# Patient Record
Sex: FEMALE | Race: WHITE | NOT HISPANIC OR LATINO | Employment: OTHER | URBAN - METROPOLITAN AREA
[De-identification: names, ages, dates, MRNs, and addresses within clinical notes are randomized per-mention and may not be internally consistent; named-entity substitution may affect disease eponyms.]

---

## 2019-02-28 DIAGNOSIS — K21.9 GASTROESOPHAGEAL REFLUX DISEASE, ESOPHAGITIS PRESENCE NOT SPECIFIED: Primary | ICD-10-CM

## 2019-02-28 RX ORDER — GABAPENTIN 400 MG/1
1 CAPSULE ORAL 4 TIMES DAILY
COMMUNITY
End: 2019-04-25 | Stop reason: SDUPTHER

## 2019-02-28 RX ORDER — TEMAZEPAM 7.5 MG/1
1 CAPSULE ORAL DAILY
COMMUNITY
End: 2019-03-07

## 2019-02-28 RX ORDER — PANTOPRAZOLE SODIUM 40 MG/1
1 TABLET, DELAYED RELEASE ORAL DAILY
COMMUNITY
Start: 2019-01-13 | End: 2019-02-28 | Stop reason: SDUPTHER

## 2019-02-28 NOTE — TELEPHONE ENCOUNTER
Script called in to pharmacy    ----- Message from Kiki Hoskins sent at 2/28/2019 11:47 AM CST -----  Contact: patient  Needs refill on PANTOTRAZOLE  40 mg ..[facility] [place]

## 2019-03-05 RX ORDER — PANTOPRAZOLE SODIUM 40 MG/1
40 TABLET, DELAYED RELEASE ORAL DAILY
Qty: 30 TABLET | Refills: 3 | Status: SHIPPED | OUTPATIENT
Start: 2019-03-05 | End: 2020-05-22

## 2019-03-06 DIAGNOSIS — C50.919 MALIGNANT NEOPLASM OF BREAST, STAGE 4, UNSPECIFIED LATERALITY: Primary | ICD-10-CM

## 2019-03-07 ENCOUNTER — OFFICE VISIT (OUTPATIENT)
Dept: HEMATOLOGY/ONCOLOGY | Facility: CLINIC | Age: 74
End: 2019-03-07
Payer: MEDICARE

## 2019-03-07 VITALS
DIASTOLIC BLOOD PRESSURE: 68 MMHG | RESPIRATION RATE: 18 BRPM | HEIGHT: 66 IN | TEMPERATURE: 98 F | BODY MASS INDEX: 21.69 KG/M2 | SYSTOLIC BLOOD PRESSURE: 131 MMHG | OXYGEN SATURATION: 95 % | HEART RATE: 70 BPM | WEIGHT: 135 LBS

## 2019-03-07 DIAGNOSIS — C50.919 CARCINOMA OF BREAST METASTATIC TO BONE, UNSPECIFIED LATERALITY: ICD-10-CM

## 2019-03-07 DIAGNOSIS — C79.51 CARCINOMA OF BREAST METASTATIC TO BONE, UNSPECIFIED LATERALITY: ICD-10-CM

## 2019-03-07 PROCEDURE — 99214 PR OFFICE/OUTPT VISIT, EST, LEVL IV, 30-39 MIN: ICD-10-PCS | Mod: S$GLB,,, | Performed by: NURSE PRACTITIONER

## 2019-03-07 PROCEDURE — 99214 OFFICE O/P EST MOD 30 MIN: CPT | Mod: S$GLB,,, | Performed by: NURSE PRACTITIONER

## 2019-03-07 RX ORDER — LAMOTRIGINE 25 MG/1
500 TABLET ORAL
Status: CANCELLED | OUTPATIENT
Start: 2019-03-07

## 2019-03-07 NOTE — PROGRESS NOTES
Subjective:      Patient ID: Benita Carballo is a 73 y.o. female.    Oncology History:  Oncology History    2002 Rt breast cancer s/p lump with chemo, [place] in [place] Tamoxifen x 1 yr, then Arimidex x 5 yrs. Completed tx 2008.     2015  c/o chronic cough with CT chest 1/28/16 showing extensive adenopathy  With base of lt neck mass 4.5 cm with yosi pulm nodules. RF to Dr ANDREWS Mcnally    2/6/16 bx of lt neck mass carcinoma with feature suggestive of breast primary. ER/%, Her2 borderline by IHC and deemed equivocal by FISH          Breast cancer metastasized to bone    2/4/2016 Initial Diagnosis     Breast cancer metastasized to bone, , lymphoid mass          5/25/2016 - 2/9/2017 Chemotherapy     Taxol/Herceptin x 8 cycles then perjeta added 7/16  Prescribed by Dr Mcnally          2/20/2017 - 6/17/2017 Chemotherapy     Kadcycla prescribed by Dr Mcnally          6/28/2017 Biopsy     Biopsy of rt abd met disease consistent with breast cancer. ER/RI + Her 2 neg per Dr Moreno's note.    Dr Moreno noted path review from bx 2/16 showed HER 2 + equivocal with FISH reading as Equivocal but ratio 1.2. Additional report form 2016 states additional testing was done with different probe and the equivocal results can be noted as negative.     Treatment therapy changed from Her2 based tx to endocrine base therapy by Dr Moreno          6/28/2017 -  Hormone Therapy     Arimidex 6/17-2/18  Aromasin 2/18  Ibrance + Aromasin 5/18 -1/19  Ibrance + faslodex 2/19                 Chief Complaint: Breast Cancer    Benita Carballo is here for continuation of Ibrance, faslodex   So far the patient appears to tolerate chemotherapy fairly well.  The patient does have mild fatigue.  Today pt states noticeable difference in left clavicle mass, pt states decreased in size since starting faslodex.           Past Medical History:   Diagnosis Date    Arthritis     Breast cancer     Depression     High cholesterol     Skin problem     Sleep disorder       Family History   Problem Relation Age of Onset    Lung cancer Father     Melanoma Father      Social History     Socioeconomic History    Marital status: Other     Spouse name: Not on file    Number of children: Not on file    Years of education: Not on file    Highest education level: Not on file   Social Needs    Financial resource strain: Not on file    Food insecurity - worry: Not on file    Food insecurity - inability: Not on file    Transportation needs - medical: Not on file    Transportation needs - non-medical: Not on file   Occupational History    Not on file   Tobacco Use    Smoking status: Current Every Day Smoker     Packs/day: 0.50     Years: 4.00     Pack years: 2.00     Types: Cigarettes    Smokeless tobacco: Never Used    Tobacco comment: Pt reports quitting for about ten years prior to 2015   Substance and Sexual Activity    Alcohol use: No     Frequency: Never    Drug use: No    Sexual activity: Not on file   Other Topics Concern    Not on file   Social History Narrative    Not on file     Past Surgical History:   Procedure Laterality Date    BREAST LUMPECTOMY Right 2002    CATARACT EXTRACTION, BILATERAL  2014    COLONOSCOPY  2008    HYSTERECTOMY      PORTACATH PLACEMENT  05/18/2016    SURGICAL REMOVAL OF NODULE OF VOCAL CORD             Review of systems:  Review of Systems   Constitutional: Negative for activity change, appetite change, chills, diaphoresis, fatigue, fever and unexpected weight change.   HENT: Negative for congestion, dental problem, mouth sores, nosebleeds, sore throat, tinnitus and voice change.    Eyes: Negative for photophobia, discharge and visual disturbance.   Respiratory: Negative for apnea, cough, choking, chest tightness, shortness of breath, wheezing and stridor.    Cardiovascular: Negative for chest pain, palpitations and leg swelling.   Gastrointestinal: Negative for abdominal distention, abdominal pain, anal bleeding, blood in stool,  constipation, diarrhea, nausea, rectal pain and vomiting.   Endocrine: Negative for cold intolerance and heat intolerance.   Genitourinary: Negative for difficulty urinating, dysuria, hematuria, menstrual problem, vaginal bleeding, vaginal discharge and vaginal pain.   Musculoskeletal: Negative for arthralgias, back pain, gait problem, joint swelling and myalgias.   Skin: Negative for color change, pallor, rash and wound.   Neurological: Negative for dizziness, syncope, light-headedness and numbness.   Hematological: Negative for adenopathy. Does not bruise/bleed easily.   Psychiatric/Behavioral: Negative for agitation, confusion, sleep disturbance and suicidal ideas. The patient is not nervous/anxious.        Objective:     Physical Exam   Constitutional: She is oriented to person, place, and time. She appears well-developed and well-nourished.   Neck: Normal range of motion.   Cardiovascular: Normal rate and regular rhythm.   Pulmonary/Chest: Effort normal and breath sounds normal.   Abdominal: Soft.   Neurological: She is alert and oriented to person, place, and time.   Psychiatric: She has a normal mood and affect.   Vitals reviewed.    Vitals:    03/07/19 1053   BP: 131/68   Pulse: 70   Resp: 18   Temp: 97.8 °F (36.6 °C)   Body surface area is 1.69 meters squared.    Labs:  Reviewed none completed.     Assessment:      1. 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, unspecified laterality           Plan:   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, unspecified laterality      Adv to cont Ibrance/Faslodex. Xgeva on hold for dental work. Will order PET at next visit  Pt undergoing chemotherapy, with risks, side effects and benefits discussed. Pt is instructed to RTC with labs for continued monitoring of treatment as instructed.     Rosalind Ricks, ANP

## 2019-04-05 ENCOUNTER — OFFICE VISIT (OUTPATIENT)
Dept: HEMATOLOGY/ONCOLOGY | Facility: CLINIC | Age: 74
End: 2019-04-05
Payer: MEDICARE

## 2019-04-05 VITALS
DIASTOLIC BLOOD PRESSURE: 64 MMHG | HEIGHT: 66 IN | WEIGHT: 139.19 LBS | OXYGEN SATURATION: 93 % | TEMPERATURE: 97 F | HEART RATE: 78 BPM | BODY MASS INDEX: 22.37 KG/M2 | RESPIRATION RATE: 18 BRPM | SYSTOLIC BLOOD PRESSURE: 135 MMHG

## 2019-04-05 DIAGNOSIS — C50.911 CARCINOMA OF RIGHT BREAST METASTATIC TO BONE: Primary | ICD-10-CM

## 2019-04-05 DIAGNOSIS — G62.9 NEUROPATHY: ICD-10-CM

## 2019-04-05 DIAGNOSIS — C79.51 CARCINOMA OF RIGHT BREAST METASTATIC TO BONE: Primary | ICD-10-CM

## 2019-04-05 PROCEDURE — 99215 PR OFFICE/OUTPT VISIT, EST, LEVL V, 40-54 MIN: ICD-10-PCS | Mod: S$GLB,,, | Performed by: NURSE PRACTITIONER

## 2019-04-05 PROCEDURE — 99215 OFFICE O/P EST HI 40 MIN: CPT | Mod: S$GLB,,, | Performed by: NURSE PRACTITIONER

## 2019-04-05 RX ORDER — LAMOTRIGINE 25 MG/1
500 TABLET ORAL
Status: CANCELLED | OUTPATIENT
Start: 2019-04-05

## 2019-04-05 NOTE — PROGRESS NOTES
Subjective:      Patient ID: Benita Carballo is a 73 y.o. female.    Oncology History:  Oncology History    2002 Rt breast cancer s/p lump with chemo, [place] in [place] Tamoxifen x 1 yr, then Arimidex x 5 yrs. Completed tx 2008.     2015  c/o chronic cough with CT chest 1/28/16 showing extensive adenopathy  With base of lt neck mass 4.5 cm with yosi pulm nodules. RF to Dr ANDREWS Mcnally    2/6/16 bx of lt neck mass carcinoma with feature suggestive of breast primary. ER/%, Her2 borderline by IHC and deemed equivocal by FISH          Breast cancer metastasized to bone    2/4/2016 Initial Diagnosis     Breast cancer metastasized to bone, , lymphoid mass          5/25/2016 - 2/9/2017 Chemotherapy     Taxol/Herceptin x 8 cycles then perjeta added 7/16  Prescribed by Dr Mcnally          2/20/2017 - 6/17/2017 Chemotherapy     Kadcycla prescribed by Dr Mcnally          6/28/2017 Biopsy     Biopsy of rt abd met disease consistent with breast cancer. ER/ME + Her 2 neg per Dr Moreno's note.    Dr Moreno noted path review from bx 2/16 showed HER 2 + equivocal with FISH reading as Equivocal but ratio 1.2. Additional report form 2016 states additional testing was done with different probe and the equivocal results can be noted as negative.     Treatment therapy changed from Her2 based tx to endocrine base therapy by Dr Moreno          6/28/2017 -  Hormone Therapy     Arimidex 6/17-2/18  Aromasin 2/18  Ibrance + Aromasin 5/18 -1/19  Ibrance + faslodex 2/19                 Chief Complaint: Breast Cancer    Benita Carballo is here for continuation of Ibrance, faslodex. Pt now on monthly faslodex injections. Patient appears to tolerate chemotherapy fairly well.  The patient does have mild fatigue and chronic neuropathy that is worse at night. Pt on gabapentin with modest relief. Discussed Anodyne therapy option but declines at this time.    Today pt states noticeable difference in left clavicle mass, pt states decreased in size since starting  faslodex.         Past Medical History:   Diagnosis Date    Arthritis     Bone cancer     Breast cancer     Cancer     Cataract     Depression     GERD (gastroesophageal reflux disease)     High cholesterol     Skin problem     Sleep disorder      Family History   Problem Relation Age of Onset    Lung cancer Father     Melanoma Father      Social History     Socioeconomic History    Marital status: Other     Spouse name: Not on file    Number of children: Not on file    Years of education: Not on file    Highest education level: Not on file   Occupational History    Not on file   Social Needs    Financial resource strain: Not on file    Food insecurity:     Worry: Not on file     Inability: Not on file    Transportation needs:     Medical: Not on file     Non-medical: Not on file   Tobacco Use    Smoking status: Current Every Day Smoker     Packs/day: 0.50     Years: 4.00     Pack years: 2.00     Types: Cigarettes    Smokeless tobacco: Never Used    Tobacco comment: Pt reports quitting for about ten years prior to 2015   Substance and Sexual Activity    Alcohol use: No     Frequency: Never    Drug use: No    Sexual activity: Not on file   Lifestyle    Physical activity:     Days per week: Not on file     Minutes per session: Not on file    Stress: Not on file   Relationships    Social connections:     Talks on phone: Not on file     Gets together: Not on file     Attends Worship service: Not on file     Active member of club or organization: Not on file     Attends meetings of clubs or organizations: Not on file     Relationship status: Not on file   Other Topics Concern    Not on file   Social History Narrative    Not on file     Past Surgical History:   Procedure Laterality Date    APPENDECTOMY      BREAST LUMPECTOMY Right 2002    CATARACT EXTRACTION, BILATERAL  2014    COLONOSCOPY  2008    HYSTERECTOMY      PORTACATH PLACEMENT  05/18/2016    SURGICAL REMOVAL OF NODULE OF  VOCAL CORD             Review of systems:  Review of Systems   Constitutional: Negative for activity change, appetite change, chills, diaphoresis, fatigue, fever and unexpected weight change.   HENT: Negative for congestion, dental problem, mouth sores, nosebleeds, sore throat, tinnitus and voice change.    Eyes: Negative for photophobia, discharge and visual disturbance.   Respiratory: Negative for apnea, cough, choking, chest tightness, shortness of breath, wheezing and stridor.    Cardiovascular: Negative for chest pain, palpitations and leg swelling.   Gastrointestinal: Negative for abdominal distention, abdominal pain, anal bleeding, blood in stool, constipation, diarrhea, nausea, rectal pain and vomiting.   Endocrine: Negative for cold intolerance and heat intolerance.   Genitourinary: Negative for difficulty urinating, dysuria, hematuria, menstrual problem, vaginal bleeding, vaginal discharge and vaginal pain.   Musculoskeletal: Negative for arthralgias, back pain, gait problem, joint swelling and myalgias.   Skin: Negative for color change, pallor, rash and wound.   Neurological: Negative for dizziness, syncope, light-headedness and numbness.   Hematological: Negative for adenopathy. Does not bruise/bleed easily.   Psychiatric/Behavioral: Negative for agitation, confusion, sleep disturbance and suicidal ideas. The patient is not nervous/anxious.        Objective:     Physical Exam   Constitutional: She is oriented to person, place, and time. She appears well-developed and well-nourished.   Neck: Normal range of motion.   Cardiovascular: Normal rate and regular rhythm.   Pulmonary/Chest: Effort normal and breath sounds normal.   Abdominal: Soft.   Musculoskeletal:        Arms:  Neurological: She is alert and oriented to person, place, and time.   Psychiatric: She has a normal mood and affect.   Vitals reviewed.    Vitals:    04/05/19 1046   BP: 135/64   Pulse: 78   Resp: 18   Temp: 97.2 °F (36.2 °C)   Body  surface area is 1.71 meters squared.    Labs:  WBC 3.5 HGB 13.1  Cr 0.86 AST 15 ALT 15    Assessment:      1. Carcinoma of right breast metastatic to bone    2. Neuropathy           Plan:   Carcinoma of right breast metastatic to bone  -     CBC w/ DIFF; Future; Expected date: 04/05/2019  -     CMP; Future; Expected date: 04/05/2019    Neuropathy      - Adv to cont Ibrance/Faslodex.   - Xgeva on hold for dental work. Pt states still working on finding dental evaluation.   - Repeat PET scan ordered for next visit.   - Cont gabapentin as prescribed.   - Pt undergoing chemotherapy, with risks, side effects and benefits discussed. Pt is instructed to RTC with labs for continued monitoring of treatment as instructed.     Rosalind Ricks, ANP

## 2019-04-22 DIAGNOSIS — C50.919 CARCINOMA OF BREAST METASTATIC TO BONE, UNSPECIFIED LATERALITY: ICD-10-CM

## 2019-04-22 DIAGNOSIS — C79.51 CARCINOMA OF BREAST METASTATIC TO BONE, UNSPECIFIED LATERALITY: ICD-10-CM

## 2019-04-22 DIAGNOSIS — C50.911 CARCINOMA OF RIGHT BREAST METASTATIC TO BONE: ICD-10-CM

## 2019-04-22 DIAGNOSIS — C79.51 CARCINOMA OF RIGHT BREAST METASTATIC TO BONE: ICD-10-CM

## 2019-04-22 DIAGNOSIS — C50.919 MALIGNANT NEOPLASM OF BREAST, STAGE 4, UNSPECIFIED LATERALITY: Primary | ICD-10-CM

## 2019-04-22 DIAGNOSIS — C50.411 MALIGNANT NEOPLASM OF UPPER-OUTER QUADRANT OF RIGHT BREAST IN FEMALE, ESTROGEN RECEPTOR POSITIVE: ICD-10-CM

## 2019-04-22 DIAGNOSIS — Z17.0 MALIGNANT NEOPLASM OF UPPER-OUTER QUADRANT OF RIGHT BREAST IN FEMALE, ESTROGEN RECEPTOR POSITIVE: ICD-10-CM

## 2019-04-23 DIAGNOSIS — C50.411 CARCINOMA OF BREAST UPPER OUTER QUADRANT, RIGHT: ICD-10-CM

## 2019-04-23 DIAGNOSIS — C79.51 CARCINOMA OF RIGHT BREAST METASTATIC TO BONE: Primary | ICD-10-CM

## 2019-04-23 DIAGNOSIS — C50.911 CARCINOMA OF RIGHT BREAST METASTATIC TO BONE: Primary | ICD-10-CM

## 2019-04-25 DIAGNOSIS — G62.9 NEUROPATHY: Primary | ICD-10-CM

## 2019-04-25 RX ORDER — GABAPENTIN 400 MG/1
400 CAPSULE ORAL 3 TIMES DAILY
Qty: 120 CAPSULE | Refills: 3 | Status: SHIPPED | OUTPATIENT
Start: 2019-04-25 | End: 2019-08-29 | Stop reason: ALTCHOICE

## 2019-04-25 RX ORDER — FUROSEMIDE 20 MG/1
TABLET ORAL
COMMUNITY
Start: 2019-04-11 | End: 2019-07-08

## 2019-04-25 RX ORDER — POTASSIUM CHLORIDE 1500 MG/1
TABLET, EXTENDED RELEASE ORAL
COMMUNITY
Start: 2019-04-11 | End: 2019-07-08

## 2019-05-02 ENCOUNTER — OFFICE VISIT (OUTPATIENT)
Dept: HEMATOLOGY/ONCOLOGY | Facility: CLINIC | Age: 74
End: 2019-05-02
Payer: MEDICARE

## 2019-05-02 VITALS
WEIGHT: 133.19 LBS | DIASTOLIC BLOOD PRESSURE: 69 MMHG | BODY MASS INDEX: 21.4 KG/M2 | OXYGEN SATURATION: 94 % | RESPIRATION RATE: 14 BRPM | HEIGHT: 66 IN | HEART RATE: 94 BPM | TEMPERATURE: 97 F | SYSTOLIC BLOOD PRESSURE: 111 MMHG

## 2019-05-02 DIAGNOSIS — C50.911 CARCINOMA OF RIGHT BREAST METASTATIC TO BONE: Primary | ICD-10-CM

## 2019-05-02 DIAGNOSIS — C79.51 CARCINOMA OF RIGHT BREAST METASTATIC TO BONE: Primary | ICD-10-CM

## 2019-05-02 DIAGNOSIS — G62.9 NEUROPATHY: ICD-10-CM

## 2019-05-02 PROCEDURE — 99215 OFFICE O/P EST HI 40 MIN: CPT | Mod: S$GLB,,, | Performed by: NURSE PRACTITIONER

## 2019-05-02 PROCEDURE — 99215 PR OFFICE/OUTPT VISIT, EST, LEVL V, 40-54 MIN: ICD-10-PCS | Mod: S$GLB,,, | Performed by: NURSE PRACTITIONER

## 2019-05-02 RX ORDER — LAMOTRIGINE 25 MG/1
500 TABLET ORAL
Status: CANCELLED | OUTPATIENT
Start: 2019-05-09

## 2019-05-02 NOTE — PROGRESS NOTES
Subjective:      Patient ID: Benita Carballo is a 73 y.o. female.    Oncology History:  Oncology History    2002 Rt breast cancer s/p lump with chemo, [place] in [place] Tamoxifen x 1 yr, then Arimidex x 5 yrs. Completed tx 2008.     2015  c/o chronic cough with CT chest 1/28/16 showing extensive adenopathy  With base of lt neck mass 4.5 cm with yosi pulm nodules. RF to Dr ANDREWS Mcnally    2/6/16 bx of lt neck mass carcinoma with feature suggestive of breast primary. ER/%, Her2 borderline by IHC and deemed equivocal by FISH          Breast cancer metastasized to bone    2/4/2016 Initial Diagnosis     Breast cancer metastasized to bone, , lymphoid mass          5/25/2016 - 2/9/2017 Chemotherapy     Taxol/Herceptin x 8 cycles then perjeta added 7/16  Prescribed by Dr Mcnally          2/20/2017 - 6/17/2017 Chemotherapy     Kadcycla prescribed by Dr Mcnally          6/28/2017 Biopsy     Biopsy of rt abd met disease consistent with breast cancer. ER/MN + Her 2 neg per Dr Moreno's note.    Dr Moreno noted path review from bx 2/16 showed HER 2 + equivocal with FISH reading as Equivocal but ratio 1.2. Additional report form 2016 states additional testing was done with different probe and the equivocal results can be noted as negative.     Treatment therapy changed from Her2 based tx to endocrine base therapy by Dr Moreno          6/28/2017 -  Hormone Therapy     Arimidex 6/17-2/18  Aromasin 2/18  Ibrance + Aromasin 5/18 -1/19  Ibrance + faslodex 2/19 4/30/2019 Imaging Significant Findings     PET/CT: Lesions involving the medial clavicle , subcarinal LN, pleural based soft tissue density adjacent to T11 and lesion in left lobe of liver diminished in size and SUV.   RECIST 34% decrease in disease              Chief Complaint: Breast Cancer    Benita Carballo is here for continuation of Ibrance, faslodex. Pt now on monthly faslodex injections. Patient appears to tolerate chemotherapy fairly well.  The patient does have mild  fatigue and chronic neuropathy that is worse at night. Pt on gabapentin with modest relief. Discussed Anodyne therapy option but declines at this time. Pt states noticeable difference in left clavicle mass, pt states decreased in size since starting faslodex.     Today, Ms Carballo is here to discuss recent CT/PET scan completed on 4/30/19 showing response to current treatment regimen.       Past Medical History:   Diagnosis Date    Arthritis     Bone cancer     Breast cancer     Cancer     Cataract     Depression     GERD (gastroesophageal reflux disease)     High cholesterol     Skin problem     Sleep disorder      Family History   Problem Relation Age of Onset    Lung cancer Father     Melanoma Father      Social History     Socioeconomic History    Marital status: Other     Spouse name: Not on file    Number of children: Not on file    Years of education: Not on file    Highest education level: Not on file   Occupational History    Not on file   Social Needs    Financial resource strain: Not on file    Food insecurity:     Worry: Not on file     Inability: Not on file    Transportation needs:     Medical: Not on file     Non-medical: Not on file   Tobacco Use    Smoking status: Current Every Day Smoker     Packs/day: 0.50     Years: 4.00     Pack years: 2.00     Types: Cigarettes    Smokeless tobacco: Never Used    Tobacco comment: Pt reports quitting for about ten years prior to 2015   Substance and Sexual Activity    Alcohol use: No     Frequency: Never    Drug use: No    Sexual activity: Not on file   Lifestyle    Physical activity:     Days per week: Not on file     Minutes per session: Not on file    Stress: Not on file   Relationships    Social connections:     Talks on phone: Not on file     Gets together: Not on file     Attends [employer] service: Not on file     Active member of club or organization: Not on file     Attends meetings of clubs or organizations: Not on file      Relationship status: Not on file   Other Topics Concern    Not on file   Social History Narrative    Not on file     Past Surgical History:   Procedure Laterality Date    APPENDECTOMY      BREAST LUMPECTOMY Right 2002    CATARACT EXTRACTION, BILATERAL  2014    COLONOSCOPY  2008    HYSTERECTOMY      PORTACATH PLACEMENT  05/18/2016    SURGICAL REMOVAL OF NODULE OF VOCAL CORD             Review of systems:  Review of Systems   Constitutional: Negative for activity change, appetite change, chills, diaphoresis, fatigue, fever and unexpected weight change.   HENT: Negative for congestion, dental problem, mouth sores, nosebleeds, sore throat, tinnitus and voice change.    Eyes: Negative for photophobia, discharge and visual disturbance.   Respiratory: Negative for apnea, cough, choking, chest tightness, shortness of breath, wheezing and stridor.    Cardiovascular: Negative for chest pain, palpitations and leg swelling.   Gastrointestinal: Negative for abdominal distention, abdominal pain, anal bleeding, blood in stool, constipation, diarrhea, nausea, rectal pain and vomiting.   Endocrine: Negative for cold intolerance and heat intolerance.   Genitourinary: Negative for difficulty urinating, dysuria, hematuria, menstrual problem, vaginal bleeding, vaginal discharge and vaginal pain.   Musculoskeletal: Negative for arthralgias, back pain, gait problem, joint swelling and myalgias.   Skin: Negative for color change, pallor, rash and wound.   Neurological: Negative for dizziness, syncope, light-headedness and numbness.   Hematological: Negative for adenopathy. Does not bruise/bleed easily.   Psychiatric/Behavioral: Negative for agitation, confusion, sleep disturbance and suicidal ideas. The patient is not nervous/anxious.        Objective:     Physical Exam   Constitutional: She is oriented to person, place, and time. She appears well-developed and well-nourished.   Neck: Normal range of motion.   Cardiovascular:  Normal rate and regular rhythm.   Pulmonary/Chest: Effort normal and breath sounds normal.   Abdominal: Soft.   Musculoskeletal:        Arms:  Neurological: She is alert and oriented to person, place, and time.   Psychiatric: She has a normal mood and affect.   Vitals reviewed.    Vitals:    05/02/19 1030   BP: 111/69   Pulse: 94   Resp: 14   Temp: 97.2 °F (36.2 °C)   Body surface area is 1.68 meters squared.    Labs:  WBC 3.5 HGB 13.1  Cr 0.86 AST 15 ALT 15    Assessment:      1. Carcinoma of right breast metastatic to bone    2. Neuropathy           Plan:   Carcinoma of right breast metastatic to bone    Neuropathy      - Pet scan discussed with patient and family member showing response to therapy.  - Adv to cont Ibrance/Faslodex which is scheduled for next Thusday.    - Xgeva on hold for dental work. Pt states still working on finding dental evaluation.   - Cont gabapentin as prescribed.   - Pt undergoing chemotherapy, with risks, side effects and benefits discussed. Pt is instructed to RTC with labs for continued monitoring of treatment as instructed.   - Today I spent over 25 - 30 minutes in patient care.  More than 50% of that time was spent in direct face-to-face patient counseling.        Rosalind Ricks, ANP

## 2019-06-06 ENCOUNTER — OFFICE VISIT (OUTPATIENT)
Dept: HEMATOLOGY/ONCOLOGY | Facility: CLINIC | Age: 74
End: 2019-06-06
Payer: MEDICARE

## 2019-06-06 VITALS
HEIGHT: 66 IN | SYSTOLIC BLOOD PRESSURE: 145 MMHG | BODY MASS INDEX: 21.69 KG/M2 | HEART RATE: 70 BPM | DIASTOLIC BLOOD PRESSURE: 73 MMHG | RESPIRATION RATE: 14 BRPM | OXYGEN SATURATION: 92 % | TEMPERATURE: 98 F | WEIGHT: 135 LBS

## 2019-06-06 DIAGNOSIS — C79.51 CARCINOMA OF RIGHT BREAST METASTATIC TO BONE: Primary | ICD-10-CM

## 2019-06-06 DIAGNOSIS — C50.911 CARCINOMA OF RIGHT BREAST METASTATIC TO BONE: Primary | ICD-10-CM

## 2019-06-06 DIAGNOSIS — D64.9 FATIGUE ASSOCIATED WITH ANEMIA: ICD-10-CM

## 2019-06-06 PROCEDURE — 99214 OFFICE O/P EST MOD 30 MIN: CPT | Mod: S$GLB,,, | Performed by: NURSE PRACTITIONER

## 2019-06-06 PROCEDURE — 99214 PR OFFICE/OUTPT VISIT, EST, LEVL IV, 30-39 MIN: ICD-10-PCS | Mod: S$GLB,,, | Performed by: NURSE PRACTITIONER

## 2019-06-06 RX ORDER — LAMOTRIGINE 25 MG/1
500 TABLET ORAL
Status: CANCELLED | OUTPATIENT
Start: 2019-06-06

## 2019-06-06 NOTE — PROGRESS NOTES
Subjective:      Patient ID: Benita Carballo is a 73 y.o. female.    Oncology History:  Oncology History    2002 Rt breast cancer s/p lump with chemo, [place] in [place]. Tamoxifen x 1 yr, then Arimidex x 5 yrs. Completed tx 2008.     2015  c/o chronic cough with CT chest 1/28/16 showing extensive adenopathy  With base of lt neck mass 4.5 cm with yosi pulm nodules. RF to Dr ANDREWS Mcnally    2/6/16 bx of lt neck mass carcinoma with feature suggestive of breast primary. ER/%, Her2 borderline by IHC and deemed equivocal by FISH          Breast cancer metastasized to bone    2/4/2016 Initial Diagnosis     Breast cancer metastasized to bone, , lymphoid mass          5/25/2016 - 2/9/2017 Chemotherapy     Taxol/Herceptin x 8 cycles then perjeta added 7/16  Prescribed by Dr Mcnally          2/20/2017 - 6/17/2017 Chemotherapy     Kadcycla prescribed by Dr Mcnally          6/28/2017 Biopsy     Biopsy of rt abd met disease consistent with breast cancer. ER/TX + Her 2 neg per Dr Moreno's note.    Dr Moreno noted path review from bx 2/16 showed HER 2 + equivocal with FISH reading as Equivocal but ratio 1.2. Additional report form 2016 states additional testing was done with different probe and the equivocal results can be noted as negative.     Treatment therapy changed from Her2 based tx to endocrine base therapy by Dr Moreno          6/28/2017 -  Hormone Therapy     Arimidex 6/17-2/18  Aromasin 2/18  Ibrance + Aromasin 5/18 -1/19  Ibrance + faslodex 2/19 4/30/2019 Imaging Significant Findings     PET/CT: Lesions involving the medial clavicle , subcarinal LN, pleural based soft tissue density adjacent to T11 and lesion in left lobe of liver diminished in size and SUV.   RECIST 34% decrease in disease              Chief Complaint: Breast Cancer    Benita Carballo is here for continuation of Ibrance, faslodex which she started in 2/19. Pt now on monthly faslodex injections. Patient appears to tolerate chemotherapy fairly well.  The  patient does have mild fatigue and chronic neuropathy that is worse at night. Pt on gabapentin with modest relief. Discussed Anodyne therapy option but declines at this time. Her xgeva has been on hold due to upcoming dental work that has been planned for several months but has not yet been completed.     Pt states noticeable difference in left clavicle mass, pt states decreased in size since starting faslodex. Ms Carballo most recent CT/PET scan on 4/30/19 showed response to current treatment regimen.     Today, she is her for month lab evaluation for her concurrent use of Ibrance + Faslodex. She states she is to undergo a vocal cord bx on Monday with Dr Singh due to chronic hoarseness per PCP requests. She c/o of fatigue and continuing hair thining while on faslodex.       Past Medical History:   Diagnosis Date    Arthritis     Bone cancer     Breast cancer     Cancer     Cataract     Depression     Fatigue associated with anemia 6/6/2019    GERD (gastroesophageal reflux disease)     High cholesterol     Skin problem     Sleep disorder      Family History   Problem Relation Age of Onset    Lung cancer Father     Melanoma Father      Social History     Socioeconomic History    Marital status: Other     Spouse name: Not on file    Number of children: Not on file    Years of education: Not on file    Highest education level: Not on file   Occupational History    Not on file   Social Needs    Financial resource strain: Not on file    Food insecurity:     Worry: Not on file     Inability: Not on file    Transportation needs:     Medical: Not on file     Non-medical: Not on file   Tobacco Use    Smoking status: Current Every Day Smoker     Packs/day: 0.50     Years: 4.00     Pack years: 2.00     Types: Cigarettes    Smokeless tobacco: Never Used    Tobacco comment: Pt reports quitting for about ten years prior to 2015   Substance and Sexual Activity    Alcohol use: No     Frequency: Never     Drug use: No    Sexual activity: Not on file   Lifestyle    Physical activity:     Days per week: Not on file     Minutes per session: Not on file    Stress: Not on file   Relationships    Social connections:     Talks on phone: Not on file     Gets together: Not on file     Attends Jainism service: Not on file     Active member of club or organization: Not on file     Attends meetings of clubs or organizations: Not on file     Relationship status: Not on file   Other Topics Concern    Not on file   Social History Narrative    Not on file     Past Surgical History:   Procedure Laterality Date    APPENDECTOMY      BREAST LUMPECTOMY Right 2002    CATARACT EXTRACTION, BILATERAL  2014    COLONOSCOPY  2008    HYSTERECTOMY      PORTACATH PLACEMENT  05/18/2016    SURGICAL REMOVAL OF NODULE OF VOCAL CORD             Review of systems:  Review of Systems   Constitutional: Negative for activity change, appetite change, chills, diaphoresis, fatigue, fever and unexpected weight change.   HENT: Positive for voice change. Negative for congestion, dental problem, mouth sores, nosebleeds, sore throat and tinnitus.    Eyes: Negative for photophobia, discharge and visual disturbance.   Respiratory: Negative for apnea, cough, choking, chest tightness, shortness of breath, wheezing and stridor.    Cardiovascular: Negative for chest pain, palpitations and leg swelling.   Gastrointestinal: Negative for abdominal distention, abdominal pain, anal bleeding, blood in stool, constipation, diarrhea, nausea, rectal pain and vomiting.   Endocrine: Negative for cold intolerance and heat intolerance.   Genitourinary: Negative for difficulty urinating, dysuria, hematuria, menstrual problem, vaginal bleeding, vaginal discharge and vaginal pain.   Musculoskeletal: Negative for arthralgias, back pain, gait problem, joint swelling and myalgias.   Skin: Negative for color change, pallor, rash and wound.   Neurological: Negative for  dizziness, syncope, light-headedness and numbness.   Hematological: Negative for adenopathy. Does not bruise/bleed easily.   Psychiatric/Behavioral: Negative for agitation, confusion, sleep disturbance and suicidal ideas. The patient is not nervous/anxious.        Objective:     Physical Exam   Constitutional: She is oriented to person, place, and time. She appears well-developed and well-nourished.   Neck: Normal range of motion.   Cardiovascular: Normal rate and regular rhythm.   Pulmonary/Chest: Effort normal and breath sounds normal.   Abdominal: Soft.   Musculoskeletal:        Arms:  Neurological: She is alert and oriented to person, place, and time.   Psychiatric: She has a normal mood and affect.   Vitals reviewed.    Vitals:    06/06/19 1022   BP: (!) 145/73   Pulse: 70   Resp: 14   Temp: 98.3 °F (36.8 °C)   Body surface area is 1.69 meters squared.    Labs:  WBC 5.0 HGB 11.8  Cr 0.86 AST 15 ALT 15    Assessment:      1. Carcinoma of right breast metastatic to bone    2. Fatigue associated with anemia           Plan:   Carcinoma of right breast metastatic to bone  -     CBC w/ DIFF; Standing  -     CMP; Standing    Fatigue associated with anemia      - Adv to cont Ibrance/Faslodex which is due today/   - Xgeva on hold for dental work. Pt states still working on finding dental evaluation.   - Cont gabapentin as prescribed.  - B12 today.  - RTC 1 month with labs.        Rosalind Ricks, ANP

## 2019-07-05 DIAGNOSIS — C50.911 CARCINOMA OF RIGHT BREAST METASTATIC TO BONE: Primary | ICD-10-CM

## 2019-07-05 DIAGNOSIS — C79.51 CARCINOMA OF RIGHT BREAST METASTATIC TO BONE: Primary | ICD-10-CM

## 2019-07-08 ENCOUNTER — OFFICE VISIT (OUTPATIENT)
Dept: HEMATOLOGY/ONCOLOGY | Facility: CLINIC | Age: 74
End: 2019-07-08
Payer: MEDICARE

## 2019-07-08 VITALS
RESPIRATION RATE: 12 BRPM | HEART RATE: 71 BPM | TEMPERATURE: 98 F | HEIGHT: 66 IN | DIASTOLIC BLOOD PRESSURE: 59 MMHG | BODY MASS INDEX: 21.89 KG/M2 | SYSTOLIC BLOOD PRESSURE: 108 MMHG | WEIGHT: 136.19 LBS | OXYGEN SATURATION: 94 %

## 2019-07-08 DIAGNOSIS — C50.911 CARCINOMA OF RIGHT BREAST METASTATIC TO BONE: ICD-10-CM

## 2019-07-08 DIAGNOSIS — C50.812 MALIGNANT NEOPLASM OF OVERLAPPING SITES OF LEFT BREAST IN FEMALE, ESTROGEN RECEPTOR POSITIVE: ICD-10-CM

## 2019-07-08 DIAGNOSIS — T50.905A DRUG-RELATED HAIR LOSS: Primary | ICD-10-CM

## 2019-07-08 DIAGNOSIS — C79.51 CARCINOMA OF RIGHT BREAST METASTATIC TO BONE: ICD-10-CM

## 2019-07-08 DIAGNOSIS — Z17.0 MALIGNANT NEOPLASM OF OVERLAPPING SITES OF LEFT BREAST IN FEMALE, ESTROGEN RECEPTOR POSITIVE: ICD-10-CM

## 2019-07-08 DIAGNOSIS — C79.51 CANCER, METASTATIC TO BONE: ICD-10-CM

## 2019-07-08 DIAGNOSIS — G62.9 NEUROPATHY: ICD-10-CM

## 2019-07-08 DIAGNOSIS — L65.8 DRUG-RELATED HAIR LOSS: Primary | ICD-10-CM

## 2019-07-08 DIAGNOSIS — D64.9 FATIGUE ASSOCIATED WITH ANEMIA: ICD-10-CM

## 2019-07-08 PROCEDURE — 99215 OFFICE O/P EST HI 40 MIN: CPT | Mod: S$GLB,,, | Performed by: NURSE PRACTITIONER

## 2019-07-08 PROCEDURE — 99215 PR OFFICE/OUTPT VISIT, EST, LEVL V, 40-54 MIN: ICD-10-PCS | Mod: S$GLB,,, | Performed by: NURSE PRACTITIONER

## 2019-07-08 RX ORDER — LAMOTRIGINE 25 MG/1
500 TABLET ORAL
Status: CANCELLED | OUTPATIENT
Start: 2019-07-08

## 2019-07-08 NOTE — PROGRESS NOTES
Subjective:      Patient ID: Benita Carballo is a 73 y.o. female.    Oncology History:  Oncology History    2002 Rt breast cancer s/p lump with chemo, [place] in [place] Tamoxifen x 1 yr, then Arimidex x 5 yrs. Completed tx 2008.     2015  c/o chronic cough with CT chest 1/28/16 showing extensive adenopathy  With base of lt neck mass 4.5 cm with yosi pulm nodules. RF to Dr ANDREWS Mcnally    2/6/16 bx of lt neck mass carcinoma with feature suggestive of breast primary. ER/%, Her2 borderline by IHC and deemed equivocal by FISH          Breast cancer metastasized to bone    2/4/2016 Initial Diagnosis     Breast cancer metastasized to bone, , lymphoid mass          5/25/2016 - 2/9/2017 Chemotherapy     Taxol/Herceptin x 8 cycles then perjeta added 7/16  Prescribed by Dr Mcnally          2/20/2017 - 6/17/2017 Chemotherapy     Kadcycla prescribed by Dr Mcnally          6/28/2017 Biopsy     Biopsy of rt abd met disease consistent with breast cancer. ER/NC + Her 2 neg per Dr Moreno's note.    Dr Moreno noted path review from bx 2/16 showed HER 2 + equivocal with FISH reading as Equivocal but ratio 1.2. Additional report form 2016 states additional testing was done with different probe and the equivocal results can be noted as negative.     Treatment therapy changed from Her2 based tx to endocrine base therapy by Dr Moreno          6/28/2017 -  Hormone Therapy     Arimidex 6/17-2/18  Aromasin 2/18  Ibrance + Aromasin 5/18 -1/19  Ibrance + faslodex 2/19 4/30/2019 Imaging Significant Findings     PET/CT: Lesions involving the medial clavicle , subcarinal LN, pleural based soft tissue density adjacent to T11 and lesion in left lobe of liver diminished in size and SUV.   RECIST 34% decrease in disease              Chief Complaint: Breast Cancer    Benita Carballo is here for continuation of Ibrance, faslodex which she started in 2/19. Pt now on monthly faslodex injections. Patient appears to tolerate chemotherapy fairly well.  The  patient does have mild fatigue and chronic neuropathy that is worse at night. Pt on gabapentin with modest relief. Discussed Anodyne therapy option but declines at this time. Her xgeva has been on hold due to upcoming dental work that has been planned for several months but has not yet been completed.     Pt states noticeable difference in left clavicle mass, pt states decreased in size since starting faslodex. Ms Carballo most recent CT/PET scan on 4/30/19 showed response to current treatment regimen.     Today, she is her for month lab evaluation for her concurrent use of Ibrance + Faslodex. She states she is to undergo a vocal cord bx on Monday with Dr Singh due to chronic hoarseness per PCP requests which shows squamous dysplasia and no evidence of malignancy. She c/o of fatigue and continuing hair thining while on Ibrance + faslodex.       Past Medical History:   Diagnosis Date    Arthritis     Bone cancer     Breast cancer     Cancer     Cataract     Depression     Fatigue associated with anemia 6/6/2019    GERD (gastroesophageal reflux disease)     High cholesterol     Skin problem     Sleep disorder      Family History   Problem Relation Age of Onset    Lung cancer Father     Melanoma Father      Social History     Socioeconomic History    Marital status: Other     Spouse name: Not on file    Number of children: Not on file    Years of education: Not on file    Highest education level: Not on file   Occupational History    Not on file   Social Needs    Financial resource strain: Not on file    Food insecurity:     Worry: Not on file     Inability: Not on file    Transportation needs:     Medical: Not on file     Non-medical: Not on file   Tobacco Use    Smoking status: Current Every Day Smoker     Packs/day: 0.50     Years: 4.00     Pack years: 2.00     Types: Cigarettes    Smokeless tobacco: Never Used    Tobacco comment: Pt reports quitting for about ten years prior to 2015    Substance and Sexual Activity    Alcohol use: No     Frequency: Never    Drug use: No    Sexual activity: Not on file   Lifestyle    Physical activity:     Days per week: Not on file     Minutes per session: Not on file    Stress: Not on file   Relationships    Social connections:     Talks on phone: Not on file     Gets together: Not on file     Attends [employer] service: Not on file     Active member of club or organization: Not on file     Attends meetings of clubs or organizations: Not on file     Relationship status: Not on file   Other Topics Concern    Not on file   Social History Narrative    Not on file     Past Surgical History:   Procedure Laterality Date    APPENDECTOMY      BREAST LUMPECTOMY Right 2002    CATARACT EXTRACTION, BILATERAL  2014    COLONOSCOPY  2008    HYSTERECTOMY      PORTACATH PLACEMENT  05/18/2016    SURGICAL REMOVAL OF NODULE OF VOCAL CORD             Review of systems:  Review of Systems   Constitutional: Negative for activity change, appetite change, chills, diaphoresis, fatigue, fever and unexpected weight change.   HENT: Positive for voice change. Negative for congestion, dental problem, mouth sores, nosebleeds, sore throat and tinnitus.    Eyes: Negative for photophobia, discharge and visual disturbance.   Respiratory: Negative for apnea, cough, choking, chest tightness, shortness of breath, wheezing and stridor.    Cardiovascular: Negative for chest pain, palpitations and leg swelling.   Gastrointestinal: Negative for abdominal distention, abdominal pain, anal bleeding, blood in stool, constipation, diarrhea, nausea, rectal pain and vomiting.   Endocrine: Negative for cold intolerance and heat intolerance.   Genitourinary: Negative for difficulty urinating, dysuria, hematuria, menstrual problem, vaginal bleeding, vaginal discharge and vaginal pain.   Musculoskeletal: Negative for arthralgias, back pain, gait problem, joint swelling and myalgias.   Skin: Negative  for color change, pallor, rash and wound.   Neurological: Negative for dizziness, syncope, light-headedness and numbness.   Hematological: Negative for adenopathy. Does not bruise/bleed easily.   Psychiatric/Behavioral: Negative for agitation, confusion, sleep disturbance and suicidal ideas. The patient is not nervous/anxious.        Objective:     Physical Exam   Constitutional: She is oriented to person, place, and time. She appears well-developed and well-nourished.   Neck: Normal range of motion.   Cardiovascular: Normal rate and regular rhythm.   Pulmonary/Chest: Effort normal and breath sounds normal.   Abdominal: Soft.   Musculoskeletal:        Arms:  Neurological: She is alert and oriented to person, place, and time.   Psychiatric: She has a normal mood and affect.   Vitals reviewed.    Vitals:    07/08/19 1030   BP: (!) 108/59   Pulse: 71   Resp: 12   Temp: 97.5 °F (36.4 °C)   Body surface area is 1.7 meters squared.    Labs:  7/5/19 WBC 4.9 HGB 12.5  ANC 3.69 CMP WNL   Assessment:      1. Drug-related hair loss    2. Carcinoma of right breast metastatic to bone    3. Neuropathy    4. Fatigue associated with anemia           Plan:   Drug-related hair loss    Carcinoma of right breast metastatic to bone    Neuropathy    Fatigue associated with anemia      - Adv to cont Ibrance/Faslodex which is due today  - Xgeva on hold for dental work. Pt states still working on finding dental evaluation.   - Cont gabapentin as prescribed.  - B12 today.  - RTC 1 month with labs and PET results.       Rosalind Ricks, ANP

## 2019-08-06 ENCOUNTER — OFFICE VISIT (OUTPATIENT)
Dept: HEMATOLOGY/ONCOLOGY | Facility: CLINIC | Age: 74
End: 2019-08-06
Payer: MEDICARE

## 2019-08-06 VITALS
OXYGEN SATURATION: 94 % | BODY MASS INDEX: 22.02 KG/M2 | WEIGHT: 137 LBS | DIASTOLIC BLOOD PRESSURE: 57 MMHG | HEIGHT: 66 IN | TEMPERATURE: 97 F | SYSTOLIC BLOOD PRESSURE: 112 MMHG | HEART RATE: 75 BPM

## 2019-08-06 DIAGNOSIS — C79.51 CARCINOMA OF RIGHT BREAST METASTATIC TO BONE: Primary | ICD-10-CM

## 2019-08-06 DIAGNOSIS — C50.911 CARCINOMA OF RIGHT BREAST METASTATIC TO BONE: Primary | ICD-10-CM

## 2019-08-06 DIAGNOSIS — T50.905A DRUG-RELATED HAIR LOSS: ICD-10-CM

## 2019-08-06 DIAGNOSIS — D64.9 FATIGUE ASSOCIATED WITH ANEMIA: ICD-10-CM

## 2019-08-06 DIAGNOSIS — L65.8 DRUG-RELATED HAIR LOSS: ICD-10-CM

## 2019-08-06 PROCEDURE — 99214 OFFICE O/P EST MOD 30 MIN: CPT | Mod: S$GLB,,, | Performed by: NURSE PRACTITIONER

## 2019-08-06 PROCEDURE — 99214 PR OFFICE/OUTPT VISIT, EST, LEVL IV, 30-39 MIN: ICD-10-PCS | Mod: S$GLB,,, | Performed by: NURSE PRACTITIONER

## 2019-08-06 RX ORDER — LAMOTRIGINE 25 MG/1
500 TABLET ORAL
Status: CANCELLED | OUTPATIENT
Start: 2019-08-06

## 2019-08-06 NOTE — PROGRESS NOTES
Subjective:      Patient ID: Benita Carballo is a 73 y.o. female.    Oncology History:  Oncology History    2002 Rt breast cancer s/p lump with chemo, [place] in [place] Tamoxifen x 1 yr, then Arimidex x 5 yrs. Completed tx 2008.     2015  c/o chronic cough with CT chest 1/28/16 showing extensive adenopathy  With base of lt neck mass 4.5 cm with yosi pulm nodules. RF to Dr ANDREWS Mcnally    2/6/16 bx of lt neck mass carcinoma with feature suggestive of breast primary. ER/%, Her2 borderline by IHC and deemed equivocal by FISH          Breast cancer metastasized to bone    2/4/2016 Initial Diagnosis     Breast cancer metastasized to bone, , lymphoid mass          5/25/2016 - 2/9/2017 Chemotherapy     Taxol/Herceptin x 8 cycles then perjeta added 7/16  Prescribed by Dr Mcnally          2/20/2017 - 6/17/2017 Chemotherapy     Kadcycla prescribed by Dr Mcnally          6/28/2017 Biopsy     Biopsy of rt abd met disease consistent with breast cancer. ER/ID + Her 2 neg per Dr Moreno's note.    Dr Moreno noted path review from bx 2/16 showed HER 2 + equivocal with FISH reading as Equivocal but ratio 1.2. Additional report form 2016 states additional testing was done with different probe and the equivocal results can be noted as negative.     Treatment therapy changed from Her2 based tx to endocrine base therapy by Dr Moreno          6/28/2017 -  Hormone Therapy     Arimidex 6/17-2/18  Aromasin 2/18  Ibrance + Aromasin 5/18 -1/19  Ibrance + faslodex 2/19 4/30/2019 Imaging Significant Findings     PET/CT: Lesions involving the medial clavicle , subcarinal LN, pleural based soft tissue density adjacent to T11 and lesion in left lobe of liver diminished in size and SUV.   RECIST 34% decrease in disease              Chief Complaint: Breast Cancer (follow up)    Benita Carballo is here for continuation of Ibrance, faslodex which she started in 2/19. Pt now on monthly faslodex injections. Patient appears to tolerate chemotherapy fairly  well.  The patient does have mild fatigue and chronic neuropathy that is worse at night. Pt on gabapentin with modest relief. Discussed Anodyne therapy option but declines at this time. Her xgeva has been on hold due to upcoming dental work that has been planned for several months but has not yet been completed.     Pt states noticeable difference in left clavicle mass, pt states completely resolved today. Ms Carballo most recent CT/PET scan on 4/30/19 showed response to current treatment regimen.     Today, she is her for month lab evaluation for her concurrent use of Ibrance + Faslodex. She underwent a vocal cord bx in the past with Dr Singh due to chronic hoarseness per PCP requests which shows squamous dysplasia and no evidence of malignancy. She c/o of fatigue and continuing hair thining while on Ibrance + faslodex.       Past Medical History:   Diagnosis Date    Arthritis     Bone cancer     Breast cancer     Cancer     Cataract     Depression     Fatigue associated with anemia 6/6/2019    GERD (gastroesophageal reflux disease)     High cholesterol     Skin problem     Sleep disorder      Family History   Problem Relation Age of Onset    Lung cancer Father     Melanoma Father      Social History     Socioeconomic History    Marital status: Other     Spouse name: Not on file    Number of children: Not on file    Years of education: Not on file    Highest education level: Not on file   Occupational History    Not on file   Social Needs    Financial resource strain: Not on file    Food insecurity:     Worry: Not on file     Inability: Not on file    Transportation needs:     Medical: Not on file     Non-medical: Not on file   Tobacco Use    Smoking status: Current Every Day Smoker     Packs/day: 0.50     Years: 4.00     Pack years: 2.00     Types: Cigarettes    Smokeless tobacco: Never Used    Tobacco comment: Pt reports quitting for about ten years prior to 2015   Substance and Sexual  Activity    Alcohol use: Yes     Alcohol/week: 2.4 oz     Types: 4 Glasses of wine per week     Frequency: Never     Comment: Grandson's wedding celebration    Drug use: No    Sexual activity: Not on file   Lifestyle    Physical activity:     Days per week: Not on file     Minutes per session: Not on file    Stress: Not on file   Relationships    Social connections:     Talks on phone: Not on file     Gets together: Not on file     Attends Latter-day service: Not on file     Active member of club or organization: Not on file     Attends meetings of clubs or organizations: Not on file     Relationship status: Not on file   Other Topics Concern    Not on file   Social History Narrative    Not on file     Past Surgical History:   Procedure Laterality Date    APPENDECTOMY      BREAST LUMPECTOMY Right 2002    CATARACT EXTRACTION, BILATERAL  2014    COLONOSCOPY  2008    HYSTERECTOMY      PORTACATH PLACEMENT  05/18/2016    SURGICAL REMOVAL OF NODULE OF VOCAL CORD             Review of systems:  Review of Systems   Constitutional: Negative for activity change, appetite change, chills, diaphoresis, fatigue, fever and unexpected weight change.   HENT: Positive for voice change. Negative for congestion, dental problem, mouth sores, nosebleeds, sore throat and tinnitus.    Eyes: Negative for photophobia, discharge and visual disturbance.   Respiratory: Negative for apnea, cough, choking, chest tightness, shortness of breath, wheezing and stridor.    Cardiovascular: Negative for chest pain, palpitations and leg swelling.   Gastrointestinal: Negative for abdominal distention, abdominal pain, anal bleeding, blood in stool, constipation, diarrhea, nausea, rectal pain and vomiting.   Endocrine: Negative for cold intolerance and heat intolerance.   Genitourinary: Negative for difficulty urinating, dysuria, hematuria, menstrual problem, vaginal bleeding, vaginal discharge and vaginal pain.   Musculoskeletal: Negative for  arthralgias, back pain, gait problem, joint swelling and myalgias.   Skin: Negative for color change, pallor, rash and wound.   Neurological: Negative for dizziness, syncope, light-headedness and numbness.   Hematological: Negative for adenopathy. Does not bruise/bleed easily.   Psychiatric/Behavioral: Negative for agitation, confusion, sleep disturbance and suicidal ideas. The patient is not nervous/anxious.        Objective:     Physical Exam   Constitutional: She is oriented to person, place, and time. She appears well-developed and well-nourished.   Neck: Normal range of motion.   Cardiovascular: Normal rate and regular rhythm.   Pulmonary/Chest: Effort normal and breath sounds normal.   Abdominal: Soft.   Musculoskeletal:        Arms:  Neurological: She is alert and oriented to person, place, and time.   Psychiatric: She has a normal mood and affect.   Vitals reviewed.    Vitals:    08/06/19 1106   BP: (!) 112/57   Pulse: 75   Temp: 96.9 °F (36.1 °C)   Body surface area is 1.7 meters squared.    Labs:  7/5/19 WBC 4.9 HGB 12.5  ANC 3.69 CMP WNL   Assessment:      No diagnosis found.       Plan:   There are no diagnoses linked to this encounter.  - Adv to cont Ibrance/Faslodex which is due today  - Xgeva on hold for dental work. Pt states still working on finding dental evaluation.   - Cont gabapentin as prescribed.  - B12 today.  - RTC 1 month with labs and PET results.       Rosalind Ricks, ANP

## 2019-08-15 NOTE — PROGRESS NOTES
Subjective:       Patient ID: Benita Carballo is a 73 y.o. female.    Chief Complaint: No chief complaint on file.    HPI     Mrs Carballo returns today for follow up.  Briefly, she is a 73 year old female with metastatic breast cancer who recently had a PET scan that is highly suggestive of disease progression.  She is here to discuss further treatment options.  Her most recent treatment was the combination of ibrance plus faslodex, and she has been on it since February 2019..  For complete oncologic history please refer to the NP's note dated 8/9/2109  which I had the opportunity to review.        Review of Systems    Overall she feels fair.  Her main complaint is her neuropathic pain on both the upper and the lower extremities.  She states that she has difficulty sleeping through the night and has to wake up and take neurontin.  She appears anxious but she denies any depression, easy bruising, fevers, chills, night  sweats, weight loss, nausea, vomiting, diarrhea, constipation, diplopia,blurred vision, headache, chest pain, palpitations, shortness of breath, breast pain, abdominal pain, or difficulty ambulating.  The remainder of the ten-point ROS, including general, skin, lymph, H/N, cardiorespiratory, GI, , Neuro, Endocrine, and psychiatric is negative.     Objective:      Physical Exam      She is alert, oriented to time, place, person, pleasant, well      nourished, in no acute physical distress.  She is here with her son.                                 VITAL SIGNS:  Reviewed                                      HEENT:  Normal.  There are no nasal, oral, lip, gingival, auricular, lid,    or conjunctival lesions.  Mucosae are moist and pink, and there is no        thrush.  Pupils are equal, reactive to light and accommodation.              Extraocular muscle movements are intact.  Dentition is good.  There is no frontal or maxillary tenderness.                                     NECK:  Supple without  JVD, adenopathy, or thyromegaly.                       LUNGS:  Clear to auscultation without wheezing, rales, or rhonchi.           CARDIOVASCULAR:  Reveals an S1, S2, no murmurs, no rubs, no gallops.         ABDOMEN:  Soft, nontender, without organomegaly.  Bowel sounds are    present.                                                                     EXTREMITIES:  No cyanosis, clubbing, or edema.                               BREASTS:  She is status post right lumpectomy with a well-healed incision.    There are no masses in either breast.                                        LYMPHATIC:  There is no cervical, axillary, or supraclavicular adenopathy.   SKIN:  Warm and moist, without petechiae, rashes, induration, or ecchymoses.           NEUROLOGIC:  DTRs are 0-1+ bilaterally, symmetrical, motor function is 5/5,  and cranial nerves are  within normal limits.    Assessment:       1. 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    Neuropathy secondary to chemotherapy  Plan:         I had a long discussion with her; the recent PET scan is highly suggestive of disease progression.  She had been exposed to examestane in the past, however, she has not tried the combination of examestane plus afinitor.  The pros and cons of the above combination were explained to her.  She was given a prescription for both, and she will return in 3 weeks for follow up with repeat labs.  We will also repeat her CBC and CMP today.    She was also given a prescription far oxycodone to take in addition to her gabapentin, and she will keep a log with her daily requirement.   Her multiple questions were answered to her satisfaction.  RTc 3 weeks to see our NP.

## 2019-08-19 ENCOUNTER — TELEPHONE (OUTPATIENT)
Dept: HEMATOLOGY/ONCOLOGY | Facility: CLINIC | Age: 74
End: 2019-08-19

## 2019-08-19 ENCOUNTER — OFFICE VISIT (OUTPATIENT)
Dept: HEMATOLOGY/ONCOLOGY | Facility: CLINIC | Age: 74
End: 2019-08-19
Payer: MEDICARE

## 2019-08-19 VITALS
TEMPERATURE: 98 F | WEIGHT: 133.19 LBS | SYSTOLIC BLOOD PRESSURE: 128 MMHG | HEART RATE: 96 BPM | OXYGEN SATURATION: 91 % | HEIGHT: 66 IN | BODY MASS INDEX: 21.4 KG/M2 | DIASTOLIC BLOOD PRESSURE: 83 MMHG | RESPIRATION RATE: 18 BRPM

## 2019-08-19 DIAGNOSIS — C50.919 CARCINOMA OF BREAST METASTATIC TO BONE, UNSPECIFIED LATERALITY: Primary | ICD-10-CM

## 2019-08-19 DIAGNOSIS — C79.51 CARCINOMA OF BREAST METASTATIC TO BONE, UNSPECIFIED LATERALITY: Primary | ICD-10-CM

## 2019-08-19 DIAGNOSIS — K13.79 MOUTH SORES: ICD-10-CM

## 2019-08-19 PROCEDURE — 99215 OFFICE O/P EST HI 40 MIN: CPT | Mod: S$GLB,,, | Performed by: INTERNAL MEDICINE

## 2019-08-19 PROCEDURE — 99215 PR OFFICE/OUTPT VISIT, EST, LEVL V, 40-54 MIN: ICD-10-PCS | Mod: S$GLB,,, | Performed by: INTERNAL MEDICINE

## 2019-08-19 RX ORDER — OXYCODONE HYDROCHLORIDE 5 MG/1
5 TABLET ORAL EVERY 8 HOURS PRN
Qty: 20 TABLET | Refills: 0 | Status: SHIPPED | OUTPATIENT
Start: 2019-08-19 | End: 2019-08-29 | Stop reason: SDUPTHER

## 2019-08-19 RX ORDER — EXEMESTANE 25 MG/1
25 TABLET ORAL DAILY
Qty: 30 TABLET | Refills: 3 | Status: SHIPPED | OUTPATIENT
Start: 2019-08-19 | End: 2019-08-21 | Stop reason: SDUPTHER

## 2019-08-19 RX ORDER — DEXAMETHASONE 0.5 MG/5ML
ELIXIR ORAL
Qty: 500 ML | Refills: 4 | Status: SHIPPED | OUTPATIENT
Start: 2019-08-19 | End: 2020-02-26 | Stop reason: SDUPTHER

## 2019-08-20 ENCOUNTER — TELEPHONE (OUTPATIENT)
Dept: HEMATOLOGY/ONCOLOGY | Facility: CLINIC | Age: 74
End: 2019-08-20

## 2019-08-21 RX ORDER — EVEROLIMUS 10 MG/1
1 TABLET ORAL DAILY
Qty: 30 TABLET | Refills: 5 | Status: SHIPPED | OUTPATIENT
Start: 2019-08-21 | End: 2019-08-26

## 2019-08-21 RX ORDER — EXEMESTANE 25 MG/1
25 TABLET ORAL DAILY
Qty: 30 TABLET | Refills: 3 | Status: SHIPPED | OUTPATIENT
Start: 2019-08-21 | End: 2019-09-18 | Stop reason: SDUPTHER

## 2019-08-26 ENCOUNTER — TELEPHONE (OUTPATIENT)
Dept: HEMATOLOGY/ONCOLOGY | Facility: CLINIC | Age: 74
End: 2019-08-26

## 2019-08-26 DIAGNOSIS — C50.919 CARCINOMA OF BREAST METASTATIC TO BONE, UNSPECIFIED LATERALITY: ICD-10-CM

## 2019-08-26 DIAGNOSIS — C79.51 CARCINOMA OF BREAST METASTATIC TO BONE, UNSPECIFIED LATERALITY: ICD-10-CM

## 2019-08-26 RX ORDER — EVEROLIMUS 10 MG/1
1 TABLET ORAL DAILY
Qty: 30 TABLET | Refills: 5 | Status: SHIPPED | OUTPATIENT
Start: 2019-08-26 | End: 2019-09-25

## 2019-08-26 NOTE — TELEPHONE ENCOUNTER
----- Message from Kiki Hoskins sent at 8/26/2019 11:09 AM CDT -----  Contact: patient  Called checking on the new medication AFINITOR.. Has been a week and hasnt seen it yet..

## 2019-08-26 NOTE — TELEPHONE ENCOUNTER
Afinitor sent to local pharmacy where there is a $2600 co-pay for med. Afinitor routed to [facility] in [facility] for co-pay assistance.

## 2019-08-26 NOTE — TELEPHONE ENCOUNTER
----- Message from Rosalind Ricks NP sent at 8/26/2019 11:38 AM CDT -----  Contact: patient  Darin,  Can you check on this medication. I know lavrene ordered it incorrectly and we corrected it at the end of last week.     Reuben,  Can you let Mrs fox know we are looking into it and will call her by tomorrow.     Thanks   Rosalind  ----- Message -----  From: Kiki Hoskins  Sent: 8/26/2019  11:09 AM  To: Rosalind Ricks NP, Katya Allred MA    Called checking on the new medication AFINITOR.. Has been a week and hasnt seen it yet..

## 2019-08-26 NOTE — TELEPHONE ENCOUNTER
I looked in her medication tabs I just see the medication that Barrett wrote; I dnt see the the new one that you wrote.

## 2019-08-29 ENCOUNTER — TELEPHONE (OUTPATIENT)
Dept: HEMATOLOGY/ONCOLOGY | Facility: CLINIC | Age: 74
End: 2019-08-29

## 2019-08-29 DIAGNOSIS — G62.9 NEUROPATHY: Primary | ICD-10-CM

## 2019-08-29 DIAGNOSIS — C50.919 CARCINOMA OF BREAST METASTATIC TO BONE, UNSPECIFIED LATERALITY: ICD-10-CM

## 2019-08-29 DIAGNOSIS — C79.51 CARCINOMA OF BREAST METASTATIC TO BONE, UNSPECIFIED LATERALITY: ICD-10-CM

## 2019-08-29 RX ORDER — PREGABALIN 50 MG/1
50 CAPSULE ORAL 3 TIMES DAILY
Qty: 90 CAPSULE | Refills: 2 | Status: SHIPPED | OUTPATIENT
Start: 2019-08-29 | End: 2019-10-16 | Stop reason: ALTCHOICE

## 2019-08-29 RX ORDER — OXYCODONE HYDROCHLORIDE 5 MG/1
5 TABLET ORAL
Qty: 20 TABLET | Refills: 0 | Status: SHIPPED | OUTPATIENT
Start: 2019-08-29 | End: 2019-09-09 | Stop reason: SDUPTHER

## 2019-08-29 NOTE — TELEPHONE ENCOUNTER
Pt requesting refill on pain med, she is taking oxycodone only at bedtime but if neuropathy is bothering her pain med doesn't help. She is on neurotin 400 mg that she takes at bedtime and throughout the night. Discussed stopping neurotin and trying lyrica. She is agreeable.     Will f/u with pharmacy r/g afinitor.     ----- Message from Kiki Hoskins sent at 8/29/2019  1:22 PM CDT -----  Contact: patient  Refill on pain medication OXYCODONE 5mg..she doesn't think its strong enough.. Would like to talk to you about this.. Not helping the neuropathy...and she has not received the new medication it has been 3 days...

## 2019-09-09 ENCOUNTER — TELEPHONE (OUTPATIENT)
Dept: HEMATOLOGY/ONCOLOGY | Facility: CLINIC | Age: 74
End: 2019-09-09

## 2019-09-09 DIAGNOSIS — C50.919 CARCINOMA OF BREAST METASTATIC TO BONE, UNSPECIFIED LATERALITY: ICD-10-CM

## 2019-09-09 DIAGNOSIS — C79.51 CARCINOMA OF BREAST METASTATIC TO BONE, UNSPECIFIED LATERALITY: ICD-10-CM

## 2019-09-09 RX ORDER — OXYCODONE HYDROCHLORIDE 5 MG/1
5 TABLET ORAL
Qty: 20 TABLET | Refills: 0 | Status: SHIPPED | OUTPATIENT
Start: 2019-09-09 | End: 2019-09-10 | Stop reason: SDUPTHER

## 2019-09-09 NOTE — TELEPHONE ENCOUNTER
----- Message from Kiki Hoskins sent at 9/9/2019  1:03 PM CDT -----  Contact: patient  Needs refill on OXYCODONE 5mg.. [facility] [address] in [place]..

## 2019-09-10 DIAGNOSIS — C50.919 CARCINOMA OF BREAST METASTATIC TO BONE, UNSPECIFIED LATERALITY: ICD-10-CM

## 2019-09-10 DIAGNOSIS — C79.51 CARCINOMA OF BREAST METASTATIC TO BONE, UNSPECIFIED LATERALITY: ICD-10-CM

## 2019-09-10 RX ORDER — OXYCODONE HYDROCHLORIDE 5 MG/1
5 TABLET ORAL
Qty: 20 TABLET | Refills: 0 | Status: SHIPPED | OUTPATIENT
Start: 2019-09-10 | End: 2019-10-15 | Stop reason: SDUPTHER

## 2019-09-10 NOTE — TELEPHONE ENCOUNTER
[facility] states they did not receive medication refill sent yesterday. Requesting to send it again.

## 2019-09-18 ENCOUNTER — OFFICE VISIT (OUTPATIENT)
Dept: HEMATOLOGY/ONCOLOGY | Facility: CLINIC | Age: 74
End: 2019-09-18
Payer: MEDICARE

## 2019-09-18 VITALS
RESPIRATION RATE: 12 BRPM | HEIGHT: 66 IN | TEMPERATURE: 98 F | SYSTOLIC BLOOD PRESSURE: 104 MMHG | BODY MASS INDEX: 20.49 KG/M2 | OXYGEN SATURATION: 93 % | DIASTOLIC BLOOD PRESSURE: 55 MMHG | HEART RATE: 101 BPM | WEIGHT: 127.5 LBS

## 2019-09-18 DIAGNOSIS — C79.51 CARCINOMA OF BREAST METASTATIC TO BONE, UNSPECIFIED LATERALITY: ICD-10-CM

## 2019-09-18 DIAGNOSIS — R11.0 NAUSEA: Primary | ICD-10-CM

## 2019-09-18 DIAGNOSIS — C50.919 CARCINOMA OF BREAST METASTATIC TO BONE, UNSPECIFIED LATERALITY: ICD-10-CM

## 2019-09-18 LAB
ALBUMIN SERPL BCP-MCNC: 3 G/DL (ref 3.4–5)
ALBUMIN/GLOBULIN RATIO: 0.79 RATIO (ref 1.1–1.8)
ALP SERPL-CCNC: 97 U/L (ref 46–116)
ALT SERPL W P-5'-P-CCNC: 21 U/L (ref 12–78)
AST SERPL-CCNC: 21 U/L (ref 15–37)
BILIRUB SERPL-MCNC: 0.3 MG/DL (ref 0–1)
BUN SERPL-MCNC: 8 MG/DL (ref 7–18)
BUN/CREAT SERPL: 9.09 RATIO (ref 7–18)
CALCIUM SERPL-MCNC: 8.8 MG/DL (ref 8.8–10.5)
CHLORIDE SERPL-SCNC: 104 MMOL/L (ref 100–108)
CO2 SERPL-SCNC: 27 MMOL/L (ref 21–32)
CREAT SERPL-MCNC: 0.88 MG/DL (ref 0.55–1.02)
GFR ESTIMATION: > 60
GLOBULIN: 3.8 G/DL (ref 2.3–3.5)
GLUCOSE SERPL-MCNC: 107 MG/DL (ref 70–110)
POTASSIUM SERPL-SCNC: 3.6 MMOL/L (ref 3.6–5.2)
PROT SERPL-MCNC: 6.8 G/DL (ref 6.4–8.2)
SODIUM BLD-SCNC: 140 MMOL/L (ref 135–145)

## 2019-09-18 PROCEDURE — 99214 OFFICE O/P EST MOD 30 MIN: CPT | Mod: S$GLB,,, | Performed by: NURSE PRACTITIONER

## 2019-09-18 PROCEDURE — 99214 PR OFFICE/OUTPT VISIT, EST, LEVL IV, 30-39 MIN: ICD-10-PCS | Mod: S$GLB,,, | Performed by: NURSE PRACTITIONER

## 2019-09-18 RX ORDER — GABAPENTIN 400 MG/1
CAPSULE ORAL
COMMUNITY
Start: 2019-08-31 | End: 2019-10-16 | Stop reason: SDUPTHER

## 2019-09-18 RX ORDER — ALBUTEROL SULFATE 90 UG/1
AEROSOL, METERED RESPIRATORY (INHALATION)
COMMUNITY
Start: 2019-09-11

## 2019-09-18 RX ORDER — PROMETHAZINE HYDROCHLORIDE 25 MG/1
25 TABLET ORAL EVERY 6 HOURS PRN
Qty: 30 TABLET | Refills: 3 | Status: SHIPPED | OUTPATIENT
Start: 2019-09-18 | End: 2019-11-13 | Stop reason: SDUPTHER

## 2019-09-18 RX ORDER — EXEMESTANE 25 MG/1
25 TABLET ORAL DAILY
Qty: 30 TABLET | Refills: 3 | Status: SHIPPED | OUTPATIENT
Start: 2019-09-18 | End: 2020-09-17

## 2019-09-18 NOTE — PROGRESS NOTES
Subjective:       Patient ID: Benita Carballo is a 73 y.o. female.    Chief Complaint: Breast Cancer (Right breast Metastatic to bone )    HPI     Mrs Carballo returns today for follow up.  Briefly, she is a 73 year old female with metastatic breast cancer who recently had a PET scan that is highly suggestive of disease progression.  She is here to discuss further treatment options.  Her most recent treatment was the combination of ibrance plus faslodex, and she has been on it since February 2019..        Review of Systems    Overall she feels fair.  Her main complaint is her neuropathic pain on both the upper and the lower extremities.  She states that she has difficulty sleeping through the night and has to wake up and take neurontin.  She appears anxious but she denies any depression, easy bruising, fevers, chills, night  sweats, weight loss, nausea, vomiting, diarrhea, constipation, diplopia,blurred vision, headache, chest pain, palpitations, shortness of breath, breast pain, abdominal pain, or difficulty ambulating.  The remainder of the ten-point ROS, including general, skin, lymph, H/N, cardiorespiratory, GI, , Neuro, Endocrine, and psychiatric is negative.     Objective:      Physical Exam      She is alert, oriented to time, place, person, pleasant, well      nourished, in no acute physical distress.  She is here with her son.                                 VITAL SIGNS:  Reviewed                                      HEENT:  Normal.  There are no nasal, oral, lip, gingival, auricular, lid,    or conjunctival lesions.  Mucosae are moist and pink, and there is no        thrush.  Pupils are equal, reactive to light and accommodation.              Extraocular muscle movements are intact.  Dentition is good.  There is no frontal or maxillary tenderness.                                     NECK:  Supple without JVD, adenopathy, or thyromegaly.                       LUNGS:  Clear to auscultation without  wheezing, rales, or rhonchi.           CARDIOVASCULAR:  Reveals an S1, S2, no murmurs, no rubs, no gallops.         ABDOMEN:  Soft, nontender, without organomegaly.  Bowel sounds are    present.                                                                     EXTREMITIES:  No cyanosis, clubbing, or edema.                               BREASTS:  She is status post right lumpectomy with a well-healed incision.    There are no masses in either breast.                                        LYMPHATIC:  There is no cervical, axillary, or supraclavicular adenopathy.   SKIN:  Warm and moist, without petechiae, rashes, induration, or ecchymoses.           NEUROLOGIC:  DTRs are 0-1+ bilaterally, symmetrical, motor function is 5/5,  and cranial nerves are  within normal limits.    Assessment:       1. 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    Neuropathy secondary to chemotherapy  Plan:       She is s/p 3 weeks of combined afinitor + aromasin, tolerating well.  Pt states lyrica is helping her neuropathy much better than gabapentin.  We will resume xgeva at next visit, once authorization obtained.  RTC in 1 month with labs.    Rosalind Ricks NP

## 2019-09-23 LAB
NAME OF TEST: NORMAL
PERFORMED BY:: NORMAL
RESULT OF TEST: NORMAL

## 2019-10-15 ENCOUNTER — TELEPHONE (OUTPATIENT)
Dept: HEMATOLOGY/ONCOLOGY | Facility: CLINIC | Age: 74
End: 2019-10-15

## 2019-10-15 DIAGNOSIS — C79.51 CARCINOMA OF BREAST METASTATIC TO BONE, UNSPECIFIED LATERALITY: ICD-10-CM

## 2019-10-15 DIAGNOSIS — C50.919 CARCINOMA OF BREAST METASTATIC TO BONE, UNSPECIFIED LATERALITY: ICD-10-CM

## 2019-10-15 RX ORDER — OXYCODONE HYDROCHLORIDE 5 MG/1
5 TABLET ORAL
Qty: 20 TABLET | Refills: 0 | Status: SHIPPED | OUTPATIENT
Start: 2019-10-15 | End: 2019-11-04 | Stop reason: SDUPTHER

## 2019-10-15 NOTE — TELEPHONE ENCOUNTER
Medication refill sent to pharmacy.    ----- Message from Kiki Hoskins sent at 10/15/2019 10:58 AM CDT -----  Contact: patient  Needs refill OXYCODONE..

## 2019-10-16 ENCOUNTER — OFFICE VISIT (OUTPATIENT)
Dept: HEMATOLOGY/ONCOLOGY | Facility: CLINIC | Age: 74
End: 2019-10-16
Payer: MEDICARE

## 2019-10-16 VITALS
HEIGHT: 66 IN | OXYGEN SATURATION: 93 % | TEMPERATURE: 98 F | DIASTOLIC BLOOD PRESSURE: 70 MMHG | HEART RATE: 84 BPM | BODY MASS INDEX: 20.73 KG/M2 | WEIGHT: 129 LBS | SYSTOLIC BLOOD PRESSURE: 126 MMHG | RESPIRATION RATE: 16 BRPM

## 2019-10-16 DIAGNOSIS — C50.919 CARCINOMA OF BREAST METASTATIC TO BONE, UNSPECIFIED LATERALITY: Primary | ICD-10-CM

## 2019-10-16 DIAGNOSIS — C79.51 CARCINOMA OF BREAST METASTATIC TO BONE, UNSPECIFIED LATERALITY: Primary | ICD-10-CM

## 2019-10-16 DIAGNOSIS — F51.01 PRIMARY INSOMNIA: ICD-10-CM

## 2019-10-16 LAB
ALBUMIN SERPL BCP-MCNC: 2.9 G/DL (ref 3.4–5)
ALBUMIN/GLOBULIN RATIO: 0.74 RATIO (ref 1.1–1.8)
ALP SERPL-CCNC: 114 U/L (ref 46–116)
ALT SERPL W P-5'-P-CCNC: 20 U/L (ref 12–78)
AST SERPL-CCNC: 18 U/L (ref 15–37)
BASOPHILS NFR SNV MANUAL: 1 % (ref 0–3)
BILIRUB SERPL-MCNC: 0.3 MG/DL (ref 0–1)
BUN SERPL-MCNC: 6 MG/DL (ref 7–18)
BUN/CREAT SERPL: 6 RATIO (ref 7–18)
CALCIUM SERPL-MCNC: 8.9 MG/DL (ref 8.8–10.5)
CHLORIDE SERPL-SCNC: 104 MMOL/L (ref 100–108)
CO2 SERPL-SCNC: 28 MMOL/L (ref 21–32)
CREAT SERPL-MCNC: 1 MG/DL (ref 0.55–1.02)
EOSINOPHIL NFR SNV MANUAL: 2.4 % (ref 1–3)
ERYTHROCYTE [DISTWIDTH] IN BLOOD BY AUTOMATED COUNT: 16.9 % (ref 12.5–18)
GFR ESTIMATION: 54
GLOBULIN: 3.9 G/DL (ref 2.3–3.5)
GLUCOSE SERPL-MCNC: 158 MG/DL (ref 70–110)
HCT VFR BLD AUTO: 42.2 % (ref 37–47)
HGB BLD-MCNC: 13.4 G/DL (ref 12–16)
LYMPHOCYTES NFR SNV MANUAL: 11.9 % (ref 25–40)
MANUAL NRBC PER 100 CELLS: 0 %
MCH RBC QN AUTO: 28.9 PG (ref 27–31.2)
MCHC RBC AUTO-ENTMCNC: 31.8 G/DL (ref 31.8–35.4)
MCV RBC AUTO: 91.1 FL (ref 80–97)
MONOCYTES/100 LEUKOCYTES: 7.8 % (ref 1–15)
NEUTROPHILS NFR BLD: 4.42 10*3/UL (ref 1.8–7.7)
NEUTROPHILS NFR SNV MANUAL: 76.4 % (ref 37–80)
PLATELETS: 106 10*3/UL (ref 142–424)
POTASSIUM SERPL-SCNC: 4.1 MMOL/L (ref 3.6–5.2)
PROT SERPL-MCNC: 6.8 G/DL (ref 6.4–8.2)
RBC # BLD AUTO: 4.63 10*6/UL (ref 4.2–5.4)
SMALL PLATELETS BLD QL SMEAR: ADEQUATE
SODIUM BLD-SCNC: 140 MMOL/L (ref 135–145)
WBC # BLD: 5.8 10*3/UL (ref 4.6–10.2)

## 2019-10-16 PROCEDURE — 99214 PR OFFICE/OUTPT VISIT, EST, LEVL IV, 30-39 MIN: ICD-10-PCS | Mod: S$GLB,,, | Performed by: NURSE PRACTITIONER

## 2019-10-16 PROCEDURE — 99214 OFFICE O/P EST MOD 30 MIN: CPT | Mod: S$GLB,,, | Performed by: NURSE PRACTITIONER

## 2019-10-16 RX ORDER — TEMAZEPAM 15 MG/1
15 CAPSULE ORAL NIGHTLY PRN
Qty: 20 CAPSULE | Refills: 0 | Status: SHIPPED | OUTPATIENT
Start: 2019-10-16 | End: 2019-11-13 | Stop reason: SDUPTHER

## 2019-10-16 RX ORDER — GABAPENTIN 400 MG/1
400 CAPSULE ORAL 3 TIMES DAILY
Qty: 120 CAPSULE | Refills: 4 | Status: SHIPPED | OUTPATIENT
Start: 2019-10-16 | End: 2019-11-04 | Stop reason: SDUPTHER

## 2019-10-16 NOTE — PROGRESS NOTES
Subjective:       Patient ID: Benita Carballo is a 73 y.o. female.    Chief Complaint: Breast Cancer (right breast metastic to bone )    HPI     Mrs Carballo returns today for follow up.  Briefly, she is a 73 year old female with metastatic breast cancer who recently had a PET scan that is highly suggestive of disease progression.  She is here to discuss further treatment options.  10/16/19 Visit:  Today she is her to resume her monthly xgeva after holiday for dental work. She has been on afinitor+aromasin for 6 weeks and states she is having some right sided abd fullness and occasional pain. Will order u/s of liver to evaluate. Otherwise she is tolerating new tx well except for mild fatigue and insomnia.       Review of Systems    Overall she feels fair.  Her main complaint is her neuropathic pain on both the upper and the lower extremities.  She states that she has difficulty sleeping through the night and has to wake up and take neurontin.  She appears anxious but she denies any depression, easy bruising, fevers, chills, night  sweats, weight loss, nausea, vomiting, diarrhea, constipation, diplopia,blurred vision, headache, chest pain, palpitations, shortness of breath, breast pain, abdominal pain, or difficulty ambulating.  The remainder of the ten-point ROS, including general, skin, lymph, H/N, cardiorespiratory, GI, , Neuro, Endocrine, and psychiatric is negative.     Objective:      Physical Exam      She is alert, oriented to time, place, person, pleasant, well      nourished, in no acute physical distress.  She is here with her son.                                 VITAL SIGNS:  Reviewed                                      HEENT:  Normal.  There are no nasal, oral, lip, gingival, auricular, lid,    or conjunctival lesions.  Mucosae are moist and pink, and there is no        thrush.  Pupils are equal, reactive to light and accommodation.              Extraocular muscle movements are intact.  Dentition  is good.  There is no frontal or maxillary tenderness.                                     NECK:  Supple without JVD, adenopathy, or thyromegaly.                       LUNGS:  Clear to auscultation without wheezing, rales, or rhonchi.           CARDIOVASCULAR:  Reveals an S1, S2, no murmurs, no rubs, no gallops.         ABDOMEN:  Soft, nontender, without organomegaly.  Bowel sounds are    present.                                                                     EXTREMITIES:  No cyanosis, clubbing, or edema.                               BREASTS:  She is status post right lumpectomy with a well-healed incision.    There are no masses in either breast.                                        LYMPHATIC:  There is no cervical, axillary, or supraclavicular adenopathy.   SKIN:  Warm and moist, without petechiae, rashes, induration, or ecchymoses.           NEUROLOGIC:  DTRs are 0-1+ bilaterally, symmetrical, motor function is 5/5,  and cranial nerves are  within normal limits.  Lab Results   Component Value Date    WBC 5.8 10/15/2019    HGB 13.4 10/15/2019    HCT 42.2 10/15/2019     CMP  Sodium   Date Value Ref Range Status   10/15/2019 140 135 - 145 mmol/L Final     Potassium   Date Value Ref Range Status   10/15/2019 4.1 3.6 - 5.2 mmol/L Final     Chloride   Date Value Ref Range Status   10/15/2019 104 100 - 108 mmol/L Final     CO2   Date Value Ref Range Status   10/15/2019 28 21 - 32 mmol/L Final     Glucose   Date Value Ref Range Status   10/15/2019 158 (H) 70 - 110 mg/dL Final     BUN, Bld   Date Value Ref Range Status   10/15/2019 6 (L) 7 - 18 mg/dL Final     Creatinine   Date Value Ref Range Status   10/15/2019 1.00 0.55 - 1.02 mg/dL Final     Calcium   Date Value Ref Range Status   10/15/2019 8.9 8.8 - 10.5 mg/dL Final     Total Protein   Date Value Ref Range Status   10/15/2019 6.8 6.4 - 8.2 g/dL Final     Albumin   Date Value Ref Range Status   10/15/2019 2.9 (L) 3.4 - 5.0 g/dL Final     Total Bilirubin    Date Value Ref Range Status   10/15/2019 0.3 0.0 - 1.0 mg/dL Final     Alkaline Phosphatase   Date Value Ref Range Status   10/15/2019 114 46 - 116 U/L Final     AST   Date Value Ref Range Status   10/15/2019 18 15 - 37 U/L Final     ALT   Date Value Ref Range Status   10/15/2019 20 12 - 78 U/L Final         Assessment:       1. 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    Neuropathy secondary to chemotherapy  3. Insomnia   Plan:       She is s/p 6 weeks of combined afinitor + aromasin, tolerating well.  Pt states lyrica is helping her neuropathy much better than gabapentin.  We will resume xgeva today.   RTC in 1 month with labs.    Rosalind Ricks NP

## 2019-10-18 LAB — EVEROLIMUS LEVEL: 31.1

## 2019-11-04 ENCOUNTER — TELEPHONE (OUTPATIENT)
Dept: HEMATOLOGY/ONCOLOGY | Facility: CLINIC | Age: 74
End: 2019-11-04

## 2019-11-04 DIAGNOSIS — C79.51 CARCINOMA OF BREAST METASTATIC TO BONE, UNSPECIFIED LATERALITY: ICD-10-CM

## 2019-11-04 DIAGNOSIS — C50.919 CARCINOMA OF BREAST METASTATIC TO BONE, UNSPECIFIED LATERALITY: ICD-10-CM

## 2019-11-04 RX ORDER — GABAPENTIN 400 MG/1
400 CAPSULE ORAL 3 TIMES DAILY
Qty: 120 CAPSULE | Refills: 4 | Status: SHIPPED | OUTPATIENT
Start: 2019-11-04 | End: 2019-11-13 | Stop reason: SDUPTHER

## 2019-11-04 RX ORDER — OXYCODONE HYDROCHLORIDE 5 MG/1
5 TABLET ORAL
Qty: 20 TABLET | Refills: 0 | Status: SHIPPED | OUTPATIENT
Start: 2019-11-04 | End: 2019-11-13 | Stop reason: SDUPTHER

## 2019-11-04 NOTE — TELEPHONE ENCOUNTER
----- Message from Kiki Hoskins sent at 11/1/2019  1:08 PM CDT -----  Contact: patient  Refill on OXYCODONE 5 mg... [facility] in [place] on [address]

## 2019-11-04 NOTE — TELEPHONE ENCOUNTER
----- Message from Kiki Hoskins sent at 11/4/2019 11:16 AM CST -----  Contact: patient  Needs refill on GABAPENTIN 400 mg..[facility] in [place]

## 2019-11-12 LAB
ALBUMIN SERPL BCP-MCNC: 2.8 G/DL (ref 3.4–5)
ALBUMIN/GLOBULIN RATIO: 0.76 RATIO (ref 1.1–1.8)
ALP SERPL-CCNC: 100 U/L (ref 46–116)
ALT SERPL W P-5'-P-CCNC: 27 U/L (ref 12–78)
AST SERPL-CCNC: 23 U/L (ref 15–37)
BASOPHILS NFR SNV MANUAL: 1 % (ref 0–3)
BILIRUB SERPL-MCNC: 0.7 MG/DL (ref 0–1)
BUN SERPL-MCNC: 6 MG/DL (ref 7–18)
BUN/CREAT SERPL: 6.25 RATIO (ref 7–18)
CALCIUM SERPL-MCNC: 8.2 MG/DL (ref 8.8–10.5)
CHLORIDE SERPL-SCNC: 102 MMOL/L (ref 100–108)
CO2 SERPL-SCNC: 27 MMOL/L (ref 21–32)
CREAT SERPL-MCNC: 0.96 MG/DL (ref 0.55–1.02)
EOSINOPHIL NFR SNV MANUAL: 2.3 % (ref 1–3)
ERYTHROCYTE [DISTWIDTH] IN BLOOD BY AUTOMATED COUNT: 16.7 % (ref 12.5–18)
GFR ESTIMATION: 57
GLOBULIN: 3.7 G/DL (ref 2.3–3.5)
GLUCOSE SERPL-MCNC: 170 MG/DL (ref 70–110)
HCT VFR BLD AUTO: 43 % (ref 37–47)
HGB BLD-MCNC: 13.6 G/DL (ref 12–16)
LYMPHOCYTES NFR SNV MANUAL: 12 % (ref 25–40)
MANUAL NRBC PER 100 CELLS: 0 %
MCH RBC QN AUTO: 26.7 PG (ref 27–31.2)
MCHC RBC AUTO-ENTMCNC: 31.6 G/DL (ref 31.8–35.4)
MCV RBC AUTO: 84.5 FL (ref 80–97)
MONOCYTES/100 LEUKOCYTES: 8 % (ref 1–15)
NEUTROPHILS NFR BLD: 3.99 10*3/UL (ref 1.8–7.7)
NEUTROPHILS NFR SNV MANUAL: 76.1 % (ref 37–80)
PLATELETS: 147 10*3/UL (ref 142–424)
POTASSIUM SERPL-SCNC: 3.6 MMOL/L (ref 3.6–5.2)
PROT SERPL-MCNC: 6.5 G/DL (ref 6.4–8.2)
RBC # BLD AUTO: 5.09 10*6/UL (ref 4.2–5.4)
SODIUM BLD-SCNC: 139 MMOL/L (ref 135–145)
WBC # BLD: 5.2 10*3/UL (ref 4.6–10.2)

## 2019-11-13 ENCOUNTER — OFFICE VISIT (OUTPATIENT)
Dept: HEMATOLOGY/ONCOLOGY | Facility: CLINIC | Age: 74
End: 2019-11-13
Payer: MEDICARE

## 2019-11-13 VITALS
HEART RATE: 98 BPM | OXYGEN SATURATION: 95 % | BODY MASS INDEX: 19.71 KG/M2 | SYSTOLIC BLOOD PRESSURE: 117 MMHG | WEIGHT: 122.63 LBS | HEIGHT: 66 IN | DIASTOLIC BLOOD PRESSURE: 75 MMHG | RESPIRATION RATE: 20 BRPM | TEMPERATURE: 99 F

## 2019-11-13 DIAGNOSIS — Z17.0 MALIGNANT NEOPLASM OF OVERLAPPING SITES OF LEFT BREAST IN FEMALE, ESTROGEN RECEPTOR POSITIVE: Primary | ICD-10-CM

## 2019-11-13 DIAGNOSIS — C79.51 CARCINOMA OF BREAST METASTATIC TO BONE, UNSPECIFIED LATERALITY: ICD-10-CM

## 2019-11-13 DIAGNOSIS — C50.911 CARCINOMA OF RIGHT BREAST METASTATIC TO BONE: ICD-10-CM

## 2019-11-13 DIAGNOSIS — F51.01 PRIMARY INSOMNIA: ICD-10-CM

## 2019-11-13 DIAGNOSIS — L08.9 FOOT INFECTION: ICD-10-CM

## 2019-11-13 DIAGNOSIS — R11.0 NAUSEA: ICD-10-CM

## 2019-11-13 DIAGNOSIS — C79.51 CANCER, METASTATIC TO BONE: ICD-10-CM

## 2019-11-13 DIAGNOSIS — G62.9 NEUROPATHY: ICD-10-CM

## 2019-11-13 DIAGNOSIS — C79.51 CARCINOMA OF RIGHT BREAST METASTATIC TO BONE: ICD-10-CM

## 2019-11-13 DIAGNOSIS — C50.919 CARCINOMA OF BREAST METASTATIC TO BONE, UNSPECIFIED LATERALITY: ICD-10-CM

## 2019-11-13 DIAGNOSIS — C50.812 MALIGNANT NEOPLASM OF OVERLAPPING SITES OF LEFT BREAST IN FEMALE, ESTROGEN RECEPTOR POSITIVE: Primary | ICD-10-CM

## 2019-11-13 DIAGNOSIS — C77.4 METASTASIS TO GROIN LYMPH NODE: ICD-10-CM

## 2019-11-13 PROCEDURE — 99215 OFFICE O/P EST HI 40 MIN: CPT | Mod: S$GLB,,, | Performed by: NURSE PRACTITIONER

## 2019-11-13 PROCEDURE — 99215 PR OFFICE/OUTPT VISIT, EST, LEVL V, 40-54 MIN: ICD-10-PCS | Mod: S$GLB,,, | Performed by: NURSE PRACTITIONER

## 2019-11-13 RX ORDER — SULFAMETHOXAZOLE AND TRIMETHOPRIM 800; 160 MG/1; MG/1
1 TABLET ORAL 2 TIMES DAILY
Qty: 20 TABLET | Refills: 0 | Status: SHIPPED | OUTPATIENT
Start: 2019-11-13 | End: 2019-11-23

## 2019-11-13 RX ORDER — DOXYCYCLINE 100 MG/1
CAPSULE ORAL
COMMUNITY
Start: 2019-11-08 | End: 2019-11-13

## 2019-11-13 RX ORDER — GABAPENTIN 400 MG/1
400 CAPSULE ORAL 3 TIMES DAILY
Qty: 120 CAPSULE | Refills: 4 | Status: SHIPPED | OUTPATIENT
Start: 2019-11-13 | End: 2020-11-13

## 2019-11-13 RX ORDER — TEMAZEPAM 15 MG/1
15 CAPSULE ORAL NIGHTLY PRN
Qty: 20 CAPSULE | Refills: 0 | Status: SHIPPED | OUTPATIENT
Start: 2019-11-13 | End: 2019-12-04 | Stop reason: SDUPTHER

## 2019-11-13 RX ORDER — OXYCODONE HYDROCHLORIDE 5 MG/1
5 TABLET ORAL
Qty: 20 TABLET | Refills: 0 | Status: SHIPPED | OUTPATIENT
Start: 2019-11-13 | End: 2019-11-13 | Stop reason: SDUPTHER

## 2019-11-13 RX ORDER — PROMETHAZINE HYDROCHLORIDE 25 MG/1
25 TABLET ORAL EVERY 6 HOURS PRN
Qty: 30 TABLET | Refills: 3 | Status: SHIPPED | OUTPATIENT
Start: 2019-11-13 | End: 2020-06-23
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
## 2019-11-14 PROBLEM — Z17.0 MALIGNANT NEOPLASM OF OVERLAPPING SITES OF LEFT BREAST IN FEMALE, ESTROGEN RECEPTOR POSITIVE: Status: ACTIVE | Noted: 2019-11-14

## 2019-11-14 PROBLEM — F51.01 PRIMARY INSOMNIA: Status: ACTIVE | Noted: 2019-11-14

## 2019-11-14 PROBLEM — L08.9 FOOT INFECTION: Status: ACTIVE | Noted: 2019-11-14

## 2019-11-14 PROBLEM — C79.51 CANCER, METASTATIC TO BONE: Status: ACTIVE | Noted: 2019-11-14

## 2019-11-14 PROBLEM — C50.812 MALIGNANT NEOPLASM OF OVERLAPPING SITES OF LEFT BREAST IN FEMALE, ESTROGEN RECEPTOR POSITIVE: Status: ACTIVE | Noted: 2019-11-14

## 2019-11-14 PROBLEM — R11.0 NAUSEA: Status: ACTIVE | Noted: 2019-11-14

## 2019-11-14 RX ORDER — OXYCODONE HYDROCHLORIDE 5 MG/1
5 TABLET ORAL EVERY 12 HOURS PRN
Qty: 45 TABLET | Refills: 0 | Status: SHIPPED | OUTPATIENT
Start: 2019-11-14 | End: 2019-12-11 | Stop reason: SDUPTHER

## 2019-11-27 ENCOUNTER — OFFICE VISIT (OUTPATIENT)
Dept: HEMATOLOGY/ONCOLOGY | Facility: CLINIC | Age: 74
End: 2019-11-27
Payer: MEDICARE

## 2019-11-27 VITALS
OXYGEN SATURATION: 91 % | TEMPERATURE: 98 F | HEART RATE: 99 BPM | BODY MASS INDEX: 19.29 KG/M2 | WEIGHT: 120 LBS | HEIGHT: 66 IN | RESPIRATION RATE: 16 BRPM | DIASTOLIC BLOOD PRESSURE: 72 MMHG | SYSTOLIC BLOOD PRESSURE: 115 MMHG

## 2019-11-27 DIAGNOSIS — C50.919 CARCINOMA OF BREAST METASTATIC TO BONE, UNSPECIFIED LATERALITY: Primary | ICD-10-CM

## 2019-11-27 DIAGNOSIS — G62.9 NEUROPATHY: ICD-10-CM

## 2019-11-27 DIAGNOSIS — C79.51 CARCINOMA OF BREAST METASTATIC TO BONE, UNSPECIFIED LATERALITY: Primary | ICD-10-CM

## 2019-11-27 DIAGNOSIS — C77.4 METASTASIS TO GROIN LYMPH NODE: ICD-10-CM

## 2019-11-27 PROCEDURE — 1159F PR MEDICATION LIST DOCUMENTED IN MEDICAL RECORD: ICD-10-PCS | Mod: S$GLB,,, | Performed by: INTERNAL MEDICINE

## 2019-11-27 PROCEDURE — 1126F PR PAIN SEVERITY QUANTIFIED, NO PAIN PRESENT: ICD-10-PCS | Mod: S$GLB,,, | Performed by: INTERNAL MEDICINE

## 2019-11-27 PROCEDURE — 99215 PR OFFICE/OUTPT VISIT, EST, LEVL V, 40-54 MIN: ICD-10-PCS | Mod: S$GLB,,, | Performed by: INTERNAL MEDICINE

## 2019-11-27 PROCEDURE — 1159F MED LIST DOCD IN RCRD: CPT | Mod: S$GLB,,, | Performed by: INTERNAL MEDICINE

## 2019-11-27 PROCEDURE — 99215 OFFICE O/P EST HI 40 MIN: CPT | Mod: S$GLB,,, | Performed by: INTERNAL MEDICINE

## 2019-11-27 PROCEDURE — 1126F AMNT PAIN NOTED NONE PRSNT: CPT | Mod: S$GLB,,, | Performed by: INTERNAL MEDICINE
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
## 2019-12-04 ENCOUNTER — TELEPHONE (OUTPATIENT)
Dept: HEMATOLOGY/ONCOLOGY | Facility: CLINIC | Age: 74
End: 2019-12-04

## 2019-12-04 DIAGNOSIS — F51.01 PRIMARY INSOMNIA: ICD-10-CM

## 2019-12-04 RX ORDER — TEMAZEPAM 15 MG/1
15 CAPSULE ORAL NIGHTLY PRN
Qty: 20 CAPSULE | Refills: 0 | Status: SHIPPED | OUTPATIENT
Start: 2019-12-04 | End: 2019-12-18 | Stop reason: SDUPTHER

## 2019-12-04 NOTE — TELEPHONE ENCOUNTER
----- Message from Kiki Hoskins sent at 12/4/2019  9:48 AM CST -----  Contact: patient  Needs refill on her sleeping pill... TEMAZEPAM 15mg... [facility] in [place] on [address]

## 2019-12-11 ENCOUNTER — OFFICE VISIT (OUTPATIENT)
Dept: HEMATOLOGY/ONCOLOGY | Facility: CLINIC | Age: 74
End: 2019-12-11
Payer: MEDICARE

## 2019-12-11 VITALS
TEMPERATURE: 99 F | WEIGHT: 119 LBS | HEIGHT: 66 IN | DIASTOLIC BLOOD PRESSURE: 69 MMHG | OXYGEN SATURATION: 86 % | BODY MASS INDEX: 19.13 KG/M2 | SYSTOLIC BLOOD PRESSURE: 119 MMHG | HEART RATE: 97 BPM | RESPIRATION RATE: 20 BRPM

## 2019-12-11 DIAGNOSIS — C79.51 CARCINOMA OF BREAST METASTATIC TO BONE, UNSPECIFIED LATERALITY: ICD-10-CM

## 2019-12-11 DIAGNOSIS — C50.919 CARCINOMA OF BREAST METASTATIC TO BONE, UNSPECIFIED LATERALITY: ICD-10-CM

## 2019-12-11 DIAGNOSIS — C50.812 MALIGNANT NEOPLASM OF OVERLAPPING SITES OF LEFT BREAST IN FEMALE, ESTROGEN RECEPTOR POSITIVE: Primary | ICD-10-CM

## 2019-12-11 DIAGNOSIS — G62.9 NEUROPATHY: ICD-10-CM

## 2019-12-11 DIAGNOSIS — C79.51 CANCER, METASTATIC TO BONE: ICD-10-CM

## 2019-12-11 DIAGNOSIS — Z17.0 MALIGNANT NEOPLASM OF OVERLAPPING SITES OF LEFT BREAST IN FEMALE, ESTROGEN RECEPTOR POSITIVE: Primary | ICD-10-CM

## 2019-12-11 PROCEDURE — 1126F AMNT PAIN NOTED NONE PRSNT: CPT | Mod: S$GLB,,, | Performed by: INTERNAL MEDICINE

## 2019-12-11 PROCEDURE — 99215 OFFICE O/P EST HI 40 MIN: CPT | Mod: S$GLB,,, | Performed by: INTERNAL MEDICINE

## 2019-12-11 PROCEDURE — 1159F MED LIST DOCD IN RCRD: CPT | Mod: S$GLB,,, | Performed by: INTERNAL MEDICINE

## 2019-12-11 PROCEDURE — 1159F PR MEDICATION LIST DOCUMENTED IN MEDICAL RECORD: ICD-10-PCS | Mod: S$GLB,,, | Performed by: INTERNAL MEDICINE

## 2019-12-11 PROCEDURE — 99215 PR OFFICE/OUTPT VISIT, EST, LEVL V, 40-54 MIN: ICD-10-PCS | Mod: S$GLB,,, | Performed by: INTERNAL MEDICINE

## 2019-12-11 PROCEDURE — 1126F PR PAIN SEVERITY QUANTIFIED, NO PAIN PRESENT: ICD-10-PCS | Mod: S$GLB,,, | Performed by: INTERNAL MEDICINE

## 2019-12-11 RX ORDER — OXYCODONE HYDROCHLORIDE 5 MG/1
5 TABLET ORAL EVERY 8 HOURS PRN
Qty: 45 TABLET | Refills: 0 | Status: SHIPPED | OUTPATIENT
Start: 2019-12-11 | End: 2019-12-26 | Stop reason: SDUPTHER

## 2019-12-11 NOTE — PROGRESS NOTES
Medical Oncology Progress Note  [facility]     PATIENT: Benita Carballo  : 1945 74 y.o. female  MRN [id number]     PCP: Primary Doctor No  Consult Requested By:      Date of Service: 2019    Subjective:   Interim History:  Breast Cancer    Benita Carballo is here for to followup breast cancer treatment.  The patient is frustrated because she was in the process of moving and she do note she had a CT scan scheduled.  She is not willing to receive any IV chemotherapy.    Oncology History:  Oncology History     Rt breast cancer s/p lump with chemo, XRT in [place]. Tamoxifen x 1 yr, then Arimidex x 5 yrs. Completed tx .       c/o chronic cough with CT chest 16 showing extensive adenopathy  With base of lt neck mass 4.5 cm with yosi pulm nodules. RF to Dr ANDREWS Mcnally    16 bx of lt neck mass carcinoma with feature suggestive of breast primary. 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 ER/ME + Her 2 neg per Dr Moreno's note.    Dr Moreno noted path review from bx  showed HER 2 + equivocal with FISH reading as Equivocal but ratio 1.2. Additional report form 2016 states additional testing was done with different probe and the equivocal results can be noted as negative.     Treatment therapy changed from Her2 based tx to endocrine base therapy by Dr Moreno       2017 -  Hormone Therapy     Arimidex -  Aromasin   Ibrance + Aromasin  -  Ibrance + faslodex 2019 Imaging Significant Findings     PET/CT: Lesions  involving the medial clavicle , subcarinal LN, pleural based soft tissue density adjacent to T11 and lesion in left lobe of liver diminished in size and SUV.   RECIST 34% decrease in disease         Past Medical History:   Past Medical History:   Diagnosis Date    Arthritis     Bone cancer     Breast cancer     Cancer     Cataract     Depression     Fatigue associated with anemia 6/6/2019    GERD (gastroesophageal reflux disease)     High cholesterol     Skin problem     Sleep disorder        Past Surgical HIstory:   Past Surgical History:   Procedure Laterality Date    APPENDECTOMY      BREAST LUMPECTOMY Right 2002    CATARACT EXTRACTION, BILATERAL  2014    COLONOSCOPY  2008    HYSTERECTOMY      PORTACATH PLACEMENT  05/18/2016    SURGICAL REMOVAL OF NODULE OF VOCAL CORD         Allergies:  Review of patient's allergies indicates:   Allergen Reactions    Benadryl [diphenhydramine hcl]      Restless leg     Penicillins        Medications:  Current Outpatient Medications   Medication Sig Dispense Refill    dexAMETHasone (DECADRON) 0.5 mg/5 mL Elix 10 mls 4X daily swish 2 min and spit 500 mL 4    exemestane (AROMASIN) 25 mg tablet Take 1 tablet (25 mg total) by mouth once daily. 30 tablet 3    gabapentin (NEURONTIN) 400 MG capsule Take 1 capsule (400 mg total) by mouth 3 (three) times daily. 120 capsule 4    oxyCODONE (ROXICODONE) 5 MG immediate release tablet Take 1 tablet (5 mg total) by mouth every 8 (eight) hours as needed for Pain. 45 tablet 0    pantoprazole (PROTONIX) 40 MG tablet Take 1 tablet (40 mg total) by mouth once daily. 30 tablet 3    PROAIR HFA 90 mcg/actuation inhaler       promethazine (PHENERGAN) 25 MG tablet Take 1 tablet (25 mg total) by mouth every 6 (six) hours as needed for Nausea. 30 tablet 3    temazepam (RESTORIL) 15 mg Cap Take 1 capsule (15 mg total) by mouth nightly as needed. 20 capsule 0     No current facility-administered medications for this visit.   "      Family History:   Family History   Problem Relation Age of Onset    Lung cancer Father     Melanoma Father        Social History:  reports that she has been smoking cigarettes. She has a 2.00 pack-year smoking history. She has never used smokeless tobacco. She reports that she drinks about 4.0 standard drinks of alcohol per week. She reports that she does not use drugs.    Review of Systemas  Constitutional: No change in weight, appetie, fatigue, fever, or night sweats  Eyes: No changes in vision  Ears, Nose, Mouth, Throat, and Face: No hearing problems, ear pain, rummy nose, or sore throat  Respiratory: No shortness of breath or cough  Cardiovascular: No chest pain, palpitations or dizziness  Gastrointestinal: No abdominal pain, hematochezia, melena  Genitourinary: No dysuria, hematuria, nocturia, menstrual problems, post menopausal  Integumentary/Breast: No rashes or itching  Hematologic/Lymphatic: No anemia or bleeding abnormalities  Musculoskeletal: No joint or muscle abnormalities  Neurological: No sensory or motor problems, no headaches  Behavioral/Psych: No depression, anxiety, cognitive problems, or stress  Endocrine: No diabetes or thyroid problems  Allergic/Immunologic: See allergy above    Physical Exam      Vitals:   Vitals:    12/11/19 1129   BP: 119/69   BP Location: Right arm   Patient Position: Sitting   BP Method: Large (Automatic)   Pulse: 97   Resp: 20   Temp: 98.7 °F (37.1 °C)   TempSrc: Temporal   SpO2: (!) 86%   Weight: 54 kg (119 lb)   Height: 5' 6" (1.676 m)     BMI: Body mass index is 19.21 kg/m².  BSA Body surface area is 1.59 meters squared.  ECOG Performance Status:   ECOG SCORE    1 - Restricted in strenuous activity-ambulatory and able to carry out work of a light nature       GENERAL APPEARANCE: Well developed, well nourished, in no acute distress.  SKIN: Inspection of the skin reveals no rashes, ulcerations or petechiae.  HEENT: The sclerae were anicteric and conjunctivae were " pink and moist. Extraocular movements were intact and pupils were equal, round, and reactive to light with normal accommodation. External inspection of the ears and nose showed no scars, lesions, or masses. Lips, teeth, and gums showed normal mucosa. The oral mucosa, hard and soft palate, tongue and posterior pharynx were normal.  NECK: Supple and symmetric. There was no thyroid enlargement, and no tenderness, or masses were felt.  CRESPIRATORY: Normal AP diameter and normal contour without any kyphoscoliosis. LUNGS: Auscultation of the lungs revealed normal breath sounds without any other adventitious sounds or rubs.  CARDIOVASCULAR: There was a regular rate and rhythm without any murmurs, gallops, rubs. The carotid pulses were normal and 2+ bilaterally without bruits. Peripheral pulses were 2+ and symmetric.  GASTROINTESTINAL: Soft and nontender with normal bowel sounds. The liver span was approximately 5-6 cm in the right midclavicular line by percussion. The liver edge was nontender. The spleen was not palpable. There were no inguinal or umbilical hernias noted. No ascites was noted.  LYMPH NODES: No lymphadenopathy was appreciated in the neck, axillae or groin.  MUSCULOSKELETAL: Gait was normal. There was no tenderness or effusions noted. Muscle strength and tone were normal. EXTREMITIES: No cyanosis, clubbing or edema.  NEUROLOGIC: Alert and oriented x 3. Normal affect. Gait was normal. Normal deep tendon reflexes with no pathological reflexes. Sensation to touch was normal.  PSYCHIATRIC: good mood, orientated to place, time and person     Labs and Imagings     Orders Only on 11/12/2019   Component Date Value Ref Range Status    Sodium 11/12/2019 139  135 - 145 mmol/L Final    Potassium 11/12/2019 3.6  3.6 - 5.2 mmol/L Final    Chloride 11/12/2019 102  100 - 108 mmol/L Final    CO2 11/12/2019 27  21 - 32 mmol/L Final    BUN, Bld 11/12/2019 6* 7 - 18 mg/dL Final    Creatinine 11/12/2019 0.96  0.55 - 1.02  mg/dL Final    GFR ESTIMATION 11/12/2019 57* >60 Final               DESCRIPTION       GLOMERULAR FILTRATION RATE             NORMAL                     >60             KIDNEY  DISEASE           15-60             KIDNEY FAILURE             <15    BUN/Creatinine Ratio 11/12/2019 6.25* 7 - 18 Ratio Final    Glucose 11/12/2019 170* 70 - 110 mg/dL Final    Calcium 11/12/2019 8.2* 8.8 - 10.5 mg/dL Final    Total Bilirubin 11/12/2019 0.7  0.0 - 1.0 mg/dL Final    AST 11/12/2019 23  15 - 37 U/L Final    ALT 11/12/2019 27  12 - 78 U/L Final    Total Protein 11/12/2019 6.5  6.4 - 8.2 g/dL Final    Albumin 11/12/2019 2.8* 3.4 - 5.0 g/dL Final    Globulin 11/12/2019 3.7* 2.3 - 3.5 g/dL Final    Albumin/Globulin Ratio 11/12/2019 0.756* 1.1 - 1.8 Ratio Final    Alkaline Phosphatase 11/12/2019 100  46 - 116 U/L Final   Orders Only on 11/12/2019   Component Date Value Ref Range Status    WBC 11/12/2019 5.2  4.6 - 10.2 10*3/uL Final    RBC 11/12/2019 5.09  4.2 - 5.4 10*6/uL Final    Hemoglobin 11/12/2019 13.6  12.0 - 16.0 g/dL Final    Hematocrit 11/12/2019 43.0  37.0 - 47.0 % Final    MCV 11/12/2019 84.5  80 - 97 fL Final    MCH 11/12/2019 26.7* 27.0 - 31.2 pg Final    MCHC 11/12/2019 31.6* 31.8 - 35.4 g/dL Final    RDW RBC Auto-Rto 11/12/2019 16.7  12.5 - 18.0 % Final    Platelets 11/12/2019 147  142 - 424 10*3/uL Final    Neutrophils/100 leukocytes 11/12/2019 76.1  37 - 80 % Final    Lymphocytes/100 leukocytes 11/12/2019 12.0* 25 - 40 % Final    Monocytes/100 leukocytes 11/12/2019 8.0  1 - 15 % Final    Eosinophils/100 leukocytes 11/12/2019 2.3  1 - 3 % Final    Basophils/100 leukocytes 11/12/2019 1.0  0 - 3 % Final    Manual nRBC per 100 Cells 11/12/2019 0.0  % Final    Neutrophils 11/12/2019 3.99  1.8 - 7.7 10*3/uL Final   Office Visit on 10/16/2019   Component Date Value Ref Range Status    WBC 10/15/2019 5.8  4.6 - 10.2 10*3/uL Final    RBC 10/15/2019 4.63  4.2 - 5.4 10*6/uL Final    Hemoglobin  10/15/2019 13.4  12.0 - 16.0 g/dL Final    Hematocrit 10/15/2019 42.2  37.0 - 47.0 % Final    MCV 10/15/2019 91.1  80 - 97 fL Final    MCH 10/15/2019 28.9  27.0 - 31.2 pg Final    MCHC 10/15/2019 31.8  31.8 - 35.4 g/dL Final    RDW RBC Auto-Rto 10/15/2019 16.9  12.5 - 18.0 % Final    Platelets 10/15/2019 106* 142 - 424 10*3/uL Final    Neutrophils/100 leukocytes 10/15/2019 76.4  37 - 80 % Final    Lymphocytes/100 leukocytes 10/15/2019 11.9* 25 - 40 % Final    Monocytes/100 leukocytes 10/15/2019 7.8  1 - 15 % Final    Eosinophils/100 leukocytes 10/15/2019 2.4  1 - 3 % Final    Basophils/100 leukocytes 10/15/2019 1.0  0 - 3 % Final    Manual nRBC per 100 Cells 10/15/2019 0.0  % Final    Neutrophils 10/15/2019 4.42  1.8 - 7.7 10*3/uL Final    Sodium 10/15/2019 140  135 - 145 mmol/L Final    Potassium 10/15/2019 4.1  3.6 - 5.2 mmol/L Final    Chloride 10/15/2019 104  100 - 108 mmol/L Final    CO2 10/15/2019 28  21 - 32 mmol/L Final    BUN, Bld 10/15/2019 6* 7 - 18 mg/dL Final    Creatinine 10/15/2019 1.00  0.55 - 1.02 mg/dL Final    GFR ESTIMATION 10/15/2019 54* >60 Final               DESCRIPTION       GLOMERULAR FILTRATION RATE             NORMAL                     >60             KIDNEY  DISEASE           15-60             KIDNEY FAILURE             <15    BUN/Creatinine Ratio 10/15/2019 6.00* 7 - 18 Ratio Final    Glucose 10/15/2019 158* 70 - 110 mg/dL Final    Calcium 10/15/2019 8.9  8.8 - 10.5 mg/dL Final    Total Bilirubin 10/15/2019 0.3  0.0 - 1.0 mg/dL Final    AST 10/15/2019 18  15 - 37 U/L Final    ALT 10/15/2019 20  12 - 78 U/L Final    Total Protein 10/15/2019 6.8  6.4 - 8.2 g/dL Final    Albumin 10/15/2019 2.9* 3.4 - 5.0 g/dL Final    Globulin 10/15/2019 3.9* 2.3 - 3.5 g/dL Final    Albumin/Globulin Ratio 10/15/2019 0.743* 1.1 - 1.8 Ratio Final    Alkaline Phosphatase 10/15/2019 114  46 - 116 U/L Final   Orders Only on 10/15/2019   Component Date Value Ref Range Status     Everolimus Lvl 10/15/2019 31.1   Final    Comment: Everolimus [facility] test code [id number] by HPLC-MS/MS31.1 ng/mLTherapeutic Range:Kidney transplant (in combination with Cyclosporine):3-8 ng/mLLiver transplant (in combination with Tacrolimus):3-8 ng/mLToxic value: Greater   than 15 ng/mLEverolimus marketed as Zortress is FDA approved forprophylaxisof organ rejection in adult patients receiving a kidney andliver transplant.Everolimus marketed as Afinitor is FDA approved for thetreatment of renal cell carcinoma and for the   treatment ofsubependymal giant cell astrocytoma (SEGA) associated withtuberous sclerosis (TS) in patients who are not candidatesforcurative surgical resection. The suggested therapeutic rangefortreatment of SEGA is 5-15 ng/mL, which is based on a   predose(trough) specimen.The optimal therapeutic range for a given patient may differfrom this suggested range based on the indication fortherapy,treatment phase (initiation or maintenance), use incombinationwith other drugs, time of specime                           n collection   relative topriordose, type of transplanted organ, and/or the therapeuticapproach of the transplant center.Test developed and characteristics determined by [facility]. See Compliance Statement B:   [employer]/CS===========================================================___________________________________________________________     Orders Only on 10/15/2019   Component Date Value Ref Range Status    Platelet Estimate 10/15/2019 Adequate   Final   Office Visit on 09/18/2019   Component Date Value Ref Range Status    Sodium 09/17/2019 140  135 - 145 mmol/L Final    Potassium 09/17/2019 3.6  3.6 - 5.2 mmol/L Final    Chloride 09/17/2019 104  100 - 108 mmol/L Final    CO2 09/17/2019 27  21 - 32 mmol/L Final    BUN, Bld 09/17/2019 8  7 - 18 mg/dL Final    Creatinine 09/17/2019 0.88  0.55 - 1.02 mg/dL Final    GFR ESTIMATION 09/17/2019  > 60  >60 Final               DESCRIPTION       GLOMERULAR FILTRATION RATE             NORMAL                     >60             KIDNEY  DISEASE           15-60             KIDNEY FAILURE             <15    BUN/Creatinine Ratio 09/17/2019 9.09  7 - 18 Ratio Final    Glucose 09/17/2019 107  70 - 110 mg/dL Final    Calcium 09/17/2019 8.8  8.8 - 10.5 mg/dL Final    Total Bilirubin 09/17/2019 0.3  0.0 - 1.0 mg/dL Final    AST 09/17/2019 21  15 - 37 U/L Final    ALT 09/17/2019 21  12 - 78 U/L Final    Total Protein 09/17/2019 6.8  6.4 - 8.2 g/dL Final    Albumin 09/17/2019 3.0* 3.4 - 5.0 g/dL Final    Globulin 09/17/2019 3.8* 2.3 - 3.5 g/dL Final    Albumin/Globulin Ratio 09/17/2019 0.789* 1.1 - 1.8 Ratio Final    Alkaline Phosphatase 09/17/2019 97  46 - 116 U/L Final   Orders Only on 09/17/2019   Component Date Value Ref Range Status    Name of Test: 09/17/2019 EVEROLIMUS   Final    Result of Test: 09/17/2019 **   Final    Comment: Everolimus by [facility] test code [id number] by HPLC-MS/MS>80.0 ng/mLTherapeutic Range:Kidney transplant (in combination with Cyclosporine):3-8 ng/mLLiver transplant (in combination with Tacrolimus):3-8 ng/mLToxic value: Greater   than 15 ng/mLEverolimus marketed as Zortress is FDA approved forprophylaxisof organ rejection in adult patients receiving a kidney andliver transplant.Everolimus marketed as Afinitor is FDA approved for thetreatment of renal cell carcinoma and for the   treatment ofsubependymal giant cell astrocytoma (SEGA) associated withtuberous sclerosis (TS) in patients who are not candidatesforcurative surgical resection. The suggested therapeutic rangefortreatment of SEGA is 5-15 ng/mL, which is based on a   predose(trough) specimen.The optimal therapeutic range for a given patient may differfrom this suggested range based on the indication fortherapy,treatment phase (initiation or maintenance), use incombinationwith other drugs, time of  specim                           en collection   relative topriordose, type of transplanted organ, and/or the therapeuticapproach of the transplant center.Test developed and characteristics determined by [facility]. See Compliance Statement B:   [employer]/CS===========================================================      Performed by: 09/17/2019 AR   Final       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, estrogen receptor positive [C50.812, Z17.0]   Co-morbidity/Active Problems:   Patient Active Problem List   Diagnosis    Breast cancer metastasized to bone    Neuropathy    Fatigue associated with anemia    Drug-related hair loss    Nausea    Primary insomnia    Foot infection    Cancer, metastatic to bone    Malignant neoplasm of overlapping 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, estrogen receptor positive         Benita Carballo is a 74 y.o. female with PMH of The primary encounter diagnosis was Malignant neoplasm of overlapping 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, estrogen receptor positive . Diagnoses of 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, unspecified laterality, Neuropathy, and Cancer, metastatic to bone were also pertinent to this visit., with Malignant neoplasm of overlapping 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, estrogen receptor positive [C50.812, Z17.0]     Metastatic breast cancer ERPR positive HER2 Noah questionable  Status post of chemotherapy and HER2 targeted therapy with progression on HER2 targeted therapy  Recent testing fish negative for her 2    Now She is currently on combined afinitor + aromasin,  still with progressing disease based on physical examination   Bone metastatic disease    Plan:   Overall Plan:  Restaging with CT scan and re-evaluation of ERPR and her 2 Noah status by liquid biopsy   may need change of chemotherapy plan if cleared the patient have a progressive disease the patient does not want any IV  chemotherapy and states that HER2 targeted therapy almost killed her    To Do:    --Labs: CBC CMP tumor marker denied  --Imaging Studies Ordered:  CT scan of chest abdomen pelvis ordered rescheduled  --Proceed with Rx Xgeva today and every 4 weeks  --Return to Clinic with appointment in 2 weeks    Signature    Solomon Contreras M.D., Ph.D., [facility]  Hematology-Oncology    Signed 12/11/2019 12:06 PM

## 2019-12-12 ENCOUNTER — TELEPHONE (OUTPATIENT)
Dept: HEMATOLOGY/ONCOLOGY | Facility: CLINIC | Age: 74
End: 2019-12-12

## 2019-12-12 NOTE — TELEPHONE ENCOUNTER
Patient called and reported she missed her CT this morning due to car troubles. Would like for us to call and reschedule.

## 2019-12-18 ENCOUNTER — OFFICE VISIT (OUTPATIENT)
Dept: HEMATOLOGY/ONCOLOGY | Facility: CLINIC | Age: 74
End: 2019-12-18
Payer: MEDICARE

## 2019-12-18 VITALS
HEIGHT: 66 IN | WEIGHT: 117.38 LBS | SYSTOLIC BLOOD PRESSURE: 122 MMHG | BODY MASS INDEX: 18.86 KG/M2 | RESPIRATION RATE: 16 BRPM | TEMPERATURE: 99 F | HEART RATE: 98 BPM | DIASTOLIC BLOOD PRESSURE: 73 MMHG | OXYGEN SATURATION: 86 %

## 2019-12-18 DIAGNOSIS — G62.9 NEUROPATHY: ICD-10-CM

## 2019-12-18 DIAGNOSIS — C50.812 MALIGNANT NEOPLASM OF OVERLAPPING SITES OF LEFT BREAST IN FEMALE, ESTROGEN RECEPTOR POSITIVE: ICD-10-CM

## 2019-12-18 DIAGNOSIS — C50.919 CARCINOMA OF BREAST METASTATIC TO BONE, UNSPECIFIED LATERALITY: ICD-10-CM

## 2019-12-18 DIAGNOSIS — R53.82 CHRONIC FATIGUE: Primary | ICD-10-CM

## 2019-12-18 DIAGNOSIS — Z17.0 MALIGNANT NEOPLASM OF OVERLAPPING SITES OF LEFT BREAST IN FEMALE, ESTROGEN RECEPTOR POSITIVE: ICD-10-CM

## 2019-12-18 DIAGNOSIS — C79.51 CARCINOMA OF BREAST METASTATIC TO BONE, UNSPECIFIED LATERALITY: ICD-10-CM

## 2019-12-18 DIAGNOSIS — J44.9 CHRONIC OBSTRUCTIVE PULMONARY DISEASE, UNSPECIFIED COPD TYPE: ICD-10-CM

## 2019-12-18 DIAGNOSIS — F51.01 PRIMARY INSOMNIA: ICD-10-CM

## 2019-12-18 PROCEDURE — 1159F MED LIST DOCD IN RCRD: CPT | Mod: S$GLB,,, | Performed by: INTERNAL MEDICINE

## 2019-12-18 PROCEDURE — 1126F AMNT PAIN NOTED NONE PRSNT: CPT | Mod: S$GLB,,, | Performed by: INTERNAL MEDICINE

## 2019-12-18 PROCEDURE — 1126F PR PAIN SEVERITY QUANTIFIED, NO PAIN PRESENT: ICD-10-PCS | Mod: S$GLB,,, | Performed by: INTERNAL MEDICINE

## 2019-12-18 PROCEDURE — 1159F PR MEDICATION LIST DOCUMENTED IN MEDICAL RECORD: ICD-10-PCS | Mod: S$GLB,,, | Performed by: INTERNAL MEDICINE

## 2019-12-18 PROCEDURE — 99215 PR OFFICE/OUTPT VISIT, EST, LEVL V, 40-54 MIN: ICD-10-PCS | Mod: S$GLB,,, | Performed by: INTERNAL MEDICINE

## 2019-12-18 PROCEDURE — 99215 OFFICE O/P EST HI 40 MIN: CPT | Mod: S$GLB,,, | Performed by: INTERNAL MEDICINE

## 2019-12-18 RX ORDER — TEMAZEPAM 15 MG/1
15 CAPSULE ORAL NIGHTLY PRN
Qty: 20 CAPSULE | Refills: 0 | Status: SHIPPED | OUTPATIENT
Start: 2019-12-18 | End: 2020-01-06 | Stop reason: SDUPTHER

## 2019-12-18 NOTE — PROGRESS NOTES
Medical Oncology Progress Note  [facility]     PATIENT: Benita Carballo  : 1945 74 y.o. female  MRN [id number]     PCP: Primary Doctor No  Consult Requested By:      Date of Service: 2019    Subjective:   Interim History:  Breast Cancer    Benita Carballo is here for to followup breast cancer treatment.  The patient have fatigue and chronic pain insomnia, some shortness of breath otherwise no new complaints       Oncology History:  Oncology History     Rt breast cancer s/p lump with chemo, XRT in [place]. Tamoxifen x 1 yr, then Arimidex x 5 yrs. Completed tx .       c/o chronic cough with CT chest 16 showing extensive adenopathy  With base of lt neck mass 4.5 cm with yosi pulm nodules. RF to Dr ANDREWS Mcnally    16 bx of lt neck mass carcinoma with feature suggestive of breast primary. ER/%, Her2 borderline by IHC and deemed equivocal by FISH          Breast cancer metastasized to bone    2016 Initial Diagnosis     Breast cancer metastasized to bone, , lymphoid mass       2016 - 2017 Chemotherapy     Taxol/Herceptin x 8 cycles then perjeta added   Prescribed by Dr Mcnally       2017 - 2017 Chemotherapy     Kadcycla prescribed by Dr Mncally       2017 Biopsy     Biopsy of rt abd met disease consistent with breast cancer. ER/WA + Her 2 neg per Dr Moreno's note.    Dr Moreno noted path review from bx  showed HER 2 + equivocal with FISH reading as Equivocal but ratio 1.2. Additional report form 2016 states additional testing was done with different probe and the equivocal results can be noted as negative.     Treatment therapy changed from Her2 based tx to endocrine base therapy by Dr Moreno       2017 -  Hormone Therapy     Arimidex -  Aromasin   Ibrance + Aromasin  -  Ibrance + faslodex 2019 Imaging Significant Findings     PET/CT: Lesions involving the medial clavicle , subcarinal LN, pleural  based soft tissue density adjacent to T11 and lesion in left lobe of liver diminished in size and SUV.   RECIST 34% decrease in disease         Past Medical History:   Past Medical History:   Diagnosis Date    Arthritis     Bone cancer     Breast cancer     Cancer     Cataract     Depression     Fatigue associated with anemia 6/6/2019    GERD (gastroesophageal reflux disease)     High cholesterol     Skin problem     Sleep disorder        Past Surgical HIstory:   Past Surgical History:   Procedure Laterality Date    APPENDECTOMY      BREAST LUMPECTOMY Right 2002    CATARACT EXTRACTION, BILATERAL  2014    COLONOSCOPY  2008    HYSTERECTOMY      PORTACATH PLACEMENT  05/18/2016    SURGICAL REMOVAL OF NODULE OF VOCAL CORD         Allergies:  Review of patient's allergies indicates:   Allergen Reactions    Benadryl [diphenhydramine hcl]      Restless leg     Penicillins        Medications:  Current Outpatient Medications   Medication Sig Dispense Refill    dexAMETHasone (DECADRON) 0.5 mg/5 mL Elix 10 mls 4X daily swish 2 min and spit 500 mL 4    exemestane (AROMASIN) 25 mg tablet Take 1 tablet (25 mg total) by mouth once daily. 30 tablet 3    gabapentin (NEURONTIN) 400 MG capsule Take 1 capsule (400 mg total) by mouth 3 (three) times daily. 120 capsule 4    oxyCODONE (ROXICODONE) 5 MG immediate release tablet Take 1 tablet (5 mg total) by mouth every 8 (eight) hours as needed for Pain. 45 tablet 0    pantoprazole (PROTONIX) 40 MG tablet Take 1 tablet (40 mg total) by mouth once daily. 30 tablet 3    PROAIR HFA 90 mcg/actuation inhaler       promethazine (PHENERGAN) 25 MG tablet Take 1 tablet (25 mg total) by mouth every 6 (six) hours as needed for Nausea. 30 tablet 3    temazepam (RESTORIL) 15 mg Cap Take 1 capsule (15 mg total) by mouth nightly as needed. 20 capsule 0     No current facility-administered medications for this visit.        Family History:   Family History   Problem Relation Age  "of Onset    Lung cancer Father     Melanoma Father        Social History:  reports that she has been smoking cigarettes. She has a 2.00 pack-year smoking history. She has never used smokeless tobacco. She reports that she drinks about 4.0 standard drinks of alcohol per week. She reports that she does not use drugs.    Review of Systemas  Constitutional: No change in weight, appetie, fatigue, fever, or night sweats  Eyes: No changes in vision  Ears, Nose, Mouth, Throat, and Face: No hearing problems, ear pain, rummy nose, or sore throat  Respiratory: No shortness of breath or cough  Cardiovascular: No chest pain, palpitations or dizziness  Gastrointestinal: No abdominal pain, hematochezia, melena  Genitourinary: No dysuria, hematuria, nocturia, menstrual problems, post menopausal  Integumentary/Breast: No rashes or itching  Hematologic/Lymphatic: No anemia or bleeding abnormalities  Musculoskeletal: No joint or muscle abnormalities  Neurological: No sensory or motor problems, no headaches  Behavioral/Psych: No depression, anxiety, cognitive problems, or stress  Endocrine: No diabetes or thyroid problems  Allergic/Immunologic: See allergy above    Physical Exam      Vitals:   Vitals:    12/18/19 1142 12/18/19 1217   BP: 122/73    BP Location: Left arm    Patient Position: Sitting    BP Method: Large (Automatic)    Pulse: 98    Resp: 16    Temp: 98.6 °F (37 °C)    TempSrc: Temporal    SpO2: (!) 90% (!) 86%   Weight: 53.3 kg (117 lb 6.4 oz)    Height: 5' 6" (1.676 m)      BMI: Body mass index is 18.95 kg/m².  BSA Body surface area is 1.58 meters squared.  ECOG Performance Status:   ECOG SCORE    1 - Restricted in strenuous activity-ambulatory and able to carry out work of a light nature       GENERAL APPEARANCE: Well developed, well nourished, in no acute distress.  SKIN: Inspection of the skin reveals no rashes, ulcerations or petechiae.  HEENT: The sclerae were anicteric and conjunctivae were pink and moist. " Extraocular movements were intact and pupils were equal, round, and reactive to light with normal accommodation. External inspection of the ears and nose showed no scars, lesions, or masses. Lips, teeth, and gums showed normal mucosa. The oral mucosa, hard and soft palate, tongue and posterior pharynx were normal.  NECK: Supple and symmetric. There was no thyroid enlargement, and no tenderness, or masses were felt.  CRESPIRATORY: Normal AP diameter and normal contour without any kyphoscoliosis. LUNGS: Auscultation of the lungs revealed normal breath sounds without any other adventitious sounds or rubs.  CARDIOVASCULAR: There was a regular rate and rhythm without any murmurs, gallops, rubs. The carotid pulses were normal and 2+ bilaterally without bruits. Peripheral pulses were 2+ and symmetric.  GASTROINTESTINAL: Soft and nontender with normal bowel sounds. The liver span was approximately 5-6 cm in the right midclavicular line by percussion. The liver edge was nontender. The spleen was not palpable. There were no inguinal or umbilical hernias noted. No ascites was noted.  LYMPH NODES: No lymphadenopathy was appreciated in the neck, axillae or groin.  MUSCULOSKELETAL: Gait was normal. There was no tenderness or effusions noted. Muscle strength and tone were normal. EXTREMITIES: No cyanosis, clubbing or edema.  NEUROLOGIC: Alert and oriented x 3. Normal affect. Gait was normal. Normal deep tendon reflexes with no pathological reflexes. Sensation to touch was normal.  PSYCHIATRIC: good mood, orientated to place, time and person     Labs and Imagings     Orders Only on 11/12/2019   Component Date Value Ref Range Status    Sodium 11/12/2019 139  135 - 145 mmol/L Final    Potassium 11/12/2019 3.6  3.6 - 5.2 mmol/L Final    Chloride 11/12/2019 102  100 - 108 mmol/L Final    CO2 11/12/2019 27  21 - 32 mmol/L Final    BUN, Bld 11/12/2019 6* 7 - 18 mg/dL Final    Creatinine 11/12/2019 0.96  0.55 - 1.02 mg/dL Final     GFR ESTIMATION 11/12/2019 57* >60 Final               DESCRIPTION       GLOMERULAR FILTRATION RATE             NORMAL                     >60             KIDNEY  DISEASE           15-60             KIDNEY FAILURE             <15    BUN/Creatinine Ratio 11/12/2019 6.25* 7 - 18 Ratio Final    Glucose 11/12/2019 170* 70 - 110 mg/dL Final    Calcium 11/12/2019 8.2* 8.8 - 10.5 mg/dL Final    Total Bilirubin 11/12/2019 0.7  0.0 - 1.0 mg/dL Final    AST 11/12/2019 23  15 - 37 U/L Final    ALT 11/12/2019 27  12 - 78 U/L Final    Total Protein 11/12/2019 6.5  6.4 - 8.2 g/dL Final    Albumin 11/12/2019 2.8* 3.4 - 5.0 g/dL Final    Globulin 11/12/2019 3.7* 2.3 - 3.5 g/dL Final    Albumin/Globulin Ratio 11/12/2019 0.756* 1.1 - 1.8 Ratio Final    Alkaline Phosphatase 11/12/2019 100  46 - 116 U/L Final   Orders Only on 11/12/2019   Component Date Value Ref Range Status    WBC 11/12/2019 5.2  4.6 - 10.2 10*3/uL Final    RBC 11/12/2019 5.09  4.2 - 5.4 10*6/uL Final    Hemoglobin 11/12/2019 13.6  12.0 - 16.0 g/dL Final    Hematocrit 11/12/2019 43.0  37.0 - 47.0 % Final    MCV 11/12/2019 84.5  80 - 97 fL Final    MCH 11/12/2019 26.7* 27.0 - 31.2 pg Final    MCHC 11/12/2019 31.6* 31.8 - 35.4 g/dL Final    RDW RBC Auto-Rto 11/12/2019 16.7  12.5 - 18.0 % Final    Platelets 11/12/2019 147  142 - 424 10*3/uL Final    Neutrophils/100 leukocytes 11/12/2019 76.1  37 - 80 % Final    Lymphocytes/100 leukocytes 11/12/2019 12.0* 25 - 40 % Final    Monocytes/100 leukocytes 11/12/2019 8.0  1 - 15 % Final    Eosinophils/100 leukocytes 11/12/2019 2.3  1 - 3 % Final    Basophils/100 leukocytes 11/12/2019 1.0  0 - 3 % Final    Manual nRBC per 100 Cells 11/12/2019 0.0  % Final    Neutrophils 11/12/2019 3.99  1.8 - 7.7 10*3/uL Final   Office Visit on 10/16/2019   Component Date Value Ref Range Status    WBC 10/15/2019 5.8  4.6 - 10.2 10*3/uL Final    RBC 10/15/2019 4.63  4.2 - 5.4 10*6/uL Final    Hemoglobin 10/15/2019 13.4   12.0 - 16.0 g/dL Final    Hematocrit 10/15/2019 42.2  37.0 - 47.0 % Final    MCV 10/15/2019 91.1  80 - 97 fL Final    MCH 10/15/2019 28.9  27.0 - 31.2 pg Final    MCHC 10/15/2019 31.8  31.8 - 35.4 g/dL Final    RDW RBC Auto-Rto 10/15/2019 16.9  12.5 - 18.0 % Final    Platelets 10/15/2019 106* 142 - 424 10*3/uL Final    Neutrophils/100 leukocytes 10/15/2019 76.4  37 - 80 % Final    Lymphocytes/100 leukocytes 10/15/2019 11.9* 25 - 40 % Final    Monocytes/100 leukocytes 10/15/2019 7.8  1 - 15 % Final    Eosinophils/100 leukocytes 10/15/2019 2.4  1 - 3 % Final    Basophils/100 leukocytes 10/15/2019 1.0  0 - 3 % Final    Manual nRBC per 100 Cells 10/15/2019 0.0  % Final    Neutrophils 10/15/2019 4.42  1.8 - 7.7 10*3/uL Final    Sodium 10/15/2019 140  135 - 145 mmol/L Final    Potassium 10/15/2019 4.1  3.6 - 5.2 mmol/L Final    Chloride 10/15/2019 104  100 - 108 mmol/L Final    CO2 10/15/2019 28  21 - 32 mmol/L Final    BUN, Bld 10/15/2019 6* 7 - 18 mg/dL Final    Creatinine 10/15/2019 1.00  0.55 - 1.02 mg/dL Final    GFR ESTIMATION 10/15/2019 54* >60 Final               DESCRIPTION       GLOMERULAR FILTRATION RATE             NORMAL                     >60             KIDNEY  DISEASE           15-60             KIDNEY FAILURE             <15    BUN/Creatinine Ratio 10/15/2019 6.00* 7 - 18 Ratio Final    Glucose 10/15/2019 158* 70 - 110 mg/dL Final    Calcium 10/15/2019 8.9  8.8 - 10.5 mg/dL Final    Total Bilirubin 10/15/2019 0.3  0.0 - 1.0 mg/dL Final    AST 10/15/2019 18  15 - 37 U/L Final    ALT 10/15/2019 20  12 - 78 U/L Final    Total Protein 10/15/2019 6.8  6.4 - 8.2 g/dL Final    Albumin 10/15/2019 2.9* 3.4 - 5.0 g/dL Final    Globulin 10/15/2019 3.9* 2.3 - 3.5 g/dL Final    Albumin/Globulin Ratio 10/15/2019 0.743* 1.1 - 1.8 Ratio Final    Alkaline Phosphatase 10/15/2019 114  46 - 116 U/L Final   Orders Only on 10/15/2019   Component Date Value Ref Range Status    Everolimus Lvl  10/15/2019 31.1   Final    Comment: Everolimus [facility] test code [id number] by HPLC-MS/MS31.1 ng/mLTherapeutic Range:Kidney transplant (in combination with Cyclosporine):3-8 ng/mLLiver transplant (in combination with Tacrolimus):3-8 ng/mLToxic value: Greater   than 15 ng/mLEverolimus marketed as Zortress is FDA approved forprophylaxisof organ rejection in adult patients receiving a kidney andliver transplant.Everolimus marketed as Afinitor is FDA approved for thetreatment of renal cell carcinoma and for the   treatment ofsubependymal giant cell astrocytoma (SEGA) associated withtuberous sclerosis (TS) in patients who are not candidatesforcurative surgical resection. The suggested therapeutic rangefortreatment of SEGA is 5-15 ng/mL, which is based on a   predose(trough) specimen.The optimal therapeutic range for a given patient may differfrom this suggested range based on the indication fortherapy,treatment phase (initiation or maintenance), use incombinationwith other drugs, time of specime                           n collection   relative topriordose, type of transplanted organ, and/or the therapeuticapproach of the transplant center.Test developed and characteristics determined by [employer]. See Compliance Statement B:   [email or web address]/CS===========================================================___________________________________________________________     Orders Only on 10/15/2019   Component Date Value Ref Range Status    Platelet Estimate 10/15/2019 Adequate   Final   Office Visit on 09/18/2019   Component Date Value Ref Range Status    Sodium 09/17/2019 140  135 - 145 mmol/L Final    Potassium 09/17/2019 3.6  3.6 - 5.2 mmol/L Final    Chloride 09/17/2019 104  100 - 108 mmol/L Final    CO2 09/17/2019 27  21 - 32 mmol/L Final    BUN, Bld 09/17/2019 8  7 - 18 mg/dL Final    Creatinine 09/17/2019 0.88  0.55 - 1.02 mg/dL Final    GFR ESTIMATION 09/17/2019 > 60  >60 Final                DESCRIPTION       GLOMERULAR FILTRATION RATE             NORMAL                     >60             KIDNEY  DISEASE           15-60             KIDNEY FAILURE             <15    BUN/Creatinine Ratio 09/17/2019 9.09  7 - 18 Ratio Final    Glucose 09/17/2019 107  70 - 110 mg/dL Final    Calcium 09/17/2019 8.8  8.8 - 10.5 mg/dL Final    Total Bilirubin 09/17/2019 0.3  0.0 - 1.0 mg/dL Final    AST 09/17/2019 21  15 - 37 U/L Final    ALT 09/17/2019 21  12 - 78 U/L Final    Total Protein 09/17/2019 6.8  6.4 - 8.2 g/dL Final    Albumin 09/17/2019 3.0* 3.4 - 5.0 g/dL Final    Globulin 09/17/2019 3.8* 2.3 - 3.5 g/dL Final    Albumin/Globulin Ratio 09/17/2019 0.789* 1.1 - 1.8 Ratio Final    Alkaline Phosphatase 09/17/2019 97  46 - 116 U/L Final   Orders Only on 09/17/2019   Component Date Value Ref Range Status    Name of Test: 09/17/2019 EVEROLIMUS   Final    Result of Test: 09/17/2019 **   Final    Comment: Everolimus by [employer] test code [id number] by HPLC-MS/MS>80.0 ng/mLTherapeutic Range:Kidney transplant (in combination with Cyclosporine):3-8 ng/mLLiver transplant (in combination with Tacrolimus):3-8 ng/mLToxic value: Greater   than 15 ng/mLEverolimus marketed as Zortress is FDA approved forprophylaxisof organ rejection in adult patients receiving a kidney andliver transplant.Everolimus marketed as Afinitor is FDA approved for thetreatment of renal cell carcinoma and for the   treatment ofsubependymal giant cell astrocytoma (SEGA) associated withtuberous sclerosis (TS) in patients who are not candidatesforcurative surgical resection. The suggested therapeutic rangefortreatment of SEGA is 5-15 ng/mL, which is based on a   predose(trough) specimen.The optimal therapeutic range for a given patient may differfrom this suggested range based on the indication fortherapy,treatment phase (initiation or maintenance), use incombinationwith other drugs, time of specim                            en collection   relative topriordose, type of transplanted organ, and/or the therapeuticapproach of the transplant center.Test developed and characteristics determined by [employer]. See Compliance Statement B:   [employer]/CS===========================================================      Performed by: 09/17/2019 AR   Final       Imaging:     Assessment:     Principle Problem: Chronic fatigue [R53.82]   Co-morbidity/Active Problems:   Patient Active Problem List   Diagnosis    Breast cancer metastasized to bone    Neuropathy    Fatigue associated with anemia    Drug-related hair loss    Nausea    Primary insomnia    Foot infection    Cancer, metastatic to bone    Malignant neoplasm of overlapping 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, estrogen receptor positive     Chronic fatigue        Benita Carballo is a 74 y.o. female with PMH of The primary encounter diagnosis was Chronic fatigue. Diagnoses of Malignant neoplasm of overlapping 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, estrogen receptor positive , 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, unspecified laterality, Neuropathy, Primary insomnia, and Chronic obstructive pulmonary disease, unspecified COPD type were also pertinent to this visit., with Chronic fatigue [R53.82]       Metastatic breast cancer ERPR positive HER2 Noah questionable  Status post of chemotherapy and HER2 targeted therapy with progression on HER2 targeted therapy  Recent testing fish negative for her 2 2018  Repeat the lipid biopsy shows HER2 positive and ER negative    Now She is currently on combined afinitor + aromasin,  repeated CT scan shows improvement  Bone metastatic disease  Fatigue  Hypoxia    Plan:   Overall Plan:    She has HER2 positive disease with ERPR negative by the new liquid biopsy however the patient responding to Afinitor and Aromasin therefore going to continue the current treatment without changing to HER2 targeted therapy at this time and continue  monitor response by tumor marker and CT scan  Check thyroid function for fatigue  Start oxygen for hypoxia     --Labs: CBC CMP tumor marker every months  == CT scan due in 3 months March 2020  --Proceed with Rx Xgeva today and every 4 weeks  == start home oxygen  --Return to Clinic with appointment in 4 weeks    Signature    Solomon Contreras M.D., Ph.D., [facility]  Hematology-Oncology    Signed 12/18/2019 12:06 PM

## 2019-12-26 DIAGNOSIS — C50.919 CARCINOMA OF BREAST METASTATIC TO BONE, UNSPECIFIED LATERALITY: ICD-10-CM

## 2019-12-26 DIAGNOSIS — C79.51 CANCER, METASTATIC TO BONE: ICD-10-CM

## 2019-12-26 DIAGNOSIS — C79.51 CARCINOMA OF BREAST METASTATIC TO BONE, UNSPECIFIED LATERALITY: ICD-10-CM

## 2019-12-26 RX ORDER — OXYCODONE HYDROCHLORIDE 5 MG/1
5 TABLET ORAL EVERY 8 HOURS PRN
Qty: 45 TABLET | Refills: 0 | Status: SHIPPED | OUTPATIENT
Start: 2019-12-26 | End: 2019-12-27 | Stop reason: RX

## 2019-12-26 NOTE — TELEPHONE ENCOUNTER
----- Message from Kiki Hoskins sent at 12/26/2019 10:06 AM CST -----  Contact: patient  Needs refill on OXYCODONE 5mg.. 1 tablet every 8 hours... [place] pharmacy. [facility] [phone]

## 2019-12-27 RX ORDER — OXYCODONE HYDROCHLORIDE 5 MG/1
5 TABLET ORAL EVERY 8 HOURS PRN
COMMUNITY
Start: 2019-12-26 | End: 2020-01-13 | Stop reason: SDUPTHER

## 2019-12-27 RX ORDER — OXYCODONE HYDROCHLORIDE 5 MG/1
5 TABLET ORAL EVERY 8 HOURS PRN
Qty: 45 TABLET | Refills: 0 | Status: SHIPPED | OUTPATIENT
Start: 2019-12-27 | End: 2020-02-14 | Stop reason: SDUPTHER

## 2019-12-27 NOTE — TELEPHONE ENCOUNTER
Patient called and reports [facility] does not have medication in stock. Would like script sent to [facility] instead.

## 2020-01-06 DIAGNOSIS — F51.01 PRIMARY INSOMNIA: ICD-10-CM

## 2020-01-06 NOTE — TELEPHONE ENCOUNTER
----- Message from Jen De sent at 1/6/2020  1:17 PM CST -----  Contact: patient  Pt is out of sleeping meds and needs a refill of:       temazepam (RESTORIL) 15 mg Cap      Pharmacy is:       [facility] on [address]

## 2020-01-08 DIAGNOSIS — F51.01 PRIMARY INSOMNIA: ICD-10-CM

## 2020-01-08 RX ORDER — TEMAZEPAM 15 MG/1
15 CAPSULE ORAL NIGHTLY PRN
Qty: 30 CAPSULE | Refills: 0 | Status: CANCELLED | OUTPATIENT
Start: 2020-01-08 | End: 2020-02-07

## 2020-01-08 RX ORDER — TEMAZEPAM 15 MG/1
15 CAPSULE ORAL NIGHTLY PRN
Qty: 30 CAPSULE | Refills: 0 | Status: SHIPPED | OUTPATIENT
Start: 2020-01-08 | End: 2020-01-09 | Stop reason: SDUPTHER

## 2020-01-09 DIAGNOSIS — F51.01 PRIMARY INSOMNIA: ICD-10-CM

## 2020-01-09 RX ORDER — TEMAZEPAM 15 MG/1
15 CAPSULE ORAL NIGHTLY PRN
Qty: 30 CAPSULE | Refills: 0 | Status: SHIPPED | OUTPATIENT
Start: 2020-01-09 | End: 2020-02-07

## 2020-01-13 DIAGNOSIS — C79.51 CANCER, METASTATIC TO BONE: Primary | ICD-10-CM

## 2020-01-13 NOTE — TELEPHONE ENCOUNTER
"----- Message from Jen De sent at 1/13/2020  1:12 PM CST -----  Contact: patient  If is from the Patient....    "Someone needs to call [facility].  Ask for the PA dept.  Change script to 20 day supply."    Needs refill for Oxycodone - Only has one pill remaining.    Thanks!  "

## 2020-01-14 RX ORDER — OXYCODONE HYDROCHLORIDE 5 MG/1
5 TABLET ORAL EVERY 8 HOURS PRN
Qty: 90 TABLET | Refills: 0 | Status: SHIPPED | OUTPATIENT
Start: 2020-01-14 | End: 2020-02-13

## 2020-01-15 ENCOUNTER — OFFICE VISIT (OUTPATIENT)
Dept: HEMATOLOGY/ONCOLOGY | Facility: CLINIC | Age: 75
End: 2020-01-15
Payer: MEDICARE

## 2020-01-15 VITALS
TEMPERATURE: 99 F | OXYGEN SATURATION: 84 % | HEIGHT: 66 IN | SYSTOLIC BLOOD PRESSURE: 142 MMHG | BODY MASS INDEX: 18.86 KG/M2 | HEART RATE: 86 BPM | WEIGHT: 117.38 LBS | RESPIRATION RATE: 18 BRPM | DIASTOLIC BLOOD PRESSURE: 83 MMHG

## 2020-01-15 DIAGNOSIS — Z17.0 MALIGNANT NEOPLASM OF OVERLAPPING SITES OF LEFT BREAST IN FEMALE, ESTROGEN RECEPTOR POSITIVE: Primary | ICD-10-CM

## 2020-01-15 DIAGNOSIS — K57.90 DIVERTICULOSIS: ICD-10-CM

## 2020-01-15 DIAGNOSIS — J44.9 CHRONIC OBSTRUCTIVE PULMONARY DISEASE, UNSPECIFIED COPD TYPE: ICD-10-CM

## 2020-01-15 DIAGNOSIS — C50.919 MALIGNANT NEOPLASM OF BREAST, STAGE 4, UNSPECIFIED LATERALITY: ICD-10-CM

## 2020-01-15 DIAGNOSIS — G62.9 NEUROPATHY: ICD-10-CM

## 2020-01-15 DIAGNOSIS — C50.812 MALIGNANT NEOPLASM OF OVERLAPPING SITES OF LEFT BREAST IN FEMALE, ESTROGEN RECEPTOR POSITIVE: Primary | ICD-10-CM

## 2020-01-15 PROCEDURE — 99215 PR OFFICE/OUTPT VISIT, EST, LEVL V, 40-54 MIN: ICD-10-PCS | Mod: S$GLB,,, | Performed by: INTERNAL MEDICINE

## 2020-01-15 PROCEDURE — 99215 OFFICE O/P EST HI 40 MIN: CPT | Mod: S$GLB,,, | Performed by: INTERNAL MEDICINE

## 2020-01-15 PROCEDURE — 1159F PR MEDICATION LIST DOCUMENTED IN MEDICAL RECORD: ICD-10-PCS | Mod: S$GLB,,, | Performed by: INTERNAL MEDICINE

## 2020-01-15 PROCEDURE — 1159F MED LIST DOCD IN RCRD: CPT | Mod: S$GLB,,, | Performed by: INTERNAL MEDICINE

## 2020-01-15 PROCEDURE — 1125F AMNT PAIN NOTED PAIN PRSNT: CPT | Mod: S$GLB,,, | Performed by: INTERNAL MEDICINE

## 2020-01-15 PROCEDURE — 1125F PR PAIN SEVERITY QUANTIFIED, PAIN PRESENT: ICD-10-PCS | Mod: S$GLB,,, | Performed by: INTERNAL MEDICINE

## 2020-01-15 NOTE — PROGRESS NOTES
Medical Oncology Progress Note  [facility]     PATIENT: Benita Carballo  : 1945 74 y.o. female  MRN [id number]     PCP: Primary Doctor No  Consult Requested By:      Date of Service: 1/15/2020    Subjective:   Interim History:  Breast Cancer    Benita Carballo is here for to followup breast cancer treatment.  The patient is doing better no new c/o       Oncology History:  Oncology History     Rt breast cancer s/p lump with chemo, [place] in [place]. Tamoxifen x 1 yr, then Arimidex x 5 yrs. Completed tx .       c/o chronic cough with CT chest 16 showing extensive adenopathy  With base of lt neck mass 4.5 cm with yosi pulm nodules. RF to Dr ANDREWS Mcnally    16 bx of lt neck mass carcinoma with feature suggestive of breast primary. 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 ER/PA + Her 2 neg per Dr Moreno's note.    Dr Moreno noted path review from bx  showed HER 2 + equivocal with FISH reading as Equivocal but ratio 1.2. Additional report form 2016 states additional testing was done with different probe and the equivocal results can be noted as negative.     Treatment therapy changed from Her2 based tx to endocrine base therapy by Dr Moreno       2017 -  Hormone Therapy     Arimidex -  Aromasin   Ibrance + Aromasin  -  Ibrance + faslodex 2019 Imaging Significant Findings     PET/CT: Lesions involving the medial clavicle , subcarinal LN, pleural based soft tissue density adjacent to T11 and lesion in left lobe of  liver diminished in size and SUV.   RECIST 34% decrease in disease         Past Medical History:   Past Medical History:   Diagnosis Date    Arthritis     Bone cancer     Breast cancer     Cancer     Cataract     Depression     Diverticulosis 1/15/2020    Fatigue associated with anemia 6/6/2019    GERD (gastroesophageal reflux disease)     High cholesterol     Skin problem     Sleep disorder        Past Surgical HIstory:   Past Surgical History:   Procedure Laterality Date    APPENDECTOMY      BREAST LUMPECTOMY Right 2002    CATARACT EXTRACTION, BILATERAL  2014    COLONOSCOPY  2008    HYSTERECTOMY      PORTACATH PLACEMENT  05/18/2016    SURGICAL REMOVAL OF NODULE OF VOCAL CORD         Allergies:  Review of patient's allergies indicates:   Allergen Reactions    Benadryl [diphenhydramine hcl]      Restless leg     Penicillins        Medications:  Current Outpatient Medications   Medication Sig Dispense Refill    dexAMETHasone (DECADRON) 0.5 mg/5 mL Elix 10 mls 4X daily swish 2 min and spit 500 mL 4    exemestane (AROMASIN) 25 mg tablet Take 1 tablet (25 mg total) by mouth once daily. 30 tablet 3    gabapentin (NEURONTIN) 400 MG capsule Take 1 capsule (400 mg total) by mouth 3 (three) times daily. 120 capsule 4    oxyCODONE (ROXICODONE) 5 MG immediate release tablet Take 1 tablet (5 mg total) by mouth every 8 (eight) hours as needed for Pain. 45 tablet 0    oxyCODONE (ROXICODONE) 5 MG immediate release tablet Take 1 tablet (5 mg total) by mouth every 8 (eight) hours as needed for Pain. 90 tablet 0    pantoprazole (PROTONIX) 40 MG tablet Take 1 tablet (40 mg total) by mouth once daily. 30 tablet 3    PROAIR HFA 90 mcg/actuation inhaler       promethazine (PHENERGAN) 25 MG tablet Take 1 tablet (25 mg total) by mouth every 6 (six) hours as needed for Nausea. 30 tablet 3    temazepam (RESTORIL) 15 mg Cap Take 1 capsule (15 mg total) by mouth nightly as needed. 30 capsule 0     No current  "facility-administered medications for this visit.        Family History:   Family History   Problem Relation Age of Onset    Lung cancer Father     Melanoma Father        Social History:  reports that she has been smoking cigarettes. She has a 2.00 pack-year smoking history. She has never used smokeless tobacco. She reports that she drinks about 4.0 standard drinks of alcohol per week. She reports that she does not use drugs.    Review of Systemas  Constitutional: No change in weight, appetie, fatigue, fever, or night sweats  Eyes: No changes in vision  Ears, Nose, Mouth, Throat, and Face: No hearing problems, ear pain, rummy nose, or sore throat  Respiratory: No shortness of breath or cough  Cardiovascular: No chest pain, palpitations or dizziness  Gastrointestinal: No abdominal pain, hematochezia, melena  Genitourinary: No dysuria, hematuria, nocturia, menstrual problems, post menopausal  Integumentary/Breast: No rashes or itching  Hematologic/Lymphatic: No anemia or bleeding abnormalities  Musculoskeletal: No joint or muscle abnormalities  Neurological: No sensory or motor problems, no headaches  Behavioral/Psych: No depression, anxiety, cognitive problems, or stress  Endocrine: No diabetes or thyroid problems  Allergic/Immunologic: See allergy above    Physical Exam      Vitals:   Vitals:    01/15/20 1102   BP: (!) 142/83   BP Location: Right arm   Patient Position: Sitting   BP Method: Large (Automatic)   Pulse: 86   Resp: 18   Temp: 98.6 °F (37 °C)   TempSrc: Temporal   SpO2: (!) 84%   Weight: 53.3 kg (117 lb 6.4 oz)   Height: 5' 6" (1.676 m)     BMI: Body mass index is 18.95 kg/m².  BSA Body surface area is 1.58 meters squared.  ECOG Performance Status:   ECOG SCORE    1 - Restricted in strenuous activity-ambulatory and able to carry out work of a light nature       GENERAL APPEARANCE: Well developed, well nourished, in no acute distress.  SKIN: Inspection of the skin reveals no rashes, ulcerations or " petechiae.  HEENT: The sclerae were anicteric and conjunctivae were pink and moist. Extraocular movements were intact and pupils were equal, round, and reactive to light with normal accommodation. External inspection of the ears and nose showed no scars, lesions, or masses. Lips, teeth, and gums showed normal mucosa. The oral mucosa, hard and soft palate, tongue and posterior pharynx were normal.  NECK: Supple and symmetric. There was no thyroid enlargement, and no tenderness, or masses were felt.  CRESPIRATORY: Normal AP diameter and normal contour without any kyphoscoliosis. LUNGS: Auscultation of the lungs revealed normal breath sounds without any other adventitious sounds or rubs.  CARDIOVASCULAR: There was a regular rate and rhythm without any murmurs, gallops, rubs. The carotid pulses were normal and 2+ bilaterally without bruits. Peripheral pulses were 2+ and symmetric.  GASTROINTESTINAL: Soft and nontender with normal bowel sounds. The liver span was approximately 5-6 cm in the right midclavicular line by percussion. The liver edge was nontender. The spleen was not palpable. There were no inguinal or umbilical hernias noted. No ascites was noted.  LYMPH NODES: No lymphadenopathy was appreciated in the neck, axillae or groin.  MUSCULOSKELETAL: Gait was normal. There was no tenderness or effusions noted. Muscle strength and tone were normal. EXTREMITIES: No cyanosis, clubbing or edema.  NEUROLOGIC: Alert and oriented x 3. Normal affect. Gait was normal. Normal deep tendon reflexes with no pathological reflexes. Sensation to touch was normal.  PSYCHIATRIC: good mood, orientated to place, time and person     Labs and Imagings     Orders Only on 11/12/2019   Component Date Value Ref Range Status    Sodium 11/12/2019 139  135 - 145 mmol/L Final    Potassium 11/12/2019 3.6  3.6 - 5.2 mmol/L Final    Chloride 11/12/2019 102  100 - 108 mmol/L Final    CO2 11/12/2019 27  21 - 32 mmol/L Final    BUN, Bld  11/12/2019 6* 7 - 18 mg/dL Final    Creatinine 11/12/2019 0.96  0.55 - 1.02 mg/dL Final    GFR ESTIMATION 11/12/2019 57* >60 Final               DESCRIPTION       GLOMERULAR FILTRATION RATE             NORMAL                     >60             KIDNEY  DISEASE           15-60             KIDNEY FAILURE             <15    BUN/Creatinine Ratio 11/12/2019 6.25* 7 - 18 Ratio Final    Glucose 11/12/2019 170* 70 - 110 mg/dL Final    Calcium 11/12/2019 8.2* 8.8 - 10.5 mg/dL Final    Total Bilirubin 11/12/2019 0.7  0.0 - 1.0 mg/dL Final    AST 11/12/2019 23  15 - 37 U/L Final    ALT 11/12/2019 27  12 - 78 U/L Final    Total Protein 11/12/2019 6.5  6.4 - 8.2 g/dL Final    Albumin 11/12/2019 2.8* 3.4 - 5.0 g/dL Final    Globulin 11/12/2019 3.7* 2.3 - 3.5 g/dL Final    Albumin/Globulin Ratio 11/12/2019 0.756* 1.1 - 1.8 Ratio Final    Alkaline Phosphatase 11/12/2019 100  46 - 116 U/L Final   Orders Only on 11/12/2019   Component Date Value Ref Range Status    WBC 11/12/2019 5.2  4.6 - 10.2 10*3/uL Final    RBC 11/12/2019 5.09  4.2 - 5.4 10*6/uL Final    Hemoglobin 11/12/2019 13.6  12.0 - 16.0 g/dL Final    Hematocrit 11/12/2019 43.0  37.0 - 47.0 % Final    MCV 11/12/2019 84.5  80 - 97 fL Final    MCH 11/12/2019 26.7* 27.0 - 31.2 pg Final    MCHC 11/12/2019 31.6* 31.8 - 35.4 g/dL Final    RDW RBC Auto-Rto 11/12/2019 16.7  12.5 - 18.0 % Final    Platelets 11/12/2019 147  142 - 424 10*3/uL Final    Neutrophils/100 leukocytes 11/12/2019 76.1  37 - 80 % Final    Lymphocytes/100 leukocytes 11/12/2019 12.0* 25 - 40 % Final    Monocytes/100 leukocytes 11/12/2019 8.0  1 - 15 % Final    Eosinophils/100 leukocytes 11/12/2019 2.3  1 - 3 % Final    Basophils/100 leukocytes 11/12/2019 1.0  0 - 3 % Final    Manual nRBC per 100 Cells 11/12/2019 0.0  % Final    Neutrophils 11/12/2019 3.99  1.8 - 7.7 10*3/uL Final   Office Visit on 10/16/2019   Component Date Value Ref Range Status    WBC 10/15/2019 5.8  4.6 - 10.2  10*3/uL Final    RBC 10/15/2019 4.63  4.2 - 5.4 10*6/uL Final    Hemoglobin 10/15/2019 13.4  12.0 - 16.0 g/dL Final    Hematocrit 10/15/2019 42.2  37.0 - 47.0 % Final    MCV 10/15/2019 91.1  80 - 97 fL Final    MCH 10/15/2019 28.9  27.0 - 31.2 pg Final    MCHC 10/15/2019 31.8  31.8 - 35.4 g/dL Final    RDW RBC Auto-Rto 10/15/2019 16.9  12.5 - 18.0 % Final    Platelets 10/15/2019 106* 142 - 424 10*3/uL Final    Neutrophils/100 leukocytes 10/15/2019 76.4  37 - 80 % Final    Lymphocytes/100 leukocytes 10/15/2019 11.9* 25 - 40 % Final    Monocytes/100 leukocytes 10/15/2019 7.8  1 - 15 % Final    Eosinophils/100 leukocytes 10/15/2019 2.4  1 - 3 % Final    Basophils/100 leukocytes 10/15/2019 1.0  0 - 3 % Final    Manual nRBC per 100 Cells 10/15/2019 0.0  % Final    Neutrophils 10/15/2019 4.42  1.8 - 7.7 10*3/uL Final    Sodium 10/15/2019 140  135 - 145 mmol/L Final    Potassium 10/15/2019 4.1  3.6 - 5.2 mmol/L Final    Chloride 10/15/2019 104  100 - 108 mmol/L Final    CO2 10/15/2019 28  21 - 32 mmol/L Final    BUN, Bld 10/15/2019 6* 7 - 18 mg/dL Final    Creatinine 10/15/2019 1.00  0.55 - 1.02 mg/dL Final    GFR ESTIMATION 10/15/2019 54* >60 Final               DESCRIPTION       GLOMERULAR FILTRATION RATE             NORMAL                     >60             KIDNEY  DISEASE           15-60             KIDNEY FAILURE             <15    BUN/Creatinine Ratio 10/15/2019 6.00* 7 - 18 Ratio Final    Glucose 10/15/2019 158* 70 - 110 mg/dL Final    Calcium 10/15/2019 8.9  8.8 - 10.5 mg/dL Final    Total Bilirubin 10/15/2019 0.3  0.0 - 1.0 mg/dL Final    AST 10/15/2019 18  15 - 37 U/L Final    ALT 10/15/2019 20  12 - 78 U/L Final    Total Protein 10/15/2019 6.8  6.4 - 8.2 g/dL Final    Albumin 10/15/2019 2.9* 3.4 - 5.0 g/dL Final    Globulin 10/15/2019 3.9* 2.3 - 3.5 g/dL Final    Albumin/Globulin Ratio 10/15/2019 0.743* 1.1 - 1.8 Ratio Final    Alkaline Phosphatase 10/15/2019 114  46 - 116 U/L  Final   Orders Only on 10/15/2019   Component Date Value Ref Range Status    Everolimus Lvl 10/15/2019 31.1   Final    Comment: [facility]imus [facility] test code [id number] by HPLC-MS/MS31.1 ng/mLTherapeutic Range:Kidney transplant (in combination with Cyclosporine):3-8 ng/mLLiver transplant (in combination with Tacrolimus):3-8 ng/mLToxic value: Greater   than 15 ng/mLEverolimus marketed as Zortress is FDA approved forprophylaxisof organ rejection in adult patients receiving a kidney andliver transplant.Everolimus marketed as Afinitor is FDA approved for thetreatment of renal cell carcinoma and for the   treatment ofsubependymal giant cell astrocytoma (SEGA) associated withtuberous sclerosis (TS) in patients who are not candidatesforcurative surgical resection. The suggested therapeutic rangefortreatment of SEGA is 5-15 ng/mL, which is based on a   predose(trough) specimen.The optimal therapeutic range for a given patient may differfrom this suggested range based on the indication fortherapy,treatment phase (initiation or maintenance), use incombinationwith other drugs, time of specime                           n collection   relative topriordose, type of transplanted organ, and/or the therapeuticapproach of the transplant center.Test developed and characteristics determined by [employer]. See Compliance Statement B:   [email or web address]===========================================================___________________________________________________________     Orders Only on 10/15/2019   Component Date Value Ref Range Status    Platelet Estimate 10/15/2019 Adequate   Final   Office Visit on 09/18/2019   Component Date Value Ref Range Status    Sodium 09/17/2019 140  135 - 145 mmol/L Final    Potassium 09/17/2019 3.6  3.6 - 5.2 mmol/L Final    Chloride 09/17/2019 104  100 - 108 mmol/L Final    CO2 09/17/2019 27  21 - 32 mmol/L Final    BUN, Bld 09/17/2019 8  7 - 18 mg/dL Final     Creatinine 09/17/2019 0.88  0.55 - 1.02 mg/dL Final    GFR ESTIMATION 09/17/2019 > 60  >60 Final               DESCRIPTION       GLOMERULAR FILTRATION RATE             NORMAL                     >60             KIDNEY  DISEASE           15-60             KIDNEY FAILURE             <15    BUN/Creatinine Ratio 09/17/2019 9.09  7 - 18 Ratio Final    Glucose 09/17/2019 107  70 - 110 mg/dL Final    Calcium 09/17/2019 8.8  8.8 - 10.5 mg/dL Final    Total Bilirubin 09/17/2019 0.3  0.0 - 1.0 mg/dL Final    AST 09/17/2019 21  15 - 37 U/L Final    ALT 09/17/2019 21  12 - 78 U/L Final    Total Protein 09/17/2019 6.8  6.4 - 8.2 g/dL Final    Albumin 09/17/2019 3.0* 3.4 - 5.0 g/dL Final    Globulin 09/17/2019 3.8* 2.3 - 3.5 g/dL Final    Albumin/Globulin Ratio 09/17/2019 0.789* 1.1 - 1.8 Ratio Final    Alkaline Phosphatase 09/17/2019 97  46 - 116 U/L Final   Orders Only on 09/17/2019   Component Date Value Ref Range Status    Name of Test: 09/17/2019 EVEROLIMUS   Final    Result of Test: 09/17/2019 **   Final    Comment: Everolimus by [employer] test code [id number] by HPLC-MS/MS>80.0 ng/mLTherapeutic Range:Kidney transplant (in combination with Cyclosporine):3-8 ng/mLLiver transplant (in combination with Tacrolimus):3-8 ng/mLToxic value: Greater   than 15 ng/mLEverolimus marketed as Zortress is FDA approved forprophylaxisof organ rejection in adult patients receiving a kidney andliver transplant.Everolimus marketed as Afinitor is FDA approved for thetreatment of renal cell carcinoma and for the   treatment ofsubependymal giant cell astrocytoma (SEGA) associated withtuberous sclerosis (TS) in patients who are not candidatesforcurative surgical resection. The suggested therapeutic rangefortreatment of SEGA is 5-15 ng/mL, which is based on a   predose(trough) specimen.The optimal therapeutic range for a given patient may differfrom this suggested range based on the indication  fortherapy,treatment phase (initiation or maintenance), use incombinationwith other drugs, time of specim                           en collection   relative topriordose, type of transplanted organ, and/or the therapeuticapproach of the transplant center.Test developed and characteristics determined by [employer]. See Compliance Statement B:   Sara [email or web address]===========================================================      Performed by: 09/17/2019 AR   Final       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, estrogen receptor positive [C50.812, Z17.0]   Co-morbidity/Active Problems:   Patient Active Problem List   Diagnosis    Breast cancer metastasized to bone    Neuropathy    Fatigue associated with anemia    Drug-related hair loss    Nausea    Primary insomnia    Foot infection    Cancer, metastatic to bone    Malignant neoplasm of overlapping 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, estrogen receptor positive     Chronic fatigue    Diverticulosis        Benita Carballo is a 74 y.o. female with PMH of The primary encounter diagnosis was Malignant neoplasm of overlapping 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, estrogen receptor positive . Diagnoses of Diverticulosis, Neuropathy, Chronic obstructive pulmonary disease, unspecified COPD type, and Malignant neoplasm of breast, stage 4, unspecified laterality were also pertinent to this visit., with Malignant neoplasm of overlapping 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, estrogen receptor positive [C50.812, Z17.0]       Metastatic breast cancer ERPR positive HER2 Noah questionable  Status post of chemotherapy and HER2 targeted therapy with progression on HER2 targeted therapy  Recent testing fish negative for her 2 2018  Repeat the lipid biopsy shows HER2 positive and ER negative    Now She is currently on combined afinitor + aromasin,  repeated CT scan shows improvement  Bone metastatic  disease  Fatigue  Hypoxia    Plan:   Overall Plan:    She has HER2 positive disease with ERPR negative by the new liquid biopsy however the patient responding to Afinitor and Aromasin therefore going to continue the current treatment without changing to HER2 targeted therapy at this time and continue monitor response by tumor marker and CT scan(she demands PET)  Check thyroid function for fatigue      ==Labs: CBC CMP tumor marker every month  ==Cont Afinitor and Aromasin. On  ? Afinitor   ==PET ordered as per her request  ==Proceed with Rx Xgeva today and every 4 weeks  ==Return to Clinic with appointment in 4 weeks    Signature    Solomon Contreras M.D., Ph.D., [facility]  Hematology-Oncology    Signed 1/15/2020 12:06 PM

## 2020-02-06 DIAGNOSIS — F51.01 PRIMARY INSOMNIA: ICD-10-CM

## 2020-02-07 RX ORDER — TEMAZEPAM 15 MG/1
CAPSULE ORAL
Qty: 30 CAPSULE | Refills: 0 | Status: SHIPPED | OUTPATIENT
Start: 2020-02-07 | End: 2020-02-12 | Stop reason: RX

## 2020-02-12 ENCOUNTER — OFFICE VISIT (OUTPATIENT)
Dept: HEMATOLOGY/ONCOLOGY | Facility: CLINIC | Age: 75
End: 2020-02-12
Payer: MEDICARE

## 2020-02-12 VITALS
OXYGEN SATURATION: 94 % | HEART RATE: 92 BPM | SYSTOLIC BLOOD PRESSURE: 127 MMHG | HEIGHT: 66 IN | DIASTOLIC BLOOD PRESSURE: 71 MMHG | BODY MASS INDEX: 18.69 KG/M2 | RESPIRATION RATE: 18 BRPM | WEIGHT: 116.31 LBS | TEMPERATURE: 98 F

## 2020-02-12 DIAGNOSIS — J44.9 CHRONIC OBSTRUCTIVE PULMONARY DISEASE, UNSPECIFIED COPD TYPE: ICD-10-CM

## 2020-02-12 DIAGNOSIS — F51.01 PRIMARY INSOMNIA: Primary | ICD-10-CM

## 2020-02-12 DIAGNOSIS — G62.9 NEUROPATHY: ICD-10-CM

## 2020-02-12 DIAGNOSIS — C79.51 CARCINOMA OF RIGHT BREAST METASTATIC TO BONE: ICD-10-CM

## 2020-02-12 DIAGNOSIS — Z17.0 MALIGNANT NEOPLASM OF OVERLAPPING SITES OF LEFT BREAST IN FEMALE, ESTROGEN RECEPTOR POSITIVE: Primary | ICD-10-CM

## 2020-02-12 DIAGNOSIS — C50.812 MALIGNANT NEOPLASM OF OVERLAPPING SITES OF LEFT BREAST IN FEMALE, ESTROGEN RECEPTOR POSITIVE: Primary | ICD-10-CM

## 2020-02-12 DIAGNOSIS — C50.911 CARCINOMA OF RIGHT BREAST METASTATIC TO BONE: ICD-10-CM

## 2020-02-12 PROCEDURE — 99215 PR OFFICE/OUTPT VISIT, EST, LEVL V, 40-54 MIN: ICD-10-PCS | Mod: S$GLB,,, | Performed by: INTERNAL MEDICINE

## 2020-02-12 PROCEDURE — 99215 OFFICE O/P EST HI 40 MIN: CPT | Mod: S$GLB,,, | Performed by: INTERNAL MEDICINE

## 2020-02-12 RX ORDER — LORAZEPAM 1 MG/1
1 TABLET ORAL NIGHTLY
Qty: 30 TABLET | Refills: 0 | Status: SHIPPED | OUTPATIENT
Start: 2020-02-12 | End: 2020-03-12

## 2020-02-12 RX ORDER — MEGESTROL ACETATE 40 MG/ML
400 SUSPENSION ORAL DAILY
Qty: 240 ML | Refills: 2 | Status: SHIPPED | OUTPATIENT
Start: 2020-02-12 | End: 2021-02-11

## 2020-02-12 NOTE — PROGRESS NOTES
Medical Oncology Progress Note  [facility]     PATIENT: Benita Carballo  : 1945 74 y.o. female  MRN [id number]     PCP: Primary Doctor No  Consult Requested By:      Date of Service: 2020    Subjective:   Interim History:  Cancer    Benita Carballo is here for to followup breast cancer treatment.  Solomon the patient here to review PET scan results.  PET scan shows progressive disease compared with 2019.  The patient states that she feels except for fatigue and for appetite       Oncology History:  Oncology History     Rt breast cancer s/p lump with chemo, [place] in [place] Tamoxifen x 1 yr, then Arimidex x 5 yrs. Completed tx .       c/o chronic cough with CT chest 16 showing extensive adenopathy  With base of lt neck mass 4.5 cm with yosi pulm nodules. RF to Dr ANDREWS Mcnally    16 bx of lt neck mass carcinoma with feature suggestive of breast primary. 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 ER/[place] + Her 2 neg per Dr Moreno's note.    Dr Moreno noted path review from bx  showed HER 2 + equivocal with FISH reading as Equivocal but ratio 1.2. Additional report form 2016 states additional testing was done with different probe and the equivocal results can be noted as negative.     Treatment therapy changed from Her2 based tx to endocrine base therapy by Dr Moreno       2017 -  Hormone Therapy     Arimidex -  Aromasin   Ibrance + Aromasin  -  Ibrance + faslodex 2019 Imaging Significant Findings      PET/CT: Lesions involving the medial clavicle , subcarinal LN, pleural based soft tissue density adjacent to T11 and lesion in left lobe of liver diminished in size and SUV.   RECIST 34% decrease in disease       ==8/2019 Now She is currently on combined afinitor + aromasin,  repeated CT scan shows improvement  == PET scan 01/20/2020 the progressive    Past Medical History:   Past Medical History:   Diagnosis Date    Arthritis     Bone cancer     Breast cancer     Cancer     Cataract     Depression     Diverticulosis 1/15/2020    Fatigue associated with anemia 6/6/2019    GERD (gastroesophageal reflux disease)     High cholesterol     Skin problem     Sleep disorder        Past Surgical HIstory:   Past Surgical History:   Procedure Laterality Date    APPENDECTOMY      BREAST LUMPECTOMY Right 2002    CATARACT EXTRACTION, BILATERAL  2014    COLONOSCOPY  2008    HYSTERECTOMY      PORTACATH PLACEMENT  05/18/2016    SURGICAL REMOVAL OF NODULE OF VOCAL CORD         Allergies:  Review of patient's allergies indicates:   Allergen Reactions    Benadryl [diphenhydramine hcl]      Restless leg     Medrol [methylprednisolone] Hallucinations    Penicillins        Medications:  Current Outpatient Medications   Medication Sig Dispense Refill    dexAMETHasone (DECADRON) 0.5 mg/5 mL Elix 10 mls 4X daily swish 2 min and spit 500 mL 4    exemestane (AROMASIN) 25 mg tablet Take 1 tablet (25 mg total) by mouth once daily. 30 tablet 3    gabapentin (NEURONTIN) 400 MG capsule Take 1 capsule (400 mg total) by mouth 3 (three) times daily. 120 capsule 4    oxyCODONE (ROXICODONE) 5 MG immediate release tablet Take 1 tablet (5 mg total) by mouth every 8 (eight) hours as needed for Pain. 45 tablet 0    oxyCODONE (ROXICODONE) 5 MG immediate release tablet Take 1 tablet (5 mg total) by mouth every 8 (eight) hours as needed for Pain. 90 tablet 0    pantoprazole (PROTONIX) 40 MG tablet Take 1 tablet (40 mg total) by  "mouth once daily. 30 tablet 3    PROAIR HFA 90 mcg/actuation inhaler       promethazine (PHENERGAN) 25 MG tablet Take 1 tablet (25 mg total) by mouth every 6 (six) hours as needed for Nausea. 30 tablet 3    temazepam (RESTORIL) 15 mg Cap TAKE ONE CAPSULE BY MOUTH EVERY NIGHT AT BEDTIME AS NEEDED 30 capsule 0     No current facility-administered medications for this visit.        Family History:   Family History   Problem Relation Age of Onset    Lung cancer Father     Melanoma Father        Social History:  reports that she has been smoking cigarettes. She has a 2.00 pack-year smoking history. She has never used smokeless tobacco. She reports that she drinks about 4.0 standard drinks of alcohol per week. She reports that she does not use drugs.    Review of Systemas  Constitutional: No change in weight, appetie, fatigue, fever, or night sweats  Eyes: No changes in vision  Ears, Nose, Mouth, Throat, and Face: No hearing problems, ear pain, rummy nose, or sore throat  Respiratory: No shortness of breath or cough  Cardiovascular: No chest pain, palpitations or dizziness  Gastrointestinal: No abdominal pain, hematochezia, melena  Genitourinary: No dysuria, hematuria, nocturia, menstrual problems, post menopausal  Integumentary/Breast: No rashes or itching  Hematologic/Lymphatic: No anemia or bleeding abnormalities  Musculoskeletal: No joint or muscle abnormalities  Neurological: No sensory or motor problems, no headaches  Behavioral/Psych: No depression, anxiety, cognitive problems, or stress  Endocrine: No diabetes or thyroid problems  Allergic/Immunologic: See allergy above    Physical Exam      Vitals:   Vitals:    02/12/20 1042   BP: 127/71   BP Location: Left arm   Patient Position: Sitting   Pulse: 92   Resp: 18   Temp: 98.2 °F (36.8 °C)   TempSrc: Temporal   SpO2: (!) 94%   Weight: 52.8 kg (116 lb 4.8 oz)   Height: 5' 6" (1.676 m)     BMI: Body mass index is 18.77 kg/m².  BSA Body surface area is 1.57 meters " squared.  ECOG Performance Status:   ECOG SCORE    1 - Restricted in strenuous activity-ambulatory and able to carry out work of a light nature       GENERAL APPEARANCE: Well developed, well nourished, in no acute distress.  SKIN: Inspection of the skin reveals no rashes, ulcerations or petechiae.  HEENT: The sclerae were anicteric and conjunctivae were pink and moist. Extraocular movements were intact and pupils were equal, round, and reactive to light with normal accommodation. External inspection of the ears and nose showed no scars, lesions, or masses. Lips, teeth, and gums showed normal mucosa. The oral mucosa, hard and soft palate, tongue and posterior pharynx were normal.  NECK: Supple and symmetric. There was no thyroid enlargement, and no tenderness, or masses were felt.  CRESPIRATORY: Normal AP diameter and normal contour without any kyphoscoliosis. LUNGS: Auscultation of the lungs revealed normal breath sounds without any other adventitious sounds or rubs.  CARDIOVASCULAR: There was a regular rate and rhythm without any murmurs, gallops, rubs. The carotid pulses were normal and 2+ bilaterally without bruits. Peripheral pulses were 2+ and symmetric.  GASTROINTESTINAL: Soft and nontender with normal bowel sounds. The liver span was approximately 5-6 cm in the right midclavicular line by percussion. The liver edge was nontender. The spleen was not palpable. There were no inguinal or umbilical hernias noted. No ascites was noted.  LYMPH NODES: No lymphadenopathy was appreciated in the neck, axillae or groin.  MUSCULOSKELETAL: Gait was normal. There was no tenderness or effusions noted. Muscle strength and tone were normal. EXTREMITIES: No cyanosis, clubbing or edema.  NEUROLOGIC: Alert and oriented x 3. Normal affect. Gait was normal. Normal deep tendon reflexes with no pathological reflexes. Sensation to touch was normal.  PSYCHIATRIC: good mood, orientated to place, time and person     Labs and Imagings      Orders Only on 11/12/2019   Component Date Value Ref Range Status    Sodium 11/12/2019 139  135 - 145 mmol/L Final    Potassium 11/12/2019 3.6  3.6 - 5.2 mmol/L Final    Chloride 11/12/2019 102  100 - 108 mmol/L Final    CO2 11/12/2019 27  21 - 32 mmol/L Final    BUN, Bld 11/12/2019 6* 7 - 18 mg/dL Final    Creatinine 11/12/2019 0.96  0.55 - 1.02 mg/dL Final    GFR ESTIMATION 11/12/2019 57* >60 Final               DESCRIPTION       GLOMERULAR FILTRATION RATE             NORMAL                     >60             KIDNEY  DISEASE           15-60             KIDNEY FAILURE             <15    BUN/Creatinine Ratio 11/12/2019 6.25* 7 - 18 Ratio Final    Glucose 11/12/2019 170* 70 - 110 mg/dL Final    Calcium 11/12/2019 8.2* 8.8 - 10.5 mg/dL Final    Total Bilirubin 11/12/2019 0.7  0.0 - 1.0 mg/dL Final    AST 11/12/2019 23  15 - 37 U/L Final    ALT 11/12/2019 27  12 - 78 U/L Final    Total Protein 11/12/2019 6.5  6.4 - 8.2 g/dL Final    Albumin 11/12/2019 2.8* 3.4 - 5.0 g/dL Final    Globulin 11/12/2019 3.7* 2.3 - 3.5 g/dL Final    Albumin/Globulin Ratio 11/12/2019 0.756* 1.1 - 1.8 Ratio Final    Alkaline Phosphatase 11/12/2019 100  46 - 116 U/L Final   Orders Only on 11/12/2019   Component Date Value Ref Range Status    WBC 11/12/2019 5.2  4.6 - 10.2 10*3/uL Final    RBC 11/12/2019 5.09  4.2 - 5.4 10*6/uL Final    Hemoglobin 11/12/2019 13.6  12.0 - 16.0 g/dL Final    Hematocrit 11/12/2019 43.0  37.0 - 47.0 % Final    MCV 11/12/2019 84.5  80 - 97 fL Final    MCH 11/12/2019 26.7* 27.0 - 31.2 pg Final    MCHC 11/12/2019 31.6* 31.8 - 35.4 g/dL Final    RDW RBC Auto-Rto 11/12/2019 16.7  12.5 - 18.0 % Final    Platelets 11/12/2019 147  142 - 424 10*3/uL Final    Neutrophils/100 leukocytes 11/12/2019 76.1  37 - 80 % Final    Lymphocytes/100 leukocytes 11/12/2019 12.0* 25 - 40 % Final    Monocytes/100 leukocytes 11/12/2019 8.0  1 - 15 % Final    Eosinophils/100 leukocytes 11/12/2019 2.3  1 - 3  % Final    Basophils/100 leukocytes 11/12/2019 1.0  0 - 3 % Final    Manual nRBC per 100 Cells 11/12/2019 0.0  % Final    Neutrophils 11/12/2019 3.99  1.8 - 7.7 10*3/uL Final   Office Visit on 10/16/2019   Component Date Value Ref Range Status    WBC 10/15/2019 5.8  4.6 - 10.2 10*3/uL Final    RBC 10/15/2019 4.63  4.2 - 5.4 10*6/uL Final    Hemoglobin 10/15/2019 13.4  12.0 - 16.0 g/dL Final    Hematocrit 10/15/2019 42.2  37.0 - 47.0 % Final    MCV 10/15/2019 91.1  80 - 97 fL Final    MCH 10/15/2019 28.9  27.0 - 31.2 pg Final    MCHC 10/15/2019 31.8  31.8 - 35.4 g/dL Final    RDW RBC Auto-Rto 10/15/2019 16.9  12.5 - 18.0 % Final    Platelets 10/15/2019 106* 142 - 424 10*3/uL Final    Neutrophils/100 leukocytes 10/15/2019 76.4  37 - 80 % Final    Lymphocytes/100 leukocytes 10/15/2019 11.9* 25 - 40 % Final    Monocytes/100 leukocytes 10/15/2019 7.8  1 - 15 % Final    Eosinophils/100 leukocytes 10/15/2019 2.4  1 - 3 % Final    Basophils/100 leukocytes 10/15/2019 1.0  0 - 3 % Final    Manual nRBC per 100 Cells 10/15/2019 0.0  % Final    Neutrophils 10/15/2019 4.42  1.8 - 7.7 10*3/uL Final    Sodium 10/15/2019 140  135 - 145 mmol/L Final    Potassium 10/15/2019 4.1  3.6 - 5.2 mmol/L Final    Chloride 10/15/2019 104  100 - 108 mmol/L Final    CO2 10/15/2019 28  21 - 32 mmol/L Final    BUN, Bld 10/15/2019 6* 7 - 18 mg/dL Final    Creatinine 10/15/2019 1.00  0.55 - 1.02 mg/dL Final    GFR ESTIMATION 10/15/2019 54* >60 Final               DESCRIPTION       GLOMERULAR FILTRATION RATE             NORMAL                     >60             KIDNEY  DISEASE           15-60             KIDNEY FAILURE             <15    BUN/Creatinine Ratio 10/15/2019 6.00* 7 - 18 Ratio Final    Glucose 10/15/2019 158* 70 - 110 mg/dL Final    Calcium 10/15/2019 8.9  8.8 - 10.5 mg/dL Final    Total Bilirubin 10/15/2019 0.3  0.0 - 1.0 mg/dL Final    AST 10/15/2019 18  15 - 37 U/L Final    ALT 10/15/2019 20  12 - 78 U/L  Final    Total Protein 10/15/2019 6.8  6.4 - 8.2 g/dL Final    Albumin 10/15/2019 2.9* 3.4 - 5.0 g/dL Final    Globulin 10/15/2019 3.9* 2.3 - 3.5 g/dL Final    Albumin/Globulin Ratio 10/15/2019 0.743* 1.1 - 1.8 Ratio Final    Alkaline Phosphatase 10/15/2019 114  46 - 116 U/L Final   Orders Only on 10/15/2019   Component Date Value Ref Range Status    Everolimus Lvl 10/15/2019 31.1   Final    Comment: Everolimus by [employer] test code [id number] by HPLC-MS/MS31.1 ng/mLTherapeutic Range:Kidney transplant (in combination with Cyclosporine):3-8 ng/mLLiver transplant (in combination with Tacrolimus):3-8 ng/mLToxic value: Greater   than 15 ng/mLEverolimus marketed as Zortress is FDA approved forprophylaxisof organ rejection in adult patients receiving a kidney andliver transplant.Everolimus marketed as Afinitor is FDA approved for thetreatment of renal cell carcinoma and for the   treatment ofsubependymal giant cell astrocytoma (SEGA) associated withtuberous sclerosis (TS) in patients who are not candidatesforcurative surgical resection. The suggested therapeutic rangefortreatment of SEGA is 5-15 ng/mL, which is based on a   predose(trough) specimen.The optimal therapeutic range for a given patient may differfrom this suggested range based on the indication fortherapy,treatment phase (initiation or maintenance), use incombinationwith other drugs, time of specime                           n collection   relative topriordose, type of transplanted organ, and/or the therapeuticapproach of the transplant center.Test developed and characteristics determined by [employer]. See Compliance Statement B:   [employer]/CS===========================================================___________________________________________________________     Orders Only on 10/15/2019   Component Date Value Ref Range Status    Platelet Estimate 10/15/2019 Adequate   Final   Office Visit on 09/18/2019   Component  Date Value Ref Range Status    Sodium 09/17/2019 140  135 - 145 mmol/L Final    Potassium 09/17/2019 3.6  3.6 - 5.2 mmol/L Final    Chloride 09/17/2019 104  100 - 108 mmol/L Final    CO2 09/17/2019 27  21 - 32 mmol/L Final    BUN, Bld 09/17/2019 8  7 - 18 mg/dL Final    Creatinine 09/17/2019 0.88  0.55 - 1.02 mg/dL Final    GFR ESTIMATION 09/17/2019 > 60  >60 Final               DESCRIPTION       GLOMERULAR FILTRATION RATE             NORMAL                     >60             KIDNEY  DISEASE           15-60             KIDNEY FAILURE             <15    BUN/Creatinine Ratio 09/17/2019 9.09  7 - 18 Ratio Final    Glucose 09/17/2019 107  70 - 110 mg/dL Final    Calcium 09/17/2019 8.8  8.8 - 10.5 mg/dL Final    Total Bilirubin 09/17/2019 0.3  0.0 - 1.0 mg/dL Final    AST 09/17/2019 21  15 - 37 U/L Final    ALT 09/17/2019 21  12 - 78 U/L Final    Total Protein 09/17/2019 6.8  6.4 - 8.2 g/dL Final    Albumin 09/17/2019 3.0* 3.4 - 5.0 g/dL Final    Globulin 09/17/2019 3.8* 2.3 - 3.5 g/dL Final    Albumin/Globulin Ratio 09/17/2019 0.789* 1.1 - 1.8 Ratio Final    Alkaline Phosphatase 09/17/2019 97  46 - 116 U/L Final   Orders Only on 09/17/2019   Component Date Value Ref Range Status    Name of Test: 09/17/2019 EVEROLIMUS   Final    Result of Test: 09/17/2019 **   Final    Comment: Everolimus by [employer] test code [id number] by HPLC-MS/MS>80.0 ng/mLTherapeutic Range:Kidney transplant (in combination with Cyclosporine):3-8 ng/mLLiver transplant (in combination with Tacrolimus):3-8 ng/mLToxic value: Greater   than 15 ng/mLEverolimus marketed as Zortress is FDA approved forprophylaxisof organ rejection in adult patients receiving a kidney andliver transplant.Everolimus marketed as Afinitor is FDA approved for thetreatment of renal cell carcinoma and for the   treatment ofsubependymal giant cell astrocytoma (SEGA) associated withtuberous sclerosis (TS) in patients who are not  candidatesforcurative surgical resection. The suggested therapeutic rangefortreatment of SEGA is 5-15 ng/mL, which is based on a   predose(trough) specimen.The optimal therapeutic range for a given patient may differfrom this suggested range based on the indication fortherapy,treatment phase (initiation or maintenance), use incombinationwith other drugs, time of specim                           en collection   relative topriordose, type of transplanted organ, and/or the therapeuticapproach of the transplant center.Test developed and characteristics determined by [employer]. See Compliance Statement B:   [employer]/CS===========================================================      Performed by: 09/17/2019 [facility]   Final       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, estrogen receptor positive [C50.812, Z17.0]   Co-morbidity/Active Problems:   Patient Active Problem List   Diagnosis    Breast cancer metastasized to bone    Neuropathy    Fatigue associated with anemia    Drug-related hair loss    Nausea    Primary insomnia    Foot infection    Cancer, metastatic to bone    Malignant neoplasm of overlapping 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, estrogen receptor positive     Chronic fatigue    Diverticulosis        Benita Carballo is a 74 y.o. female with PMH of The primary encounter diagnosis was Malignant neoplasm of overlapping 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, estrogen receptor positive . Diagnoses of Chronic obstructive pulmonary disease, unspecified COPD type, Neuropathy, and Carcinoma of right breast metastatic to bone were also pertinent to this visit., with Malignant neoplasm of overlapping 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, estrogen receptor positive [C50.812, Z17.0]       Metastatic breast cancer ERPR positive HER2 Noah questionable  Status post of chemotherapy and HER2 targeted therapy with progression on HER2 targeted  therapy  Recent testing fish negative for her 2 2018  Repeat the lipid biopsy shows HER2 positive and ER negative    Now She is currently on combined afinitor + aromasin,  repeated CT scan shows improvement  Bone metastatic disease  Fatigue  Hypoxia    Plan:   Overall Plan:    She has HER2 positive disease with ERPR negative by the new liquid biopsy however the patient responding to Afinitor and Aromasin therefore going to continue the current treatment without changing to HER2 targeted therapy at this time and continue monitor response by tumor marker and CT scan(she demands PET)  Check thyroid function for fatigue      ==Labs: CBC CMP tumor marker every month  ==Cont Afinitor and Aromasin. On  ? Afinitor   == to treatment plan to Taxol and Herceptin weekly  ==Proceed with Rx Xgeva today and every 4 weeks  ==Return to Clinic with appointment in 2 weeks    Signature    Solomon Contreras M.D., Ph.D., [facility]  Hematology-Oncology    Signed 2/12/2020 12:06 PM

## 2020-02-14 DIAGNOSIS — C79.51 CARCINOMA OF BREAST METASTATIC TO BONE, UNSPECIFIED LATERALITY: ICD-10-CM

## 2020-02-14 DIAGNOSIS — C50.919 CARCINOMA OF BREAST METASTATIC TO BONE, UNSPECIFIED LATERALITY: ICD-10-CM

## 2020-02-14 DIAGNOSIS — C79.51 CANCER, METASTATIC TO BONE: ICD-10-CM

## 2020-02-14 RX ORDER — OXYCODONE HYDROCHLORIDE 5 MG/1
5 TABLET ORAL EVERY 8 HOURS PRN
Qty: 45 TABLET | Refills: 0 | Status: SHIPPED | OUTPATIENT
Start: 2020-02-14 | End: 2020-02-26

## 2020-02-24 RX ORDER — DEXAMETHASONE 0.5 MG/5ML
SOLUTION ORAL
Qty: 500 ML | OUTPATIENT
Start: 2020-02-24

## 2020-02-26 ENCOUNTER — OFFICE VISIT (OUTPATIENT)
Dept: HEMATOLOGY/ONCOLOGY | Facility: CLINIC | Age: 75
End: 2020-02-26
Payer: MEDICARE

## 2020-02-26 VITALS
WEIGHT: 116 LBS | OXYGEN SATURATION: 87 % | RESPIRATION RATE: 22 BRPM | BODY MASS INDEX: 18.64 KG/M2 | TEMPERATURE: 99 F | SYSTOLIC BLOOD PRESSURE: 93 MMHG | HEIGHT: 66 IN | DIASTOLIC BLOOD PRESSURE: 59 MMHG

## 2020-02-26 DIAGNOSIS — Z17.0 MALIGNANT NEOPLASM OF OVERLAPPING SITES OF LEFT BREAST IN FEMALE, ESTROGEN RECEPTOR POSITIVE: Primary | ICD-10-CM

## 2020-02-26 DIAGNOSIS — C50.812 MALIGNANT NEOPLASM OF OVERLAPPING SITES OF LEFT BREAST IN FEMALE, ESTROGEN RECEPTOR POSITIVE: Primary | ICD-10-CM

## 2020-02-26 DIAGNOSIS — C79.51 CANCER, METASTATIC TO BONE: ICD-10-CM

## 2020-02-26 PROCEDURE — 99215 OFFICE O/P EST HI 40 MIN: CPT | Mod: S$GLB,,, | Performed by: INTERNAL MEDICINE

## 2020-02-26 PROCEDURE — 99215 PR OFFICE/OUTPT VISIT, EST, LEVL V, 40-54 MIN: ICD-10-PCS | Mod: S$GLB,,, | Performed by: INTERNAL MEDICINE

## 2020-02-26 RX ORDER — EPINEPHRINE 0.3 MG/.3ML
0.3 INJECTION SUBCUTANEOUS ONCE AS NEEDED
Status: CANCELLED | OUTPATIENT
Start: 2020-02-26

## 2020-02-26 RX ORDER — DIPHENHYDRAMINE HYDROCHLORIDE 50 MG/ML
50 INJECTION INTRAMUSCULAR; INTRAVENOUS ONCE AS NEEDED
Status: CANCELLED | OUTPATIENT
Start: 2020-02-26

## 2020-02-26 RX ORDER — HYDROCODONE BITARTRATE AND ACETAMINOPHEN 10; 325 MG/1; MG/1
1 TABLET ORAL EVERY 6 HOURS PRN
Qty: 20 TABLET | Refills: 0 | Status: SHIPPED | OUTPATIENT
Start: 2020-02-26 | End: 2020-03-19 | Stop reason: SDUPTHER

## 2020-02-26 RX ORDER — HEPARIN 100 UNIT/ML
500 SYRINGE INTRAVENOUS
Status: CANCELLED | OUTPATIENT
Start: 2020-02-26

## 2020-02-26 RX ORDER — DEXAMETHASONE 0.5 MG/5ML
SOLUTION ORAL
COMMUNITY
Start: 2020-02-08

## 2020-02-26 RX ORDER — FAMOTIDINE 10 MG/ML
20 INJECTION INTRAVENOUS
Status: CANCELLED | OUTPATIENT
Start: 2020-02-26

## 2020-02-26 RX ORDER — OXYCODONE HCL 10 MG/1
10 TABLET, FILM COATED, EXTENDED RELEASE ORAL EVERY 12 HOURS PRN
Qty: 60 TABLET | Refills: 0 | Status: SHIPPED | OUTPATIENT
Start: 2020-02-26 | End: 2020-03-19 | Stop reason: SDUPTHER

## 2020-02-26 RX ORDER — SODIUM CHLORIDE 0.9 % (FLUSH) 0.9 %
10 SYRINGE (ML) INJECTION
Status: CANCELLED | OUTPATIENT
Start: 2020-02-26

## 2020-02-26 RX ORDER — UMECLIDINIUM BROMIDE AND VILANTEROL TRIFENATATE 62.5; 25 UG/1; UG/1
POWDER RESPIRATORY (INHALATION)
COMMUNITY
Start: 2020-02-21

## 2020-02-26 NOTE — PROGRESS NOTES
Medical Oncology Progress Note  [facility]     PATIENT: Benita Carballo  : 1945 74 y.o. female  MRN [id number]     PCP: Primary Doctor No  Consult Requested By:      Date of Service: 2020    Subjective:   Interim History:  No chief complaint on file.    Benita Carballo is here for to followup breast cancer treatment.  Solomon the patient here to review PET scan results.  PET scan shows progressive disease compared with 2019.  The patient states that she feels except for fatigue and for appetite  The patient here today to start Neurontin chemotherapy Herceptin and Taxol     Oncology History:  Oncology History     Rt breast cancer s/p lump with chemo, XRT in [place]. Tamoxifen x 1 yr, then Arimidex x 5 yrs. Completed tx .       c/o chronic cough with CT chest 16 showing extensive adenopathy  With base of lt neck mass 4.5 cm with yosi pulm nodules. RF to Dr ANDREWS Mcnally    16 bx of lt neck mass carcinoma with feature suggestive of breast primary. 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 ER/ID + Her 2 neg per Dr Moreno's note.    Dr Moreno noted path review from bx  showed HER 2 + equivocal with FISH reading as Equivocal but ratio 1.2. Additional report form 2016 states additional testing was done with different probe and the equivocal results can be noted as negative.     Treatment therapy changed from Her2 based tx to endocrine base therapy by Dr Moreno       2017 -  Hormone Therapy     Arimidex -  Aromasin   Ibrance +  Aromasin 5/18 -1/19  Ibrance + faslodex 2/19 4/30/2019 Imaging Significant Findings     PET/CT: Lesions involving the medial clavicle , subcarinal LN, pleural based soft tissue density adjacent to T11 and lesion in left lobe of liver diminished in size and SUV.   RECIST 34% decrease in disease      2/17/2020 -  Chemotherapy     Treatment Summary   Plan Name: OP BREAST TRASTUZUMAB PACLITAXEL QW  Treatment Goal: Control  Status: Active  Start Date: 2/17/2020 (Planned)  End Date: 5/4/2020 (Planned)  Provider: Solomon Contreras MD  Chemotherapy: PACLitaxel (TAXOL) 64 mg/m2 = 102 mg in sodium chloride 0.9% 250 mL chemo infusion, 64 mg/m2 = 102 mg (original dose ), Intravenous, Clinic/HOD 1 time, 0 of 12 cycles  Dose modification: 64 mg/m2 (Cycle 1)  trastuzumab 211 mg in sodium chloride 0.9% 250 mL chemo infusion, 4 mg/kg = 211 mg, Intravenous, Clinic/HOD 1 time, 0 of 12 cycles       ==8/2019 Now She is currently on combined afinitor + aromasin,  repeated CT scan shows improvement  == PET scan 01/20/2020 the progressive    Past Medical History:   Past Medical History:   Diagnosis Date    Arthritis     Bone cancer     Breast cancer     Cancer     Cataract     Depression     Diverticulosis 1/15/2020    Fatigue associated with anemia 6/6/2019    GERD (gastroesophageal reflux disease)     High cholesterol     Skin problem     Sleep disorder        Past Surgical HIstory:   Past Surgical History:   Procedure Laterality Date    APPENDECTOMY      BREAST LUMPECTOMY Right 2002    CATARACT EXTRACTION, BILATERAL  2014    COLONOSCOPY  2008    HYSTERECTOMY      PORTACATH PLACEMENT  05/18/2016    SURGICAL REMOVAL OF NODULE OF VOCAL CORD         Allergies:  Review of patient's allergies indicates:   Allergen Reactions    Benadryl [diphenhydramine hcl]      Restless leg     Medrol [methylprednisolone] Hallucinations    Penicillins        Medications:  Current Outpatient Medications   Medication Sig Dispense  Refill    ANORO ELLIPTA 62.5-25 mcg/actuation DsDv       dexAMETHasone 0.5 mg/5 mL Soln SWISH WITH 10 ML PO FOR 2 MIN THEN SPIT QID      exemestane (AROMASIN) 25 mg tablet Take 1 tablet (25 mg total) by mouth once daily. 30 tablet 3    gabapentin (NEURONTIN) 400 MG capsule Take 1 capsule (400 mg total) by mouth 3 (three) times daily. 120 capsule 4    LORazepam (ATIVAN) 1 MG tablet Take 1 tablet (1 mg total) by mouth every evening. 30 tablet 0    megestrol (MEGACE) 400 mg/10 mL (40 mg/mL) Susp Take 10 mLs (400 mg total) by mouth once daily. 240 mL 2    pantoprazole (PROTONIX) 40 MG tablet Take 1 tablet (40 mg total) by mouth once daily. 30 tablet 3    PROAIR HFA 90 mcg/actuation inhaler       promethazine (PHENERGAN) 25 MG tablet Take 1 tablet (25 mg total) by mouth every 6 (six) hours as needed for Nausea. 30 tablet 3    HYDROcodone-acetaminophen (NORCO)  mg per tablet Take 1 tablet by mouth every 6 (six) hours as needed for Pain. 20 tablet 0    oxyCODONE (OXYCONTIN) 10 mg 12 hr tablet Take 1 tablet (10 mg total) by mouth every 12 (twelve) hours as needed for Pain. 60 tablet 0     No current facility-administered medications for this visit.        Family History:   Family History   Problem Relation Age of Onset    Lung cancer Father     Melanoma Father        Social History:  reports that she has been smoking cigarettes. She has a 2.00 pack-year smoking history. She has never used smokeless tobacco. She reports that she drinks about 4.0 standard drinks of alcohol per week. She reports that she does not use drugs.    Review of Systemas  Constitutional: No change in weight, appetie, fatigue, fever, or night sweats  Eyes: No changes in vision  Ears, Nose, Mouth, Throat, and Face: No hearing problems, ear pain, rummy nose, or sore throat  Respiratory: No shortness of breath or cough  Cardiovascular: No chest pain, palpitations or dizziness  Gastrointestinal: No abdominal pain, hematochezia,  "melena  Genitourinary: No dysuria, hematuria, nocturia, menstrual problems, post menopausal  Integumentary/Breast: No rashes or itching  Hematologic/Lymphatic: No anemia or bleeding abnormalities  Musculoskeletal: No joint or muscle abnormalities  Neurological: No sensory or motor problems, no headaches  Behavioral/Psych: No depression, anxiety, cognitive problems, or stress  Endocrine: No diabetes or thyroid problems  Allergic/Immunologic: See allergy above    Physical Exam      Vitals:   Vitals:    02/26/20 1056   BP: (!) 93/59   BP Location: Left arm   Patient Position: Sitting   BP Method: Large (Automatic)   Resp: (!) 22   Temp: 98.8 °F (37.1 °C)   SpO2: (!) 87%   Weight: 52.6 kg (116 lb)   Height: 5' 6" (1.676 m)     BMI: Body mass index is 18.72 kg/m².  BSA Body surface area is 1.57 meters squared.  ECOG Performance Status:   ECOG SCORE    1 - Restricted in strenuous activity-ambulatory and able to carry out work of a light nature       GENERAL APPEARANCE: Well developed, well nourished, in no acute distress.  SKIN: Inspection of the skin reveals no rashes, ulcerations or petechiae.  HEENT: The sclerae were anicteric and conjunctivae were pink and moist. Extraocular movements were intact and pupils were equal, round, and reactive to light with normal accommodation. External inspection of the ears and nose showed no scars, lesions, or masses. Lips, teeth, and gums showed normal mucosa. The oral mucosa, hard and soft palate, tongue and posterior pharynx were normal.  NECK: Supple and symmetric. There was no thyroid enlargement, and no tenderness, or masses were felt.  CRESPIRATORY: Normal AP diameter and normal contour without any kyphoscoliosis. LUNGS: Auscultation of the lungs revealed normal breath sounds without any other adventitious sounds or rubs.  CARDIOVASCULAR: There was a regular rate and rhythm without any murmurs, gallops, rubs. The carotid pulses were normal and 2+ bilaterally without bruits. " Peripheral pulses were 2+ and symmetric.  GASTROINTESTINAL: Soft and nontender with normal bowel sounds. The liver span was approximately 5-6 cm in the right midclavicular line by percussion. The liver edge was nontender. The spleen was not palpable. There were no inguinal or umbilical hernias noted. No ascites was noted.  LYMPH NODES: No lymphadenopathy was appreciated in the neck, axillae or groin.  MUSCULOSKELETAL: Gait was normal. There was no tenderness or effusions noted. Muscle strength and tone were normal. EXTREMITIES: No cyanosis, clubbing or edema.  NEUROLOGIC: Alert and oriented x 3. Normal affect. Gait was normal. Normal deep tendon reflexes with no pathological reflexes. Sensation to touch was normal.  PSYCHIATRIC: good mood, orientated to place, time and person     Labs and Imagings     Orders Only on 11/12/2019   Component Date Value Ref Range Status    Sodium 11/12/2019 139  135 - 145 mmol/L Final    Potassium 11/12/2019 3.6  3.6 - 5.2 mmol/L Final    Chloride 11/12/2019 102  100 - 108 mmol/L Final    CO2 11/12/2019 27  21 - 32 mmol/L Final    BUN, Bld 11/12/2019 6* 7 - 18 mg/dL Final    Creatinine 11/12/2019 0.96  0.55 - 1.02 mg/dL Final    GFR ESTIMATION 11/12/2019 57* >60 Final               DESCRIPTION       GLOMERULAR FILTRATION RATE             NORMAL                     >60             KIDNEY  DISEASE           15-60             KIDNEY FAILURE             <15    BUN/Creatinine Ratio 11/12/2019 6.25* 7 - 18 Ratio Final    Glucose 11/12/2019 170* 70 - 110 mg/dL Final    Calcium 11/12/2019 8.2* 8.8 - 10.5 mg/dL Final    Total Bilirubin 11/12/2019 0.7  0.0 - 1.0 mg/dL Final    AST 11/12/2019 23  15 - 37 U/L Final    ALT 11/12/2019 27  12 - 78 U/L Final    Total Protein 11/12/2019 6.5  6.4 - 8.2 g/dL Final    Albumin 11/12/2019 2.8* 3.4 - 5.0 g/dL Final    Globulin 11/12/2019 3.7* 2.3 - 3.5 g/dL Final    Albumin/Globulin Ratio 11/12/2019 0.756* 1.1 - 1.8 Ratio Final    Alkaline  Phosphatase 11/12/2019 100  46 - 116 U/L Final   Orders Only on 11/12/2019   Component Date Value Ref Range Status    WBC 11/12/2019 5.2  4.6 - 10.2 10*3/uL Final    RBC 11/12/2019 5.09  4.2 - 5.4 10*6/uL Final    Hemoglobin 11/12/2019 13.6  12.0 - 16.0 g/dL Final    Hematocrit 11/12/2019 43.0  37.0 - 47.0 % Final    MCV 11/12/2019 84.5  80 - 97 fL Final    MCH 11/12/2019 26.7* 27.0 - 31.2 pg Final    MCHC 11/12/2019 31.6* 31.8 - 35.4 g/dL Final    RDW RBC Auto-Rto 11/12/2019 16.7  12.5 - 18.0 % Final    Platelets 11/12/2019 147  142 - 424 10*3/uL Final    Neutrophils/100 leukocytes 11/12/2019 76.1  37 - 80 % Final    Lymphocytes/100 leukocytes 11/12/2019 12.0* 25 - 40 % Final    Monocytes/100 leukocytes 11/12/2019 8.0  1 - 15 % Final    Eosinophils/100 leukocytes 11/12/2019 2.3  1 - 3 % Final    Basophils/100 leukocytes 11/12/2019 1.0  0 - 3 % Final    Manual nRBC per 100 Cells 11/12/2019 0.0  % Final    Neutrophils 11/12/2019 3.99  1.8 - 7.7 10*3/uL Final   Office Visit on 10/16/2019   Component Date Value Ref Range Status    WBC 10/15/2019 5.8  4.6 - 10.2 10*3/uL Final    RBC 10/15/2019 4.63  4.2 - 5.4 10*6/uL Final    Hemoglobin 10/15/2019 13.4  12.0 - 16.0 g/dL Final    Hematocrit 10/15/2019 42.2  37.0 - 47.0 % Final    MCV 10/15/2019 91.1  80 - 97 fL Final    MCH 10/15/2019 28.9  27.0 - 31.2 pg Final    MCHC 10/15/2019 31.8  31.8 - 35.4 g/dL Final    RDW RBC Auto-Rto 10/15/2019 16.9  12.5 - 18.0 % Final    Platelets 10/15/2019 106* 142 - 424 10*3/uL Final    Neutrophils/100 leukocytes 10/15/2019 76.4  37 - 80 % Final    Lymphocytes/100 leukocytes 10/15/2019 11.9* 25 - 40 % Final    Monocytes/100 leukocytes 10/15/2019 7.8  1 - 15 % Final    Eosinophils/100 leukocytes 10/15/2019 2.4  1 - 3 % Final    Basophils/100 leukocytes 10/15/2019 1.0  0 - 3 % Final    Manual nRBC per 100 Cells 10/15/2019 0.0  % Final    Neutrophils 10/15/2019 4.42  1.8 - 7.7 10*3/uL Final    Sodium  10/15/2019 140  135 - 145 mmol/L Final    Potassium 10/15/2019 4.1  3.6 - 5.2 mmol/L Final    Chloride 10/15/2019 104  100 - 108 mmol/L Final    CO2 10/15/2019 28  21 - 32 mmol/L Final    BUN, Bld 10/15/2019 6* 7 - 18 mg/dL Final    Creatinine 10/15/2019 1.00  0.55 - 1.02 mg/dL Final    GFR ESTIMATION 10/15/2019 54* >60 Final               DESCRIPTION       GLOMERULAR FILTRATION RATE             NORMAL                     >60             KIDNEY  DISEASE           15-60             KIDNEY FAILURE             <15    BUN/Creatinine Ratio 10/15/2019 6.00* 7 - 18 Ratio Final    Glucose 10/15/2019 158* 70 - 110 mg/dL Final    Calcium 10/15/2019 8.9  8.8 - 10.5 mg/dL Final    Total Bilirubin 10/15/2019 0.3  0.0 - 1.0 mg/dL Final    AST 10/15/2019 18  15 - 37 U/L Final    ALT 10/15/2019 20  12 - 78 U/L Final    Total Protein 10/15/2019 6.8  6.4 - 8.2 g/dL Final    Albumin 10/15/2019 2.9* 3.4 - 5.0 g/dL Final    Globulin 10/15/2019 3.9* 2.3 - 3.5 g/dL Final    Albumin/Globulin Ratio 10/15/2019 0.743* 1.1 - 1.8 Ratio Final    Alkaline Phosphatase 10/15/2019 114  46 - 116 U/L Final   Orders Only on 10/15/2019   Component Date Value Ref Range Status    Everolimus Lvl 10/15/2019 31.1   Final    Comment: Everolimus by [facility] test code [id number] by HPLC-MS/MS31.1 ng/mLTherapeutic Range:Kidney transplant (in combination with Cyclosporine):3-8 ng/mLLiver transplant (in combination with Tacrolimus):3-8 ng/mLToxic value: Greater   than 15 ng/mLEverolimus marketed as Zortress is FDA approved forprophylaxisof organ rejection in adult patients receiving a kidney andliver transplant.Everolimus marketed as Afinitor is FDA approved for thetreatment of renal cell carcinoma and for the   treatment ofsubependymal giant cell astrocytoma (SEGA) associated withtuberous sclerosis (TS) in patients who are not candidatesforcurative surgical resection. The suggested therapeutic rangefortreatment of SEGA  is 5-15 ng/mL, which is based on a   predose(trough) specimen.The optimal therapeutic range for a given patient may differfrom this suggested range based on the indication fortherapy,treatment phase (initiation or maintenance), use incombinationwith other drugs, time of specime                           n collection   relative topriordose, type of transplanted organ, and/or the therapeuticapproach of the transplant center.Test developed and characteristics determined by [facility]. See Compliance Statement B:   [employer]/CS===========================================================___________________________________________________________     Orders Only on 10/15/2019   Component Date Value Ref Range Status    Platelet Estimate 10/15/2019 Adequate   Final   Office Visit on 09/18/2019   Component Date Value Ref Range Status    Sodium 09/17/2019 140  135 - 145 mmol/L Final    Potassium 09/17/2019 3.6  3.6 - 5.2 mmol/L Final    Chloride 09/17/2019 104  100 - 108 mmol/L Final    CO2 09/17/2019 27  21 - 32 mmol/L Final    BUN, Bld 09/17/2019 8  7 - 18 mg/dL Final    Creatinine 09/17/2019 0.88  0.55 - 1.02 mg/dL Final    GFR ESTIMATION 09/17/2019 > 60  >60 Final               DESCRIPTION       GLOMERULAR FILTRATION RATE             NORMAL                     >60             KIDNEY  DISEASE           15-60             KIDNEY FAILURE             <15    BUN/Creatinine Ratio 09/17/2019 9.09  7 - 18 Ratio Final    Glucose 09/17/2019 107  70 - 110 mg/dL Final    Calcium 09/17/2019 8.8  8.8 - 10.5 mg/dL Final    Total Bilirubin 09/17/2019 0.3  0.0 - 1.0 mg/dL Final    AST 09/17/2019 21  15 - 37 U/L Final    ALT 09/17/2019 21  12 - 78 U/L Final    Total Protein 09/17/2019 6.8  6.4 - 8.2 g/dL Final    Albumin 09/17/2019 3.0* 3.4 - 5.0 g/dL Final    Globulin 09/17/2019 3.8* 2.3 - 3.5 g/dL Final    Albumin/Globulin Ratio 09/17/2019 0.789* 1.1 - 1.8 Ratio Final    Alkaline Phosphatase 09/17/2019 97  46 -  116 U/L Final   Orders Only on 09/17/2019   Component Date Value Ref Range Status    Name of Test: 09/17/2019 EVEROLIMUS   Final    Result of Test: 09/17/2019 **   Final    Comment: [facility] test code [id number] by HPLC-MS/MS>80.0 ng/mLTherapeutic Range:Kidney transplant (in combination with Cyclosporine):3-8 ng/mLLiver transplant (in combination with Tacrolimus):3-8 ng/mLToxic value: Greater   than 15 ng/mLEverolimus marketed as Zortress is FDA approved forprophylaxisof organ rejection in adult patients receiving a kidney andliver transplant.Everolimus marketed as Afinitor is FDA approved for thetreatment of renal cell carcinoma and for the   treatment ofsubependymal giant cell astrocytoma (SEGA) associated withtuberous sclerosis (TS) in patients who are not candidatesforcurative surgical resection. The suggested therapeutic rangefortreatment of SEGA is 5-15 ng/mL, which is based on a   predose(trough) specimen.The optimal therapeutic range for a given patient may differfrom this suggested range based on the indication fortherapy,treatment phase (initiation or maintenance), use incombinationwith other drugs, time of specim                           en collection   relative topriordose, type of transplanted organ, and/or the therapeuticapproach of the transplant center.Test developed and characteristics determined by [facility]. See Compliance Statement B:   [employer]/CS===========================================================      Performed by: 09/17/2019 [facility]   Final       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, estrogen receptor positive [C50.812, Z17.0]   Co-morbidity/Active Problems:   Patient Active Problem List   Diagnosis    Breast cancer metastasized to bone    Neuropathy    Fatigue associated with anemia    Drug-related hair loss    Nausea    Primary insomnia    Foot  infection    Cancer, metastatic to bone    Malignant neoplasm of overlapping 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, estrogen receptor positive     Chronic fatigue    Diverticulosis        Benita Carballo is a 74 y.o. female with PMH of The primary encounter diagnosis was Malignant neoplasm of overlapping 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, estrogen receptor positive . A diagnosis of Cancer, metastatic to bone was also pertinent to this visit., with Malignant neoplasm of overlapping 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, estrogen receptor positive [C50.812, Z17.0]       Metastatic breast cancer ERPR positive HER2 Noah questionable  Status post of chemotherapy and HER2 targeted therapy with progression on HER2 targeted therapy  Recent testing fish negative for her 2 2018  Repeat the lipid biopsy shows HER2 positive and ER negative    Now She is currently on combined afinitor + aromasin,  repeated CT scan shows improvement  Bone metastatic disease  Fatigue  Hypoxia    Plan:   Overall Plan:    She has HER2 positive disease with ERPR negative by the new liquid biopsy however the patient responding to Afinitor and Aromasin therefore going to continue the current treatment without changing to HER2 targeted therapy at this time and continue monitor response by tumor marker and CT scan(she demands PET)  Check thyroid function for fatigue      ==Labs: CBC CMP tumor marker every month  == discontinue Afinitor and start Herceptin Taxol  == start Taxol and Herceptin weekly today  ==Proceed with Rx Xgeva today and every 4 weeks  ==Return to Clinic with appointment in 1 weeks    Signature    Solomon Cnotreras M.D., Ph.D., [facility]  Hematology-Oncology    Signed 2/26/2020 12:06 PM

## 2020-03-04 DIAGNOSIS — F51.01 PRIMARY INSOMNIA: ICD-10-CM

## 2020-03-05 ENCOUNTER — OFFICE VISIT (OUTPATIENT)
Dept: HEMATOLOGY/ONCOLOGY | Facility: CLINIC | Age: 75
End: 2020-03-05
Payer: MEDICARE

## 2020-03-05 VITALS
DIASTOLIC BLOOD PRESSURE: 52 MMHG | WEIGHT: 123.19 LBS | BODY MASS INDEX: 19.8 KG/M2 | HEIGHT: 66 IN | SYSTOLIC BLOOD PRESSURE: 83 MMHG | TEMPERATURE: 98 F | RESPIRATION RATE: 20 BRPM | OXYGEN SATURATION: 86 % | HEART RATE: 104 BPM

## 2020-03-05 DIAGNOSIS — C79.51 CARCINOMA OF RIGHT BREAST METASTATIC TO BONE: ICD-10-CM

## 2020-03-05 DIAGNOSIS — C79.51 CANCER, METASTATIC TO BONE: ICD-10-CM

## 2020-03-05 DIAGNOSIS — Z17.0 MALIGNANT NEOPLASM OF OVERLAPPING SITES OF LEFT BREAST IN FEMALE, ESTROGEN RECEPTOR POSITIVE: Primary | ICD-10-CM

## 2020-03-05 DIAGNOSIS — C50.812 MALIGNANT NEOPLASM OF OVERLAPPING SITES OF LEFT BREAST IN FEMALE, ESTROGEN RECEPTOR POSITIVE: Primary | ICD-10-CM

## 2020-03-05 DIAGNOSIS — C50.911 CARCINOMA OF RIGHT BREAST METASTATIC TO BONE: ICD-10-CM

## 2020-03-05 DIAGNOSIS — E86.0 DEHYDRATION: ICD-10-CM

## 2020-03-05 PROCEDURE — 99215 OFFICE O/P EST HI 40 MIN: CPT | Mod: S$GLB,,, | Performed by: INTERNAL MEDICINE

## 2020-03-05 PROCEDURE — 99215 PR OFFICE/OUTPT VISIT, EST, LEVL V, 40-54 MIN: ICD-10-PCS | Mod: S$GLB,,, | Performed by: INTERNAL MEDICINE

## 2020-03-05 RX ORDER — SODIUM CHLORIDE 0.9 % (FLUSH) 0.9 %
10 SYRINGE (ML) INJECTION
Status: CANCELLED | OUTPATIENT
Start: 2020-03-16

## 2020-03-05 RX ORDER — EPINEPHRINE 0.3 MG/.3ML
0.3 INJECTION SUBCUTANEOUS ONCE AS NEEDED
Status: CANCELLED | OUTPATIENT
Start: 2020-03-16

## 2020-03-05 RX ORDER — TEMAZEPAM 15 MG/1
CAPSULE ORAL
Qty: 30 CAPSULE | Refills: 0 | Status: SHIPPED | OUTPATIENT
Start: 2020-03-05 | End: 2020-04-09

## 2020-03-05 RX ORDER — HEPARIN 100 UNIT/ML
500 SYRINGE INTRAVENOUS
Status: CANCELLED | OUTPATIENT
Start: 2020-03-16

## 2020-03-05 RX ORDER — DIPHENHYDRAMINE HYDROCHLORIDE 50 MG/ML
50 INJECTION INTRAMUSCULAR; INTRAVENOUS ONCE AS NEEDED
Status: CANCELLED | OUTPATIENT
Start: 2020-03-16

## 2020-03-05 RX ORDER — FAMOTIDINE 10 MG/ML
20 INJECTION INTRAVENOUS
Status: CANCELLED | OUTPATIENT
Start: 2020-03-16

## 2020-03-05 NOTE — PROGRESS NOTES
Medical Oncology Progress Note  [facility]     PATIENT: Benita Carballo  : 1945 74 y.o. female  MRN [id number]     PCP: Primary Doctor No  Consult Requested By:      Date of Service: 3/5/2020    Subjective:   Interim History:  Breast Cancer (1 week with lab results) and Shortness of Breath (saw pulmonologist yesterday and will start on (02) unsure of how many liters)    Benita Carballo is here for to followup breast cancer treatment.  Solomon the patient here to review PET scan results.  PET scan shows progressive disease compared with 2019.  The patient states that she feels except for fatigue and for appetite  The patient here today to start Neurontin chemotherapy Herceptin and Taxol     Oncology History:  Oncology History     Rt breast cancer s/p lump with chemo, XRT in [place]. Tamoxifen x 1 yr, then Arimidex x 5 yrs. Completed tx .       c/o chronic cough with CT chest 16 showing extensive adenopathy  With base of lt neck mass 4.5 cm with yosi pulm nodules. RF to Dr ANDREWS Mcnally    16 bx of lt neck mass carcinoma with feature suggestive of breast primary. 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 ER/IA + Her 2 neg per Dr Moreno's note.    Dr Moreno noted path review from bx  showed HER 2 + equivocal with FISH reading as Equivocal but ratio 1.2. Additional report form 2016 states additional testing was done with different probe and the equivocal results can be noted as negative.     Treatment therapy changed from Her2 based tx to  endocrine base therapy by Dr Moreno       6/28/2017 -  Hormone Therapy     Arimidex 6/17-2/18  Aromasin 2/18  Ibrance + Aromasin 5/18 -1/19  Ibrance + faslodex 2/19 4/30/2019 Imaging Significant Findings     PET/CT: Lesions involving the medial clavicle , subcarinal LN, pleural based soft tissue density adjacent to T11 and lesion in left lobe of liver diminished in size and SUV.   RECIST 34% decrease in disease      2/17/2020 -  Chemotherapy     Treatment Summary   Plan Name: OP BREAST TRASTUZUMAB PACLITAXEL QW  Treatment Goal: Control  Status: Active  Start Date: 2/26/2020  End Date: 5/25/2020 (Planned)  Provider: Solomon Contreras MD  Chemotherapy: PACLitaxel (TAXOL) 64 mg/m2 = 102 mg in sodium chloride 0.9% 250 mL chemo infusion, 64 mg/m2 = 102 mg (100 % of original dose 64 mg/m2), Intravenous, Clinic/HOD 1 time, 1 of 12 cycles  Dose modification: 64 mg/m2 (original dose 64 mg/m2, Cycle 1)  trastuzumab 211 mg in sodium chloride 0.9% 250 mL chemo infusion, 4 mg/kg = 211 mg, Intravenous, Clinic/HOD 1 time, 1 of 12 cycles       ==8/2019 Now She is currently on combined afinitor + aromasin,  repeated CT scan shows improvement  PET 1/2020 showed PD,  afinitor + aromasin stopped  == PET scan 01/20/2020 the progressive  ==Started Taxol/Herceptin weekly 2/2020    Past Medical History:   Past Medical History:   Diagnosis Date    Arthritis     Bone cancer     Breast cancer     Cancer     Cataract     Depression     Diverticulosis 1/15/2020    Fatigue associated with anemia 6/6/2019    GERD (gastroesophageal reflux disease)     High cholesterol     Skin problem     Sleep disorder        Past Surgical HIstory:   Past Surgical History:   Procedure Laterality Date    APPENDECTOMY      BREAST LUMPECTOMY Right 2002    CATARACT EXTRACTION, BILATERAL  2014    COLONOSCOPY  2008    HYSTERECTOMY      PORTACATH PLACEMENT  05/18/2016    SURGICAL REMOVAL OF NODULE OF VOCAL CORD         Allergies:  Review of  patient's allergies indicates:   Allergen Reactions    Benadryl [diphenhydramine hcl]      Restless leg     Medrol [methylprednisolone] Hallucinations    Penicillins        Medications:  Current Outpatient Medications   Medication Sig Dispense Refill    ANORO ELLIPTA 62.5-25 mcg/actuation DsDv       dexAMETHasone 0.5 mg/5 mL Soln SWISH WITH 10 ML PO FOR 2 MIN THEN SPIT QID      exemestane (AROMASIN) 25 mg tablet Take 1 tablet (25 mg total) by mouth once daily. 30 tablet 3    gabapentin (NEURONTIN) 400 MG capsule Take 1 capsule (400 mg total) by mouth 3 (three) times daily. 120 capsule 4    HYDROcodone-acetaminophen (NORCO)  mg per tablet Take 1 tablet by mouth every 6 (six) hours as needed for Pain. 20 tablet 0    LORazepam (ATIVAN) 1 MG tablet Take 1 tablet (1 mg total) by mouth every evening. 30 tablet 0    megestrol (MEGACE) 400 mg/10 mL (40 mg/mL) Susp Take 10 mLs (400 mg total) by mouth once daily. 240 mL 2    oxyCODONE (OXYCONTIN) 10 mg 12 hr tablet Take 1 tablet (10 mg total) by mouth every 12 (twelve) hours as needed for Pain. 60 tablet 0    pantoprazole (PROTONIX) 40 MG tablet Take 1 tablet (40 mg total) by mouth once daily. 30 tablet 3    PROAIR HFA 90 mcg/actuation inhaler       promethazine (PHENERGAN) 25 MG tablet Take 1 tablet (25 mg total) by mouth every 6 (six) hours as needed for Nausea. 30 tablet 3    temazepam (RESTORIL) 15 mg Cap TAKE 1 CAPSULE BY MOUTH ONCE EVERY NIGHT AT BEDTIME AS NEEDED 30 capsule 0     No current facility-administered medications for this visit.        Family History:   Family History   Problem Relation Age of Onset    Lung cancer Father     Melanoma Father        Social History:  reports that she has been smoking cigarettes. She has a 2.00 pack-year smoking history. She has never used smokeless tobacco. She reports that she drinks about 4.0 standard drinks of alcohol per week. She reports that she does not use drugs.    Review of  "Systemas  Constitutional: No change in weight, appetie, fatigue, fever, or night sweats  Eyes: No changes in vision  Ears, Nose, Mouth, Throat, and Face: No hearing problems, ear pain, rummy nose, or sore throat  Respiratory: No shortness of breath or cough  Cardiovascular: No chest pain, palpitations or dizziness  Gastrointestinal: No abdominal pain, hematochezia, melena  Genitourinary: No dysuria, hematuria, nocturia, menstrual problems, post menopausal  Integumentary/Breast: No rashes or itching  Hematologic/Lymphatic: No anemia or bleeding abnormalities  Musculoskeletal: No joint or muscle abnormalities  Neurological: No sensory or motor problems, no headaches  Behavioral/Psych: No depression, anxiety, cognitive problems, or stress  Endocrine: No diabetes or thyroid problems  Allergic/Immunologic: See allergy above    Physical Exam      Vitals:   Vitals:    03/05/20 1058   BP: (!) 83/52   BP Location: Right arm   Patient Position: Sitting   BP Method: Medium (Automatic)   Pulse: 104   Resp: 20   Temp: 98.4 °F (36.9 °C)   SpO2: (!) 86%   Weight: 55.9 kg (123 lb 3.2 oz)   Height: 5' 6" (1.676 m)     BMI: Body mass index is 19.89 kg/m².  BSA Body surface area is 1.61 meters squared.  ECOG Performance Status:   ECOG SCORE    1 - Restricted in strenuous activity-ambulatory and able to carry out work of a light nature       GENERAL APPEARANCE: Well developed, well nourished, in no acute distress.  SKIN: Inspection of the skin reveals no rashes, ulcerations or petechiae.  HEENT: The sclerae were anicteric and conjunctivae were pink and moist. Extraocular movements were intact and pupils were equal, round, and reactive to light with normal accommodation. External inspection of the ears and nose showed no scars, lesions, or masses. Lips, teeth, and gums showed normal mucosa. The oral mucosa, hard and soft palate, tongue and posterior pharynx were normal.  NECK: Supple and symmetric. There was no thyroid enlargement, and " no tenderness, or masses were felt.  CRESPIRATORY: Normal AP diameter and normal contour without any kyphoscoliosis. LUNGS: Auscultation of the lungs revealed normal breath sounds without any other adventitious sounds or rubs.  CARDIOVASCULAR: There was a regular rate and rhythm without any murmurs, gallops, rubs. The carotid pulses were normal and 2+ bilaterally without bruits. Peripheral pulses were 2+ and symmetric.  GASTROINTESTINAL: Soft and nontender with normal bowel sounds. The liver span was approximately 5-6 cm in the right midclavicular line by percussion. The liver edge was nontender. The spleen was not palpable. There were no inguinal or umbilical hernias noted. No ascites was noted.  LYMPH NODES: No lymphadenopathy was appreciated in the neck, axillae or groin.  MUSCULOSKELETAL: Gait was normal. There was no tenderness or effusions noted. Muscle strength and tone were normal. EXTREMITIES: No cyanosis, clubbing or edema.  NEUROLOGIC: Alert and oriented x 3. Normal affect. Gait was normal. Normal deep tendon reflexes with no pathological reflexes. Sensation to touch was normal.  PSYCHIATRIC: good mood, orientated to place, time and person     Labs and Imagings     Orders Only on 11/12/2019   Component Date Value Ref Range Status    Sodium 11/12/2019 139  135 - 145 mmol/L Final    Potassium 11/12/2019 3.6  3.6 - 5.2 mmol/L Final    Chloride 11/12/2019 102  100 - 108 mmol/L Final    CO2 11/12/2019 27  21 - 32 mmol/L Final    BUN, Bld 11/12/2019 6* 7 - 18 mg/dL Final    Creatinine 11/12/2019 0.96  0.55 - 1.02 mg/dL Final    GFR ESTIMATION 11/12/2019 57* >60 Final               DESCRIPTION       GLOMERULAR FILTRATION RATE             NORMAL                     >60             KIDNEY  DISEASE           15-60             KIDNEY FAILURE             <15    BUN/Creatinine Ratio 11/12/2019 6.25* 7 - 18 Ratio Final    Glucose 11/12/2019 170* 70 - 110 mg/dL Final    Calcium 11/12/2019 8.2* 8.8 - 10.5 mg/dL  Final    Total Bilirubin 11/12/2019 0.7  0.0 - 1.0 mg/dL Final    AST 11/12/2019 23  15 - 37 U/L Final    ALT 11/12/2019 27  12 - 78 U/L Final    Total Protein 11/12/2019 6.5  6.4 - 8.2 g/dL Final    Albumin 11/12/2019 2.8* 3.4 - 5.0 g/dL Final    Globulin 11/12/2019 3.7* 2.3 - 3.5 g/dL Final    Albumin/Globulin Ratio 11/12/2019 0.756* 1.1 - 1.8 Ratio Final    Alkaline Phosphatase 11/12/2019 100  46 - 116 U/L Final   Orders Only on 11/12/2019   Component Date Value Ref Range Status    WBC 11/12/2019 5.2  4.6 - 10.2 10*3/uL Final    RBC 11/12/2019 5.09  4.2 - 5.4 10*6/uL Final    Hemoglobin 11/12/2019 13.6  12.0 - 16.0 g/dL Final    Hematocrit 11/12/2019 43.0  37.0 - 47.0 % Final    MCV 11/12/2019 84.5  80 - 97 fL Final    MCH 11/12/2019 26.7* 27.0 - 31.2 pg Final    MCHC 11/12/2019 31.6* 31.8 - 35.4 g/dL Final    RDW RBC Auto-Rto 11/12/2019 16.7  12.5 - 18.0 % Final    Platelets 11/12/2019 147  142 - 424 10*3/uL Final    Neutrophils/100 leukocytes 11/12/2019 76.1  37 - 80 % Final    Lymphocytes/100 leukocytes 11/12/2019 12.0* 25 - 40 % Final    Monocytes/100 leukocytes 11/12/2019 8.0  1 - 15 % Final    Eosinophils/100 leukocytes 11/12/2019 2.3  1 - 3 % Final    Basophils/100 leukocytes 11/12/2019 1.0  0 - 3 % Final    Manual nRBC per 100 Cells 11/12/2019 0.0  % Final    Neutrophils 11/12/2019 3.99  1.8 - 7.7 10*3/uL Final   Office Visit on 10/16/2019   Component Date Value Ref Range Status    WBC 10/15/2019 5.8  4.6 - 10.2 10*3/uL Final    RBC 10/15/2019 4.63  4.2 - 5.4 10*6/uL Final    Hemoglobin 10/15/2019 13.4  12.0 - 16.0 g/dL Final    Hematocrit 10/15/2019 42.2  37.0 - 47.0 % Final    MCV 10/15/2019 91.1  80 - 97 fL Final    MCH 10/15/2019 28.9  27.0 - 31.2 pg Final    MCHC 10/15/2019 31.8  31.8 - 35.4 g/dL Final    RDW RBC Auto-Rto 10/15/2019 16.9  12.5 - 18.0 % Final    Platelets 10/15/2019 106* 142 - 424 10*3/uL Final    Neutrophils/100 leukocytes 10/15/2019 76.4  37 - 80 %  Final    Lymphocytes/100 leukocytes 10/15/2019 11.9* 25 - 40 % Final    Monocytes/100 leukocytes 10/15/2019 7.8  1 - 15 % Final    Eosinophils/100 leukocytes 10/15/2019 2.4  1 - 3 % Final    Basophils/100 leukocytes 10/15/2019 1.0  0 - 3 % Final    Manual nRBC per 100 Cells 10/15/2019 0.0  % Final    Neutrophils 10/15/2019 4.42  1.8 - 7.7 10*3/uL Final    Sodium 10/15/2019 140  135 - 145 mmol/L Final    Potassium 10/15/2019 4.1  3.6 - 5.2 mmol/L Final    Chloride 10/15/2019 104  100 - 108 mmol/L Final    CO2 10/15/2019 28  21 - 32 mmol/L Final    BUN, Bld 10/15/2019 6* 7 - 18 mg/dL Final    Creatinine 10/15/2019 1.00  0.55 - 1.02 mg/dL Final    GFR ESTIMATION 10/15/2019 54* >60 Final               DESCRIPTION       GLOMERULAR FILTRATION RATE             NORMAL                     >60             KIDNEY  DISEASE           15-60             KIDNEY FAILURE             <15    BUN/Creatinine Ratio 10/15/2019 6.00* 7 - 18 Ratio Final    Glucose 10/15/2019 158* 70 - 110 mg/dL Final    Calcium 10/15/2019 8.9  8.8 - 10.5 mg/dL Final    Total Bilirubin 10/15/2019 0.3  0.0 - 1.0 mg/dL Final    AST 10/15/2019 18  15 - 37 U/L Final    ALT 10/15/2019 20  12 - 78 U/L Final    Total Protein 10/15/2019 6.8  6.4 - 8.2 g/dL Final    Albumin 10/15/2019 2.9* 3.4 - 5.0 g/dL Final    Globulin 10/15/2019 3.9* 2.3 - 3.5 g/dL Final    Albumin/Globulin Ratio 10/15/2019 0.743* 1.1 - 1.8 Ratio Final    Alkaline Phosphatase 10/15/2019 114  46 - 116 U/L Final   Orders Only on 10/15/2019   Component Date Value Ref Range Status    Everolimus Lvl 10/15/2019 31.1   Final    Comment: Everolimus by [employer] test code [id number] by HPLC-MS/MS31.1 ng/mLTherapeutic Range:Kidney transplant (in combination with Cyclosporine):3-8 ng/mLLiver transplant (in combination with Tacrolimus):3-8 ng/mLToxic value: Greater   than 15 ng/mLEverolimus marketed as Zortress is FDA approved forprophylaxisof organ rejection  in adult patients receiving a kidney andliver transplant.Everolimus marketed as Afinitor is FDA approved for thetreatment of renal cell carcinoma and for the   treatment ofsubependymal giant cell astrocytoma (SEGA) associated withtuberous sclerosis (TS) in patients who are not candidatesforcurative surgical resection. The suggested therapeutic rangefortreatment of SEGA is 5-15 ng/mL, which is based on a   predose(trough) specimen.The optimal therapeutic range for a given patient may differfrom this suggested range based on the indication fortherapy,treatment phase (initiation or maintenance), use incombinationwith other drugs, time of specime                           n collection   relative topriordose, type of transplanted organ, and/or the therapeuticapproach of the transplant center.Test developed and characteristics determined by [employer]. See Compliance Statement B:   [employer]/CS===========================================================___________________________________________________________     Orders Only on 10/15/2019   Component Date Value Ref Range Status    Platelet Estimate 10/15/2019 Adequate   Final   Office Visit on 09/18/2019   Component Date Value Ref Range Status    Sodium 09/17/2019 140  135 - 145 mmol/L Final    Potassium 09/17/2019 3.6  3.6 - 5.2 mmol/L Final    Chloride 09/17/2019 104  100 - 108 mmol/L Final    CO2 09/17/2019 27  21 - 32 mmol/L Final    BUN, Bld 09/17/2019 8  7 - 18 mg/dL Final    Creatinine 09/17/2019 0.88  0.55 - 1.02 mg/dL Final    GFR ESTIMATION 09/17/2019 > 60  >60 Final               DESCRIPTION       GLOMERULAR FILTRATION RATE             NORMAL                     >60             KIDNEY  DISEASE           15-60             KIDNEY FAILURE             <15    BUN/Creatinine Ratio 09/17/2019 9.09  7 - 18 Ratio Final    Glucose 09/17/2019 107  70 - 110 mg/dL Final    Calcium 09/17/2019 8.8  8.8 - 10.5 mg/dL Final    Total Bilirubin 09/17/2019 0.3   0.0 - 1.0 mg/dL Final    AST 09/17/2019 21  15 - 37 U/L Final    ALT 09/17/2019 21  12 - 78 U/L Final    Total Protein 09/17/2019 6.8  6.4 - 8.2 g/dL Final    Albumin 09/17/2019 3.0* 3.4 - 5.0 g/dL Final    Globulin 09/17/2019 3.8* 2.3 - 3.5 g/dL Final    Albumin/Globulin Ratio 09/17/2019 0.789* 1.1 - 1.8 Ratio Final    Alkaline Phosphatase 09/17/2019 97  46 - 116 U/L Final   Orders Only on 09/17/2019   Component Date Value Ref Range Status    Name of Test: 09/17/2019 EVEROLIMUS   Final    Result of Test: 09/17/2019 **   Final    Comment: Everolimus by [employer] test code [id number] by HPLC-MS/MS>80.0 ng/mLTherapeutic Range:Kidney transplant (in combination with Cyclosporine):3-8 ng/mLLiver transplant (in combination with Tacrolimus):3-8 ng/mLToxic value: Greater   than 15 ng/mLEverolimus marketed as Zortress is FDA approved forprophylaxisof organ rejection in adult patients receiving a kidney andliver transplant.Everolimus marketed as Afinitor is FDA approved for thetreatment of renal cell carcinoma and for the   treatment ofsubependymal giant cell astrocytoma (SEGA) associated withtuberous sclerosis (TS) in patients who are not candidatesforcurative surgical resection. The suggested therapeutic rangefortreatment of SEGA is 5-15 ng/mL, which is based on a   predose(trough) specimen.The optimal therapeutic range for a given patient may differfrom this suggested range based on the indication fortherapy,treatment phase (initiation or maintenance), use incombinationwith other drugs, time of specim                           en collection   relative topriordose, type of transplanted organ, and/or the therapeuticapproach of the transplant center.Test developed and characteristics determined by [employer]. See Compliance Statement B:   [employer]/CS===========================================================      Performed by: 09/17/2019 [facility]   Final       Imaging:     Assessment:      Principle Problem: Malignant neoplasm of overlapping 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, estrogen receptor positive [C50.812, Z17.0]   Co-morbidity/Active Problems:   Patient Active Problem List   Diagnosis    Breast cancer metastasized to bone    Neuropathy    Fatigue associated with anemia    Drug-related hair loss    Nausea    Primary insomnia    Foot infection    Cancer, metastatic to bone    Malignant neoplasm of overlapping 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, estrogen receptor positive     Chronic fatigue    Diverticulosis    Dehydration        Bneita Carballo is a 74 y.o. female with PMH of The primary encounter diagnosis was Malignant neoplasm of overlapping 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, estrogen receptor positive . Diagnoses of Cancer, metastatic to bone, Carcinoma of right breast metastatic to bone, and Dehydration were also pertinent to this visit., with Malignant neoplasm of overlapping 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, estrogen receptor positive [C50.812, Z17.0]       Metastatic breast cancer ERPR positive HER2 Noah questionable  Status post of chemotherapy and HER2 targeted therapy with progression on HER2 targeted therapy  Recent testing fish negative for her 2 2018  Repeat the lipid biopsy 2019  shows HER2 positive and ER negative  Bone metastatic disease  Fatigue  Hypoxia  Dehydration    Plan:   Overall Plan:    She has HER2 positive disease with ERPR negative by the new liquid biopsy. Started Taxol/Herceptin 2/2020     ==Labs: CBC CMP w=Mag weekly;  tumor marker every month  ==weekly  Herceptin Taxol  ==NS 500ml x1, Mag 2g IV x 1   ==Return to Clinic with appointment in 1 weeks with Rosalindkenyatta Contreras M.D., Ph.D., [facility]  Hematology-Oncology    Signed 3/5/2020 12:06 PM

## 2020-03-07 LAB
CANCER ANTIGEN 15-3: 85 U/ML (ref 0–31)
CANCER ANTIGEN 27.29: 126.1 U/ML (ref 0–40)
CEA SERPL-MCNC: 10.5 NG/ML (ref 0–3)

## 2020-03-11 LAB
ALBUMIN SERPL BCP-MCNC: 2.6 G/DL (ref 3.4–5)
ALBUMIN/GLOBULIN RATIO: 0.81 RATIO (ref 1.1–1.8)
ALP SERPL-CCNC: 77 U/L (ref 46–116)
ALT SERPL W P-5'-P-CCNC: 16 U/L (ref 12–78)
ANISOCYTOSIS: NORMAL
AST SERPL-CCNC: 14 U/L (ref 15–37)
BASOPHILS NFR SNV MANUAL: 1.3 % (ref 0–3)
BILIRUB SERPL-MCNC: 0.6 MG/DL (ref 0–1)
BUN SERPL-MCNC: 10 MG/DL (ref 7–18)
BUN/CREAT SERPL: 10.75 RATIO (ref 7–18)
CALCIUM SERPL-MCNC: 8.9 MG/DL (ref 8.8–10.5)
CHLORIDE SERPL-SCNC: 103 MMOL/L (ref 100–108)
CO2 SERPL-SCNC: 30 MMOL/L (ref 21–32)
CREAT SERPL-MCNC: 0.93 MG/DL (ref 0.55–1.02)
EOSINOPHIL NFR SNV MANUAL: 1.5 % (ref 1–3)
ERYTHROCYTE [DISTWIDTH] IN BLOOD BY AUTOMATED COUNT: 21.2 % (ref 12.5–18)
GFR ESTIMATION: 59
GLOBULIN: 3.2 G/DL (ref 2.3–3.5)
GLUCOSE SERPL-MCNC: 95 MG/DL (ref 70–110)
HCT VFR BLD AUTO: 36 % (ref 37–47)
HGB BLD-MCNC: 10.9 G/DL (ref 12–16)
HYPOCHROMIA BLD QL SMEAR: NORMAL
LYMPHOCYTES NFR SNV MANUAL: 16.8 % (ref 25–40)
MAGNESIUM SERPL-MCNC: 1.7 MG/DL (ref 1.8–2.4)
MANUAL NRBC PER 100 CELLS: 0.4 %
MCH RBC QN AUTO: 23.6 PG (ref 27–31.2)
MCHC RBC AUTO-ENTMCNC: 30.3 G/DL (ref 31.8–35.4)
MCV RBC AUTO: 78.1 FL (ref 80–97)
MONOCYTES/100 LEUKOCYTES: 8 % (ref 1–15)
NEUTROPHILS NFR BLD: 3.49 10*3/UL (ref 1.8–7.7)
NEUTROPHILS NFR SNV MANUAL: 65.3 % (ref 37–80)
PLATELETS: 317 10*3/UL (ref 142–424)
POTASSIUM SERPL-SCNC: 3.7 MMOL/L (ref 3.6–5.2)
PROT SERPL-MCNC: 5.8 G/DL (ref 6.4–8.2)
RBC # BLD AUTO: 4.61 10*6/UL (ref 4.2–5.4)
RBC MORPH BLD: NORMAL
SODIUM BLD-SCNC: 138 MMOL/L (ref 135–145)
WBC # BLD: 5.4 10*3/UL (ref 4.6–10.2)

## 2020-03-12 ENCOUNTER — OFFICE VISIT (OUTPATIENT)
Dept: HEMATOLOGY/ONCOLOGY | Facility: CLINIC | Age: 75
End: 2020-03-12
Payer: MEDICARE

## 2020-03-12 VITALS
TEMPERATURE: 97 F | SYSTOLIC BLOOD PRESSURE: 111 MMHG | WEIGHT: 125 LBS | DIASTOLIC BLOOD PRESSURE: 73 MMHG | BODY MASS INDEX: 20.09 KG/M2 | HEIGHT: 66 IN | OXYGEN SATURATION: 77 % | RESPIRATION RATE: 14 BRPM | HEART RATE: 90 BPM

## 2020-03-12 DIAGNOSIS — R09.02 HYPOXEMIA: Primary | Chronic | ICD-10-CM

## 2020-03-12 DIAGNOSIS — F51.01 PRIMARY INSOMNIA: ICD-10-CM

## 2020-03-12 DIAGNOSIS — J44.9 CHRONIC OBSTRUCTIVE PULMONARY DISEASE, UNSPECIFIED COPD TYPE: ICD-10-CM

## 2020-03-12 DIAGNOSIS — C50.919 MALIGNANT NEOPLASM OF BREAST, STAGE 4, UNSPECIFIED LATERALITY: ICD-10-CM

## 2020-03-12 PROCEDURE — 99214 PR OFFICE/OUTPT VISIT, EST, LEVL IV, 30-39 MIN: ICD-10-PCS | Mod: S$GLB,,, | Performed by: INTERNAL MEDICINE

## 2020-03-12 PROCEDURE — 99214 OFFICE O/P EST MOD 30 MIN: CPT | Mod: S$GLB,,, | Performed by: INTERNAL MEDICINE

## 2020-03-12 RX ORDER — LORAZEPAM 1 MG/1
TABLET ORAL
Qty: 30 TABLET | Refills: 0 | Status: SHIPPED | OUTPATIENT
Start: 2020-03-12

## 2020-03-12 NOTE — PROGRESS NOTES
Medical Oncology Progress Note  [facility]     PATIENT: Benita Carballo  : 1945 74 y.o. female  MRN [id number]     PCP: Solomon Contreras MD  Consult Requested By:      Date of Service: 3/12/2020    Subjective:   Interim History:  Breast Cancer    Benita Carballo is here for to followup breast cancer treatment.    The patient was started on  chemotherapy last week with Herceptin and Taxol weekly.  She has chronic hypoxia and her oxygen saturation has been declining today her O2 status 77% she looks purple and she also has increasing degree of short of breath; in addition she complains of swelling to her left arm.  She had no chest pain.      Oncology History:  Oncology History     Rt breast cancer s/p lump with chemo, XRT in [place]. Tamoxifen x 1 yr, then Arimidex x 5 yrs. Completed tx .       c/o chronic cough with CT chest 16 showing extensive adenopathy  With base of lt neck mass 4.5 cm with yosi pulm nodules. RF to Dr ANDREWS Mcnally    16 bx of lt neck mass carcinoma with feature suggestive of breast primary. 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 ER/ID + Her 2 neg per Dr Moreno's note.    Dr Moreno noted path review from bx  showed HER 2 + equivocal with FISH reading as Equivocal but ratio 1.2. Additional report form 2016 states additional testing was done with different probe and the equivocal results can be noted as negative.     Treatment therapy changed from Her2 based tx to endocrine base therapy by Dr Moreno       2017 -  Hormone  Therapy     Arimidex 6/17-2/18  Aromasin 2/18  Ibrance + Aromasin 5/18 -1/19  Ibrance + faslodex 2/19 4/30/2019 Imaging Significant Findings     PET/CT: Lesions involving the medial clavicle , subcarinal LN, pleural based soft tissue density adjacent to T11 and lesion in left lobe of liver diminished in size and SUV.   RECIST 34% decrease in disease      2/17/2020 -  Chemotherapy     Treatment Summary   Plan Name: OP BREAST TRASTUZUMAB PACLITAXEL QW  Treatment Goal: Control  Status: Active  Start Date: 2/26/2020  End Date: 5/14/2020 (Planned)  Provider: Solomon Contreras MD  Chemotherapy: PACLitaxel (TAXOL) 64 mg/m2 = 102 mg in sodium chloride 0.9% 250 mL chemo infusion, 64 mg/m2 = 102 mg (100 % of original dose 64 mg/m2), Intravenous, Clinic/HOD 1 time, 2 of 12 cycles  Dose modification: 64 mg/m2 (original dose 64 mg/m2, Cycle 1)  trastuzumab 211 mg in sodium chloride 0.9% 250 mL chemo infusion, 4 mg/kg = 211 mg, Intravenous, Clinic/HOD 1 time, 2 of 12 cycles       ==8/2019 Now She is currently on combined afinitor + aromasin,  repeated CT scan shows improvement  PET 1/2020 showed PD,  afinitor + aromasin stopped  == PET scan 01/20/2020 the progressive  ==Started Taxol/Herceptin weekly 2/2020    Past Medical History:   Past Medical History:   Diagnosis Date    Arthritis     Bone cancer     Breast cancer     Cancer     Cataract     Depression     Diverticulosis 1/15/2020    Fatigue associated with anemia 6/6/2019    GERD (gastroesophageal reflux disease)     High cholesterol     Hypoxemia 3/12/2020    Skin problem     Sleep disorder        Past Surgical HIstory:   Past Surgical History:   Procedure Laterality Date    APPENDECTOMY      BREAST LUMPECTOMY Right 2002    CATARACT EXTRACTION, BILATERAL  2014    COLONOSCOPY  2008    HYSTERECTOMY      PORTACATH PLACEMENT  05/18/2016    SURGICAL REMOVAL OF NODULE OF VOCAL CORD         Allergies:  Review of patient's allergies indicates:   Allergen  Reactions    Benadryl [diphenhydramine hcl]      Restless leg     Medrol [methylprednisolone] Hallucinations    Penicillins        Medications:  Current Outpatient Medications   Medication Sig Dispense Refill    ANORO ELLIPTA 62.5-25 mcg/actuation DsDv       dexAMETHasone 0.5 mg/5 mL Soln SWISH WITH 10 ML PO FOR 2 MIN THEN SPIT QID      exemestane (AROMASIN) 25 mg tablet Take 1 tablet (25 mg total) by mouth once daily. 30 tablet 3    gabapentin (NEURONTIN) 400 MG capsule Take 1 capsule (400 mg total) by mouth 3 (three) times daily. 120 capsule 4    HYDROcodone-acetaminophen (NORCO)  mg per tablet Take 1 tablet by mouth every 6 (six) hours as needed for Pain. 20 tablet 0    LORazepam (ATIVAN) 1 MG tablet Take 1 tablet (1 mg total) by mouth every evening. 30 tablet 0    megestrol (MEGACE) 400 mg/10 mL (40 mg/mL) Susp Take 10 mLs (400 mg total) by mouth once daily. 240 mL 2    oxyCODONE (OXYCONTIN) 10 mg 12 hr tablet Take 1 tablet (10 mg total) by mouth every 12 (twelve) hours as needed for Pain. 60 tablet 0    pantoprazole (PROTONIX) 40 MG tablet Take 1 tablet (40 mg total) by mouth once daily. 30 tablet 3    PROAIR HFA 90 mcg/actuation inhaler       promethazine (PHENERGAN) 25 MG tablet Take 1 tablet (25 mg total) by mouth every 6 (six) hours as needed for Nausea. 30 tablet 3    temazepam (RESTORIL) 15 mg Cap TAKE 1 CAPSULE BY MOUTH ONCE EVERY NIGHT AT BEDTIME AS NEEDED 30 capsule 0     No current facility-administered medications for this visit.        Family History:   Family History   Problem Relation Age of Onset    Lung cancer Father     Melanoma Father        Social History:  reports that she has been smoking cigarettes. She has a 2.00 pack-year smoking history. She has never used smokeless tobacco. She reports that she drinks about 4.0 standard drinks of alcohol per week. She reports that she does not use drugs.    Review of Systemas  Constitutional: No change in weight, appetie,  "fatigue, fever, or night sweats  Eyes: No changes in vision  Ears, Nose, Mouth, Throat, and Face: No hearing problems, ear pain, rummy nose, or sore throat  Respiratory: No shortness of breath or cough  Cardiovascular: No chest pain, palpitations or dizziness  Gastrointestinal: No abdominal pain, hematochezia, melena  Genitourinary: No dysuria, hematuria, nocturia, menstrual problems, post menopausal  Integumentary/Breast: No rashes or itching  Hematologic/Lymphatic: No anemia or bleeding abnormalities  Musculoskeletal: No joint or muscle abnormalities  Neurological: No sensory or motor problems, no headaches  Behavioral/Psych: No depression, anxiety, cognitive problems, or stress  Endocrine: No diabetes or thyroid problems  Allergic/Immunologic: See allergy above    Physical Exam      Vitals:   Vitals:    03/12/20 1050   BP: 111/73   BP Location: Right arm   Patient Position: Sitting   BP Method: Large (Automatic)   Pulse: 90   Resp: 14   Temp: 97.3 °F (36.3 °C)   TempSrc: Temporal   SpO2: (!) 77%   Weight: 56.7 kg (125 lb)   Height: 5' 6" (1.676 m)     BMI: Body mass index is 20.18 kg/m².  BSA Body surface area is 1.62 meters squared.  ECOG Performance Status:   ECOG SCORE    2 - Capable of all selfcare but unable to carry out any work activities, active > 50% of hours       GENERAL APPEARANCE: Well developed, well nourished, in no acute distress.  SKIN: Inspection of the skin reveals no rashes, ulcerations or petechiae.  HEENT: The sclerae were anicteric and conjunctivae were pink and moist. Extraocular movements were intact and pupils were equal, round, and reactive to light with normal accommodation. External inspection of the ears and nose showed no scars, lesions, or masses. Lips, teeth, and gums showed normal mucosa. The oral mucosa, hard and soft palate, tongue and posterior pharynx were normal.  NECK: Supple and symmetric. There was no thyroid enlargement, and no tenderness, or masses were " felt.  CRESPIRATORY: Normal AP diameter and normal contour without any kyphoscoliosis. LUNGS: Auscultation of the lungs revealed normal breath sounds without any other adventitious sounds or rubs.  CARDIOVASCULAR: There was a regular rate and rhythm without any murmurs, gallops, rubs. The carotid pulses were normal and 2+ bilaterally without bruits. Peripheral pulses were 2+ and symmetric.  GASTROINTESTINAL: Soft and nontender with normal bowel sounds. The liver span was approximately 5-6 cm in the right midclavicular line by percussion. The liver edge was nontender. The spleen was not palpable. There were no inguinal or umbilical hernias noted. No ascites was noted.  LYMPH NODES: No lymphadenopathy was appreciated in the neck, axillae or groin.  MUSCULOSKELETAL: Gait was normal. There was no tenderness or effusions noted. Muscle strength and tone were normal. EXTREMITIES: No cyanosis, clubbing or edema.  NEUROLOGIC: Alert and oriented x 3. Normal affect. Gait was normal. Normal deep tendon reflexes with no pathological reflexes. Sensation to touch was normal.  PSYCHIATRIC: good mood, orientated to place, time and person     Labs and Imagings     Orders Only on 03/11/2020   Component Date Value Ref Range Status    RBC Morphology 03/11/2020 ABNORMAL   Final    Anisocytosis 03/11/2020 2+   Final    Hypochromia 03/11/2020 1+   Final   Office Visit on 03/05/2020   Component Date Value Ref Range Status    WBC 03/11/2020 5.4  4.6 - 10.2 10*3/uL Final    RBC 03/11/2020 4.61  4.2 - 5.4 10*6/uL Final    Hemoglobin 03/11/2020 10.9* 12.0 - 16.0 g/dL Final    Hematocrit 03/11/2020 36.0* 37.0 - 47.0 % Final    MCV 03/11/2020 78.1* 80 - 97 fL Final    MCH 03/11/2020 23.6* 27.0 - 31.2 pg Final    MCHC 03/11/2020 30.3* 31.8 - 35.4 g/dL Final    RDW RBC Auto-Rto 03/11/2020 21.2* 12.5 - 18.0 % Final    Platelets 03/11/2020 317  142 - 424 10*3/uL Final    Neutrophils/100 leukocytes 03/11/2020 65.3  37 - 80 % Final     Lymphocytes/100 leukocytes 03/11/2020 16.8* 25 - 40 % Final    Monocytes/100 leukocytes 03/11/2020 8.0  1 - 15 % Final    Eosinophils/100 leukocytes 03/11/2020 1.5  1 - 3 % Final    Basophils/100 leukocytes 03/11/2020 1.3  0 - 3 % Final    Manual nRBC per 100 Cells 03/11/2020 0.4  % Final    Neutrophils 03/11/2020 3.49  1.8 - 7.7 10*3/uL Final    Sodium 03/11/2020 138  135 - 145 mmol/L Final    Potassium 03/11/2020 3.7  3.6 - 5.2 mmol/L Final    Chloride 03/11/2020 103  100 - 108 mmol/L Final    CO2 03/11/2020 30  21 - 32 mmol/L Final    BUN, Bld 03/11/2020 10  7 - 18 mg/dL Final    Creatinine 03/11/2020 0.93  0.55 - 1.02 mg/dL Final    GFR ESTIMATION 03/11/2020 59* >60 Final               DESCRIPTION       GLOMERULAR FILTRATION RATE             NORMAL                     >60             KIDNEY  DISEASE           15-60             KIDNEY FAILURE             <15    BUN/Creatinine Ratio 03/11/2020 10.75  7 - 18 Ratio Final    Glucose 03/11/2020 95  70 - 110 mg/dL Final    Calcium 03/11/2020 8.9  8.8 - 10.5 mg/dL Final    Total Bilirubin 03/11/2020 0.6  0.0 - 1.0 mg/dL Final    AST 03/11/2020 14* 15 - 37 U/L Final    ALT 03/11/2020 16  12 - 78 U/L Final    Total Protein 03/11/2020 5.8* 6.4 - 8.2 g/dL Final    Albumin 03/11/2020 2.6* 3.4 - 5.0 g/dL Final    Globulin 03/11/2020 3.2  2.3 - 3.5 g/dL Final    Albumin/Globulin Ratio 03/11/2020 0.812* 1.1 - 1.8 Ratio Final    Alkaline Phosphatase 03/11/2020 77  46 - 116 U/L Final    Magnesium 03/11/2020 1.7* 1.8 - 2.4 mg/dL Final   Orders Only on 03/04/2020   Component Date Value Ref Range Status    Cancer Antigen 15-3 03/04/2020 85* 0 - 31 U/mL Final    Comment: INTERPRETIVE INFORMATION: Cancer Antigen-Breast ()The Roche CA 15-3 electrochemiluminescent immunoassay was used.Results obtained with different methods or kits cannot be usedinterchangeably. The CA 15-3 test is used to aid in themanagement of   Stage II and III breast cancer patients.  Serialtesting for patient CA 15-3 values should be used in conjunctionwith other clinical methods for monitoring breast cancer.Patients with confirmed breast carcinoma frequently have CA 15-3values in the same   range as healthy individuals. Elevations maybe observed in patients with nonmalignant disease. Therefore, Chelsie 15-3 value, regardless of level, should not be interpreted asabsolute evidence of the presence or absence of malignantdisease.Performed by [employer],[address] [facility] [place] [address] [phone].[employer], Sharif Leon MD, Lab. Director     Orders Only on 2020   Component Date Value Ref Range Status    CEA 2020 10.5* 0.0 - 3.0 ng/mL Final    Comment: INTERPRETIVE INFORMATION: Carcinoembryonic AntigenThe Roche CEA electrochemiluminescent immunoassay was used.Results obtained with different test methods or kits cannot beused interchangeably. Measurement of CEA has been shown to beclinically relevant in   the management of patients withcolorectal, breast, lung, prostatic, pancreatic, and ovariancarcinomas. Smokers may have slightly elevated levels of CEA.The CEA assay value, regardless of level, should not beinterpreted as evidence for the presence or   absence of malignantdisease and is not recommended for use as a screening procedureto detect the presence of cancer in the general population.Performed by [employer],[address] [facility] [address] [phone].CohBar, Sharif Leon MD,   Lab. Director     Orders Only on 2020   Component Date Value Ref Range Status    CANCER ANTIGEN 27.29 2020 126.1* 0.0 - 40.0 U/mL Final    Comment: INTERPRETIVE INFORMATION: Cancer Antigen 27.29The CA 27.29 assay is intended for use in monitorin) diseaseprogression and/or response to therapy in patients withmetastatic disease, and 2) disease recurrence in patientstreated previously for stages II   or III breast carcinoma who areclinically free of the disease.  Serial testing in patients whoare clinically free of disease should be used in conjunctionwith other clinical methods for early detection of cancerrecurrence.Limitations: Patients with   confirmed breast carcinoma frequentlyhave CA 27.29 assay values in the same range as healthyindividuals.  Elevations may also be observed in patients withnon malignant disease. Results of this test must always beinterpreted in the context of morphologic   and other relevantdata, and should not be used alone for a diagnosis of malignancy.Methodology: Siemens Advia Centaur CA 27.29 chemiluminescentimmunoassay was used. Results obtained with different assaymethods or kits cannot be used   in                           terchangeably.Performed by [employer],[address] [place] [address], [facility] [place] [address] [phone].[employer], Sharif Leon MD, Lab. Director     Orders Only on 11/12/2019   Component Date Value Ref Range Status    Sodium 11/12/2019 139  135 - 145 mmol/L Final    Potassium 11/12/2019 3.6  3.6 - 5.2 mmol/L Final    Chloride 11/12/2019 102  100 - 108 mmol/L Final    CO2 11/12/2019 27  21 - 32 mmol/L Final    BUN, Bld 11/12/2019 6* 7 - 18 mg/dL Final    Creatinine 11/12/2019 0.96  0.55 - 1.02 mg/dL Final    GFR ESTIMATION 11/12/2019 57* >60 Final               DESCRIPTION       GLOMERULAR FILTRATION RATE             NORMAL                     >60             KIDNEY  DISEASE           15-60             KIDNEY FAILURE             <15    BUN/Creatinine Ratio 11/12/2019 6.25* 7 - 18 Ratio Final    Glucose 11/12/2019 170* 70 - 110 mg/dL Final    Calcium 11/12/2019 8.2* 8.8 - 10.5 mg/dL Final    Total Bilirubin 11/12/2019 0.7  0.0 - 1.0 mg/dL Final    AST 11/12/2019 23  15 - 37 U/L Final    ALT 11/12/2019 27  12 - 78 U/L Final    Total Protein 11/12/2019 6.5  6.4 - 8.2 g/dL Final    Albumin 11/12/2019 2.8* 3.4 - 5.0 g/dL Final    Globulin 11/12/2019 3.7* 2.3 - 3.5 g/dL Final    Albumin/Globulin Ratio 11/12/2019 0.756*  1.1 - 1.8 Ratio Final    Alkaline Phosphatase 11/12/2019 100  46 - 116 U/L Final   Orders Only on 11/12/2019   Component Date Value Ref Range Status    WBC 11/12/2019 5.2  4.6 - 10.2 10*3/uL Final    RBC 11/12/2019 5.09  4.2 - 5.4 10*6/uL Final    Hemoglobin 11/12/2019 13.6  12.0 - 16.0 g/dL Final    Hematocrit 11/12/2019 43.0  37.0 - 47.0 % Final    MCV 11/12/2019 84.5  80 - 97 fL Final    MCH 11/12/2019 26.7* 27.0 - 31.2 pg Final    MCHC 11/12/2019 31.6* 31.8 - 35.4 g/dL Final    RDW RBC Auto-Rto 11/12/2019 16.7  12.5 - 18.0 % Final    Platelets 11/12/2019 147  142 - 424 10*3/uL Final    Neutrophils/100 leukocytes 11/12/2019 76.1  37 - 80 % Final    Lymphocytes/100 leukocytes 11/12/2019 12.0* 25 - 40 % Final    Monocytes/100 leukocytes 11/12/2019 8.0  1 - 15 % Final    Eosinophils/100 leukocytes 11/12/2019 2.3  1 - 3 % Final    Basophils/100 leukocytes 11/12/2019 1.0  0 - 3 % Final    Manual nRBC per 100 Cells 11/12/2019 0.0  % Final    Neutrophils 11/12/2019 3.99  1.8 - 7.7 10*3/uL Final   Office Visit on 10/16/2019   Component Date Value Ref Range Status    WBC 10/15/2019 5.8  4.6 - 10.2 10*3/uL Final    RBC 10/15/2019 4.63  4.2 - 5.4 10*6/uL Final    Hemoglobin 10/15/2019 13.4  12.0 - 16.0 g/dL Final    Hematocrit 10/15/2019 42.2  37.0 - 47.0 % Final    MCV 10/15/2019 91.1  80 - 97 fL Final    MCH 10/15/2019 28.9  27.0 - 31.2 pg Final    MCHC 10/15/2019 31.8  31.8 - 35.4 g/dL Final    RDW RBC Auto-Rto 10/15/2019 16.9  12.5 - 18.0 % Final    Platelets 10/15/2019 106* 142 - 424 10*3/uL Final    Neutrophils/100 leukocytes 10/15/2019 76.4  37 - 80 % Final    Lymphocytes/100 leukocytes 10/15/2019 11.9* 25 - 40 % Final    Monocytes/100 leukocytes 10/15/2019 7.8  1 - 15 % Final    Eosinophils/100 leukocytes 10/15/2019 2.4  1 - 3 % Final    Basophils/100 leukocytes 10/15/2019 1.0  0 - 3 % Final    Manual nRBC per 100 Cells 10/15/2019 0.0  % Final    Neutrophils 10/15/2019 4.42  1.8 -  7.7 10*3/uL Final    Sodium 10/15/2019 140  135 - 145 mmol/L Final    Potassium 10/15/2019 4.1  3.6 - 5.2 mmol/L Final    Chloride 10/15/2019 104  100 - 108 mmol/L Final    CO2 10/15/2019 28  21 - 32 mmol/L Final    BUN, Bld 10/15/2019 6* 7 - 18 mg/dL Final    Creatinine 10/15/2019 1.00  0.55 - 1.02 mg/dL Final    GFR ESTIMATION 10/15/2019 54* >60 Final               DESCRIPTION       GLOMERULAR FILTRATION RATE             NORMAL                     >60             KIDNEY  DISEASE           15-60             KIDNEY FAILURE             <15    BUN/Creatinine Ratio 10/15/2019 6.00* 7 - 18 Ratio Final    Glucose 10/15/2019 158* 70 - 110 mg/dL Final    Calcium 10/15/2019 8.9  8.8 - 10.5 mg/dL Final    Total Bilirubin 10/15/2019 0.3  0.0 - 1.0 mg/dL Final    AST 10/15/2019 18  15 - 37 U/L Final    ALT 10/15/2019 20  12 - 78 U/L Final    Total Protein 10/15/2019 6.8  6.4 - 8.2 g/dL Final    Albumin 10/15/2019 2.9* 3.4 - 5.0 g/dL Final    Globulin 10/15/2019 3.9* 2.3 - 3.5 g/dL Final    Albumin/Globulin Ratio 10/15/2019 0.743* 1.1 - 1.8 Ratio Final    Alkaline Phosphatase 10/15/2019 114  46 - 116 U/L Final   Orders Only on 10/15/2019   Component Date Value Ref Range Status    Everolimus Lvl 10/15/2019 31.1   Final    Comment: Everolimus by [employer] test code [id number] by HPLC-MS/MS31.1 ng/mLTherapeutic Range:Kidney transplant (in combination with Cyclosporine):3-8 ng/mLLiver transplant (in combination with Tacrolimus):3-8 ng/mLToxic value: Greater   than 15 ng/mLEverolimus marketed as Zortress is FDA approved forprophylaxisof organ rejection in adult patients receiving a kidney andliver transplant.Everolimus marketed as Afinitor is FDA approved for thetreatment of renal cell carcinoma and for the   treatment ofsubependymal giant cell astrocytoma (SEGA) associated withtuberous sclerosis (TS) in patients who are not candidatesforcurative surgical resection. The suggested  therapeutic rangefortreatment of SEGA is 5-15 ng/mL, which is based on a   predose(trough) specimen.The optimal therapeutic range for a given patient may differfrom this suggested range based on the indication fortherapy,treatment phase (initiation or maintenance), use incombinationwith other drugs, time of specime                           n collection   relative topriordose, type of transplanted organ, and/or the therapeuticapproach of the transplant center.Test developed and characteristics determined by [employer]. See Compliance Statement B:   Soniqplay/Palm===========================================================___________________________________________________________     Orders Only on 10/15/2019   Component Date Value Ref Range Status    Platelet Estimate 10/15/2019 Adequate   Final   There may be more visits with results that are not included.       Imaging:     Assessment:     Principle Problem: Hypoxemia [R09.02]   Co-morbidity/Active Problems:   Patient Active Problem List   Diagnosis    Breast cancer metastasized to bone    Neuropathy    Fatigue associated with anemia    Drug-related hair loss    Nausea    Primary insomnia    Foot infection    Cancer, metastatic to bone    Malignant neoplasm of overlapping 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, estrogen receptor positive     Chronic fatigue    Diverticulosis    Dehydration    Hypoxemia        Benita Carballo is a 74 y.o. female with PMH of The primary encounter diagnosis was Hypoxemia. Diagnoses of Malignant neoplasm of breast, stage 4, unspecified laterality and Chronic obstructive pulmonary disease, unspecified COPD type were also pertinent to this visit., with Hypoxemia [R09.02]       Metastatic breast cancer ERPR positive HER2 Noah questionable  Status post of chemotherapy and HER2 targeted therapy with progression on HER2 targeted therapy  Recent testing fish negative for her 2 2018  Repeat the lipid biopsy 2019  shows HER2  positive and ER negative  Bone metastatic disease  Fatigue  Hypoxia  Dehydration    Plan:   Overall Plan:    She has HER2 positive disease with ERPR negative by the new liquid biopsy. Started Taxol/Herceptin 2/2020  Today she is severe hypoxic and will going to hold her chemotherapy and admit her to the hospital for hypoxia rule out pulmonary embolism and further evaluation of underlying COPD and rule out pleural effusion    Plan is to admit the patient Hospital as above; consult Dr. Oh pulmonology in hospital  And will resume her chemotherapy next week        Signature    Solomon Contreras M.D., Ph.D., [facility]  Hematology-Oncology    Signed 3/12/2020 12:06 PM

## 2020-03-18 LAB
ANISOCYTOSIS: ABNORMAL
BANDS: 4 % (ref 0–5)
CELLS COUNTED: 100
EOSINOPHIL NFR BLD: 1 % (ref 1–3)
LYMPHOCYTES NFR BLD: 8 % (ref 25–40)
METAMYELOCYTES # BLD MANUAL: 5 % (ref 0–0)
MICROCYTES BLD QL SMEAR: ABNORMAL
MONOCYTES NFR BLD MANUAL: 20 % (ref 1–15)
NEUTROPHILS ABSOLUTE COUNT: 5.7 10*3/UL (ref 1.8–7.7)
NEUTROPHILS NFR BLD: 62 % (ref 37–80)
PLATELET CLUMPS: ABNORMAL
SMALL PLATELETS BLD QL SMEAR: ADEQUATE

## 2020-03-19 ENCOUNTER — TELEPHONE (OUTPATIENT)
Dept: HEMATOLOGY/ONCOLOGY | Facility: CLINIC | Age: 75
End: 2020-03-19

## 2020-03-19 ENCOUNTER — OFFICE VISIT (OUTPATIENT)
Dept: HEMATOLOGY/ONCOLOGY | Facility: CLINIC | Age: 75
End: 2020-03-19
Payer: MEDICARE

## 2020-03-19 VITALS
DIASTOLIC BLOOD PRESSURE: 66 MMHG | OXYGEN SATURATION: 82 % | BODY MASS INDEX: 20.31 KG/M2 | RESPIRATION RATE: 24 BRPM | HEART RATE: 96 BPM | HEIGHT: 66 IN | WEIGHT: 126.38 LBS | TEMPERATURE: 98 F | SYSTOLIC BLOOD PRESSURE: 101 MMHG

## 2020-03-19 DIAGNOSIS — Z17.0 MALIGNANT NEOPLASM OF OVERLAPPING SITES OF LEFT BREAST IN FEMALE, ESTROGEN RECEPTOR POSITIVE: Primary | ICD-10-CM

## 2020-03-19 DIAGNOSIS — D50.8 IRON DEFICIENCY ANEMIA SECONDARY TO INADEQUATE DIETARY IRON INTAKE: ICD-10-CM

## 2020-03-19 DIAGNOSIS — C79.51 CARCINOMA OF BREAST METASTATIC TO BONE, UNSPECIFIED LATERALITY: ICD-10-CM

## 2020-03-19 DIAGNOSIS — C79.51 CANCER, METASTATIC TO BONE: ICD-10-CM

## 2020-03-19 DIAGNOSIS — M79.89 SWELLING OF BOTH UPPER EXTREMITIES: ICD-10-CM

## 2020-03-19 DIAGNOSIS — E88.09 HYPOALBUMINEMIA: ICD-10-CM

## 2020-03-19 DIAGNOSIS — C50.919 CARCINOMA OF BREAST METASTATIC TO BONE, UNSPECIFIED LATERALITY: ICD-10-CM

## 2020-03-19 DIAGNOSIS — C50.812 MALIGNANT NEOPLASM OF OVERLAPPING SITES OF LEFT BREAST IN FEMALE, ESTROGEN RECEPTOR POSITIVE: Primary | ICD-10-CM

## 2020-03-19 PROCEDURE — 99214 OFFICE O/P EST MOD 30 MIN: CPT | Mod: S$GLB,,, | Performed by: NURSE PRACTITIONER

## 2020-03-19 PROCEDURE — 99214 PR OFFICE/OUTPT VISIT, EST, LEVL IV, 30-39 MIN: ICD-10-PCS | Mod: S$GLB,,, | Performed by: NURSE PRACTITIONER

## 2020-03-19 RX ORDER — OXYCODONE HCL 10 MG/1
10 TABLET, FILM COATED, EXTENDED RELEASE ORAL EVERY 12 HOURS PRN
Qty: 60 TABLET | Refills: 0 | Status: SHIPPED | OUTPATIENT
Start: 2020-03-19 | End: 2020-04-27 | Stop reason: SDUPTHER

## 2020-03-19 RX ORDER — IRON,CARBONYL/ASCORBIC ACID 100-250 MG
1 TABLET ORAL DAILY
Qty: 30 TABLET | Refills: 5 | COMMUNITY
Start: 2020-03-19

## 2020-03-19 RX ORDER — HYDROCODONE BITARTRATE AND ACETAMINOPHEN 10; 325 MG/1; MG/1
1 TABLET ORAL EVERY 6 HOURS PRN
Qty: 30 TABLET | Refills: 0 | Status: SHIPPED | OUTPATIENT
Start: 2020-03-19 | End: 2020-03-26 | Stop reason: SDUPTHER

## 2020-03-19 NOTE — PROGRESS NOTES
Medical Oncology Progress Note  [facility]     PATIENT: Benita Carballo  : 1945 74 y.o. female  MRN [id number]     PCP: Solomon Contreras MD  Consult Requested By:      Date of Service: 3/19/2020    Subjective:   Interim History:  No chief complaint on file.    Benita Carballo is here for to followup breast cancer treatment.    The patient was started on  chemotherapy last week with Herceptin and Taxol weekly.  She has chronic hypoxia and her oxygen saturation has been declining today her O2 status 77% she looks purple and she also has increasing degree of short of breath; in addition she complains of swelling to her left arm.  She had no chest pain. She was admittted last week to the hospital and underwent thoracentesis, which was not tested for cytology and imaging for possible PE was ruled out. Today she states she is feeling better but POX was 82 % on RA, and still has yosi upper ext swelling which she states has improved. She has not had a echo so will order ECHO stat to r/o any CHF which could be causing her swelling both lung and upper ext. States she had upper u/s with her pulm which was neg for DVT.      Oncology History:  Oncology History    2002 Rt breast cancer s/p lump with chemo, [place] in [place]. Tamoxifen x 1 yr, then Arimidex x 5 yrs. Completed tx .       c/o chronic cough with CT chest 16 showing extensive adenopathy  With base of lt neck mass 4.5 cm with yosi pulm nodules. RF to Dr ANDREWS Mcnally    16 bx of lt neck mass carcinoma with feature suggestive of breast primary. ER/%, Her2 borderline by IHC and deemed equivocal by FISH          Breast cancer metastasized to bone    2016 Initial Diagnosis     Breast cancer metastasized to bone, , lymphoid mass       2016 - 2017 Chemotherapy     Taxol/Herceptin x 8 cycles then perjeta added   Prescribed by Dr Mcnally       2017 - 2017 Chemotherapy     Kadcycla prescribed by Dr Mcnally        6/28/2017 Biopsy     Biopsy of rt abd met disease consistent with breast cancer. ER/FL + Her 2 neg per Dr Moreno's note.    Dr Moreno noted path review from bx 2/16 showed HER 2 + equivocal with FISH reading as Equivocal but ratio 1.2. Additional report form 2016 states additional testing was done with different probe and the equivocal results can be noted as negative.     Treatment therapy changed from Her2 based tx to endocrine base therapy by Dr Moreno       6/28/2017 -  Hormone Therapy     Arimidex 6/17-2/18  Aromasin 2/18  Ibrance + Aromasin 5/18 -1/19  Ibrance + faslodex 2/19 4/30/2019 Imaging Significant Findings     PET/CT: Lesions involving the medial clavicle , subcarinal LN, pleural based soft tissue density adjacent to T11 and lesion in left lobe of liver diminished in size and SUV.   RECIST 34% decrease in disease      2/17/2020 -  Chemotherapy     Treatment Summary   Plan Name: OP BREAST TRASTUZUMAB PACLITAXEL QW  Treatment Goal: Control  Status: Active  Start Date: 2/26/2020  End Date: 5/14/2020 (Planned)  Provider: Solomon Contreras MD  Chemotherapy: PACLitaxel (TAXOL) 64 mg/m2 = 102 mg in sodium chloride 0.9% 250 mL chemo infusion, 64 mg/m2 = 102 mg (100 % of original dose 64 mg/m2), Intravenous, Clinic/HOD 1 time, 2 of 12 cycles  Dose modification: 64 mg/m2 (original dose 64 mg/m2, Cycle 1)  trastuzumab 211 mg in sodium chloride 0.9% 250 mL chemo infusion, 4 mg/kg = 211 mg, Intravenous, Clinic/HOD 1 time, 2 of 12 cycles       ==8/2019 Now She is currently on combined afinitor + aromasin,  repeated CT scan shows improvement  PET 1/2020 showed PD,  afinitor + aromasin stopped  == PET scan 01/20/2020 the progressive  ==Started Taxol/Herceptin weekly 2/2020    Past Medical History:   Past Medical History:   Diagnosis Date    Arthritis     Bone cancer     Breast cancer     Cancer     Cataract     Depression     Diverticulosis 1/15/2020    Fatigue associated with anemia 6/6/2019    GERD  (gastroesophageal reflux disease)     High cholesterol     Hypoxemia 3/12/2020    Skin problem     Sleep disorder        Past Surgical HIstory:   Past Surgical History:   Procedure Laterality Date    APPENDECTOMY      BREAST LUMPECTOMY Right 2002    CATARACT EXTRACTION, BILATERAL  2014    COLONOSCOPY  2008    HYSTERECTOMY      PORTACATH PLACEMENT  05/18/2016    SURGICAL REMOVAL OF NODULE OF VOCAL CORD         Allergies:  Review of patient's allergies indicates:   Allergen Reactions    Benadryl [diphenhydramine hcl]      Restless leg     Medrol [methylprednisolone] Hallucinations    Penicillins        Medications:  Current Outpatient Medications   Medication Sig Dispense Refill    ANORO ELLIPTA 62.5-25 mcg/actuation DsDv       dexAMETHasone 0.5 mg/5 mL Soln SWISH WITH 10 ML PO FOR 2 MIN THEN SPIT QID      exemestane (AROMASIN) 25 mg tablet Take 1 tablet (25 mg total) by mouth once daily. 30 tablet 3    gabapentin (NEURONTIN) 400 MG capsule Take 1 capsule (400 mg total) by mouth 3 (three) times daily. 120 capsule 4    HYDROcodone-acetaminophen (NORCO)  mg per tablet Take 1 tablet by mouth every 6 (six) hours as needed for Pain. 30 tablet 0    LORazepam (ATIVAN) 1 MG tablet TAKE 1 TABLET BY MOUTH EVERY EVENING 30 tablet 0    megestrol (MEGACE) 400 mg/10 mL (40 mg/mL) Susp Take 10 mLs (400 mg total) by mouth once daily. 240 mL 2    oxyCODONE (OXYCONTIN) 10 mg 12 hr tablet Take 1 tablet (10 mg total) by mouth every 12 (twelve) hours as needed for Pain. 60 tablet 0    pantoprazole (PROTONIX) 40 MG tablet Take 1 tablet (40 mg total) by mouth once daily. 30 tablet 3    PROAIR HFA 90 mcg/actuation inhaler       promethazine (PHENERGAN) 25 MG tablet Take 1 tablet (25 mg total) by mouth every 6 (six) hours as needed for Nausea. 30 tablet 3    temazepam (RESTORIL) 15 mg Cap TAKE 1 CAPSULE BY MOUTH ONCE EVERY NIGHT AT BEDTIME AS NEEDED 30 capsule 0    iron-vitamin C 100-250 mg, ICAR-C, (ICAR-C)  "100-250 mg Tab Take 1 tablet by mouth once daily. 30 tablet 5     No current facility-administered medications for this visit.        Family History:   Family History   Problem Relation Age of Onset    Lung cancer Father     Melanoma Father        Social History:  reports that she has been smoking cigarettes. She has a 2.00 pack-year smoking history. She has never used smokeless tobacco. She reports that she drinks about 4.0 standard drinks of alcohol per week. She reports that she does not use drugs.    Review of Systemas  Constitutional: No change in weight, appetie, fatigue, fever, or night sweats  Eyes: No changes in vision  Ears, Nose, Mouth, Throat, and Face: No hearing problems, ear pain, rummy nose, or sore throat  Respiratory: No shortness of breath or cough  Cardiovascular: No chest pain, palpitations or dizziness  Gastrointestinal: No abdominal pain, hematochezia, melena  Genitourinary: No dysuria, hematuria, nocturia, menstrual problems, post menopausal  Integumentary/Breast: No rashes or itching  Hematologic/Lymphatic: No anemia or bleeding abnormalities  Musculoskeletal: No joint or muscle abnormalities  Neurological: No sensory or motor problems, no headaches  Behavioral/Psych: No depression, anxiety, cognitive problems, or stress  Endocrine: No diabetes or thyroid problems  Allergic/Immunologic: See allergy above    Physical Exam      Vitals:   Vitals:    03/19/20 1122   BP: 101/66   BP Location: Left arm   Patient Position: Sitting   BP Method: Large (Automatic)   Pulse: 96   Resp: (!) 24   Temp: 98.2 °F (36.8 °C)   TempSrc: Temporal   SpO2: (!) 82%   Weight: 57.3 kg (126 lb 6.4 oz)   Height: 5' 6" (1.676 m)     BMI: Body mass index is 20.4 kg/m².  BSA Body surface area is 1.63 meters squared.  ECOG Performance Status:   ECOG SCORE         GENERAL APPEARANCE: Well developed, well nourished, in no acute distress.  SKIN: Inspection of the skin reveals no rashes, ulcerations or petechiae.  HEENT: The " sclerae were anicteric and conjunctivae were pink and moist. Extraocular movements were intact and pupils were equal, round, and reactive to light with normal accommodation. External inspection of the ears and nose showed no scars, lesions, or masses. Lips, teeth, and gums showed normal mucosa. The oral mucosa, hard and soft palate, tongue and posterior pharynx were normal.  NECK: Supple and symmetric. There was no thyroid enlargement, and no tenderness, or masses were felt.  CRESPIRATORY: Normal AP diameter and normal contour without any kyphoscoliosis. LUNGS: Auscultation of the lungs revealed normal breath sounds without any other adventitious sounds or rubs.  CARDIOVASCULAR: There was a regular rate and rhythm without any murmurs, gallops, rubs. The carotid pulses were normal and 2+ bilaterally without bruits. Peripheral pulses were 2+ and symmetric.  GASTROINTESTINAL: Soft and nontender with normal bowel sounds. The liver span was approximately 5-6 cm in the right midclavicular line by percussion. The liver edge was nontender. The spleen was not palpable. There were no inguinal or umbilical hernias noted. No ascites was noted.  LYMPH NODES: No lymphadenopathy was appreciated in the neck, axillae or groin.  MUSCULOSKELETAL: Gait was normal. There was no tenderness or effusions noted. Muscle strength and tone were normal. EXTREMITIES: No cyanosis, clubbing or edema.  NEUROLOGIC: Alert and oriented x 3. Normal affect. Gait was normal. Normal deep tendon reflexes with no pathological reflexes. Sensation to touch was normal.  PSYCHIATRIC: good mood, orientated to place, time and person     Labs and Imagings     Orders Only on 03/18/2020   Component Date Value Ref Range Status    Neutrophils 03/18/2020 62.0  37 - 80 % Final    Neutrophils Absolute 03/18/2020 5.7  1.8 - 7.7 10*3/uL Final    BANDS 03/18/2020 4.0  0 - 5 % Final    Lymphocytes 03/18/2020 8.0* 25 - 40 % Final    Monocytes 03/18/2020 20.0* 1 - 15 %  Final    Eosinophils 03/18/2020 1.0  1 - 3 % Final    Metamyelocytes 03/18/2020 5.0* 0 - 0 % Final    Cells Counted 03/18/2020 100   Final    Platelet Estimate 03/18/2020 Adequate   Final    Platelet Clumps 03/18/2020 1+   Final    Anisocytosis 03/18/2020 1+   Final    Microcytes 03/18/2020 1+   Final   Ancillary Orders on 03/13/2020   Component Date Value Ref Range Status    WBC 03/18/2020 8.7  4.6 - 10.2 10*3/uL Final    RBC 03/18/2020 4.39  4.2 - 5.4 10*6/uL Final    Hemoglobin 03/18/2020 10.7* 12.0 - 16.0 g/dL Final    Hematocrit 03/18/2020 34.2* 37.0 - 47.0 % Final    MCV 03/18/2020 77.9* 80 - 97 fL Final    MCH 03/18/2020 24.4* 27.0 - 31.2 pg Final    MCHC 03/18/2020 31.3* 31.8 - 35.4 g/dL Final    RDW RBC Auto-Rto 03/18/2020 21.9* 12.5 - 18.0 % Final    Platelets 03/18/2020 250  142 - 424 10*3/uL Final    Manual nRBC per 100 Cells 03/18/2020 0.0  % Final    Sodium 03/18/2020 135  135 - 145 mmol/L Final    Potassium 03/18/2020 3.7  3.6 - 5.2 mmol/L Final    Chloride 03/18/2020 102  100 - 108 mmol/L Final    CO2 03/18/2020 26  21 - 32 mmol/L Final    BUN, Bld 03/18/2020 10  7 - 18 mg/dL Final    Creatinine 03/18/2020 0.91  0.55 - 1.02 mg/dL Final    GFR ESTIMATION 03/18/2020 60  >60 Final               DESCRIPTION       GLOMERULAR FILTRATION RATE             NORMAL                     >60             KIDNEY  DISEASE           15-60             KIDNEY FAILURE             <15    BUN/Creatinine Ratio 03/18/2020 10.98  7 - 18 Ratio Final    Glucose 03/18/2020 112* 70 - 110 mg/dL Final    Calcium 03/18/2020 8.1* 8.8 - 10.5 mg/dL Final    Total Bilirubin 03/18/2020 0.8  0.0 - 1.0 mg/dL Final    AST 03/18/2020 15  15 - 37 U/L Final    ALT 03/18/2020 19  12 - 78 U/L Final    Total Protein 03/18/2020 5.8* 6.4 - 8.2 g/dL Final    Albumin 03/18/2020 2.3* 3.4 - 5.0 g/dL Final    Globulin 03/18/2020 3.5  2.3 - 3.5 g/dL Final    Albumin/Globulin Ratio 03/18/2020 0.657* 1.1 - 1.8 Ratio Final     Alkaline Phosphatase 2020 56  46 - 116 U/L Final    Magnesium 2020 1.7* 1.8 - 2.4 mg/dL Final   Orders Only on 2020   Component Date Value Ref Range Status    RBC Morphology 2020 ABNORMAL   Final    Anisocytosis 2020 2+   Final    Hypochromia 2020 1+   Final   Ancillary Orders on 2020   Component Date Value Ref Range Status    CANCER ANTIGEN 27.29 2020 144.5* 0.0 - 40.0 U/mL Final    Comment: INTERPRETIVE INFORMATION: Cancer Antigen 27.29The CA 27.29 assay is intended for use in monitorin) diseaseprogression and/or response to therapy in patients withmetastatic disease, and 2) disease recurrence in patientstreated previously for stages II   or III breast carcinoma who areclinically free of the disease. Serial testing in patients whoare clinically free of disease should be used in conjunctionwith other clinical methods for early detection of cancerrecurrence.Limitations: Patients with   confirmed breast carcinoma frequentlyhave CA 27.29 assay values in the same range as healthyindividuals.  Elevations may also be observed in patients withnon malignant disease. Results of this test must always beinterpreted in the context of morphologic   and other relevantdata, and should not be used alone for a diagnosis of malignancy.Methodology: Siemens Advia Centaur CA 27.29 chemiluminescentimmunoassay was used. Results obtained with different assaymethods or kits cannot be used   in                           terchangeably.Performed by [facility],[address] [place] [id number] [phone].[employer], Sharif Leon MD, Lab. Director      CEA 2020 9.9* 0.0 - 3.0 ng/mL Final    Comment: INTERPRETIVE INFORMATION: Carcinoembryonic AntigenThe Roche CEA electrochemiluminescent immunoassay was used.Results obtained with different test methods or kits cannot beused interchangeably. Measurement of CEA has been shown to beclinically relevant in   the management  of patients withcolorectal, breast, lung, prostatic, pancreatic, and ovariancarcinomas. Smokers may have slightly elevated levels of CEA.The CEA assay value, regardless of level, should not beinterpreted as evidence for the presence or   absence of malignantdisease and is not recommended for use as a screening procedureto detect the presence of cancer in the general population.Performed by [facility],[address] [facility], [place] [address] [phone][employer], Sharif Leon MD,   Lab. Director      Cancer Antigen 15-3 03/11/2020 92* 0 - 31 U/mL Final    Comment: INTERPRETIVE INFORMATION: Cancer Antigen-Breast ()The Roche CA 15-3 electrochemiluminescent immunoassay was used.Results obtained with different methods or kits cannot be usedinterchangeably. The CA 15-3 test is used to aid in themanagement of   Stage II and III breast cancer patients. Serialtesting for patient CA 15-3 values should be used in conjunctionwith other clinical methods for monitoring breast cancer.Patients with confirmed breast carcinoma frequently have CA 15-3values in the same   range as healthy individuals. Elevations maybe observed in patients with nonmalignant disease. Therefore, Chelsie 15-3 value, regardless of level, should not be interpreted asabsolute evidence of the presence or absence of malignantdisease.Performed by [facility],[address] [facility], [place] [address] [phone].[employer], Sharif Leon MD, Lab. Director     Office Visit on 03/05/2020   Component Date Value Ref Range Status    WBC 03/11/2020 5.4  4.6 - 10.2 10*3/uL Final    RBC 03/11/2020 4.61  4.2 - 5.4 10*6/uL Final    Hemoglobin 03/11/2020 10.9* 12.0 - 16.0 g/dL Final    Hematocrit 03/11/2020 36.0* 37.0 - 47.0 % Final    MCV 03/11/2020 78.1* 80 - 97 fL Final    MCH 03/11/2020 23.6* 27.0 - 31.2 pg Final    MCHC 03/11/2020 30.3* 31.8 - 35.4 g/dL Final    RDW RBC Auto-Rto 03/11/2020 21.2* 12.5 - 18.0 % Final    Platelets 03/11/2020 317  142 -  424 10*3/uL Final    Neutrophils/100 leukocytes 03/11/2020 65.3  37 - 80 % Final    Lymphocytes/100 leukocytes 03/11/2020 16.8* 25 - 40 % Final    Monocytes/100 leukocytes 03/11/2020 8.0  1 - 15 % Final    Eosinophils/100 leukocytes 03/11/2020 1.5  1 - 3 % Final    Basophils/100 leukocytes 03/11/2020 1.3  0 - 3 % Final    Manual nRBC per 100 Cells 03/11/2020 0.4  % Final    Neutrophils 03/11/2020 3.49  1.8 - 7.7 10*3/uL Final    Sodium 03/11/2020 138  135 - 145 mmol/L Final    Potassium 03/11/2020 3.7  3.6 - 5.2 mmol/L Final    Chloride 03/11/2020 103  100 - 108 mmol/L Final    CO2 03/11/2020 30  21 - 32 mmol/L Final    BUN, Bld 03/11/2020 10  7 - 18 mg/dL Final    Creatinine 03/11/2020 0.93  0.55 - 1.02 mg/dL Final    GFR ESTIMATION 03/11/2020 59* >60 Final               DESCRIPTION       GLOMERULAR FILTRATION RATE             NORMAL                     >60             KIDNEY  DISEASE           15-60             KIDNEY FAILURE             <15    BUN/Creatinine Ratio 03/11/2020 10.75  7 - 18 Ratio Final    Glucose 03/11/2020 95  70 - 110 mg/dL Final    Calcium 03/11/2020 8.9  8.8 - 10.5 mg/dL Final    Total Bilirubin 03/11/2020 0.6  0.0 - 1.0 mg/dL Final    AST 03/11/2020 14* 15 - 37 U/L Final    ALT 03/11/2020 16  12 - 78 U/L Final    Total Protein 03/11/2020 5.8* 6.4 - 8.2 g/dL Final    Albumin 03/11/2020 2.6* 3.4 - 5.0 g/dL Final    Globulin 03/11/2020 3.2  2.3 - 3.5 g/dL Final    Albumin/Globulin Ratio 03/11/2020 0.812* 1.1 - 1.8 Ratio Final    Alkaline Phosphatase 03/11/2020 77  46 - 116 U/L Final    Magnesium 03/11/2020 1.7* 1.8 - 2.4 mg/dL Final   Orders Only on 03/04/2020   Component Date Value Ref Range Status    Cancer Antigen 15-3 03/04/2020 85* 0 - 31 U/mL Final    Comment: INTERPRETIVE INFORMATION: Cancer Antigen-Breast ()The Roche CA 15-3 electrochemiluminescent immunoassay was used.Results obtained with different methods or kits cannot be usedinterchangeably. The CA  15-3 test is used to aid in themanagement of   Stage II and III breast cancer patients. Serialtesting for patient CA 15-3 values should be used in conjunctionwith other clinical methods for monitoring breast cancer.Patients with confirmed breast carcinoma frequently have CA 15-3values in the same   range as healthy individuals. Elevations maybe observed in patients with nonmalignant disease. Therefore, Chelsie 15-3 value, regardless of level, should not be interpreted asabsolute evidence of the presence or absence of malignantdisease.Performed by [facility],[address] [facility] [place] [address] [phone].[employer], Sharif Leon MD, Lab. Director     Orders Only on 2020   Component Date Value Ref Range Status    CEA 2020 10.5* 0.0 - 3.0 ng/mL Final    Comment: INTERPRETIVE INFORMATION: Carcinoembryonic AntigenThe Roche CEA electrochemiluminescent immunoassay was used.Results obtained with different test methods or kits cannot beused interchangeably. Measurement of CEA has been shown to beclinically relevant in   the management of patients withcolorectal, breast, lung, prostatic, pancreatic, and ovariancarcinomas. Smokers may have slightly elevated levels of CEA.The CEA assay value, regardless of level, should not beinterpreted as evidence for the presence or   absence of malignantdisease and is not recommended for use as a screening procedureto detect the presence of cancer in the general population.Performed by [facility],[address] [employer] [facility] [place] [address] [phone].[employer], Sharif Leon MD,   Lab. Director     Orders Only on 2020   Component Date Value Ref Range Status    CANCER ANTIGEN 27.29 2020 126.1* 0.0 - 40.0 U/mL Final    Comment: INTERPRETIVE INFORMATION: Cancer Antigen 27.29The CA 27.29 assay is intended for use in monitorin) diseaseprogression and/or response to therapy in patients withmetastatic disease, and 2) disease recurrence in patientstreated  previously for stages II   or III breast carcinoma who areclinically free of the disease. Serial testing in patients whoare clinically free of disease should be used in conjunctionwith other clinical methods for early detection of cancerrecurrence.Limitations: Patients with   confirmed breast carcinoma frequentlyhave CA 27.29 assay values in the same range as healthyindividuals.  Elevations may also be observed in patients withnon malignant disease. Results of this test must always beinterpreted in the context of morphologic   and other relevantdata, and should not be used alone for a diagnosis of malignancy.Methodology: Siemens Advia Centaur CA 27.29 chemiluminescentimmunoassay was used. Results obtained with different assaymethods or kits cannot be used   in                           terchangeably.Performed by [facility],[address] [place] [address],[place] [address] [phone].[employer], Sharif Leon MD, Lab. Director     Orders Only on 11/12/2019   Component Date Value Ref Range Status    Sodium 11/12/2019 139  135 - 145 mmol/L Final    Potassium 11/12/2019 3.6  3.6 - 5.2 mmol/L Final    Chloride 11/12/2019 102  100 - 108 mmol/L Final    CO2 11/12/2019 27  21 - 32 mmol/L Final    BUN, Bld 11/12/2019 6* 7 - 18 mg/dL Final    Creatinine 11/12/2019 0.96  0.55 - 1.02 mg/dL Final    GFR ESTIMATION 11/12/2019 57* >60 Final               DESCRIPTION       GLOMERULAR FILTRATION RATE             NORMAL                     >60             KIDNEY  DISEASE           15-60             KIDNEY FAILURE             <15    BUN/Creatinine Ratio 11/12/2019 6.25* 7 - 18 Ratio Final    Glucose 11/12/2019 170* 70 - 110 mg/dL Final    Calcium 11/12/2019 8.2* 8.8 - 10.5 mg/dL Final    Total Bilirubin 11/12/2019 0.7  0.0 - 1.0 mg/dL Final    AST 11/12/2019 23  15 - 37 U/L Final    ALT 11/12/2019 27  12 - 78 U/L Final    Total Protein 11/12/2019 6.5  6.4 - 8.2 g/dL Final    Albumin 11/12/2019 2.8* 3.4 - 5.0 g/dL Final     Globulin 11/12/2019 3.7* 2.3 - 3.5 g/dL Final    Albumin/Globulin Ratio 11/12/2019 0.756* 1.1 - 1.8 Ratio Final    Alkaline Phosphatase 11/12/2019 100  46 - 116 U/L Final   Orders Only on 11/12/2019   Component Date Value Ref Range Status    WBC 11/12/2019 5.2  4.6 - 10.2 10*3/uL Final    RBC 11/12/2019 5.09  4.2 - 5.4 10*6/uL Final    Hemoglobin 11/12/2019 13.6  12.0 - 16.0 g/dL Final    Hematocrit 11/12/2019 43.0  37.0 - 47.0 % Final    MCV 11/12/2019 84.5  80 - 97 fL Final    MCH 11/12/2019 26.7* 27.0 - 31.2 pg Final    MCHC 11/12/2019 31.6* 31.8 - 35.4 g/dL Final    RDW RBC Auto-Rto 11/12/2019 16.7  12.5 - 18.0 % Final    Platelets 11/12/2019 147  142 - 424 10*3/uL Final    Neutrophils/100 leukocytes 11/12/2019 76.1  37 - 80 % Final    Lymphocytes/100 leukocytes 11/12/2019 12.0* 25 - 40 % Final    Monocytes/100 leukocytes 11/12/2019 8.0  1 - 15 % Final    Eosinophils/100 leukocytes 11/12/2019 2.3  1 - 3 % Final    Basophils/100 leukocytes 11/12/2019 1.0  0 - 3 % Final    Manual nRBC per 100 Cells 11/12/2019 0.0  % Final    Neutrophils 11/12/2019 3.99  1.8 - 7.7 10*3/uL Final   There may be more visits with results that are not included.       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, estrogen receptor positive [C50.812, Z17.0]   Co-morbidity/Active Problems:   Patient Active Problem List   Diagnosis    Breast cancer metastasized to bone    Neuropathy    Fatigue associated with anemia    Drug-related hair loss    Nausea    Primary insomnia    Foot infection    Cancer, metastatic to bone    Malignant neoplasm of overlapping 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, estrogen receptor positive     Chronic fatigue    Diverticulosis    Dehydration    Hypoxemia    Iron deficiency anemia secondary to inadequate dietary iron intake    Swelling of both upper extremities    Hypoalbuminemia        Benita Carballo is a 74 y.o.  female with PMH of The primary encounter diagnosis was Malignant neoplasm of overlapping 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, estrogen receptor positive . Diagnoses of Cancer, metastatic to bone, 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, unspecified laterality, Iron deficiency anemia secondary to inadequate dietary iron intake, Swelling of both upper extremities, and Hypoalbuminemia were also pertinent to this visit., with Malignant neoplasm of overlapping 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, estrogen receptor positive [C50.812, Z17.0]       Metastatic breast cancer ERPR positive HER2 Noah questionable  Status post of chemotherapy and HER2 targeted therapy with progression on HER2 targeted therapy  Recent testing fish negative for her 2 2018  Repeat the lipid biopsy 2019  shows HER2 positive and ER negative  Bone metastatic disease  Fatigue  Hypoxia  Dehydration    Plan:   Overall Plan:    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 Started Taxol/Herceptin 2/2020  Delay tx today until ECHO is done, which is cari for tomorrow at 830 am   NINA noted on labs, increase iron rich foods and supplement.   Encouraged increased protein intake      Rosalind Ricks NP

## 2020-03-19 NOTE — TELEPHONE ENCOUNTER
----- Message from Harper Chaudhari sent at 3/19/2020 12:07 PM CDT -----  Contact: Shyanne Gomez -[employer] [employer] [facility]  Requesting a call back to provide needed ICD 10 code for asinitor.Please call back at [phone].When calling back if you ask to speak with Shyanne Gomez they will try to IM her, Tried explaining to her that when trying to reach office It rings but unable to get anyone. She is trying to process asap .      Thanks,  Harper Chaudhari

## 2020-03-20 NOTE — TELEPHONE ENCOUNTER
Elizabeth, the echo tech with [facility], called to let us know that Mrs. Carballo's echo showed right side of heart enlarged, PA-[id number]. She said she is basically in Right sided heart failure. Should get report later today or Monday.

## 2020-03-23 ENCOUNTER — OFFICE VISIT (OUTPATIENT)
Dept: HEMATOLOGY/ONCOLOGY | Facility: CLINIC | Age: 75
End: 2020-03-23
Payer: MEDICARE

## 2020-03-23 VITALS
HEIGHT: 66 IN | OXYGEN SATURATION: 75 % | HEART RATE: 84 BPM | TEMPERATURE: 99 F | WEIGHT: 129.19 LBS | BODY MASS INDEX: 20.76 KG/M2 | DIASTOLIC BLOOD PRESSURE: 59 MMHG | SYSTOLIC BLOOD PRESSURE: 93 MMHG | RESPIRATION RATE: 18 BRPM

## 2020-03-23 DIAGNOSIS — C79.51 CANCER, METASTATIC TO BONE: ICD-10-CM

## 2020-03-23 DIAGNOSIS — Z17.0 MALIGNANT NEOPLASM OF OVERLAPPING SITES OF LEFT BREAST IN FEMALE, ESTROGEN RECEPTOR POSITIVE: ICD-10-CM

## 2020-03-23 DIAGNOSIS — C50.812 MALIGNANT NEOPLASM OF OVERLAPPING SITES OF LEFT BREAST IN FEMALE, ESTROGEN RECEPTOR POSITIVE: ICD-10-CM

## 2020-03-23 DIAGNOSIS — I50.813 ACUTE ON CHRONIC RIGHT-SIDED HEART FAILURE: ICD-10-CM

## 2020-03-23 DIAGNOSIS — C50.919 CARCINOMA OF BREAST METASTATIC TO BONE, UNSPECIFIED LATERALITY: ICD-10-CM

## 2020-03-23 DIAGNOSIS — M79.89 SWELLING OF BOTH UPPER EXTREMITIES: Primary | ICD-10-CM

## 2020-03-23 DIAGNOSIS — C79.51 CARCINOMA OF BREAST METASTATIC TO BONE, UNSPECIFIED LATERALITY: ICD-10-CM

## 2020-03-23 PROCEDURE — 99214 PR OFFICE/OUTPT VISIT, EST, LEVL IV, 30-39 MIN: ICD-10-PCS | Mod: S$GLB,,, | Performed by: NURSE PRACTITIONER

## 2020-03-23 PROCEDURE — 99214 OFFICE O/P EST MOD 30 MIN: CPT | Mod: S$GLB,,, | Performed by: NURSE PRACTITIONER

## 2020-03-23 RX ORDER — FUROSEMIDE 40 MG/1
40 TABLET ORAL DAILY
Qty: 30 TABLET | Refills: 11 | Status: SHIPPED | OUTPATIENT
Start: 2020-03-23 | End: 2021-03-23

## 2020-03-23 RX ORDER — POTASSIUM CHLORIDE 20 MEQ/1
20 TABLET, EXTENDED RELEASE ORAL DAILY
Qty: 30 TABLET | Refills: 11 | Status: SHIPPED | OUTPATIENT
Start: 2020-03-23 | End: 2021-03-23

## 2020-03-23 NOTE — PROGRESS NOTES
Medical Oncology Progress Note  [facility]     PATIENT: Benita Carballo  : 1945 74 y.o. female  MRN [id number]     PCP: Solomon Contreras MD  Consult Requested By:      Date of Service: 3/23/2020    Subjective:   Interim History:  No chief complaint on file.    Benita Carballo is here for to followup breast cancer treatment.    The patient was started on  chemotherapy last week with Herceptin and Taxol weekly.  She has chronic hypoxia and her oxygen saturation has been declining today her O2 status 77% she looks purple and she also has increasing degree of short of breath; in addition she complains of swelling to her left arm.  She had no chest pain. She was admittted last week to the hospital and underwent thoracentesis, which was not tested for cytology and imaging for possible PE was ruled out. Today she states she is feeling better but POX was 82 % on RA, and still has yosi upper ext swelling which she states has improved. She has not had a echo so will order ECHO stat to r/o any CHF which could be causing her swelling both lung and upper ext. States she had upper u/s with her pulm which was neg for DVT.   Today she returns with ECHO showing EF 55-60%, but right sided heart failure noted. Pt has an additional 12 # of wt gain since last week. She states she is feeling fine, better than at last visit. Discussed ECHO and will hold herceptin and obtain MUGA, will re-eval pt on Thursday prior to Taxol administration. Possibly will add herceptin back after MUGA completed and will refer pt to cardiology, but could be delayed due to COVID pandemic.      Oncology History:  Oncology History    2002 Rt breast cancer s/p lump with chemo, [place] in [place]. Tamoxifen x 1 yr, then Arimidex x 5 yrs. Completed tx .       c/o chronic cough with CT chest 16 showing extensive adenopathy  With base of lt neck mass 4.5 cm with yosi pulm nodules. RF to Dr ANDREWS Mcnally    16 bx of lt neck mass  carcinoma with feature suggestive of breast primary. ER/%, Her2 borderline by IHC and deemed equivocal by FISH          Breast cancer metastasized to bone    2/4/2016 Initial Diagnosis     Breast cancer metastasized to bone, , lymphoid mass       5/25/2016 - 2/9/2017 Chemotherapy     Taxol/Herceptin x 8 cycles then perjeta added 7/16  Prescribed by Dr Mcnally       2/20/2017 - 6/17/2017 Chemotherapy     Kadcycla prescribed by Dr Mcnally       6/28/2017 Biopsy     Biopsy of rt abd met disease consistent with breast cancer. ER/[place] + Her 2 neg per Dr Moreno's note.    Dr Moreno noted path review from bx 2/16 showed HER 2 + equivocal with FISH reading as Equivocal but ratio 1.2. Additional report form 2016 states additional testing was done with different probe and the equivocal results can be noted as negative.     Treatment therapy changed from Her2 based tx to endocrine base therapy by Dr Moreno       6/28/2017 -  Hormone Therapy     Arimidex 6/17-2/18  Aromasin 2/18  Ibrance + Aromasin 5/18 -1/19  Ibrance + faslodex 2/19 4/30/2019 Imaging Significant Findings     PET/CT: Lesions involving the medial clavicle , subcarinal LN, pleural based soft tissue density adjacent to T11 and lesion in left lobe of liver diminished in size and SUV.   RECIST 34% decrease in disease      2/17/2020 -  Chemotherapy     Treatment Summary   Plan Name: OP BREAST TRASTUZUMAB PACLITAXEL QW  Treatment Goal: Control  Status: Active  Start Date: 2/26/2020  End Date: 5/14/2020 (Planned)  Provider: Solomon Contreras MD  Chemotherapy: PACLitaxel (TAXOL) 64 mg/m2 = 102 mg in sodium chloride 0.9% 250 mL chemo infusion, 64 mg/m2 = 102 mg (100 % of original dose 64 mg/m2), Intravenous, Clinic/HOD 1 time, 2 of 12 cycles  Dose modification: 64 mg/m2 (original dose 64 mg/m2, Cycle 1)  trastuzumab 211 mg in sodium chloride 0.9% 250 mL chemo infusion, 4 mg/kg = 211 mg, Intravenous, Clinic/HOD 1 time, 2 of 2 cycles       ==8/2019 Now She is currently on  combined afinitor + aromasin,  repeated CT scan shows improvement  PET 1/2020 showed PD,  afinitor + aromasin stopped  == PET scan 01/20/2020 the progressive  ==Started Taxol/Herceptin weekly 2/2020    Past Medical History:   Past Medical History:   Diagnosis Date    Arthritis     Bone cancer     Breast cancer     Cancer     Cataract     Depression     Diverticulosis 1/15/2020    Fatigue associated with anemia 6/6/2019    GERD (gastroesophageal reflux disease)     High cholesterol     Hypoxemia 3/12/2020    Skin problem     Sleep disorder        Past Surgical HIstory:   Past Surgical History:   Procedure Laterality Date    APPENDECTOMY      BREAST LUMPECTOMY Right 2002    CATARACT EXTRACTION, BILATERAL  2014    COLONOSCOPY  2008    HYSTERECTOMY      PORTACATH PLACEMENT  05/18/2016    SURGICAL REMOVAL OF NODULE OF VOCAL CORD         Allergies:  Review of patient's allergies indicates:   Allergen Reactions    Benadryl [diphenhydramine hcl]      Restless leg     Medrol [methylprednisolone] Hallucinations    Penicillins        Medications:  Current Outpatient Medications   Medication Sig Dispense Refill    ANORO ELLIPTA 62.5-25 mcg/actuation DsDv       dexAMETHasone 0.5 mg/5 mL Soln SWISH WITH 10 ML PO FOR 2 MIN THEN SPIT QID      exemestane (AROMASIN) 25 mg tablet Take 1 tablet (25 mg total) by mouth once daily. 30 tablet 3    gabapentin (NEURONTIN) 400 MG capsule Take 1 capsule (400 mg total) by mouth 3 (three) times daily. 120 capsule 4    HYDROcodone-acetaminophen (NORCO)  mg per tablet Take 1 tablet by mouth every 6 (six) hours as needed for Pain. 30 tablet 0    iron-vitamin C 100-250 mg, ICAR-C, (ICAR-C) 100-250 mg Tab Take 1 tablet by mouth once daily. 30 tablet 5    LORazepam (ATIVAN) 1 MG tablet TAKE 1 TABLET BY MOUTH EVERY EVENING 30 tablet 0    megestrol (MEGACE) 400 mg/10 mL (40 mg/mL) Susp Take 10 mLs (400 mg total) by mouth once daily. 240 mL 2    oxyCODONE (OXYCONTIN)  10 mg 12 hr tablet Take 1 tablet (10 mg total) by mouth every 12 (twelve) hours as needed for Pain. 60 tablet 0    pantoprazole (PROTONIX) 40 MG tablet Take 1 tablet (40 mg total) by mouth once daily. 30 tablet 3    PROAIR HFA 90 mcg/actuation inhaler       promethazine (PHENERGAN) 25 MG tablet Take 1 tablet (25 mg total) by mouth every 6 (six) hours as needed for Nausea. 30 tablet 3    temazepam (RESTORIL) 15 mg Cap TAKE 1 CAPSULE BY MOUTH ONCE EVERY NIGHT AT BEDTIME AS NEEDED 30 capsule 0    furosemide (LASIX) 40 MG tablet Take 1 tablet (40 mg total) by mouth once daily. 30 tablet 11    potassium chloride SA (K-DUR,KLOR-CON) 20 MEQ tablet Take 1 tablet (20 mEq total) by mouth once daily. 30 tablet 11     No current facility-administered medications for this visit.        Family History:   Family History   Problem Relation Age of Onset    Lung cancer Father     Melanoma Father        Social History:  reports that she has been smoking cigarettes. She has a 2.00 pack-year smoking history. She has never used smokeless tobacco. She reports that she drinks about 4.0 standard drinks of alcohol per week. She reports that she does not use drugs.    Review of Systemas  Constitutional: No change in weight, appetie, fatigue, fever, or night sweats  Eyes: No changes in vision  Ears, Nose, Mouth, Throat, and Face: No hearing problems, ear pain, rummy nose, or sore throat  Respiratory: No shortness of breath or cough  Cardiovascular: No chest pain, palpitations or dizziness  Gastrointestinal: No abdominal pain, hematochezia, melena  Genitourinary: No dysuria, hematuria, nocturia, menstrual problems, post menopausal  Integumentary/Breast: No rashes or itching  Hematologic/Lymphatic: No anemia or bleeding abnormalities  Musculoskeletal: No joint or muscle abnormalities  Neurological: No sensory or motor problems, no headaches  Behavioral/Psych: No depression, anxiety, cognitive problems, or stress  Endocrine: No diabetes  "or thyroid problems  Allergic/Immunologic: See allergy above    Physical Exam      Vitals:   Vitals:    03/23/20 1318   BP: (!) 93/59   BP Location: Left arm   Patient Position: Sitting   BP Method: Large (Automatic)   Pulse: 84   Resp: 18   Temp: 99 °F (37.2 °C)   TempSrc: Oral   SpO2: (!) 75%   Weight: 58.6 kg (129 lb 3.2 oz)   Height: 5' 6" (1.676 m)     BMI: Body mass index is 20.85 kg/m².  BSA Body surface area is 1.65 meters squared.  ECOG Performance Status:   ECOG SCORE         GENERAL APPEARANCE: Well developed, well nourished, in no acute distress.  SKIN: Inspection of the skin reveals no rashes, ulcerations or petechiae.  HEENT: The sclerae were anicteric and conjunctivae were pink and moist. Extraocular movements were intact and pupils were equal, round, and reactive to light with normal accommodation. External inspection of the ears and nose showed no scars, lesions, or masses. Lips, teeth, and gums showed normal mucosa. The oral mucosa, hard and soft palate, tongue and posterior pharynx were normal.  NECK: Supple and symmetric. There was no thyroid enlargement, and no tenderness, or masses were felt.  CRESPIRATORY: Normal AP diameter and normal contour without any kyphoscoliosis. LUNGS: Auscultation of the lungs revealed normal breath sounds without any other adventitious sounds or rubs.  CARDIOVASCULAR: There was a regular rate and rhythm without any murmurs, gallops, rubs. The carotid pulses were normal and 2+ bilaterally without bruits. Peripheral pulses were 2+ and symmetric.  GASTROINTESTINAL: Soft and nontender with normal bowel sounds. The liver span was approximately 5-6 cm in the right midclavicular line by percussion. The liver edge was nontender. The spleen was not palpable. There were no inguinal or umbilical hernias noted. No ascites was noted.  LYMPH NODES: No lymphadenopathy was appreciated in the neck, axillae or groin.  MUSCULOSKELETAL: Gait was normal. There was no tenderness or " effusions noted. Muscle strength and tone were normal. EXTREMITIES: No cyanosis, clubbing or edema.  NEUROLOGIC: Alert and oriented x 3. Normal affect. Gait was normal. Normal deep tendon reflexes with no pathological reflexes. Sensation to touch was normal.  PSYCHIATRIC: good mood, orientated to place, time and person     Labs and Imagings     Orders Only on 03/18/2020   Component Date Value Ref Range Status    Neutrophils 03/18/2020 62.0  37 - 80 % Final    Neutrophils Absolute 03/18/2020 5.7  1.8 - 7.7 10*3/uL Final    BANDS 03/18/2020 4.0  0 - 5 % Final    Lymphocytes 03/18/2020 8.0* 25 - 40 % Final    Monocytes 03/18/2020 20.0* 1 - 15 % Final    Eosinophils 03/18/2020 1.0  1 - 3 % Final    Metamyelocytes 03/18/2020 5.0* 0 - 0 % Final    Cells Counted 03/18/2020 100   Final    Platelet Estimate 03/18/2020 Adequate   Final    Platelet Clumps 03/18/2020 1+   Final    Anisocytosis 03/18/2020 1+   Final    Microcytes 03/18/2020 1+   Final   Ancillary Orders on 03/13/2020   Component Date Value Ref Range Status    WBC 03/18/2020 8.7  4.6 - 10.2 10*3/uL Final    RBC 03/18/2020 4.39  4.2 - 5.4 10*6/uL Final    Hemoglobin 03/18/2020 10.7* 12.0 - 16.0 g/dL Final    Hematocrit 03/18/2020 34.2* 37.0 - 47.0 % Final    MCV 03/18/2020 77.9* 80 - 97 fL Final    MCH 03/18/2020 24.4* 27.0 - 31.2 pg Final    MCHC 03/18/2020 31.3* 31.8 - 35.4 g/dL Final    RDW RBC Auto-Rto 03/18/2020 21.9* 12.5 - 18.0 % Final    Platelets 03/18/2020 250  142 - 424 10*3/uL Final    Manual nRBC per 100 Cells 03/18/2020 0.0  % Final    Sodium 03/18/2020 135  135 - 145 mmol/L Final    Potassium 03/18/2020 3.7  3.6 - 5.2 mmol/L Final    Chloride 03/18/2020 102  100 - 108 mmol/L Final    CO2 03/18/2020 26  21 - 32 mmol/L Final    BUN, Bld 03/18/2020 10  7 - 18 mg/dL Final    Creatinine 03/18/2020 0.91  0.55 - 1.02 mg/dL Final    GFR ESTIMATION 03/18/2020 60  >60 Final               DESCRIPTION       GLOMERULAR FILTRATION  RATE             NORMAL                     >60             KIDNEY  DISEASE           15-60             KIDNEY FAILURE             <15    BUN/Creatinine Ratio 2020 10.98  7 - 18 Ratio Final    Glucose 2020 112* 70 - 110 mg/dL Final    Calcium 2020 8.1* 8.8 - 10.5 mg/dL Final    Total Bilirubin 2020 0.8  0.0 - 1.0 mg/dL Final    AST 2020 15  15 - 37 U/L Final    ALT 2020 19  12 - 78 U/L Final    Total Protein 2020 5.8* 6.4 - 8.2 g/dL Final    Albumin 2020 2.3* 3.4 - 5.0 g/dL Final    Globulin 2020 3.5  2.3 - 3.5 g/dL Final    Albumin/Globulin Ratio 2020 0.657* 1.1 - 1.8 Ratio Final    Alkaline Phosphatase 2020 56  46 - 116 U/L Final    Magnesium 2020 1.7* 1.8 - 2.4 mg/dL Final   Orders Only on 2020   Component Date Value Ref Range Status    RBC Morphology 2020 ABNORMAL   Final    Anisocytosis 2020 2+   Final    Hypochromia 2020 1+   Final   Ancillary Orders on 2020   Component Date Value Ref Range Status    CANCER ANTIGEN 27.29 2020 144.5* 0.0 - 40.0 U/mL Final    Comment: INTERPRETIVE INFORMATION: Cancer Antigen 27.29The CA 27.29 assay is intended for use in monitorin) diseaseprogression and/or response to therapy in patients withmetastatic disease, and 2) disease recurrence in patientstreated previously for stages II   or III breast carcinoma who areclinically free of the disease. Serial testing in patients whoare clinically free of disease should be used in conjunctionwith other clinical methods for early detection of cancerrecurrence.Limitations: Patients with   confirmed breast carcinoma frequentlyhave CA 27.29 assay values in the same range as healthyindividuals.  Elevations may also be observed in patients withnon malignant disease. Results of this test must always beinterpreted in the context of morphologic   and other relevantdata, and should not be used alone for a diagnosis of  malignancy.Methodology: Siemens Advia ROOOM[employer]aur CA 27.29 chemiluminescentimmunoassay was used. Results obtained with different assaymethods or kits cannot be used   in                           terchangeably.Performed by [facility],[address] [facility] [place] [address] [phone], Sharif Leon MD, Lab. Director      CEA 03/11/2020 9.9* 0.0 - 3.0 ng/mL Final    Comment: INTERPRETIVE INFORMATION: Carcinoembryonic AntigenThe Roche CEA electrochemiluminescent immunoassay was used.Results obtained with different test methods or kits cannot beused interchangeably. Measurement of CEA has been shown to beclinically relevant in   the management of patients withcolorectal, breast, lung, prostatic, pancreatic, and ovariancarcinomas. Smokers may have slightly elevated levels of CEA.The CEA assay value, regardless of level, should not beinterpreted as evidence for the presence or   absence of malignantdisease and is not recommended for use as a screening procedureto detect the presence of cancer in the general population.Performed by [facility],[address] [facility] [place] [address] [phone], Sharif Leon MD,   Lab. Director      Cancer Antigen 15-3 03/11/2020 92* 0 - 31 U/mL Final    Comment: INTERPRETIVE INFORMATION: Cancer Antigen-Breast ()The Roche CA 15-3 electrochemiluminescent immunoassay was used.Results obtained with different methods or kits cannot be usedinterchangeably. The CA 15-3 test is used to aid in themanagement of   Stage II and III breast cancer patients. Serialtesting for patient CA 15-3 values should be used in conjunctionwith other clinical methods for monitoring breast cancer.Patients with confirmed breast carcinoma frequently have CA 15-3values in the same   range as healthy individuals. Elevations maybe observed in patients with nonmalignant disease. Therefore, Chelsie 15-3 value, regardless of level, should not be interpreted asabsolute evidence of the presence or  absence of malignantdisease.Performed by [facility],[address], [facility] [place] [address] [phone], Sharif Leon MD, Lab. Director     Office Visit on 03/05/2020   Component Date Value Ref Range Status    WBC 03/11/2020 5.4  4.6 - 10.2 10*3/uL Final    RBC 03/11/2020 4.61  4.2 - 5.4 10*6/uL Final    Hemoglobin 03/11/2020 10.9* 12.0 - 16.0 g/dL Final    Hematocrit 03/11/2020 36.0* 37.0 - 47.0 % Final    MCV 03/11/2020 78.1* 80 - 97 fL Final    MCH 03/11/2020 23.6* 27.0 - 31.2 pg Final    MCHC 03/11/2020 30.3* 31.8 - 35.4 g/dL Final    RDW RBC Auto-Rto 03/11/2020 21.2* 12.5 - 18.0 % Final    Platelets 03/11/2020 317  142 - 424 10*3/uL Final    Neutrophils/100 leukocytes 03/11/2020 65.3  37 - 80 % Final    Lymphocytes/100 leukocytes 03/11/2020 16.8* 25 - 40 % Final    Monocytes/100 leukocytes 03/11/2020 8.0  1 - 15 % Final    Eosinophils/100 leukocytes 03/11/2020 1.5  1 - 3 % Final    Basophils/100 leukocytes 03/11/2020 1.3  0 - 3 % Final    Manual nRBC per 100 Cells 03/11/2020 0.4  % Final    Neutrophils 03/11/2020 3.49  1.8 - 7.7 10*3/uL Final    Sodium 03/11/2020 138  135 - 145 mmol/L Final    Potassium 03/11/2020 3.7  3.6 - 5.2 mmol/L Final    Chloride 03/11/2020 103  100 - 108 mmol/L Final    CO2 03/11/2020 30  21 - 32 mmol/L Final    BUN, Bld 03/11/2020 10  7 - 18 mg/dL Final    Creatinine 03/11/2020 0.93  0.55 - 1.02 mg/dL Final    GFR ESTIMATION 03/11/2020 59* >60 Final               DESCRIPTION       GLOMERULAR FILTRATION RATE             NORMAL                     >60             KIDNEY  DISEASE           15-60             KIDNEY FAILURE             <15    BUN/Creatinine Ratio 03/11/2020 10.75  7 - 18 Ratio Final    Glucose 03/11/2020 95  70 - 110 mg/dL Final    Calcium 03/11/2020 8.9  8.8 - 10.5 mg/dL Final    Total Bilirubin 03/11/2020 0.6  0.0 - 1.0 mg/dL Final    AST 03/11/2020 14* 15 - 37 U/L Final    ALT 03/11/2020 16  12 - 78 U/L Final    Total  Protein 03/11/2020 5.8* 6.4 - 8.2 g/dL Final    Albumin 03/11/2020 2.6* 3.4 - 5.0 g/dL Final    Globulin 03/11/2020 3.2  2.3 - 3.5 g/dL Final    Albumin/Globulin Ratio 03/11/2020 0.812* 1.1 - 1.8 Ratio Final    Alkaline Phosphatase 03/11/2020 77  46 - 116 U/L Final    Magnesium 03/11/2020 1.7* 1.8 - 2.4 mg/dL Final   Orders Only on 03/04/2020   Component Date Value Ref Range Status    Cancer Antigen 15-3 03/04/2020 85* 0 - 31 U/mL Final    Comment: INTERPRETIVE INFORMATION: Cancer Antigen-Breast ()The Roche CA 15-3 electrochemiluminescent immunoassay was used.Results obtained with different methods or kits cannot be usedinterchangeably. The CA 15-3 test is used to aid in themanagement of   Stage II and III breast cancer patients. Serialtesting for patient CA 15-3 values should be used in conjunctionwith other clinical methods for monitoring breast cancer.Patients with confirmed breast carcinoma frequently have CA 15-3values in the same   range as healthy individuals. Elevations maybe observed in patients with nonmalignant disease. Therefore, Chelsie 15-3 value, regardless of level, should not be interpreted asabsolute evidence of the presence or absence of malignantdisease.Performed by [facility],[address] [place] [phone], Sharif Leon MD, Lab. Director     Orders Only on 03/04/2020   Component Date Value Ref Range Status    CEA 03/04/2020 10.5* 0.0 - 3.0 ng/mL Final    Comment: INTERPRETIVE INFORMATION: Carcinoembryonic AntigenThe Roche CEA electrochemiluminescent immunoassay was used.Results obtained with different test methods or kits cannot beused interchangeably. Measurement of CEA has been shown to beclinically relevant in   the management of patients withcolorectal, breast, lung, prostatic, pancreatic, and ovariancarcinomas. Smokers may have slightly elevated levels of CEA.The CEA assay value, regardless of level, should not beinterpreted as evidence for the  presence or   absence of malignantdisease and is not recommended for use as a screening procedureto detect the presence of cancer in the general population.Performed by [facility],[address] [facility] [place] [address] [phone], Sharif Leon MD,   Lab. Director     Orders Only on 2020   Component Date Value Ref Range Status    CANCER ANTIGEN 27.29 2020 126.1* 0.0 - 40.0 U/mL Final    Comment: INTERPRETIVE INFORMATION: Cancer Antigen 27.29The CA 27.29 assay is intended for use in monitorin) diseaseprogression and/or response to therapy in patients withmetastatic disease, and 2) disease recurrence in patientstreated previously for stages II   or III breast carcinoma who areclinically free of the disease. Serial testing in patients whoare clinically free of disease should be used in conjunctionwith other clinical methods for early detection of cancerrecurrence.Limitations: Patients with   confirmed breast carcinoma frequentlyhave CA 27.29 assay values in the same range as healthyindividuals.  Elevations may also be observed in patients withnon malignant disease. Results of this test must always beinterpreted in the context of morphologic   and other relevantdata, and should not be used alone for a diagnosis of malignancy.Methodology: Siemens Advia [employer] CA 27.29 chemiluminescentimmunoassay was used. Results obtained with different assaymethods or kits cannot be used   in                           terchangeably.Performed by [facility],[address] [facility] [place] [place] [address] [phone], Sharif Leon MD, Lab. Director     Orders Only on 2019   Component Date Value Ref Range Status    Sodium 2019 139  135 - 145 mmol/L Final    Potassium 2019 3.6  3.6 - 5.2 mmol/L Final    Chloride 2019 102  100 - 108 mmol/L Final    CO2 2019 27  21 - 32 mmol/L Final    BUN, Bld 2019 6* 7 - 18 mg/dL Final    Creatinine 2019 0.96  0.55 -  1.02 mg/dL Final    GFR ESTIMATION 11/12/2019 57* >60 Final               DESCRIPTION       GLOMERULAR FILTRATION RATE             NORMAL                     >60             KIDNEY  DISEASE           15-60             KIDNEY FAILURE             <15    BUN/Creatinine Ratio 11/12/2019 6.25* 7 - 18 Ratio Final    Glucose 11/12/2019 170* 70 - 110 mg/dL Final    Calcium 11/12/2019 8.2* 8.8 - 10.5 mg/dL Final    Total Bilirubin 11/12/2019 0.7  0.0 - 1.0 mg/dL Final    AST 11/12/2019 23  15 - 37 U/L Final    ALT 11/12/2019 27  12 - 78 U/L Final    Total Protein 11/12/2019 6.5  6.4 - 8.2 g/dL Final    Albumin 11/12/2019 2.8* 3.4 - 5.0 g/dL Final    Globulin 11/12/2019 3.7* 2.3 - 3.5 g/dL Final    Albumin/Globulin Ratio 11/12/2019 0.756* 1.1 - 1.8 Ratio Final    Alkaline Phosphatase 11/12/2019 100  46 - 116 U/L Final   Orders Only on 11/12/2019   Component Date Value Ref Range Status    WBC 11/12/2019 5.2  4.6 - 10.2 10*3/uL Final    RBC 11/12/2019 5.09  4.2 - 5.4 10*6/uL Final    Hemoglobin 11/12/2019 13.6  12.0 - 16.0 g/dL Final    Hematocrit 11/12/2019 43.0  37.0 - 47.0 % Final    MCV 11/12/2019 84.5  80 - 97 fL Final    MCH 11/12/2019 26.7* 27.0 - 31.2 pg Final    MCHC 11/12/2019 31.6* 31.8 - 35.4 g/dL Final    RDW RBC Auto-Rto 11/12/2019 16.7  12.5 - 18.0 % Final    Platelets 11/12/2019 147  142 - 424 10*3/uL Final    Neutrophils/100 leukocytes 11/12/2019 76.1  37 - 80 % Final    Lymphocytes/100 leukocytes 11/12/2019 12.0* 25 - 40 % Final    Monocytes/100 leukocytes 11/12/2019 8.0  1 - 15 % Final    Eosinophils/100 leukocytes 11/12/2019 2.3  1 - 3 % Final    Basophils/100 leukocytes 11/12/2019 1.0  0 - 3 % Final    Manual nRBC per 100 Cells 11/12/2019 0.0  % Final    Neutrophils 11/12/2019 3.99  1.8 - 7.7 10*3/uL Final   There may be more visits with results that are not included.       Imaging:     Assessment:     Principle Problem: Swelling of both upper extremities [M79.89]    Co-morbidity/Active Problems:   Patient Active Problem List   Diagnosis    Breast cancer metastasized to bone    Neuropathy    Fatigue associated with anemia    Drug-related hair loss    Nausea    Primary insomnia    Foot infection    Cancer, metastatic to bone    Malignant neoplasm of overlapping 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, estrogen receptor positive     Chronic fatigue    Diverticulosis    Dehydration    Hypoxemia    Iron deficiency anemia secondary to inadequate dietary iron intake    Swelling of both upper extremities    Hypoalbuminemia        Benita Carballo is a 74 y.o. female with PMH of The primary encounter diagnosis was Swelling of both upper extremities. 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 Started Taxol/Herceptin 2/2020  Discussed ECHO with pt, showing right heart failure   MUGA ordered  Hercpetin on hold until muga results  Lasix x 3 days and re-eval pt on Thursday       Rosalind Ricks NP

## 2020-03-26 ENCOUNTER — OFFICE VISIT (OUTPATIENT)
Dept: HEMATOLOGY/ONCOLOGY | Facility: CLINIC | Age: 75
End: 2020-03-26
Payer: MEDICARE

## 2020-03-26 ENCOUNTER — TELEPHONE (OUTPATIENT)
Dept: HEMATOLOGY/ONCOLOGY | Facility: CLINIC | Age: 75
End: 2020-03-26

## 2020-03-26 VITALS
BODY MASS INDEX: 20.19 KG/M2 | HEART RATE: 81 BPM | DIASTOLIC BLOOD PRESSURE: 67 MMHG | WEIGHT: 125.63 LBS | HEIGHT: 66 IN | SYSTOLIC BLOOD PRESSURE: 120 MMHG | RESPIRATION RATE: 14 BRPM | OXYGEN SATURATION: 86 %

## 2020-03-26 DIAGNOSIS — C50.812 MALIGNANT NEOPLASM OF OVERLAPPING SITES OF LEFT BREAST IN FEMALE, ESTROGEN RECEPTOR POSITIVE: Primary | ICD-10-CM

## 2020-03-26 DIAGNOSIS — Z17.0 MALIGNANT NEOPLASM OF OVERLAPPING SITES OF LEFT BREAST IN FEMALE, ESTROGEN RECEPTOR POSITIVE: Primary | ICD-10-CM

## 2020-03-26 DIAGNOSIS — C50.812 MALIGNANT NEOPLASM OF OVERLAPPING SITES OF LEFT BREAST IN FEMALE, ESTROGEN RECEPTOR POSITIVE: ICD-10-CM

## 2020-03-26 DIAGNOSIS — Z17.0 MALIGNANT NEOPLASM OF OVERLAPPING SITES OF LEFT BREAST IN FEMALE, ESTROGEN RECEPTOR POSITIVE: ICD-10-CM

## 2020-03-26 DIAGNOSIS — I50.813 ACUTE ON CHRONIC RIGHT-SIDED HEART FAILURE: ICD-10-CM

## 2020-03-26 PROCEDURE — 99214 PR OFFICE/OUTPT VISIT, EST, LEVL IV, 30-39 MIN: ICD-10-PCS | Mod: S$GLB,,, | Performed by: NURSE PRACTITIONER

## 2020-03-26 PROCEDURE — 99214 OFFICE O/P EST MOD 30 MIN: CPT | Mod: S$GLB,,, | Performed by: NURSE PRACTITIONER

## 2020-03-26 RX ORDER — EPINEPHRINE 0.3 MG/.3ML
0.3 INJECTION SUBCUTANEOUS ONCE AS NEEDED
Status: CANCELLED | OUTPATIENT
Start: 2020-03-26

## 2020-03-26 RX ORDER — HYDROCODONE BITARTRATE AND ACETAMINOPHEN 10; 325 MG/1; MG/1
1 TABLET ORAL EVERY 6 HOURS PRN
Qty: 30 TABLET | Refills: 0 | Status: SHIPPED | OUTPATIENT
Start: 2020-03-26 | End: 2020-04-20 | Stop reason: SDUPTHER

## 2020-03-26 RX ORDER — DIPHENHYDRAMINE HYDROCHLORIDE 50 MG/ML
50 INJECTION INTRAMUSCULAR; INTRAVENOUS ONCE AS NEEDED
Status: CANCELLED | OUTPATIENT
Start: 2020-03-26

## 2020-03-26 RX ORDER — FAMOTIDINE 10 MG/ML
20 INJECTION INTRAVENOUS
Status: CANCELLED | OUTPATIENT
Start: 2020-03-26

## 2020-03-26 RX ORDER — SODIUM CHLORIDE 0.9 % (FLUSH) 0.9 %
10 SYRINGE (ML) INJECTION
Status: CANCELLED | OUTPATIENT
Start: 2020-03-26

## 2020-03-26 RX ORDER — HEPARIN 100 UNIT/ML
500 SYRINGE INTRAVENOUS
Status: CANCELLED | OUTPATIENT
Start: 2020-03-26

## 2020-03-26 RX ORDER — HYDROCODONE BITARTRATE AND ACETAMINOPHEN 10; 325 MG/1; MG/1
1 TABLET ORAL EVERY 6 HOURS PRN
Qty: 30 TABLET | Refills: 0 | Status: CANCELLED | OUTPATIENT
Start: 2020-03-26

## 2020-03-26 NOTE — PROGRESS NOTES
Medical Oncology Progress Note  [facility]     PATIENT: Benita Carballo  : 1945 74 y.o. female  MRN [id number]     PCP: Solomon Contreras MD  Consult Requested By:      Date of Service: 3/26/2020    Subjective:   Interim History:  Breast Cancer    Benita Carballo is here for to followup breast cancer treatment.    The patient was started on  chemotherapy last week with Herceptin and Taxol weekly.  She has chronic hypoxia and her oxygen saturation has been declining today her O2 status 77% she looks purple and she also has increasing degree of short of breath; in addition she complains of swelling to her left arm.  She had no chest pain. She was admittted last week to the hospital and underwent thoracentesis, which was not tested for cytology and imaging for possible PE was ruled out. Today she states she is feeling better but POX was 82 % on RA, and still has yosi upper ext swelling which she states has improved. She has not had a echo so will order ECHO stat to r/o any CHF which could be causing her swelling both lung and upper ext. States she had upper u/s with her pulm which was neg for DVT.   Today she returns with ECHO showing EF 55-60%, but right sided heart failure noted. Pt has an additional 12 # of wt gain since last week. She states she is feeling fine, better than at last visit. Discussed ECHO and will hold herceptin and obtain MUGA, will proceed with weekly taxol and possibly will add herceptin back after MUGA completed and will refer pt to cardiology, but could be delayed due to COVID pandemic.      Oncology History:  Oncology History     Rt breast cancer s/p lump with chemo, X[place] in [place]. Tamoxifen x 1 yr, then Arimidex x 5 yrs. Completed tx .       c/o chronic cough with CT chest 16 showing extensive adenopathy  With base of lt neck mass 4.5 cm with yosi pulm nodules. RF to Dr ANDREWS Mcnally    16 bx of lt neck mass carcinoma with feature suggestive of breast  primary. ER/%, Her2 borderline by IHC and deemed equivocal by FISH          Breast cancer metastasized to bone    2/4/2016 Initial Diagnosis     Breast cancer metastasized to bone, , lymphoid mass       5/25/2016 - 2/9/2017 Chemotherapy     Taxol/Herceptin x 8 cycles then perjeta added 7/16  Prescribed by Dr Mcnally       2/20/2017 - 6/17/2017 Chemotherapy     Kadcycla prescribed by Dr Mcnally       6/28/2017 Biopsy     Biopsy of rt abd met disease consistent with breast cancer. ER/TX + Her 2 neg per Dr Moreno's note.    Dr Moreno noted path review from bx 2/16 showed HER 2 + equivocal with FISH reading as Equivocal but ratio 1.2. Additional report form 2016 states additional testing was done with different probe and the equivocal results can be noted as negative.     Treatment therapy changed from Her2 based tx to endocrine base therapy by Dr Moreno       6/28/2017 -  Hormone Therapy     Arimidex 6/17-2/18  Aromasin 2/18  Ibrance + Aromasin 5/18 -1/19  Ibrance + faslodex 2/19 4/30/2019 Imaging Significant Findings     PET/CT: Lesions involving the medial clavicle , subcarinal LN, pleural based soft tissue density adjacent to T11 and lesion in left lobe of liver diminished in size and SUV.   RECIST 34% decrease in disease      2/17/2020 -  Chemotherapy     Treatment Summary   Plan Name: OP BREAST TRASTUZUMAB PACLITAXEL QW  Treatment Goal: Control  Status: Active  Start Date: 2/26/2020  End Date: 5/28/2020 (Planned)  Provider: Solomon Contreras MD  Chemotherapy: PACLitaxel (TAXOL) 64 mg/m2 = 102 mg in sodium chloride 0.9% 250 mL chemo infusion, 64 mg/m2 = 102 mg (100 % of original dose 64 mg/m2), Intravenous, Clinic/HOD 1 time, 2 of 12 cycles  Dose modification: 64 mg/m2 (original dose 64 mg/m2, Cycle 1)  trastuzumab 211 mg in sodium chloride 0.9% 250 mL chemo infusion, 4 mg/kg = 211 mg, Intravenous, Clinic/HOD 1 time, 2 of 2 cycles       ==8/2019 Now She is currently on combined afinitor + aromasin,  repeated CT scan  shows improvement  PET 1/2020 showed PD,  afinitor + aromasin stopped  == PET scan 01/20/2020 the progressive  ==Started Taxol/Herceptin weekly 2/2020    Past Medical History:   Past Medical History:   Diagnosis Date    Arthritis     Bone cancer     Breast cancer     Cancer     Cataract     Depression     Diverticulosis 1/15/2020    Fatigue associated with anemia 6/6/2019    GERD (gastroesophageal reflux disease)     High cholesterol     Hypoxemia 3/12/2020    Skin problem     Sleep disorder        Past Surgical HIstory:   Past Surgical History:   Procedure Laterality Date    APPENDECTOMY      BREAST LUMPECTOMY Right 2002    CATARACT EXTRACTION, BILATERAL  2014    COLONOSCOPY  2008    HYSTERECTOMY      PORTACATH PLACEMENT  05/18/2016    SURGICAL REMOVAL OF NODULE OF VOCAL CORD         Allergies:  Review of patient's allergies indicates:   Allergen Reactions    Benadryl [diphenhydramine hcl]      Restless leg     Medrol [methylprednisolone] Hallucinations    Penicillins        Medications:  Current Outpatient Medications   Medication Sig Dispense Refill    ANORO ELLIPTA 62.5-25 mcg/actuation DsDv       dexAMETHasone 0.5 mg/5 mL Soln SWISH WITH 10 ML PO FOR 2 MIN THEN SPIT QID      exemestane (AROMASIN) 25 mg tablet Take 1 tablet (25 mg total) by mouth once daily. 30 tablet 3    furosemide (LASIX) 40 MG tablet Take 1 tablet (40 mg total) by mouth once daily. 30 tablet 11    gabapentin (NEURONTIN) 400 MG capsule Take 1 capsule (400 mg total) by mouth 3 (three) times daily. 120 capsule 4    HYDROcodone-acetaminophen (NORCO)  mg per tablet Take 1 tablet by mouth every 6 (six) hours as needed for Pain. 30 tablet 0    iron-vitamin C 100-250 mg, ICAR-C, (ICAR-C) 100-250 mg Tab Take 1 tablet by mouth once daily. 30 tablet 5    LORazepam (ATIVAN) 1 MG tablet TAKE 1 TABLET BY MOUTH EVERY EVENING 30 tablet 0    megestrol (MEGACE) 400 mg/10 mL (40 mg/mL) Susp Take 10 mLs (400 mg total) by  mouth once daily. 240 mL 2    oxyCODONE (OXYCONTIN) 10 mg 12 hr tablet Take 1 tablet (10 mg total) by mouth every 12 (twelve) hours as needed for Pain. 60 tablet 0    pantoprazole (PROTONIX) 40 MG tablet Take 1 tablet (40 mg total) by mouth once daily. 30 tablet 3    potassium chloride SA (K-DUR,KLOR-CON) 20 MEQ tablet Take 1 tablet (20 mEq total) by mouth once daily. 30 tablet 11    PROAIR HFA 90 mcg/actuation inhaler       promethazine (PHENERGAN) 25 MG tablet Take 1 tablet (25 mg total) by mouth every 6 (six) hours as needed for Nausea. 30 tablet 3    temazepam (RESTORIL) 15 mg Cap TAKE 1 CAPSULE BY MOUTH ONCE EVERY NIGHT AT BEDTIME AS NEEDED 30 capsule 0     No current facility-administered medications for this visit.        Family History:   Family History   Problem Relation Age of Onset    Lung cancer Father     Melanoma Father        Social History:  reports that she has been smoking cigarettes. She has a 2.00 pack-year smoking history. She has never used smokeless tobacco. She reports that she drinks about 4.0 standard drinks of alcohol per week. She reports that she does not use drugs.    Review of Systemas  Constitutional: No change in weight, appetie, fatigue, fever, or night sweats  Eyes: No changes in vision  Ears, Nose, Mouth, Throat, and Face: No hearing problems, ear pain, rummy nose, or sore throat  Respiratory: No shortness of breath or cough  Cardiovascular: No chest pain, palpitations or dizziness  Gastrointestinal: No abdominal pain, hematochezia, melena  Genitourinary: No dysuria, hematuria, nocturia, menstrual problems, post menopausal  Integumentary/Breast: No rashes or itching  Hematologic/Lymphatic: No anemia or bleeding abnormalities  Musculoskeletal: No joint or muscle abnormalities  Neurological: No sensory or motor problems, no headaches  Behavioral/Psych: No depression, anxiety, cognitive problems, or stress  Endocrine: No diabetes or thyroid problems  Allergic/Immunologic: See  "allergy above    Physical Exam      Vitals:   Vitals:    03/26/20 1057   BP: 120/67   BP Location: Right arm   Patient Position: Sitting   BP Method: Large (Automatic)   Pulse: 81   Resp: 14   SpO2: (!) 86%   Weight: 57 kg (125 lb 9.6 oz)   Height: 5' 6" (1.676 m)     BMI: Body mass index is 20.27 kg/m².  BSA Body surface area is 1.63 meters squared.  ECOG Performance Status:   ECOG SCORE         GENERAL APPEARANCE: Well developed, well nourished, in no acute distress.  SKIN: Inspection of the skin reveals no rashes, ulcerations or petechiae.  HEENT: The sclerae were anicteric and conjunctivae were pink and moist. Extraocular movements were intact and pupils were equal, round, and reactive to light with normal accommodation. External inspection of the ears and nose showed no scars, lesions, or masses. Lips, teeth, and gums showed normal mucosa. The oral mucosa, hard and soft palate, tongue and posterior pharynx were normal.  NECK: Supple and symmetric. There was no thyroid enlargement, and no tenderness, or masses were felt.  CRESPIRATORY: Normal AP diameter and normal contour without any kyphoscoliosis. LUNGS: Auscultation of the lungs revealed normal breath sounds without any other adventitious sounds or rubs.  CARDIOVASCULAR: There was a regular rate and rhythm without any murmurs, gallops, rubs. The carotid pulses were normal and 2+ bilaterally without bruits. Peripheral pulses were 2+ and symmetric.  GASTROINTESTINAL: Soft and nontender with normal bowel sounds. The liver span was approximately 5-6 cm in the right midclavicular line by percussion. The liver edge was nontender. The spleen was not palpable. There were no inguinal or umbilical hernias noted. No ascites was noted.  LYMPH NODES: No lymphadenopathy was appreciated in the neck, axillae or groin.  MUSCULOSKELETAL: Gait was normal. There was no tenderness or effusions noted. Muscle strength and tone were normal. EXTREMITIES: No cyanosis, clubbing or " edema.  NEUROLOGIC: Alert and oriented x 3. Normal affect. Gait was normal. Normal deep tendon reflexes with no pathological reflexes. Sensation to touch was normal.  PSYCHIATRIC: good mood, orientated to place, time and person     Labs and Imagings     Ancillary Orders on 03/20/2020   Component Date Value Ref Range Status    WBC 03/25/2020 14.5* 4.6 - 10.2 10*3/uL Final    RBC 03/25/2020 4.39  4.2 - 5.4 10*6/uL Final    Hemoglobin 03/25/2020 10.8* 12.0 - 16.0 g/dL Final    Hematocrit 03/25/2020 35.0* 37.0 - 47.0 % Final    MCV 03/25/2020 79.7* 80 - 97 fL Final    MCH 03/25/2020 24.6* 27.0 - 31.2 pg Final    MCHC 03/25/2020 30.9* 31.8 - 35.4 g/dL Final    RDW RBC Auto-Rto 03/25/2020 23.0* 12.5 - 18.0 % Final    Platelets 03/25/2020 433* 142 - 424 10*3/uL Final    Manual nRBC per 100 Cells 03/25/2020 0.0  % Final    Sodium 03/25/2020 143  135 - 145 mmol/L Final    Potassium 03/25/2020 4.4  3.6 - 5.2 mmol/L Final    Chloride 03/25/2020 104  100 - 108 mmol/L Final    CO2 03/25/2020 34* 21 - 32 mmol/L Final    BUN, Bld 03/25/2020 8  7 - 18 mg/dL Final    Creatinine 03/25/2020 0.90  0.55 - 1.02 mg/dL Final    GFR ESTIMATION 03/25/2020 > 60  >60 Final               DESCRIPTION       GLOMERULAR FILTRATION RATE             NORMAL                     >60             KIDNEY  DISEASE           15-60             KIDNEY FAILURE             <15    BUN/Creatinine Ratio 03/25/2020 8.88  7 - 18 Ratio Final    Glucose 03/25/2020 91  70 - 110 mg/dL Final    Calcium 03/25/2020 8.6* 8.8 - 10.5 mg/dL Final    Total Bilirubin 03/25/2020 0.3  0.0 - 1.0 mg/dL Final    AST 03/25/2020 16  15 - 37 U/L Final    ALT 03/25/2020 18  12 - 78 U/L Final    Total Protein 03/25/2020 6.1* 6.4 - 8.2 g/dL Final    Albumin 03/25/2020 2.5* 3.4 - 5.0 g/dL Final    Globulin 03/25/2020 3.6* 2.3 - 3.5 g/dL Final    Albumin/Globulin Ratio 03/25/2020 0.694* 1.1 - 1.8 Ratio Final    Alkaline Phosphatase 03/25/2020 80  46 - 116 U/L Final     Magnesium 03/25/2020 1.9  1.8 - 2.4 mg/dL Final    Neutrophils 03/25/2020 84.0* 37 - 80 % Final    Neutrophils Absolute 03/25/2020 12.3* 1.8 - 7.7 10*3/uL Final    BANDS 03/25/2020 1.0  0 - 5 % Final    Lymphocytes 03/25/2020 7.0* 25 - 40 % Final    Monocytes 03/25/2020 4.0  1 - 15 % Final    Eosinophils 03/25/2020 3.0  1 - 3 % Final    Basophils 03/25/2020 1.0  0 - 3 % Final    Cells Counted 03/25/2020 100   Final    Platelet Estimate 03/25/2020 Adequate   Final    RBC Morphology 03/25/2020 ABNORMAL   Final    Anisocytosis 03/25/2020 3+   Final    Hypochromia 03/25/2020 1+   Final   Orders Only on 03/18/2020   Component Date Value Ref Range Status    Neutrophils 03/18/2020 62.0  37 - 80 % Final    Neutrophils Absolute 03/18/2020 5.7  1.8 - 7.7 10*3/uL Final    BANDS 03/18/2020 4.0  0 - 5 % Final    Lymphocytes 03/18/2020 8.0* 25 - 40 % Final    Monocytes 03/18/2020 20.0* 1 - 15 % Final    Eosinophils 03/18/2020 1.0  1 - 3 % Final    Metamyelocytes 03/18/2020 5.0* 0 - 0 % Final    Cells Counted 03/18/2020 100   Final    Platelet Estimate 03/18/2020 Adequate   Final    Platelet Clumps 03/18/2020 1+   Final    Anisocytosis 03/18/2020 1+   Final    Microcytes 03/18/2020 1+   Final   Ancillary Orders on 03/13/2020   Component Date Value Ref Range Status    WBC 03/18/2020 8.7  4.6 - 10.2 10*3/uL Final    RBC 03/18/2020 4.39  4.2 - 5.4 10*6/uL Final    Hemoglobin 03/18/2020 10.7* 12.0 - 16.0 g/dL Final    Hematocrit 03/18/2020 34.2* 37.0 - 47.0 % Final    MCV 03/18/2020 77.9* 80 - 97 fL Final    MCH 03/18/2020 24.4* 27.0 - 31.2 pg Final    MCHC 03/18/2020 31.3* 31.8 - 35.4 g/dL Final    RDW RBC Auto-Rto 03/18/2020 21.9* 12.5 - 18.0 % Final    Platelets 03/18/2020 250  142 - 424 10*3/uL Final    Manual nRBC per 100 Cells 03/18/2020 0.0  % Final    Sodium 03/18/2020 135  135 - 145 mmol/L Final    Potassium 03/18/2020 3.7  3.6 - 5.2 mmol/L Final    Chloride 03/18/2020 102  100 - 108  mmol/L Final    CO2 2020 26  21 - 32 mmol/L Final    BUN, Bld 2020 10  7 - 18 mg/dL Final    Creatinine 2020 0.91  0.55 - 1.02 mg/dL Final    GFR ESTIMATION 2020 60  >60 Final               DESCRIPTION       GLOMERULAR FILTRATION RATE             NORMAL                     >60             KIDNEY  DISEASE           15-60             KIDNEY FAILURE             <15    BUN/Creatinine Ratio 2020 10.98  7 - 18 Ratio Final    Glucose 2020 112* 70 - 110 mg/dL Final    Calcium 2020 8.1* 8.8 - 10.5 mg/dL Final    Total Bilirubin 2020 0.8  0.0 - 1.0 mg/dL Final    AST 2020 15  15 - 37 U/L Final    ALT 2020 19  12 - 78 U/L Final    Total Protein 2020 5.8* 6.4 - 8.2 g/dL Final    Albumin 2020 2.3* 3.4 - 5.0 g/dL Final    Globulin 2020 3.5  2.3 - 3.5 g/dL Final    Albumin/Globulin Ratio 2020 0.657* 1.1 - 1.8 Ratio Final    Alkaline Phosphatase 2020 56  46 - 116 U/L Final    Magnesium 2020 1.7* 1.8 - 2.4 mg/dL Final   Orders Only on 2020   Component Date Value Ref Range Status    RBC Morphology 2020 ABNORMAL   Final    Anisocytosis 2020 2+   Final    Hypochromia 2020 1+   Final   Ancillary Orders on 2020   Component Date Value Ref Range Status    CANCER ANTIGEN 27.29 2020 144.5* 0.0 - 40.0 U/mL Final    Comment: INTERPRETIVE INFORMATION: Cancer Antigen 27.29The CA 27.29 assay is intended for use in monitorin) diseaseprogression and/or response to therapy in patients withmetastatic disease, and 2) disease recurrence in patientstreated previously for stages II   or III breast carcinoma who areclinically free of the disease. Serial testing in patients whoare clinically free of disease should be used in conjunctionwith other clinical methods for early detection of cancerrecurrence.Limitations: Patients with   confirmed breast carcinoma frequentlyhave CA 27.29 assay values in the  same range as healthyindividuals.  Elevations may also be observed in patients withnon malignant disease. Results of this test must always beinterpreted in the context of morphologic   and other relevantdata, and should not be used alone for a diagnosis of malignancy.Methodology: Siemens Advia Centaur CA 27.29 chemiluminescentimmunoassay was used. Results obtained with different assaymethods or kits cannot be used   in                           terchangeably.Performed by [facility],[address] [facility] [address] [phone].[employer], Sharif Leon MD, Lab. Director      CEA 03/11/2020 9.9* 0.0 - 3.0 ng/mL Final    Comment: INTERPRETIVE INFORMATION: Carcinoembryonic AntigenThe Roche CEA electrochemiluminescent immunoassay was used.Results obtained with different test methods or kits cannot beused interchangeably. Measurement of CEA has been shown to beclinically relevant in   the management of patients withcolorectal, breast, lung, prostatic, pancreatic, and ovariancarcinomas. Smokers may have slightly elevated levels of CEA.The CEA assay value, regardless of level, should not beinterpreted as evidence for the presence or   absence of malignantdisease and is not recommended for use as a screening procedureto detect the presence of cancer in the general population.Performed by [facility],[address] [place] [address] [phone].[employer], Sharif Leon MD,   Lab. Director      Cancer Antigen 15-3 03/11/2020 92* 0 - 31 U/mL Final    Comment: INTERPRETIVE INFORMATION: Cancer Antigen-Breast ()The Roche CA 15-3 electrochemiluminescent immunoassay was used.Results obtained with different methods or kits cannot be usedinterchangeably. The CA 15-3 test is used to aid in themanagement of   Stage II and III breast cancer patients. Serialtesting for patient CA 15-3 values should be used in conjunctionwith other clinical methods for monitoring breast cancer.Patients with confirmed breast  carcinoma frequently have CA 15-3values in the same   range as healthy individuals. Elevations maybe observed in patients with nonmalignant disease. Therefore, Chelsie 15-3 value, regardless of level, should not be interpreted asabsolute evidence of the presence or absence of malignantdisease.Performed by [employer],[address] [place] [id number] [phone].[employer], Sharif Leon MD, Lab. Director     Office Visit on 03/05/2020   Component Date Value Ref Range Status    WBC 03/11/2020 5.4  4.6 - 10.2 10*3/uL Final    RBC 03/11/2020 4.61  4.2 - 5.4 10*6/uL Final    Hemoglobin 03/11/2020 10.9* 12.0 - 16.0 g/dL Final    Hematocrit 03/11/2020 36.0* 37.0 - 47.0 % Final    MCV 03/11/2020 78.1* 80 - 97 fL Final    MCH 03/11/2020 23.6* 27.0 - 31.2 pg Final    MCHC 03/11/2020 30.3* 31.8 - 35.4 g/dL Final    RDW RBC Auto-Rto 03/11/2020 21.2* 12.5 - 18.0 % Final    Platelets 03/11/2020 317  142 - 424 10*3/uL Final    Neutrophils/100 leukocytes 03/11/2020 65.3  37 - 80 % Final    Lymphocytes/100 leukocytes 03/11/2020 16.8* 25 - 40 % Final    Monocytes/100 leukocytes 03/11/2020 8.0  1 - 15 % Final    Eosinophils/100 leukocytes 03/11/2020 1.5  1 - 3 % Final    Basophils/100 leukocytes 03/11/2020 1.3  0 - 3 % Final    Manual nRBC per 100 Cells 03/11/2020 0.4  % Final    Neutrophils 03/11/2020 3.49  1.8 - 7.7 10*3/uL Final    Sodium 03/11/2020 138  135 - 145 mmol/L Final    Potassium 03/11/2020 3.7  3.6 - 5.2 mmol/L Final    Chloride 03/11/2020 103  100 - 108 mmol/L Final    CO2 03/11/2020 30  21 - 32 mmol/L Final    BUN, Bld 03/11/2020 10  7 - 18 mg/dL Final    Creatinine 03/11/2020 0.93  0.55 - 1.02 mg/dL Final    GFR ESTIMATION 03/11/2020 59* >60 Final               DESCRIPTION       GLOMERULAR FILTRATION RATE             NORMAL                     >60             KIDNEY  DISEASE           15-60             KIDNEY FAILURE             <15    BUN/Creatinine Ratio 03/11/2020 10.75  7 - 18 Ratio  Final    Glucose 03/11/2020 95  70 - 110 mg/dL Final    Calcium 03/11/2020 8.9  8.8 - 10.5 mg/dL Final    Total Bilirubin 03/11/2020 0.6  0.0 - 1.0 mg/dL Final    AST 03/11/2020 14* 15 - 37 U/L Final    ALT 03/11/2020 16  12 - 78 U/L Final    Total Protein 03/11/2020 5.8* 6.4 - 8.2 g/dL Final    Albumin 03/11/2020 2.6* 3.4 - 5.0 g/dL Final    Globulin 03/11/2020 3.2  2.3 - 3.5 g/dL Final    Albumin/Globulin Ratio 03/11/2020 0.812* 1.1 - 1.8 Ratio Final    Alkaline Phosphatase 03/11/2020 77  46 - 116 U/L Final    Magnesium 03/11/2020 1.7* 1.8 - 2.4 mg/dL Final   Orders Only on 03/04/2020   Component Date Value Ref Range Status    Cancer Antigen 15-3 03/04/2020 85* 0 - 31 U/mL Final    Comment: INTERPRETIVE INFORMATION: Cancer Antigen-Breast ()The Roche CA 15-3 electrochemiluminescent immunoassay was used.Results obtained with different methods or kits cannot be usedinterchangeably. The CA 15-3 test is used to aid in themanagement of   Stage II and III breast cancer patients. Serialtesting for patient CA 15-3 values should be used in conjunctionwith other clinical methods for monitoring breast cancer.Patients with confirmed breast carcinoma frequently have CA 15-3values in the same   range as healthy individuals. Elevations maybe observed in patients with nonmalignant disease. Therefore, Chelsie 15-3 value, regardless of level, should not be interpreted asabsolute evidence of the presence or absence of malignantdisease.Performed by [facility],[address] [place] [id number] [phone], Sharif Leon MD, Lab. Director     Orders Only on 03/04/2020   Component Date Value Ref Range Status    CEA 03/04/2020 10.5* 0.0 - 3.0 ng/mL Final    Comment: INTERPRETIVE INFORMATION: Carcinoembryonic AntigenThe Roche CEA electrochemiluminescent immunoassay was used.Results obtained with different test methods or kits cannot beused interchangeably. Measurement of CEA has been shown to  beclinically relevant in   the management of patients withcolorectal, breast, lung, prostatic, pancreatic, and ovariancarcinomas. Smokers may have slightly elevated levels of CEA.The CEA assay value, regardless of level, should not beinterpreted as evidence for the presence or   absence of malignantdisease and is not recommended for use as a screening procedureto detect the presence of cancer in the general population.Performed by [facility],[address] [facility] [place] [address] [phone], Sharif Leon MD,   Lab. Director     Orders Only on 2020   Component Date Value Ref Range Status    CANCER ANTIGEN 27.29 2020 126.1* 0.0 - 40.0 U/mL Final    Comment: INTERPRETIVE INFORMATION: Cancer Antigen 27.29The CA 27.29 assay is intended for use in monitorin) diseaseprogression and/or response to therapy in patients withmetastatic disease, and 2) disease recurrence in patientstreated previously for stages II   or III breast carcinoma who areclinically free of the disease. Serial testing in patients whoare clinically free of disease should be used in conjunctionwith other clinical methods for early detection of cancerrecurrence.Limitations: Patients with   confirmed breast carcinoma frequentlyhave CA 27.29 assay values in the same range as healthyindividuals.  Elevations may also be observed in patients withnon malignant disease. Results of this test must always beinterpreted in the context of morphologic   and other relevantdata, and should not be used alone for a diagnosis of malignancy.Methodology: Siemens Advia Panorama9aur CA 27.29 chemiluminescentimmunoassay was used. Results obtained with different assaymethods or kits cannot be used   in                           terchangeably.Performed by [facility],[address] [facility] [place] [address] [phone], Sharif Leon MD, Lab. Director     Orders Only on 2019   Component Date Value Ref Range Status    Sodium 2019  139  135 - 145 mmol/L Final    Potassium 11/12/2019 3.6  3.6 - 5.2 mmol/L Final    Chloride 11/12/2019 102  100 - 108 mmol/L Final    CO2 11/12/2019 27  21 - 32 mmol/L Final    BUN, Bld 11/12/2019 6* 7 - 18 mg/dL Final    Creatinine 11/12/2019 0.96  0.55 - 1.02 mg/dL Final    GFR ESTIMATION 11/12/2019 57* >60 Final               DESCRIPTION       GLOMERULAR FILTRATION RATE             NORMAL                     >60             KIDNEY  DISEASE           15-60             KIDNEY FAILURE             <15    BUN/Creatinine Ratio 11/12/2019 6.25* 7 - 18 Ratio Final    Glucose 11/12/2019 170* 70 - 110 mg/dL Final    Calcium 11/12/2019 8.2* 8.8 - 10.5 mg/dL Final    Total Bilirubin 11/12/2019 0.7  0.0 - 1.0 mg/dL Final    AST 11/12/2019 23  15 - 37 U/L Final    ALT 11/12/2019 27  12 - 78 U/L Final    Total Protein 11/12/2019 6.5  6.4 - 8.2 g/dL Final    Albumin 11/12/2019 2.8* 3.4 - 5.0 g/dL Final    Globulin 11/12/2019 3.7* 2.3 - 3.5 g/dL Final    Albumin/Globulin Ratio 11/12/2019 0.756* 1.1 - 1.8 Ratio Final    Alkaline Phosphatase 11/12/2019 100  46 - 116 U/L Final   There may be more visits with results that are not included.       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, estrogen receptor positive [C50.812, Z17.0]   Co-morbidity/Active Problems:   Patient Active Problem List   Diagnosis    Breast cancer metastasized to bone    Neuropathy    Fatigue associated with anemia    Drug-related hair loss    Nausea    Primary insomnia    Foot infection    Cancer, metastatic to bone    Malignant neoplasm of overlapping 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, estrogen receptor positive     Chronic fatigue    Diverticulosis    Dehydration    Hypoxemia    Iron deficiency anemia secondary to inadequate dietary iron intake    Swelling of both upper extremities    Hypoalbuminemia        Benita Carballo is a 74 y.o. female with PMH of  The encounter diagnosis was Malignant neoplasm of overlapping 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, estrogen receptor positive ., with Malignant neoplasm of overlapping 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, estrogen receptor positive [C50.812, Z17.0]       Metastatic breast cancer ERPR positive HER2 Noah questionable  Status post of chemotherapy and HER2 targeted therapy with progression on HER2 targeted therapy  Recent testing fish negative for her 2 2018  Repeat the lipid biopsy 2019  shows HER2 positive and ER negative  Bone metastatic disease  Fatigue  Hypoxia  Dehydration    Plan:   Overall Plan:    She has HER2 positive disease with ERPR negative by the new liquid biopsy. Started Taxol/Herceptin 2/2020  Discussed ECHO with pt, showing right heart failure   MUGA ordered  Hercpetin on hold until muga results  Lasix x 3 days with 4 lbs wt loss  Compression sleeve for dependant upper ext swelling  Refer to cardiology.       Rosalind Ricks NP

## 2020-03-26 NOTE — TELEPHONE ENCOUNTER
----- Message from Renate Carey sent at 3/26/2020  3:00 PM CDT -----  Contact: patient  Type:  RX Refill Request    Who Called: patient  Refill or New Rx:refill  RX Name and Strength:Oxycodone 10mg  How is the patient currently taking it? (ex. 1XDay):every 6 hours  Is this a 30 day or 90 day RX:30day  Preferred Pharmacy with phone number:  [id number] - [place], [place] - [address] [facility] [address]  [address] [facility] [address]  [address]  [facility] [address]  Phone: [phone] Fax: [phone]  Local or Mail Order:local  Ordering Provider:Rosalind Ricks  Would the patient rather a call back or a response via [facility]? Call back  Best Call Back Number:[phone]  Additional Information: n/a

## 2020-04-01 LAB
ANISOCYTOSIS: ABNORMAL
BANDS: 1 % (ref 0–5)
CELLS COUNTED: 100
EOSINOPHIL NFR BLD: 8 % (ref 1–3)
LYMPHOCYTES NFR BLD: 6 % (ref 25–40)
METAMYELOCYTES # BLD MANUAL: 1 % (ref 0–0)
MONOCYTES NFR BLD MANUAL: 4 % (ref 1–15)
NEUTROPHILS ABSOLUTE COUNT: 7.2 10*3/UL (ref 1.8–7.7)
NEUTROPHILS NFR BLD: 80 % (ref 37–80)
SMALL PLATELETS BLD QL SMEAR: ADEQUATE

## 2020-04-02 ENCOUNTER — OFFICE VISIT (OUTPATIENT)
Dept: HEMATOLOGY/ONCOLOGY | Facility: CLINIC | Age: 75
End: 2020-04-02
Payer: MEDICARE

## 2020-04-02 VITALS
OXYGEN SATURATION: 98 % | HEART RATE: 82 BPM | BODY MASS INDEX: 18.16 KG/M2 | HEIGHT: 66 IN | SYSTOLIC BLOOD PRESSURE: 111 MMHG | DIASTOLIC BLOOD PRESSURE: 70 MMHG | RESPIRATION RATE: 18 BRPM | TEMPERATURE: 98 F | WEIGHT: 113 LBS

## 2020-04-02 DIAGNOSIS — Z17.0 MALIGNANT NEOPLASM OF OVERLAPPING SITES OF LEFT BREAST IN FEMALE, ESTROGEN RECEPTOR POSITIVE: Primary | ICD-10-CM

## 2020-04-02 DIAGNOSIS — C79.51 CANCER, METASTATIC TO BONE: ICD-10-CM

## 2020-04-02 DIAGNOSIS — C50.812 MALIGNANT NEOPLASM OF OVERLAPPING SITES OF LEFT BREAST IN FEMALE, ESTROGEN RECEPTOR POSITIVE: Primary | ICD-10-CM

## 2020-04-02 PROCEDURE — 99214 OFFICE O/P EST MOD 30 MIN: CPT | Mod: S$GLB,,, | Performed by: NURSE PRACTITIONER

## 2020-04-02 PROCEDURE — 99214 PR OFFICE/OUTPT VISIT, EST, LEVL IV, 30-39 MIN: ICD-10-PCS | Mod: S$GLB,,, | Performed by: NURSE PRACTITIONER

## 2020-04-02 RX ORDER — HEPARIN 100 UNIT/ML
500 SYRINGE INTRAVENOUS
Status: CANCELLED | OUTPATIENT
Start: 2020-04-02

## 2020-04-02 RX ORDER — SODIUM CHLORIDE 0.9 % (FLUSH) 0.9 %
10 SYRINGE (ML) INJECTION
Status: CANCELLED | OUTPATIENT
Start: 2020-04-02

## 2020-04-02 RX ORDER — FAMOTIDINE 10 MG/ML
20 INJECTION INTRAVENOUS
Status: CANCELLED | OUTPATIENT
Start: 2020-04-02

## 2020-04-02 RX ORDER — EPINEPHRINE 0.3 MG/.3ML
0.3 INJECTION SUBCUTANEOUS ONCE AS NEEDED
Status: CANCELLED | OUTPATIENT
Start: 2020-04-02

## 2020-04-02 RX ORDER — DIPHENHYDRAMINE HYDROCHLORIDE 50 MG/ML
50 INJECTION INTRAMUSCULAR; INTRAVENOUS ONCE AS NEEDED
Status: CANCELLED | OUTPATIENT
Start: 2020-04-02

## 2020-04-02 NOTE — PROGRESS NOTES
Medical Oncology Progress Note  [facility]     PATIENT: Benita Carballo  : 1945 74 y.o. female  MRN [id number]     PCP: Solomon Contreras MD  Consult Requested By:      Date of Service: 2020    Subjective:   Interim History:  Malignant Neoplasm Of The Peritoneum    Benita Carballo is here for to followup breast cancer treatment.    The patient was started on  chemotherapy last week with Herceptin and Taxol weekly.  She has chronic hypoxia and her oxygen saturation has been declining today her O2 status 77% she looks purple and she also has increasing degree of short of breath; in addition she complains of swelling to her left arm.  She had no chest pain. She was admittted last week to the hospital and underwent thoracentesis, which was not tested for cytology and imaging for possible PE was ruled out. Today she states she is feeling better but POX was 82 % on RA, and still has yosi upper ext swelling which she states has improved. She has not had a echo so will order ECHO stat to r/o any CHF which could be causing her swelling both lung and upper ext. States she had upper u/s with her pulm which was neg for DVT.     3/26/20   She returns with ECHO showing EF 55-60%, but right sided heart failure noted. Pt has an additional 12 # of wt gain since last week. She states she is feeling fine, better than at last visit. Discussed ECHO and will hold herceptin and obtain MUGA, will proceed with weekly taxol and possibly will add herceptin back after MUGA completed and will refer pt to cardiology, but could be delayed due to COVID pandemic.     20  Today she presents with significant fluid loss noted. No longer upper ext third space swelling noted, alb and T Pro dramatically improved, pt has last 10 + lbs of fluid. She is breathing easy today and denies any SOB or ORELLANA. We will proceed with weekly taxol and continue to hold herceptin until MUGA or cardiology clearance is obtained.       Oncology History:  Oncology History    2002 Rt breast cancer s/p lump with chemo, X[place] in [place] Tamoxifen x 1 yr, then Arimidex x 5 yrs. Completed tx 2008.     2015  c/o chronic cough with CT chest 1/28/16 showing extensive adenopathy  With base of lt neck mass 4.5 cm with yosi pulm nodules. RF to Dr ANDREWS Mcnally    2/6/16 bx of lt neck mass carcinoma with feature suggestive of breast primary. ER/%, Her2 borderline by IHC and deemed equivocal by FISH          Breast cancer metastasized to bone    2/4/2016 Initial Diagnosis     Breast cancer metastasized to bone, , lymphoid mass       5/25/2016 - 2/9/2017 Chemotherapy     Taxol/Herceptin x 8 cycles then perjeta added 7/16  Prescribed by Dr Mcnally       2/20/2017 - 6/17/2017 Chemotherapy     Kadcycla prescribed by Dr Mcnally       6/28/2017 Biopsy     Biopsy of rt abd met disease consistent with breast cancer. ER/MO + Her 2 neg per Dr Moreno's note.    Dr Moreno noted path review from bx 2/16 showed HER 2 + equivocal with FISH reading as Equivocal but ratio 1.2. Additional report form 2016 states additional testing was done with different probe and the equivocal results can be noted as negative.     Treatment therapy changed from Her2 based tx to endocrine base therapy by Dr Moreno       6/28/2017 -  Hormone Therapy     Arimidex 6/17-2/18  Aromasin 2/18  Ibrance + Aromasin 5/18 -1/19  Ibrance + faslodex 2/19 4/30/2019 Imaging Significant Findings     PET/CT: Lesions involving the medial clavicle , subcarinal LN, pleural based soft tissue density adjacent to T11 and lesion in left lobe of liver diminished in size and SUV.   RECIST 34% decrease in disease      2/17/2020 -  Chemotherapy     Treatment Summary   Plan Name: OP BREAST TRASTUZUMAB PACLITAXEL QW  Treatment Goal: Control  Status: Active  Start Date: 2/26/2020  End Date: 5/28/2020 (Planned)  Provider: Solomon Contreras MD  Chemotherapy: PACLitaxel (TAXOL) 64 mg/m2 = 102 mg in sodium chloride 0.9% 250 mL chemo infusion,  64 mg/m2 = 102 mg (100 % of original dose 64 mg/m2), Intravenous, Clinic/HOD 1 time, 3 of 12 cycles  Dose modification: 64 mg/m2 (original dose 64 mg/m2, Cycle 1)  trastuzumab 211 mg in sodium chloride 0.9% 250 mL chemo infusion, 4 mg/kg = 211 mg, Intravenous, Clinic/HOD 1 time, 2 of 2 cycles       ==8/2019 Now She is currently on combined afinitor + aromasin,  repeated CT scan shows improvement  PET 1/2020 showed PD,  afinitor + aromasin stopped  == PET scan 01/20/2020 the progressive  ==Started Taxol/Herceptin weekly 2/2020    Past Medical History:   Past Medical History:   Diagnosis Date    Arthritis     Bone cancer     Breast cancer     Cancer     Cataract     Depression     Diverticulosis 1/15/2020    Fatigue associated with anemia 6/6/2019    GERD (gastroesophageal reflux disease)     High cholesterol     Hypoxemia 3/12/2020    Skin problem     Sleep disorder        Past Surgical HIstory:   Past Surgical History:   Procedure Laterality Date    APPENDECTOMY      BREAST LUMPECTOMY Right 2002    CATARACT EXTRACTION, BILATERAL  2014    COLONOSCOPY  2008    HYSTERECTOMY      PORTACATH PLACEMENT  05/18/2016    SURGICAL REMOVAL OF NODULE OF VOCAL CORD         Allergies:  Review of patient's allergies indicates:   Allergen Reactions    Benadryl [diphenhydramine hcl]      Restless leg     Medrol [methylprednisolone] Hallucinations    Penicillins        Medications:  Current Outpatient Medications   Medication Sig Dispense Refill    ANORO ELLIPTA 62.5-25 mcg/actuation DsDv       dexAMETHasone 0.5 mg/5 mL Soln SWISH WITH 10 ML PO FOR 2 MIN THEN SPIT QID      exemestane (AROMASIN) 25 mg tablet Take 1 tablet (25 mg total) by mouth once daily. 30 tablet 3    furosemide (LASIX) 40 MG tablet Take 1 tablet (40 mg total) by mouth once daily. 30 tablet 11    gabapentin (NEURONTIN) 400 MG capsule Take 1 capsule (400 mg total) by mouth 3 (three) times daily. 120 capsule 4    HYDROcodone-acetaminophen  (NORCO)  mg per tablet Take 1 tablet by mouth every 6 (six) hours as needed for Pain. 30 tablet 0    iron-vitamin C 100-250 mg, ICAR-C, (ICAR-C) 100-250 mg Tab Take 1 tablet by mouth once daily. 30 tablet 5    LORazepam (ATIVAN) 1 MG tablet TAKE 1 TABLET BY MOUTH EVERY EVENING 30 tablet 0    megestrol (MEGACE) 400 mg/10 mL (40 mg/mL) Susp Take 10 mLs (400 mg total) by mouth once daily. 240 mL 2    oxyCODONE (OXYCONTIN) 10 mg 12 hr tablet Take 1 tablet (10 mg total) by mouth every 12 (twelve) hours as needed for Pain. 60 tablet 0    pantoprazole (PROTONIX) 40 MG tablet Take 1 tablet (40 mg total) by mouth once daily. 30 tablet 3    potassium chloride SA (K-DUR,KLOR-CON) 20 MEQ tablet Take 1 tablet (20 mEq total) by mouth once daily. 30 tablet 11    PROAIR HFA 90 mcg/actuation inhaler       promethazine (PHENERGAN) 25 MG tablet Take 1 tablet (25 mg total) by mouth every 6 (six) hours as needed for Nausea. 30 tablet 3    temazepam (RESTORIL) 15 mg Cap TAKE 1 CAPSULE BY MOUTH ONCE EVERY NIGHT AT BEDTIME AS NEEDED 30 capsule 0     No current facility-administered medications for this visit.        Family History:   Family History   Problem Relation Age of Onset    Lung cancer Father     Melanoma Father        Social History:  reports that she has been smoking cigarettes. She has a 2.00 pack-year smoking history. She has never used smokeless tobacco. She reports that she drinks about 4.0 standard drinks of alcohol per week. She reports that she does not use drugs.    Review of Systemas  Constitutional: No change in weight, appetie, fatigue, fever, or night sweats  Eyes: No changes in vision  Ears, Nose, Mouth, Throat, and Face: No hearing problems, ear pain, rummy nose, or sore throat  Respiratory: No shortness of breath or cough  Cardiovascular: No chest pain, palpitations or dizziness  Gastrointestinal: No abdominal pain, hematochezia, melena  Genitourinary: No dysuria, hematuria, nocturia, menstrual  "problems, post menopausal  Integumentary/Breast: No rashes or itching  Hematologic/Lymphatic: No anemia or bleeding abnormalities  Musculoskeletal: No joint or muscle abnormalities  Neurological: No sensory or motor problems, no headaches  Behavioral/Psych: No depression, anxiety, cognitive problems, or stress  Endocrine: No diabetes or thyroid problems  Allergic/Immunologic: See allergy above    Physical Exam      Vitals:   Vitals:    04/02/20 1003   BP: 111/70   BP Location: Left arm   Patient Position: Sitting   BP Method: Small (Automatic)   Pulse: 82   Resp: 18   Temp: 98.3 °F (36.8 °C)   TempSrc: Oral   SpO2: 98%   Weight: 51.3 kg (113 lb)   Height: 5' 6" (1.676 m)     BMI: Body mass index is 18.24 kg/m².  BSA Body surface area is 1.55 meters squared.  ECOG Performance Status:   ECOG SCORE         GENERAL APPEARANCE: Well developed, well nourished, in no acute distress.  SKIN: Inspection of the skin reveals no rashes, ulcerations or petechiae.  HEENT: The sclerae were anicteric and conjunctivae were pink and moist. Extraocular movements were intact and pupils were equal, round, and reactive to light with normal accommodation. External inspection of the ears and nose showed no scars, lesions, or masses. Lips, teeth, and gums showed normal mucosa. The oral mucosa, hard and soft palate, tongue and posterior pharynx were normal.  NECK: Supple and symmetric. There was no thyroid enlargement, and no tenderness, or masses were felt.  CRESPIRATORY: Normal AP diameter and normal contour without any kyphoscoliosis. LUNGS: Auscultation of the lungs revealed normal breath sounds without any other adventitious sounds or rubs.  CARDIOVASCULAR: There was a regular rate and rhythm without any murmurs, gallops, rubs. The carotid pulses were normal and 2+ bilaterally without bruits. Peripheral pulses were 2+ and symmetric.  GASTROINTESTINAL: Soft and nontender with normal bowel sounds. The liver span was approximately 5-6 cm in " the right midclavicular line by percussion. The liver edge was nontender. The spleen was not palpable. There were no inguinal or umbilical hernias noted. No ascites was noted.  LYMPH NODES: No lymphadenopathy was appreciated in the neck, axillae or groin.  MUSCULOSKELETAL: Gait was normal. There was no tenderness or effusions noted. Muscle strength and tone were normal. EXTREMITIES: No cyanosis, clubbing or edema.  NEUROLOGIC: Alert and oriented x 3. Normal affect. Gait was normal. Normal deep tendon reflexes with no pathological reflexes. Sensation to touch was normal.  PSYCHIATRIC: good mood, orientated to place, time and person     Labs and Imagings     Orders Only on 04/01/2020   Component Date Value Ref Range Status    Neutrophils 04/01/2020 80.0  37 - 80 % Final    Neutrophils Absolute 04/01/2020 7.2  1.8 - 7.7 10*3/uL Final    BANDS 04/01/2020 1.0  0 - 5 % Final    Lymphocytes 04/01/2020 6.0* 25 - 40 % Final    Monocytes 04/01/2020 4.0  1 - 15 % Final    Eosinophils 04/01/2020 8.0* 1 - 3 % Final    Metamyelocytes 04/01/2020 1.0* 0 - 0 % Final    Cells Counted 04/01/2020 100   Final    Platelet Estimate 04/01/2020 Adequate   Final    Anisocytosis 04/01/2020 1+   Final   Ancillary Orders on 03/27/2020   Component Date Value Ref Range Status    WBC 04/01/2020 8.9  4.6 - 10.2 10*3/uL Final    RBC 04/01/2020 4.52  4.2 - 5.4 10*6/uL Final    Hemoglobin 04/01/2020 11.3* 12.0 - 16.0 g/dL Final    Hematocrit 04/01/2020 36.3* 37.0 - 47.0 % Final    MCV 04/01/2020 80.3  80 - 97 fL Final    MCH 04/01/2020 25.0* 27.0 - 31.2 pg Final    MCHC 04/01/2020 31.1* 31.8 - 35.4 g/dL Final    RDW RBC Auto-Rto 04/01/2020 22.9* 12.5 - 18.0 % Final    Platelets 04/01/2020 331  142 - 424 10*3/uL Final    Manual nRBC per 100 Cells 04/01/2020 0.0  % Final    Sodium 04/01/2020 137  135 - 145 mmol/L Final    Potassium 04/01/2020 5.0  3.6 - 5.2 mmol/L Final    Chloride 04/01/2020 101  100 - 108 mmol/L Final    CO2  04/01/2020 31  21 - 32 mmol/L Final    BUN, Bld 04/01/2020 7  7 - 18 mg/dL Final    Creatinine 04/01/2020 0.83  0.55 - 1.02 mg/dL Final    GFR ESTIMATION 04/01/2020 > 60  >60 Final               DESCRIPTION       GLOMERULAR FILTRATION RATE             NORMAL                     >60             KIDNEY  DISEASE           15-60             KIDNEY FAILURE             <15    BUN/Creatinine Ratio 04/01/2020 8.43  7 - 18 Ratio Final    Glucose 04/01/2020 89  70 - 110 mg/dL Final    Calcium 04/01/2020 9.1  8.8 - 10.5 mg/dL Final    Total Bilirubin 04/01/2020 0.5  0.0 - 1.0 mg/dL Final    AST 04/01/2020 21  15 - 37 U/L Final    ALT 04/01/2020 24  12 - 78 U/L Final    Total Protein 04/01/2020 6.5  6.4 - 8.2 g/dL Final    Albumin 04/01/2020 3.1* 3.4 - 5.0 g/dL Final    Globulin 04/01/2020 3.4  2.3 - 3.5 g/dL Final    Albumin/Globulin Ratio 04/01/2020 0.911* 1.1 - 1.8 Ratio Final    Alkaline Phosphatase 04/01/2020 71  46 - 116 U/L Final    Magnesium 04/01/2020 1.9  1.8 - 2.4 mg/dL Final   Ancillary Orders on 03/20/2020   Component Date Value Ref Range Status    WBC 03/25/2020 14.5* 4.6 - 10.2 10*3/uL Final    RBC 03/25/2020 4.39  4.2 - 5.4 10*6/uL Final    Hemoglobin 03/25/2020 10.8* 12.0 - 16.0 g/dL Final    Hematocrit 03/25/2020 35.0* 37.0 - 47.0 % Final    MCV 03/25/2020 79.7* 80 - 97 fL Final    MCH 03/25/2020 24.6* 27.0 - 31.2 pg Final    MCHC 03/25/2020 30.9* 31.8 - 35.4 g/dL Final    RDW RBC Auto-Rto 03/25/2020 23.0* 12.5 - 18.0 % Final    Platelets 03/25/2020 433* 142 - 424 10*3/uL Final    Manual nRBC per 100 Cells 03/25/2020 0.0  % Final    Sodium 03/25/2020 143  135 - 145 mmol/L Final    Potassium 03/25/2020 4.4  3.6 - 5.2 mmol/L Final    Chloride 03/25/2020 104  100 - 108 mmol/L Final    CO2 03/25/2020 34* 21 - 32 mmol/L Final    BUN, Bld 03/25/2020 8  7 - 18 mg/dL Final    Creatinine 03/25/2020 0.90  0.55 - 1.02 mg/dL Final    GFR ESTIMATION 03/25/2020 > 60  >60 Final                DESCRIPTION       GLOMERULAR FILTRATION RATE             NORMAL                     >60             KIDNEY  DISEASE           15-60             KIDNEY FAILURE             <15    BUN/Creatinine Ratio 03/25/2020 8.88  7 - 18 Ratio Final    Glucose 03/25/2020 91  70 - 110 mg/dL Final    Calcium 03/25/2020 8.6* 8.8 - 10.5 mg/dL Final    Total Bilirubin 03/25/2020 0.3  0.0 - 1.0 mg/dL Final    AST 03/25/2020 16  15 - 37 U/L Final    ALT 03/25/2020 18  12 - 78 U/L Final    Total Protein 03/25/2020 6.1* 6.4 - 8.2 g/dL Final    Albumin 03/25/2020 2.5* 3.4 - 5.0 g/dL Final    Globulin 03/25/2020 3.6* 2.3 - 3.5 g/dL Final    Albumin/Globulin Ratio 03/25/2020 0.694* 1.1 - 1.8 Ratio Final    Alkaline Phosphatase 03/25/2020 80  46 - 116 U/L Final    Magnesium 03/25/2020 1.9  1.8 - 2.4 mg/dL Final    Neutrophils 03/25/2020 84.0* 37 - 80 % Final    Neutrophils Absolute 03/25/2020 12.3* 1.8 - 7.7 10*3/uL Final    BANDS 03/25/2020 1.0  0 - 5 % Final    Lymphocytes 03/25/2020 7.0* 25 - 40 % Final    Monocytes 03/25/2020 4.0  1 - 15 % Final    Eosinophils 03/25/2020 3.0  1 - 3 % Final    Basophils 03/25/2020 1.0  0 - 3 % Final    Cells Counted 03/25/2020 100   Final    Platelet Estimate 03/25/2020 Adequate   Final    RBC Morphology 03/25/2020 ABNORMAL   Final    Anisocytosis 03/25/2020 3+   Final    Hypochromia 03/25/2020 1+   Final   Orders Only on 03/18/2020   Component Date Value Ref Range Status    Neutrophils 03/18/2020 62.0  37 - 80 % Final    Neutrophils Absolute 03/18/2020 5.7  1.8 - 7.7 10*3/uL Final    BANDS 03/18/2020 4.0  0 - 5 % Final    Lymphocytes 03/18/2020 8.0* 25 - 40 % Final    Monocytes 03/18/2020 20.0* 1 - 15 % Final    Eosinophils 03/18/2020 1.0  1 - 3 % Final    Metamyelocytes 03/18/2020 5.0* 0 - 0 % Final    Cells Counted 03/18/2020 100   Final    Platelet Estimate 03/18/2020 Adequate   Final    Platelet Clumps 03/18/2020 1+   Final    Anisocytosis 03/18/2020 1+   Final     Microcytes 03/18/2020 1+   Final   Ancillary Orders on 03/13/2020   Component Date Value Ref Range Status    WBC 03/18/2020 8.7  4.6 - 10.2 10*3/uL Final    RBC 03/18/2020 4.39  4.2 - 5.4 10*6/uL Final    Hemoglobin 03/18/2020 10.7* 12.0 - 16.0 g/dL Final    Hematocrit 03/18/2020 34.2* 37.0 - 47.0 % Final    MCV 03/18/2020 77.9* 80 - 97 fL Final    MCH 03/18/2020 24.4* 27.0 - 31.2 pg Final    MCHC 03/18/2020 31.3* 31.8 - 35.4 g/dL Final    RDW RBC Auto-Rto 03/18/2020 21.9* 12.5 - 18.0 % Final    Platelets 03/18/2020 250  142 - 424 10*3/uL Final    Manual nRBC per 100 Cells 03/18/2020 0.0  % Final    Sodium 03/18/2020 135  135 - 145 mmol/L Final    Potassium 03/18/2020 3.7  3.6 - 5.2 mmol/L Final    Chloride 03/18/2020 102  100 - 108 mmol/L Final    CO2 03/18/2020 26  21 - 32 mmol/L Final    BUN, Bld 03/18/2020 10  7 - 18 mg/dL Final    Creatinine 03/18/2020 0.91  0.55 - 1.02 mg/dL Final    GFR ESTIMATION 03/18/2020 60  >60 Final               DESCRIPTION       GLOMERULAR FILTRATION RATE             NORMAL                     >60             KIDNEY  DISEASE           15-60             KIDNEY FAILURE             <15    BUN/Creatinine Ratio 03/18/2020 10.98  7 - 18 Ratio Final    Glucose 03/18/2020 112* 70 - 110 mg/dL Final    Calcium 03/18/2020 8.1* 8.8 - 10.5 mg/dL Final    Total Bilirubin 03/18/2020 0.8  0.0 - 1.0 mg/dL Final    AST 03/18/2020 15  15 - 37 U/L Final    ALT 03/18/2020 19  12 - 78 U/L Final    Total Protein 03/18/2020 5.8* 6.4 - 8.2 g/dL Final    Albumin 03/18/2020 2.3* 3.4 - 5.0 g/dL Final    Globulin 03/18/2020 3.5  2.3 - 3.5 g/dL Final    Albumin/Globulin Ratio 03/18/2020 0.657* 1.1 - 1.8 Ratio Final    Alkaline Phosphatase 03/18/2020 56  46 - 116 U/L Final    Magnesium 03/18/2020 1.7* 1.8 - 2.4 mg/dL Final   Orders Only on 03/11/2020   Component Date Value Ref Range Status    RBC Morphology 03/11/2020 ABNORMAL   Final    Anisocytosis 03/11/2020 2+   Final     Hypochromia 2020 1+   Final   Ancillary Orders on 2020   Component Date Value Ref Range Status    CANCER ANTIGEN 27.29 2020 144.5* 0.0 - 40.0 U/mL Final    Comment: INTERPRETIVE INFORMATION: Cancer Antigen 27.29The CA 27.29 assay is intended for use in monitorin) diseaseprogression and/or response to therapy in patients withmetastatic disease, and 2) disease recurrence in patientstreated previously for stages II   or III breast carcinoma who areclinically free of the disease. Serial testing in patients whoare clinically free of disease should be used in conjunctionwith other clinical methods for early detection of cancerrecurrence.Limitations: Patients with   confirmed breast carcinoma frequentlyhave CA 27.29 assay values in the same range as healthyindividuals.  Elevations may also be observed in patients withnon malignant disease. Results of this test must always beinterpreted in the context of morphologic   and other relevantdata, and should not be used alone for a diagnosis of malignancy.Methodology: Siemens Advia Centaur CA 27.29 chemiluminescentimmunoassay was used. Results obtained with different assaymethods or kits cannot be used   in                           terchangeably.Performed by [facility],[address] [place] [id number] [phone], Sharif Leon MD, Lab. Director      CEA 2020 9.9* 0.0 - 3.0 ng/mL Final    Comment: INTERPRETIVE INFORMATION: Carcinoembryonic AntigenThe Roche CEA electrochemiluminescent immunoassay was used.Results obtained with different test methods or kits cannot beused interchangeably. Measurement of CEA has been shown to beclinically relevant in   the management of patients withcolorectal, breast, lung, prostatic, pancreatic, and ovariancarcinomas. Smokers may have slightly elevated levels of CEA.The CEA assay value, regardless of level, should not beinterpreted as evidence for the presence or   absence of malignantdisease and  is not recommended for use as a screening procedureto detect the presence of cancer in the general population.Performed by [facility],[address] [facility] [place] [address] [phone], Sharif Leon MD,   Lab. Director      Cancer Antigen 15-3 03/11/2020 92* 0 - 31 U/mL Final    Comment: INTERPRETIVE INFORMATION: Cancer Antigen-Breast ()The Roche CA 15-3 electrochemiluminescent immunoassay was used.Results obtained with different methods or kits cannot be usedinterchangeably. The CA 15-3 test is used to aid in themanagement of   Stage II and III breast cancer patients. Serialtesting for patient CA 15-3 values should be used in conjunctionwith other clinical methods for monitoring breast cancer.Patients with confirmed breast carcinoma frequently have CA 15-3values in the same   range as healthy individuals. Elevations maybe observed in patients with nonmalignant disease. Therefore, Chelsie 15-3 value, regardless of level, should not be interpreted asabsolute evidence of the presence or absence of malignantdisease.Performed by [facility],[address] [facility] [address] [phone], Sharif Leon MD, Lab. Director     Office Visit on 03/05/2020   Component Date Value Ref Range Status    WBC 03/11/2020 5.4  4.6 - 10.2 10*3/uL Final    RBC 03/11/2020 4.61  4.2 - 5.4 10*6/uL Final    Hemoglobin 03/11/2020 10.9* 12.0 - 16.0 g/dL Final    Hematocrit 03/11/2020 36.0* 37.0 - 47.0 % Final    MCV 03/11/2020 78.1* 80 - 97 fL Final    MCH 03/11/2020 23.6* 27.0 - 31.2 pg Final    MCHC 03/11/2020 30.3* 31.8 - 35.4 g/dL Final    RDW RBC Auto-Rto 03/11/2020 21.2* 12.5 - 18.0 % Final    Platelets 03/11/2020 317  142 - 424 10*3/uL Final    Neutrophils/100 leukocytes 03/11/2020 65.3  37 - 80 % Final    Lymphocytes/100 leukocytes 03/11/2020 16.8* 25 - 40 % Final    Monocytes/100 leukocytes 03/11/2020 8.0  1 - 15 % Final    Eosinophils/100 leukocytes 03/11/2020 1.5  1 - 3 % Final     Basophils/100 leukocytes 03/11/2020 1.3  0 - 3 % Final    Manual nRBC per 100 Cells 03/11/2020 0.4  % Final    Neutrophils 03/11/2020 3.49  1.8 - 7.7 10*3/uL Final    Sodium 03/11/2020 138  135 - 145 mmol/L Final    Potassium 03/11/2020 3.7  3.6 - 5.2 mmol/L Final    Chloride 03/11/2020 103  100 - 108 mmol/L Final    CO2 03/11/2020 30  21 - 32 mmol/L Final    BUN, Bld 03/11/2020 10  7 - 18 mg/dL Final    Creatinine 03/11/2020 0.93  0.55 - 1.02 mg/dL Final    GFR ESTIMATION 03/11/2020 59* >60 Final               DESCRIPTION       GLOMERULAR FILTRATION RATE             NORMAL                     >60             KIDNEY  DISEASE           15-60             KIDNEY FAILURE             <15    BUN/Creatinine Ratio 03/11/2020 10.75  7 - 18 Ratio Final    Glucose 03/11/2020 95  70 - 110 mg/dL Final    Calcium 03/11/2020 8.9  8.8 - 10.5 mg/dL Final    Total Bilirubin 03/11/2020 0.6  0.0 - 1.0 mg/dL Final    AST 03/11/2020 14* 15 - 37 U/L Final    ALT 03/11/2020 16  12 - 78 U/L Final    Total Protein 03/11/2020 5.8* 6.4 - 8.2 g/dL Final    Albumin 03/11/2020 2.6* 3.4 - 5.0 g/dL Final    Globulin 03/11/2020 3.2  2.3 - 3.5 g/dL Final    Albumin/Globulin Ratio 03/11/2020 0.812* 1.1 - 1.8 Ratio Final    Alkaline Phosphatase 03/11/2020 77  46 - 116 U/L Final    Magnesium 03/11/2020 1.7* 1.8 - 2.4 mg/dL Final   Orders Only on 03/04/2020   Component Date Value Ref Range Status    Cancer Antigen 15-3 03/04/2020 85* 0 - 31 U/mL Final    Comment: INTERPRETIVE INFORMATION: Cancer Antigen-Breast ()The Roche CA 15-3 electrochemiluminescent immunoassay was used.Results obtained with different methods or kits cannot be usedinterchangeably. The CA 15-3 test is used to aid in themanagement of   Stage II and III breast cancer patients. Serialtesting for patient CA 15-3 values should be used in conjunctionwith other clinical methods for monitoring breast cancer.Patients with confirmed breast carcinoma frequently have  CA 15-3values in the same   range as healthy individuals. Elevations maybe observed in patients with nonmalignant disease. Therefore, Chelsie 15-3 value, regardless of level, should not be interpreted asabsolute evidence of the presence or absence of malignantdisease.Performed by [facility],[address] [facility], [place] [address] [phone], Sharif Leon MD, Lab. Director     Orders Only on 03/04/2020   Component Date Value Ref Range Status    CEA 03/04/2020 10.5* 0.0 - 3.0 ng/mL Final    Comment: INTERPRETIVE INFORMATION: Carcinoembryonic AntigenThe Roche CEA electrochemiluminescent immunoassay was used.Results obtained with different test methods or kits cannot beused interchangeably. Measurement of CEA has been shown to beclinically relevant in   the management of patients withcolorectal, breast, lung, prostatic, pancreatic, and ovariancarcinomas. Smokers may have slightly elevated levels of CEA.The CEA assay value, regardless of level, should not beinterpreted as evidence for the presence or   absence of malignantdisease and is not recommended for use as a screening procedureto detect the presence of cancer in the general population.Performed by [facility],[address] [place] [place] [address] [phone], Sharif Leon MD,   Lab. Director     There may be more visits with results that are not included.       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, estrogen receptor positive [C50.812, Z17.0]   Co-morbidity/Active Problems:   Patient Active Problem List   Diagnosis    Breast cancer metastasized to bone    Neuropathy    Fatigue associated with anemia    Drug-related hair loss    Nausea    Primary insomnia    Foot infection    Cancer, metastatic to bone    Malignant neoplasm of overlapping 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, estrogen receptor positive     Chronic fatigue    Diverticulosis    Dehydration     Hypoxemia    Iron deficiency anemia secondary to inadequate dietary iron intake    Swelling of both upper extremities    Hypoalbuminemia        Benita Carballo is a 74 y.o. female with PMH of The primary encounter diagnosis was Malignant neoplasm of overlapping 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, estrogen receptor positive . A diagnosis of Cancer, metastatic to bone was also pertinent to this visit., with Malignant neoplasm of overlapping 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, estrogen receptor positive [C50.812, Z17.0]       Metastatic breast cancer ERPR positive HER2 Noah questionable  Status post of chemotherapy and HER2 targeted therapy with progression on HER2 targeted therapy  Recent testing fish negative for her 2 2018  Repeat the lipid biopsy 2019  shows HER2 positive and ER negative  Bone metastatic disease  Fatigue  Hypoxia  Dehydration    Plan:   Overall Plan:    She has HER2 positive disease with ERPR negative by the new liquid biopsy. Started Taxol/Herceptin 2/2020  Discussed ECHO with pt, showing right heart failure   MUGA ordered  Hercpetin on hold until muga results  Lasix x 3 days with 10 lbs wt loss, p is instructed to use prn  Compression sleeve for dependant upper ext swelling  Refer to cardiology.   Ok to proceed with weekly taxol.     Rosalind Ricks NP

## 2020-04-08 DIAGNOSIS — F51.01 PRIMARY INSOMNIA: ICD-10-CM

## 2020-04-09 ENCOUNTER — OFFICE VISIT (OUTPATIENT)
Dept: HEMATOLOGY/ONCOLOGY | Facility: CLINIC | Age: 75
End: 2020-04-09
Payer: MEDICARE

## 2020-04-09 VITALS
HEART RATE: 88 BPM | BODY MASS INDEX: 18.24 KG/M2 | RESPIRATION RATE: 16 BRPM | DIASTOLIC BLOOD PRESSURE: 66 MMHG | SYSTOLIC BLOOD PRESSURE: 118 MMHG | OXYGEN SATURATION: 93 % | HEIGHT: 66 IN

## 2020-04-09 DIAGNOSIS — C50.812 MALIGNANT NEOPLASM OF OVERLAPPING SITES OF LEFT BREAST IN FEMALE, ESTROGEN RECEPTOR POSITIVE: ICD-10-CM

## 2020-04-09 DIAGNOSIS — C79.51 CANCER, METASTATIC TO BONE: ICD-10-CM

## 2020-04-09 DIAGNOSIS — Z17.0 MALIGNANT NEOPLASM OF OVERLAPPING SITES OF LEFT BREAST IN FEMALE, ESTROGEN RECEPTOR POSITIVE: ICD-10-CM

## 2020-04-09 DIAGNOSIS — I50.813 ACUTE ON CHRONIC RIGHT-SIDED HEART FAILURE: Primary | ICD-10-CM

## 2020-04-09 LAB
ANISOCYTOSIS: NORMAL
HYPOCHROMIA BLD QL SMEAR: NORMAL

## 2020-04-09 PROCEDURE — 99214 OFFICE O/P EST MOD 30 MIN: CPT | Mod: S$GLB,,, | Performed by: NURSE PRACTITIONER

## 2020-04-09 PROCEDURE — 99214 PR OFFICE/OUTPT VISIT, EST, LEVL IV, 30-39 MIN: ICD-10-PCS | Mod: S$GLB,,, | Performed by: NURSE PRACTITIONER

## 2020-04-09 RX ORDER — HEPARIN 100 UNIT/ML
500 SYRINGE INTRAVENOUS
Status: CANCELLED | OUTPATIENT
Start: 2020-04-09

## 2020-04-09 RX ORDER — EPINEPHRINE 0.3 MG/.3ML
0.3 INJECTION SUBCUTANEOUS ONCE AS NEEDED
Status: CANCELLED | OUTPATIENT
Start: 2020-04-09

## 2020-04-09 RX ORDER — TEMAZEPAM 15 MG/1
CAPSULE ORAL
Qty: 30 CAPSULE | Refills: 0 | Status: SHIPPED | OUTPATIENT
Start: 2020-04-09 | End: 2020-05-08 | Stop reason: SDUPTHER

## 2020-04-09 RX ORDER — FAMOTIDINE 10 MG/ML
20 INJECTION INTRAVENOUS
Status: CANCELLED | OUTPATIENT
Start: 2020-04-09

## 2020-04-09 RX ORDER — DIPHENHYDRAMINE HYDROCHLORIDE 50 MG/ML
50 INJECTION INTRAMUSCULAR; INTRAVENOUS ONCE AS NEEDED
Status: CANCELLED | OUTPATIENT
Start: 2020-04-09

## 2020-04-09 RX ORDER — SODIUM CHLORIDE 0.9 % (FLUSH) 0.9 %
10 SYRINGE (ML) INJECTION
Status: CANCELLED | OUTPATIENT
Start: 2020-04-09

## 2020-04-09 NOTE — TELEPHONE ENCOUNTER
Pt. Referral to Dr. Gardner was sent out today for a second attempt as STAT   Ph:[phone]  F: [phone]

## 2020-04-09 NOTE — PROGRESS NOTES
Medical Oncology Progress Note  [facility]     PATIENT: Benita Carballo  : 1945 74 y.o. female  MRN [id number]     PCP: Solomon Contreras MD  Consult Requested By:      Date of Service: 2020    Subjective:   Interim History:  Breast Cancer    Benita Carballo is here for to followup breast cancer treatment.    The patient was started on  chemotherapy last week with Herceptin and Taxol weekly.  She has chronic hypoxia and her oxygen saturation has been declining today her O2 status 77% she looks purple and she also has increasing degree of short of breath; in addition she complains of swelling to her left arm.  She had no chest pain. She was admittted last week to the hospital and underwent thoracentesis, which was not tested for cytology and imaging for possible PE was ruled out. Today she states she is feeling better but POX was 82 % on RA, and still has yosi upper ext swelling which she states has improved. She has not had a echo so will order ECHO stat to r/o any CHF which could be causing her swelling both lung and upper ext. States she had upper u/s with her pulm which was neg for DVT.     3/26/20   She returns with ECHO showing EF 55-60%, but right sided heart failure noted. Pt has an additional 12 # of wt gain since last week. She states she is feeling fine, better than at last visit. Discussed ECHO and will hold herceptin and obtain MUGA, will proceed with weekly taxol and possibly will add herceptin back after MUGA completed and will refer pt to cardiology, but could be delayed due to COVID pandemic.       Today she presents with significant fluid loss noted. No longer upper ext third space swelling noted, alb and T Pro dramatically improved, pt has last 10 + lbs of fluid. She is breathing easy today and denies any SOB or ORELLANA. We will proceed with weekly taxol and continue to hold herceptin until MUGA or cardiology clearance is obtained.      Oncology History:  Oncology  History    2002 Rt breast cancer s/p lump with chemo, [place] in [place] Tamoxifen x 1 yr, then Arimidex x 5 yrs. Completed tx 2008.     2015  c/o chronic cough with CT chest 1/28/16 showing extensive adenopathy  With base of lt neck mass 4.5 cm with yosi pulm nodules. RF to Dr ANDREWS Mcnally    2/6/16 bx of lt neck mass carcinoma with feature suggestive of breast primary. ER/%, Her2 borderline by IHC and deemed equivocal by FISH          Breast cancer metastasized to bone    2/4/2016 Initial Diagnosis     Breast cancer metastasized to bone, , lymphoid mass       5/25/2016 - 2/9/2017 Chemotherapy     Taxol/Herceptin x 8 cycles then perjeta added 7/16  Prescribed by Dr Mcnally       2/20/2017 - 6/17/2017 Chemotherapy     Kadcycla prescribed by Dr Mcnally       6/28/2017 Biopsy     Biopsy of rt abd met disease consistent with breast cancer. ER/DC + Her 2 neg per Dr Moreno's note.    Dr Moreno noted path review from bx 2/16 showed HER 2 + equivocal with FISH reading as Equivocal but ratio 1.2. Additional report form 2016 states additional testing was done with different probe and the equivocal results can be noted as negative.     Treatment therapy changed from Her2 based tx to endocrine base therapy by Dr Moreno       6/28/2017 -  Hormone Therapy     Arimidex 6/17-2/18  Aromasin 2/18  Ibrance + Aromasin 5/18 -1/19  Ibrance + faslodex 2/19 4/30/2019 Imaging Significant Findings     PET/CT: Lesions involving the medial clavicle , subcarinal LN, pleural based soft tissue density adjacent to T11 and lesion in left lobe of liver diminished in size and SUV.   RECIST 34% decrease in disease      2/17/2020 -  Chemotherapy     Treatment Summary   Plan Name: OP BREAST TRASTUZUMAB PACLITAXEL QW  Treatment Goal: Control  Status: Active  Start Date: 2/26/2020  End Date: 5/28/2020 (Planned)  Provider: Solomon Contreras MD  Chemotherapy: PACLitaxel (TAXOL) 64 mg/m2 = 102 mg in sodium chloride 0.9% 250 mL chemo infusion, 64 mg/m2 = 102 mg (100 % of  original dose 64 mg/m2), Intravenous, Clinic/HOD 1 time, 4 of 12 cycles  Dose modification: 64 mg/m2 (original dose 64 mg/m2, Cycle 1)  trastuzumab 211 mg in sodium chloride 0.9% 250 mL chemo infusion, 4 mg/kg = 211 mg, Intravenous, Clinic/HOD 1 time, 2 of 2 cycles       ==8/2019 Now She is currently on combined afinitor + aromasin,  repeated CT scan shows improvement  PET 1/2020 showed PD,  afinitor + aromasin stopped  == PET scan 01/20/2020 the progressive  ==Started Taxol/Herceptin weekly 2/2020    Past Medical History:   Past Medical History:   Diagnosis Date    Arthritis     Bone cancer     Breast cancer     Cancer     Cataract     Depression     Diverticulosis 1/15/2020    Fatigue associated with anemia 6/6/2019    GERD (gastroesophageal reflux disease)     High cholesterol     Hypoxemia 3/12/2020    Skin problem     Sleep disorder        Past Surgical HIstory:   Past Surgical History:   Procedure Laterality Date    APPENDECTOMY      BREAST LUMPECTOMY Right 2002    CATARACT EXTRACTION, BILATERAL  2014    COLONOSCOPY  2008    HYSTERECTOMY      PORTACATH PLACEMENT  05/18/2016    SURGICAL REMOVAL OF NODULE OF VOCAL CORD         Allergies:  Review of patient's allergies indicates:   Allergen Reactions    Benadryl [diphenhydramine hcl]      Restless leg     Medrol [methylprednisolone] Hallucinations    Penicillins        Medications:  Current Outpatient Medications   Medication Sig Dispense Refill    ANORO ELLIPTA 62.5-25 mcg/actuation DsDv       dexAMETHasone 0.5 mg/5 mL Soln SWISH WITH 10 ML PO FOR 2 MIN THEN SPIT QID      exemestane (AROMASIN) 25 mg tablet Take 1 tablet (25 mg total) by mouth once daily. 30 tablet 3    furosemide (LASIX) 40 MG tablet Take 1 tablet (40 mg total) by mouth once daily. 30 tablet 11    gabapentin (NEURONTIN) 400 MG capsule Take 1 capsule (400 mg total) by mouth 3 (three) times daily. 120 capsule 4    HYDROcodone-acetaminophen (NORCO)  mg per tablet  Take 1 tablet by mouth every 6 (six) hours as needed for Pain. 30 tablet 0    iron-vitamin C 100-250 mg, ICAR-C, (ICAR-C) 100-250 mg Tab Take 1 tablet by mouth once daily. 30 tablet 5    LORazepam (ATIVAN) 1 MG tablet TAKE 1 TABLET BY MOUTH EVERY EVENING 30 tablet 0    megestrol (MEGACE) 400 mg/10 mL (40 mg/mL) Susp Take 10 mLs (400 mg total) by mouth once daily. 240 mL 2    oxyCODONE (OXYCONTIN) 10 mg 12 hr tablet Take 1 tablet (10 mg total) by mouth every 12 (twelve) hours as needed for Pain. 60 tablet 0    pantoprazole (PROTONIX) 40 MG tablet Take 1 tablet (40 mg total) by mouth once daily. 30 tablet 3    potassium chloride SA (K-DUR,KLOR-CON) 20 MEQ tablet Take 1 tablet (20 mEq total) by mouth once daily. 30 tablet 11    PROAIR HFA 90 mcg/actuation inhaler       promethazine (PHENERGAN) 25 MG tablet Take 1 tablet (25 mg total) by mouth every 6 (six) hours as needed for Nausea. 30 tablet 3    temazepam (RESTORIL) 15 mg Cap TAKE 1 CAPSULE BY MOUTH ONCE EVERY NIGHT AT BEDTIME AS NEEDED 30 capsule 0     No current facility-administered medications for this visit.        Family History:   Family History   Problem Relation Age of Onset    Lung cancer Father     Melanoma Father        Social History:  reports that she has been smoking cigarettes. She has a 2.00 pack-year smoking history. She has never used smokeless tobacco. She reports that she drinks about 4.0 standard drinks of alcohol per week. She reports that she does not use drugs.    Review of Systemas  Constitutional: No change in weight, appetie, fatigue, fever, or night sweats  Eyes: No changes in vision  Ears, Nose, Mouth, Throat, and Face: No hearing problems, ear pain, rummy nose, or sore throat  Respiratory: No shortness of breath or cough  Cardiovascular: No chest pain, palpitations or dizziness  Gastrointestinal: No abdominal pain, hematochezia, melena  Genitourinary: No dysuria, hematuria, nocturia, menstrual problems, post  "menopausal  Integumentary/Breast: No rashes or itching  Hematologic/Lymphatic: No anemia or bleeding abnormalities  Musculoskeletal: No joint or muscle abnormalities  Neurological: No sensory or motor problems, no headaches  Behavioral/Psych: No depression, anxiety, cognitive problems, or stress  Endocrine: No diabetes or thyroid problems  Allergic/Immunologic: See allergy above    Physical Exam      Vitals:   Vitals:    04/09/20 1025   BP: 118/66   BP Location: Left arm   Patient Position: Sitting   BP Method: Small (Automatic)   Pulse: 88   Resp: 16   SpO2: (!) 93%   Height: 5' 6" (1.676 m)     BMI: Body mass index is 18.24 kg/m².  BSA Body surface area is 1.55 meters squared.  ECOG Performance Status:   ECOG SCORE         GENERAL APPEARANCE: Well developed, well nourished, in no acute distress.  SKIN: Inspection of the skin reveals no rashes, ulcerations or petechiae.  HEENT: The sclerae were anicteric and conjunctivae were pink and moist. Extraocular movements were intact and pupils were equal, round, and reactive to light with normal accommodation. External inspection of the ears and nose showed no scars, lesions, or masses. Lips, teeth, and gums showed normal mucosa. The oral mucosa, hard and soft palate, tongue and posterior pharynx were normal.  NECK: Supple and symmetric. There was no thyroid enlargement, and no tenderness, or masses were felt.  CRESPIRATORY: Normal AP diameter and normal contour without any kyphoscoliosis. LUNGS: Auscultation of the lungs revealed normal breath sounds without any other adventitious sounds or rubs.  CARDIOVASCULAR: There was a regular rate and rhythm without any murmurs, gallops, rubs. The carotid pulses were normal and 2+ bilaterally without bruits. Peripheral pulses were 2+ and symmetric.  GASTROINTESTINAL: Soft and nontender with normal bowel sounds. The liver span was approximately 5-6 cm in the right midclavicular line by percussion. The liver edge was nontender. The " spleen was not palpable. There were no inguinal or umbilical hernias noted. No ascites was noted.  LYMPH NODES: No lymphadenopathy was appreciated in the neck, axillae or groin.  MUSCULOSKELETAL: Gait was normal. There was no tenderness or effusions noted. Muscle strength and tone were normal. EXTREMITIES: No cyanosis, clubbing or edema.  NEUROLOGIC: Alert and oriented x 3. Normal affect. Gait was normal. Normal deep tendon reflexes with no pathological reflexes. Sensation to touch was normal.  PSYCHIATRIC: good mood, orientated to place, time and person     Labs and Imagings     Orders Only on 04/01/2020   Component Date Value Ref Range Status    Neutrophils 04/01/2020 80.0  37 - 80 % Final    Neutrophils Absolute 04/01/2020 7.2  1.8 - 7.7 10*3/uL Final    BANDS 04/01/2020 1.0  0 - 5 % Final    Lymphocytes 04/01/2020 6.0* 25 - 40 % Final    Monocytes 04/01/2020 4.0  1 - 15 % Final    Eosinophils 04/01/2020 8.0* 1 - 3 % Final    Metamyelocytes 04/01/2020 1.0* 0 - 0 % Final    Cells Counted 04/01/2020 100   Final    Platelet Estimate 04/01/2020 Adequate   Final    Anisocytosis 04/01/2020 1+   Final   Ancillary Orders on 03/27/2020   Component Date Value Ref Range Status    WBC 04/01/2020 8.9  4.6 - 10.2 10*3/uL Final    RBC 04/01/2020 4.52  4.2 - 5.4 10*6/uL Final    Hemoglobin 04/01/2020 11.3* 12.0 - 16.0 g/dL Final    Hematocrit 04/01/2020 36.3* 37.0 - 47.0 % Final    MCV 04/01/2020 80.3  80 - 97 fL Final    MCH 04/01/2020 25.0* 27.0 - 31.2 pg Final    MCHC 04/01/2020 31.1* 31.8 - 35.4 g/dL Final    RDW RBC Auto-Rto 04/01/2020 22.9* 12.5 - 18.0 % Final    Platelets 04/01/2020 331  142 - 424 10*3/uL Final    Manual nRBC per 100 Cells 04/01/2020 0.0  % Final    Sodium 04/01/2020 137  135 - 145 mmol/L Final    Potassium 04/01/2020 5.0  3.6 - 5.2 mmol/L Final    Chloride 04/01/2020 101  100 - 108 mmol/L Final    CO2 04/01/2020 31  21 - 32 mmol/L Final    BUN, Bld 04/01/2020 7  7 - 18 mg/dL  Final    Creatinine 04/01/2020 0.83  0.55 - 1.02 mg/dL Final    GFR ESTIMATION 04/01/2020 > 60  >60 Final               DESCRIPTION       GLOMERULAR FILTRATION RATE             NORMAL                     >60             KIDNEY  DISEASE           15-60             KIDNEY FAILURE             <15    BUN/Creatinine Ratio 04/01/2020 8.43  7 - 18 Ratio Final    Glucose 04/01/2020 89  70 - 110 mg/dL Final    Calcium 04/01/2020 9.1  8.8 - 10.5 mg/dL Final    Total Bilirubin 04/01/2020 0.5  0.0 - 1.0 mg/dL Final    AST 04/01/2020 21  15 - 37 U/L Final    ALT 04/01/2020 24  12 - 78 U/L Final    Total Protein 04/01/2020 6.5  6.4 - 8.2 g/dL Final    Albumin 04/01/2020 3.1* 3.4 - 5.0 g/dL Final    Globulin 04/01/2020 3.4  2.3 - 3.5 g/dL Final    Albumin/Globulin Ratio 04/01/2020 0.911* 1.1 - 1.8 Ratio Final    Alkaline Phosphatase 04/01/2020 71  46 - 116 U/L Final    Magnesium 04/01/2020 1.9  1.8 - 2.4 mg/dL Final   Ancillary Orders on 03/20/2020   Component Date Value Ref Range Status    WBC 03/25/2020 14.5* 4.6 - 10.2 10*3/uL Final    RBC 03/25/2020 4.39  4.2 - 5.4 10*6/uL Final    Hemoglobin 03/25/2020 10.8* 12.0 - 16.0 g/dL Final    Hematocrit 03/25/2020 35.0* 37.0 - 47.0 % Final    MCV 03/25/2020 79.7* 80 - 97 fL Final    MCH 03/25/2020 24.6* 27.0 - 31.2 pg Final    MCHC 03/25/2020 30.9* 31.8 - 35.4 g/dL Final    RDW RBC Auto-Rto 03/25/2020 23.0* 12.5 - 18.0 % Final    Platelets 03/25/2020 433* 142 - 424 10*3/uL Final    Manual nRBC per 100 Cells 03/25/2020 0.0  % Final    Sodium 03/25/2020 143  135 - 145 mmol/L Final    Potassium 03/25/2020 4.4  3.6 - 5.2 mmol/L Final    Chloride 03/25/2020 104  100 - 108 mmol/L Final    CO2 03/25/2020 34* 21 - 32 mmol/L Final    BUN, Bld 03/25/2020 8  7 - 18 mg/dL Final    Creatinine 03/25/2020 0.90  0.55 - 1.02 mg/dL Final    GFR ESTIMATION 03/25/2020 > 60  >60 Final               DESCRIPTION       GLOMERULAR FILTRATION RATE             NORMAL                      >60             KIDNEY  DISEASE           15-60             KIDNEY FAILURE             <15    BUN/Creatinine Ratio 03/25/2020 8.88  7 - 18 Ratio Final    Glucose 03/25/2020 91  70 - 110 mg/dL Final    Calcium 03/25/2020 8.6* 8.8 - 10.5 mg/dL Final    Total Bilirubin 03/25/2020 0.3  0.0 - 1.0 mg/dL Final    AST 03/25/2020 16  15 - 37 U/L Final    ALT 03/25/2020 18  12 - 78 U/L Final    Total Protein 03/25/2020 6.1* 6.4 - 8.2 g/dL Final    Albumin 03/25/2020 2.5* 3.4 - 5.0 g/dL Final    Globulin 03/25/2020 3.6* 2.3 - 3.5 g/dL Final    Albumin/Globulin Ratio 03/25/2020 0.694* 1.1 - 1.8 Ratio Final    Alkaline Phosphatase 03/25/2020 80  46 - 116 U/L Final    Magnesium 03/25/2020 1.9  1.8 - 2.4 mg/dL Final    Neutrophils 03/25/2020 84.0* 37 - 80 % Final    Neutrophils Absolute 03/25/2020 12.3* 1.8 - 7.7 10*3/uL Final    BANDS 03/25/2020 1.0  0 - 5 % Final    Lymphocytes 03/25/2020 7.0* 25 - 40 % Final    Monocytes 03/25/2020 4.0  1 - 15 % Final    Eosinophils 03/25/2020 3.0  1 - 3 % Final    Basophils 03/25/2020 1.0  0 - 3 % Final    Cells Counted 03/25/2020 100   Final    Platelet Estimate 03/25/2020 Adequate   Final    RBC Morphology 03/25/2020 ABNORMAL   Final    Anisocytosis 03/25/2020 3+   Final    Hypochromia 03/25/2020 1+   Final   Orders Only on 03/18/2020   Component Date Value Ref Range Status    Neutrophils 03/18/2020 62.0  37 - 80 % Final    Neutrophils Absolute 03/18/2020 5.7  1.8 - 7.7 10*3/uL Final    BANDS 03/18/2020 4.0  0 - 5 % Final    Lymphocytes 03/18/2020 8.0* 25 - 40 % Final    Monocytes 03/18/2020 20.0* 1 - 15 % Final    Eosinophils 03/18/2020 1.0  1 - 3 % Final    Metamyelocytes 03/18/2020 5.0* 0 - 0 % Final    Cells Counted 03/18/2020 100   Final    Platelet Estimate 03/18/2020 Adequate   Final    Platelet Clumps 03/18/2020 1+   Final    Anisocytosis 03/18/2020 1+   Final    Microcytes 03/18/2020 1+   Final   Ancillary Orders on 03/13/2020   Component  Date Value Ref Range Status    WBC 03/18/2020 8.7  4.6 - 10.2 10*3/uL Final    RBC 03/18/2020 4.39  4.2 - 5.4 10*6/uL Final    Hemoglobin 03/18/2020 10.7* 12.0 - 16.0 g/dL Final    Hematocrit 03/18/2020 34.2* 37.0 - 47.0 % Final    MCV 03/18/2020 77.9* 80 - 97 fL Final    MCH 03/18/2020 24.4* 27.0 - 31.2 pg Final    MCHC 03/18/2020 31.3* 31.8 - 35.4 g/dL Final    RDW RBC Auto-Rto 03/18/2020 21.9* 12.5 - 18.0 % Final    Platelets 03/18/2020 250  142 - 424 10*3/uL Final    Manual nRBC per 100 Cells 03/18/2020 0.0  % Final    Sodium 03/18/2020 135  135 - 145 mmol/L Final    Potassium 03/18/2020 3.7  3.6 - 5.2 mmol/L Final    Chloride 03/18/2020 102  100 - 108 mmol/L Final    CO2 03/18/2020 26  21 - 32 mmol/L Final    BUN, Bld 03/18/2020 10  7 - 18 mg/dL Final    Creatinine 03/18/2020 0.91  0.55 - 1.02 mg/dL Final    GFR ESTIMATION 03/18/2020 60  >60 Final               DESCRIPTION       GLOMERULAR FILTRATION RATE             NORMAL                     >60             KIDNEY  DISEASE           15-60             KIDNEY FAILURE             <15    BUN/Creatinine Ratio 03/18/2020 10.98  7 - 18 Ratio Final    Glucose 03/18/2020 112* 70 - 110 mg/dL Final    Calcium 03/18/2020 8.1* 8.8 - 10.5 mg/dL Final    Total Bilirubin 03/18/2020 0.8  0.0 - 1.0 mg/dL Final    AST 03/18/2020 15  15 - 37 U/L Final    ALT 03/18/2020 19  12 - 78 U/L Final    Total Protein 03/18/2020 5.8* 6.4 - 8.2 g/dL Final    Albumin 03/18/2020 2.3* 3.4 - 5.0 g/dL Final    Globulin 03/18/2020 3.5  2.3 - 3.5 g/dL Final    Albumin/Globulin Ratio 03/18/2020 0.657* 1.1 - 1.8 Ratio Final    Alkaline Phosphatase 03/18/2020 56  46 - 116 U/L Final    Magnesium 03/18/2020 1.7* 1.8 - 2.4 mg/dL Final   Orders Only on 03/11/2020   Component Date Value Ref Range Status    RBC Morphology 03/11/2020 ABNORMAL   Final    Anisocytosis 03/11/2020 2+   Final    Hypochromia 03/11/2020 1+   Final   Ancillary Orders on 03/06/2020   Component Date  Value Ref Range Status    CANCER ANTIGEN 27.29 2020 144.5* 0.0 - 40.0 U/mL Final    Comment: INTERPRETIVE INFORMATION: Cancer Antigen 27.29The CA 27.29 assay is intended for use in monitorin) diseaseprogression and/or response to therapy in patients withmetastatic disease, and 2) disease recurrence in patientstreated previously for stages II   or III breast carcinoma who areclinically free of the disease. Serial testing in patients whoare clinically free of disease should be used in conjunctionwith other clinical methods for early detection of cancerrecurrence.Limitations: Patients with   confirmed breast carcinoma frequentlyhave CA 27.29 assay values in the same range as healthyindividuals.  Elevations may also be observed in patients withnon malignant disease. Results of this test must always beinterpreted in the context of morphologic   and other relevantdata, and should not be used alone for a diagnosis of malignancy.Methodology: Siemens Advia Ensaaur CA 27.29 chemiluminescentimmunoassay was used. Results obtained with different assaymethods or kits cannot be used   in                           terchangeably.Performed by [employer],[address] [place] [id number] [phone].[employer], Sharif Leon MD, Lab. Director      CEA 2020 9.9* 0.0 - 3.0 ng/mL Final    Comment: INTERPRETIVE INFORMATION: Carcinoembryonic AntigenThe Roche CEA electrochemiluminescent immunoassay was used.Results obtained with different test methods or kits cannot beused interchangeably. Measurement of CEA has been shown to beclinically relevant in   the management of patients withcolorectal, breast, lung, prostatic, pancreatic, and ovariancarcinomas. Smokers may have slightly elevated levels of CEA.The CEA assay value, regardless of level, should not beinterpreted as evidence for the presence or   absence of malignantdisease and is not recommended for use as a screening procedureto detect the presence of cancer  in the general population.Performed by [facility] [facility] [place] [address] [phone].[employer], Sharif Leno MD,   Lab. Director      Cancer Antigen 15-3 03/11/2020 92* 0 - 31 U/mL Final    Comment: INTERPRETIVE INFORMATION: Cancer Antigen-Breast ()The Roche CA 15-3 electrochemiluminescent immunoassay was used.Results obtained with different methods or kits cannot be usedinterchangeably. The CA 15-3 test is used to aid in themanagement of   Stage II and III breast cancer patients. Serialtesting for patient CA 15-3 values should be used in conjunctionwith other clinical methods for monitoring breast cancer.Patients with confirmed breast carcinoma frequently have CA 15-3values in the same   range as healthy individuals. Elevations maybe observed in patients with nonmalignant disease. Therefore, Chelsie 15-3 value, regardless of level, should not be interpreted asabsolute evidence of the presence or absence of malignantdisease.Performed by [facility] [facility] [place] [facility] [address] [phone].[employer], Sharif Leon MD, Lab. Director     Office Visit on 03/05/2020   Component Date Value Ref Range Status    WBC 03/11/2020 5.4  4.6 - 10.2 10*3/uL Final    RBC 03/11/2020 4.61  4.2 - 5.4 10*6/uL Final    Hemoglobin 03/11/2020 10.9* 12.0 - 16.0 g/dL Final    Hematocrit 03/11/2020 36.0* 37.0 - 47.0 % Final    MCV 03/11/2020 78.1* 80 - 97 fL Final    MCH 03/11/2020 23.6* 27.0 - 31.2 pg Final    MCHC 03/11/2020 30.3* 31.8 - 35.4 g/dL Final    RDW RBC Auto-Rto 03/11/2020 21.2* 12.5 - 18.0 % Final    Platelets 03/11/2020 317  142 - 424 10*3/uL Final    Neutrophils/100 leukocytes 03/11/2020 65.3  37 - 80 % Final    Lymphocytes/100 leukocytes 03/11/2020 16.8* 25 - 40 % Final    Monocytes/100 leukocytes 03/11/2020 8.0  1 - 15 % Final    Eosinophils/100 leukocytes 03/11/2020 1.5  1 - 3 % Final    Basophils/100 leukocytes 03/11/2020 1.3  0 - 3 % Final    Manual nRBC per 100  Cells 03/11/2020 0.4  % Final    Neutrophils 03/11/2020 3.49  1.8 - 7.7 10*3/uL Final    Sodium 03/11/2020 138  135 - 145 mmol/L Final    Potassium 03/11/2020 3.7  3.6 - 5.2 mmol/L Final    Chloride 03/11/2020 103  100 - 108 mmol/L Final    CO2 03/11/2020 30  21 - 32 mmol/L Final    BUN, Bld 03/11/2020 10  7 - 18 mg/dL Final    Creatinine 03/11/2020 0.93  0.55 - 1.02 mg/dL Final    GFR ESTIMATION 03/11/2020 59* >60 Final               DESCRIPTION       GLOMERULAR FILTRATION RATE             NORMAL                     >60             KIDNEY  DISEASE           15-60             KIDNEY FAILURE             <15    BUN/Creatinine Ratio 03/11/2020 10.75  7 - 18 Ratio Final    Glucose 03/11/2020 95  70 - 110 mg/dL Final    Calcium 03/11/2020 8.9  8.8 - 10.5 mg/dL Final    Total Bilirubin 03/11/2020 0.6  0.0 - 1.0 mg/dL Final    AST 03/11/2020 14* 15 - 37 U/L Final    ALT 03/11/2020 16  12 - 78 U/L Final    Total Protein 03/11/2020 5.8* 6.4 - 8.2 g/dL Final    Albumin 03/11/2020 2.6* 3.4 - 5.0 g/dL Final    Globulin 03/11/2020 3.2  2.3 - 3.5 g/dL Final    Albumin/Globulin Ratio 03/11/2020 0.812* 1.1 - 1.8 Ratio Final    Alkaline Phosphatase 03/11/2020 77  46 - 116 U/L Final    Magnesium 03/11/2020 1.7* 1.8 - 2.4 mg/dL Final   Orders Only on 03/04/2020   Component Date Value Ref Range Status    Cancer Antigen 15-3 03/04/2020 85* 0 - 31 U/mL Final    Comment: INTERPRETIVE INFORMATION: Cancer Antigen-Breast ()The Roche CA 15-3 electrochemiluminescent immunoassay was used.Results obtained with different methods or kits cannot be usedinterchangeably. The CA 15-3 test is used to aid in themanagement of   Stage II and III breast cancer patients. Serialtesting for patient CA 15-3 values should be used in conjunctionwith other clinical methods for monitoring breast cancer.Patients with confirmed breast carcinoma frequently have CA 15-3values in the same   range as healthy individuals. Elevations maybe  observed in patients with nonmalignant disease. Therefore, Chelsie 15-3 value, regardless of level, should not be interpreted asabsolute evidence of the presence or absence of malignantdisease.Performed by [employer],[address] [facility], [place] [address] [phone].[employer], Sharif Leon MD, Lab. Director     Orders Only on 03/04/2020   Component Date Value Ref Range Status    CEA 03/04/2020 10.5* 0.0 - 3.0 ng/mL Final    Comment: INTERPRETIVE INFORMATION: Carcinoembryonic AntigenThe Roche CEA electrochemiluminescent immunoassay was used.Results obtained with different test methods or kits cannot beused interchangeably. Measurement of CEA has been shown to beclinically relevant in   the management of patients withcolorectal, breast, lung, prostatic, pancreatic, and ovariancarcinomas. Smokers may have slightly elevated levels of CEA.The CEA assay value, regardless of level, should not beinterpreted as evidence for the presence or   absence of malignantdisease and is not recommended for use as a screening procedureto detect the presence of cancer in the general population.Performed by [facility],[address] [facility] [address] [phone], Sharif Leon MD,   Lab. Director     There may be more visits with results that are not included.       Imaging:     Assessment:     Principle Problem: Acute on chronic right-sided heart failure [I50.813]   Co-morbidity/Active Problems:   Patient Active Problem List   Diagnosis    Breast cancer metastasized to bone    Neuropathy    Fatigue associated with anemia    Drug-related hair loss    Nausea    Primary insomnia    Foot infection    Cancer, metastatic to bone    Malignant neoplasm of overlapping 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, estrogen receptor positive     Chronic fatigue    Diverticulosis    Dehydration    Hypoxemia    Iron deficiency anemia secondary to inadequate dietary iron intake    Swelling of both upper extremities     Hypoalbuminemia        Benita Carballo is a 74 y.o. female with PMH of The primary encounter diagnosis was Acute on chronic right-sided heart failure. Diagnoses of Malignant neoplasm of overlapping 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, estrogen receptor positive  and Cancer, metastatic to bone were also pertinent to this visit., with Acute on chronic right-sided heart failure [I50.813]       Metastatic breast cancer ERPR positive HER2 Noah questionable  Status post of chemotherapy and HER2 targeted therapy with progression on HER2 targeted therapy  Recent testing fish negative for her 2 2018  Repeat the lipid biopsy 2019  shows HER2 positive and ER negative  Bone metastatic disease  Fatigue  Hypoxia  Dehydration    Plan:   Overall Plan:    She has HER2 positive disease with ERPR negative by the new liquid biopsy. Started Taxol/Herceptin 2/2020  Discussed ECHO with pt, showing right heart failure   MUGA ordered, cannot wait 30 days  Hercpetin on hold until muga results  Lasix x 3 days with 10 lbs wt loss, p is instructed to use prn, pt has not had to use Lasix over last 2 weeks   Compression sleeve for dependant upper ext swelling  Refer to cardiology.   Ok to proceed with weekly taxol.     Rosalind Ricks NP

## 2020-04-16 ENCOUNTER — OFFICE VISIT (OUTPATIENT)
Dept: HEMATOLOGY/ONCOLOGY | Facility: CLINIC | Age: 75
End: 2020-04-16
Payer: MEDICARE

## 2020-04-16 VITALS
RESPIRATION RATE: 18 BRPM | TEMPERATURE: 99 F | DIASTOLIC BLOOD PRESSURE: 70 MMHG | WEIGHT: 110.81 LBS | OXYGEN SATURATION: 98 % | HEIGHT: 66 IN | HEART RATE: 88 BPM | SYSTOLIC BLOOD PRESSURE: 121 MMHG | BODY MASS INDEX: 17.81 KG/M2

## 2020-04-16 DIAGNOSIS — Z17.0 MALIGNANT NEOPLASM OF OVERLAPPING SITES OF LEFT BREAST IN FEMALE, ESTROGEN RECEPTOR POSITIVE: ICD-10-CM

## 2020-04-16 DIAGNOSIS — C50.812 MALIGNANT NEOPLASM OF OVERLAPPING SITES OF LEFT BREAST IN FEMALE, ESTROGEN RECEPTOR POSITIVE: ICD-10-CM

## 2020-04-16 DIAGNOSIS — C79.51 CANCER, METASTATIC TO BONE: Primary | ICD-10-CM

## 2020-04-16 PROBLEM — M79.89 SWELLING OF BOTH UPPER EXTREMITIES: Status: RESOLVED | Noted: 2020-03-19 | Resolved: 2020-04-16

## 2020-04-16 LAB
ANISOCYTOSIS: NORMAL
HYPOCHROMIA BLD QL SMEAR: NORMAL

## 2020-04-16 PROCEDURE — 99214 PR OFFICE/OUTPT VISIT, EST, LEVL IV, 30-39 MIN: ICD-10-PCS | Mod: S$GLB,,, | Performed by: NURSE PRACTITIONER

## 2020-04-16 PROCEDURE — 99214 OFFICE O/P EST MOD 30 MIN: CPT | Mod: S$GLB,,, | Performed by: NURSE PRACTITIONER

## 2020-04-16 RX ORDER — HEPARIN 100 UNIT/ML
500 SYRINGE INTRAVENOUS
Status: CANCELLED | OUTPATIENT
Start: 2020-04-16

## 2020-04-16 RX ORDER — SODIUM CHLORIDE 0.9 % (FLUSH) 0.9 %
10 SYRINGE (ML) INJECTION
Status: CANCELLED | OUTPATIENT
Start: 2020-04-16

## 2020-04-16 RX ORDER — FAMOTIDINE 10 MG/ML
20 INJECTION INTRAVENOUS
Status: CANCELLED | OUTPATIENT
Start: 2020-04-16

## 2020-04-16 RX ORDER — EPINEPHRINE 0.3 MG/.3ML
0.3 INJECTION SUBCUTANEOUS ONCE AS NEEDED
Status: CANCELLED | OUTPATIENT
Start: 2020-04-16

## 2020-04-16 RX ORDER — DIPHENHYDRAMINE HYDROCHLORIDE 50 MG/ML
50 INJECTION INTRAMUSCULAR; INTRAVENOUS ONCE AS NEEDED
Status: CANCELLED | OUTPATIENT
Start: 2020-04-16

## 2020-04-16 NOTE — PROGRESS NOTES
Medical Oncology Progress Note  [facility]     PATIENT: Benita Carballo  : 1945 74 y.o. female  MRN [id number]     PCP: Solomon Contreras MD  Consult Requested By:      Date of Service: 2020    Subjective:   Interim History:  Follow-up (With labs)    Benita Carballo is here for to followup breast cancer treatment.    The patient was started on  chemotherapy last week with Herceptin and Taxol weekly.  She has chronic hypoxia and her oxygen saturation has been declining today her O2 status 77% she looks purple and she also has increasing degree of short of breath; in addition she complains of swelling to her left arm.  She had no chest pain. She was admittted last week to the hospital and underwent thoracentesis, which was not tested for cytology and imaging for possible PE was ruled out. Today she states she is feeling better but POX was 82 % on RA, and still has yosi upper ext swelling which she states has improved. She has not had a echo so will order ECHO stat to r/o any CHF which could be causing her swelling both lung and upper ext. States she had upper u/s with her pulm which was neg for DVT.     3/26/20   She returns with ECHO showing EF 55-60%, but right sided heart failure noted. Pt has an additional 12 # of wt gain since last week. She states she is feeling fine, better than at last visit. Discussed ECHO and will hold herceptin and obtain MUGA, will proceed with weekly taxol and possibly will add herceptin back after MUGA completed and will refer pt to cardiology, but could be delayed due to COVID pandemic.       Today she presents with significant fluid loss noted. No longer upper ext third space swelling noted, alb and T Pro dramatically improved, pt has last 10 + lbs of fluid. She is breathing easy today and denies any SOB or ORELLANA. We will proceed with weekly taxol and continue to hold herceptin until MUGA or cardiology clearance is obtained. She was seen yesterday  with Dr Gardner. Below is a portion of his note:       Oncology History:  Oncology History    2002 Rt breast cancer s/p lump with chemo, [place] in [place] Tamoxifen x 1 yr, then Arimidex x 5 yrs. Completed tx 2008.     2015  c/o chronic cough with CT chest 1/28/16 showing extensive adenopathy  With base of lt neck mass 4.5 cm with yosi pulm nodules. RF to Dr ANDREWS Mcnally    2/6/16 bx of lt neck mass carcinoma with feature suggestive of breast primary. ER/%, Her2 borderline by IHC and deemed equivocal by FISH          Breast cancer metastasized to bone    2/4/2016 Initial Diagnosis     Breast cancer metastasized to bone, , lymphoid mass       5/25/2016 - 2/9/2017 Chemotherapy     Taxol/Herceptin x 8 cycles then perjeta added 7/16  Prescribed by Dr Mcnally       2/20/2017 - 6/17/2017 Chemotherapy     Kadcycla prescribed by Dr Mcnally       6/28/2017 Biopsy     Biopsy of rt abd met disease consistent with breast cancer. ER/[place] + Her 2 neg per Dr Morneo's note.    Dr Moreno noted path review from bx 2/16 showed HER 2 + equivocal with FISH reading as Equivocal but ratio 1.2. Additional report form 2016 states additional testing was done with different probe and the equivocal results can be noted as negative.     Treatment therapy changed from Her2 based tx to endocrine base therapy by Dr Moreno       6/28/2017 -  Hormone Therapy     Arimidex 6/17-2/18  Aromasin 2/18  Ibrance + Aromasin 5/18 -1/19  Ibrance + faslodex 2/19 4/30/2019 Imaging Significant Findings     PET/CT: Lesions involving the medial clavicle , subcarinal LN, pleural based soft tissue density adjacent to T11 and lesion in left lobe of liver diminished in size and SUV.   RECIST 34% decrease in disease      2/17/2020 -  Chemotherapy     Treatment Summary   Plan Name: OP BREAST TRASTUZUMAB PACLITAXEL QW  Treatment Goal: Control  Status: Active  Start Date: 2/26/2020  End Date: 5/28/2020 (Planned)  Provider: Solomon Contreras MD  Chemotherapy: PACLitaxel (TAXOL) 64 mg/m2 =  102 mg in sodium chloride 0.9% 250 mL chemo infusion, 64 mg/m2 = 102 mg (100 % of original dose 64 mg/m2), Intravenous, Clinic/HOD 1 time, 5 of 12 cycles  Dose modification: 64 mg/m2 (original dose 64 mg/m2, Cycle 1)  trastuzumab 211 mg in sodium chloride 0.9% 250 mL chemo infusion, 4 mg/kg = 211 mg, Intravenous, Clinic/HOD 1 time, 2 of 2 cycles       ==8/2019 Now She is currently on combined afinitor + aromasin,  repeated CT scan shows improvement  PET 1/2020 showed PD,  afinitor + aromasin stopped  == PET scan 01/20/2020 the progressive  ==Started Taxol/Herceptin weekly 2/2020    Past Medical History:   Past Medical History:   Diagnosis Date    Arthritis     Bone cancer     Breast cancer     Cancer     Cataract     Depression     Diverticulosis 1/15/2020    Fatigue associated with anemia 6/6/2019    GERD (gastroesophageal reflux disease)     High cholesterol     Hypoxemia 3/12/2020    Skin problem     Sleep disorder        Past Surgical HIstory:   Past Surgical History:   Procedure Laterality Date    APPENDECTOMY      BREAST LUMPECTOMY Right 2002    CATARACT EXTRACTION, BILATERAL  2014    COLONOSCOPY  2008    HYSTERECTOMY      PORTACATH PLACEMENT  05/18/2016    SURGICAL REMOVAL OF NODULE OF VOCAL CORD         Allergies:  Review of patient's allergies indicates:   Allergen Reactions    Benadryl [diphenhydramine hcl]      Restless leg     Medrol [methylprednisolone] Hallucinations    Penicillins        Medications:  Current Outpatient Medications   Medication Sig Dispense Refill    ANORO ELLIPTA 62.5-25 mcg/actuation DsDv       dexAMETHasone 0.5 mg/5 mL Soln SWISH WITH 10 ML PO FOR 2 MIN THEN SPIT QID      exemestane (AROMASIN) 25 mg tablet Take 1 tablet (25 mg total) by mouth once daily. 30 tablet 3    furosemide (LASIX) 40 MG tablet Take 1 tablet (40 mg total) by mouth once daily. 30 tablet 11    gabapentin (NEURONTIN) 400 MG capsule Take 1 capsule (400 mg total) by mouth 3 (three)  times daily. 120 capsule 4    HYDROcodone-acetaminophen (NORCO)  mg per tablet Take 1 tablet by mouth every 6 (six) hours as needed for Pain. 30 tablet 0    iron-vitamin C 100-250 mg, ICAR-C, (ICAR-C) 100-250 mg Tab Take 1 tablet by mouth once daily. 30 tablet 5    LORazepam (ATIVAN) 1 MG tablet TAKE 1 TABLET BY MOUTH EVERY EVENING 30 tablet 0    megestrol (MEGACE) 400 mg/10 mL (40 mg/mL) Susp Take 10 mLs (400 mg total) by mouth once daily. 240 mL 2    oxyCODONE (OXYCONTIN) 10 mg 12 hr tablet Take 1 tablet (10 mg total) by mouth every 12 (twelve) hours as needed for Pain. 60 tablet 0    pantoprazole (PROTONIX) 40 MG tablet Take 1 tablet (40 mg total) by mouth once daily. 30 tablet 3    potassium chloride SA (K-DUR,KLOR-CON) 20 MEQ tablet Take 1 tablet (20 mEq total) by mouth once daily. 30 tablet 11    PROAIR HFA 90 mcg/actuation inhaler       promethazine (PHENERGAN) 25 MG tablet Take 1 tablet (25 mg total) by mouth every 6 (six) hours as needed for Nausea. 30 tablet 3    temazepam (RESTORIL) 15 mg Cap TAKE ONE CAPSULE BY MOUTH EVERY NIGHT AT BEDTIME AS NEEDED FOR INSOMNIA 30 capsule 0     No current facility-administered medications for this visit.        Family History:   Family History   Problem Relation Age of Onset    Lung cancer Father     Melanoma Father        Social History:  reports that she has been smoking cigarettes. She has a 2.00 pack-year smoking history. She has never used smokeless tobacco. She reports that she drinks about 4.0 standard drinks of alcohol per week. She reports that she does not use drugs.    Review of Systemas  Constitutional: No change in weight, appetie, fatigue, fever, or night sweats  Eyes: No changes in vision  Ears, Nose, Mouth, Throat, and Face: No hearing problems, ear pain, rummy nose, or sore throat  Respiratory: No shortness of breath or cough  Cardiovascular: No chest pain, palpitations or dizziness  Gastrointestinal: No abdominal pain, hematochezia,  "melena  Genitourinary: No dysuria, hematuria, nocturia, menstrual problems, post menopausal  Integumentary/Breast: No rashes or itching  Hematologic/Lymphatic: No anemia or bleeding abnormalities  Musculoskeletal: No joint or muscle abnormalities  Neurological: No sensory or motor problems, no headaches  Behavioral/Psych: No depression, anxiety, cognitive problems, or stress  Endocrine: No diabetes or thyroid problems  Allergic/Immunologic: See allergy above    Physical Exam      Vitals:   Vitals:    04/16/20 1025   BP: 121/70   BP Location: Left arm   Patient Position: Sitting   BP Method: Small (Automatic)   Pulse: 88   Resp: 18   Temp: 98.9 °F (37.2 °C)   TempSrc: Oral   SpO2: 98%   Weight: 50.3 kg (110 lb 12.8 oz)   Height: 5' 6" (1.676 m)     BMI: Body mass index is 17.88 kg/m².  BSA Body surface area is 1.53 meters squared.  ECOG Performance Status:   ECOG SCORE         GENERAL APPEARANCE: Well developed, well nourished, in no acute distress.  SKIN: Inspection of the skin reveals no rashes, ulcerations or petechiae.  HEENT: The sclerae were anicteric and conjunctivae were pink and moist. Extraocular movements were intact and pupils were equal, round, and reactive to light with normal accommodation. External inspection of the ears and nose showed no scars, lesions, or masses. Lips, teeth, and gums showed normal mucosa. The oral mucosa, hard and soft palate, tongue and posterior pharynx were normal.  NECK: Supple and symmetric. There was no thyroid enlargement, and no tenderness, or masses were felt.  CRESPIRATORY: Normal AP diameter and normal contour without any kyphoscoliosis. LUNGS: Auscultation of the lungs revealed normal breath sounds without any other adventitious sounds or rubs.  CARDIOVASCULAR: There was a regular rate and rhythm without any murmurs, gallops, rubs. The carotid pulses were normal and 2+ bilaterally without bruits. Peripheral pulses were 2+ and symmetric.  GASTROINTESTINAL: Soft and " nontender with normal bowel sounds. The liver span was approximately 5-6 cm in the right midclavicular line by percussion. The liver edge was nontender. The spleen was not palpable. There were no inguinal or umbilical hernias noted. No ascites was noted.  LYMPH NODES: No lymphadenopathy was appreciated in the neck, axillae or groin.  MUSCULOSKELETAL: Gait was normal. There was no tenderness or effusions noted. Muscle strength and tone were normal. EXTREMITIES: No cyanosis, clubbing or edema.  NEUROLOGIC: Alert and oriented x 3. Normal affect. Gait was normal. Normal deep tendon reflexes with no pathological reflexes. Sensation to touch was normal.  PSYCHIATRIC: good mood, orientated to place, time and person     Labs and Imagings     Orders Only on 2020   Component Date Value Ref Range Status    Anisocytosis 2020 1+   Final    Hypochromia 2020 1+   Final   Ancillary Orders on 2020   Component Date Value Ref Range Status    CANCER ANTIGEN 27.29 2020 138.0* 0.0 - 40.0 U/mL Final    Comment: INTERPRETIVE INFORMATION: Cancer Antigen 27.29The CA 27.29 assay is intended for use in monitorin) diseaseprogression and/or response to therapy in patients withmetastatic disease, and 2) disease recurrence in patientstreated previously for stages II   or III breast carcinoma who areclinically free of the disease. Serial testing in patients whoare clinically free of disease should be used in conjunctionwith other clinical methods for early detection of cancerrecurrence.Limitations: Patients with   confirmed breast carcinoma frequentlyhave CA 27.29 assay values in the same range as healthyindividuals.  Elevations may also be observed in patients withnon malignant disease. Results of this test must always beinterpreted in the context of morphologic   and other relevantdata, and should not be used alone for a diagnosis of malignancy.Methodology: Siemens [employer] CA 27.29  chemiluminescentimmunoassay was used. Results obtained with different assaymethods or kits cannot be used   in                           terchangeably.Performed by [facility],[address] [place] [address] [phone].[employer], Sharif Leon MD, Lab. Director      CEA 04/09/2020 9.4* 0.0 - 3.0 ng/mL Final    Comment: INTERPRETIVE INFORMATION: Carcinoembryonic AntigenThe Roche CEA electrochemiluminescent immunoassay was used.Results obtained with different test methods or kits cannot beused interchangeably. Measurement of CEA has been shown to beclinically relevant in   the management of patients withcolorectal, breast, lung, prostatic, pancreatic, and ovariancarcinomas. Smokers may have slightly elevated levels of CEA.The CEA assay value, regardless of level, should not beinterpreted as evidence for the presence or   absence of malignantdisease and is not recommended for use as a screening procedureto detect the presence of cancer in the general population.Performed by [facility],[address] [place] [address] [phone].[employer], Sharif Leon MD,   Lab. Director      Cancer Antigen 15-3 04/09/2020 84* 0 - 31 U/mL Final    Comment: INTERPRETIVE INFORMATION: Cancer Antigen-Breast ()The Roche CA 15-3 electrochemiluminescent immunoassay was used.Results obtained with different methods or kits cannot be usedinterchangeably. The CA 15-3 test is used to aid in themanagement of   Stage II and III breast cancer patients. Serialtesting for patient CA 15-3 values should be used in conjunctionwith other clinical methods for monitoring breast cancer.Patients with confirmed breast carcinoma frequently have CA 15-3values in the same   range as healthy individuals. Elevations maybe observed in patients with nonmalignant disease. Therefore, Chelsie 15-3 value, regardless of level, should not be interpreted asabsolute evidence of the presence or absence of malignantdisease.Performed by [facility],[address] [place] [address] [phone].[employer], Sharif Leon MD, Lab. Director      WBC 04/09/2020 6.1  4.6 - 10.2 10*3/uL Final    RBC 04/09/2020 4.57  4.2 - 5.4 10*6/uL Final    Hemoglobin 04/09/2020 11.4* 12.0 - 16.0 g/dL Final    Hematocrit 04/09/2020 37.3  37.0 - 47.0 % Final    MCV 04/09/2020 81.6  80 - 97 fL Final    MCH 04/09/2020 24.9* 27.0 - 31.2 pg Final    MCHC 04/09/2020 30.6* 31.8 - 35.4 g/dL Final    RDW RBC Auto-Rto 04/09/2020 23.8* 12.5 - 18.0 % Final    Platelets 04/09/2020 345  142 - 424 10*3/uL Final    Neutrophils/100 leukocytes 04/09/2020 71.3  37 - 80 % Final    Lymphocytes/100 leukocytes 04/09/2020 11.8* 25 - 40 % Final    Monocytes/100 leukocytes 04/09/2020 8.3  1 - 15 % Final    Eosinophils/100 leukocytes 04/09/2020 6.0* 1 - 3 % Final    Basophils/100 leukocytes 04/09/2020 1.0  0 - 3 % Final    Manual nRBC per 100 Cells 04/09/2020 0.0  % Final    Neutrophils 04/09/2020 4.36  1.8 - 7.7 10*3/uL Final    Sodium 04/09/2020 137  135 - 145 mmol/L Final    Potassium 04/09/2020 4.1  3.6 - 5.2 mmol/L Final    Chloride 04/09/2020 100  100 - 108 mmol/L Final    CO2 04/09/2020 30  21 - 32 mmol/L Final    BUN, Bld 04/09/2020 8  7 - 18 mg/dL Final    Creatinine 04/09/2020 0.89  0.55 - 1.02 mg/dL Final    GFR ESTIMATION 04/09/2020 > 60  >60 Final               DESCRIPTION       GLOMERULAR FILTRATION RATE             NORMAL                     >60             KIDNEY  DISEASE           15-60             KIDNEY FAILURE             <15    BUN/Creatinine Ratio 04/09/2020 8.98  7 - 18 Ratio Final    Glucose 04/09/2020 104  70 - 110 mg/dL Final    Calcium 04/09/2020 9.9  8.8 - 10.5 mg/dL Final    Total Bilirubin 04/09/2020 0.5  0.0 - 1.0 mg/dL Final    AST 04/09/2020 15  15 - 37 U/L Final    ALT 04/09/2020 18  12 - 78 U/L Final    Total Protein 04/09/2020 7.3  6.4 - 8.2 g/dL Final    Albumin 04/09/2020 3.2* 3.4 - 5.0 g/dL Final    Globulin 04/09/2020  4.1* 2.3 - 3.5 g/dL Final    Albumin/Globulin Ratio 04/09/2020 0.780* 1.1 - 1.8 Ratio Final    Alkaline Phosphatase 04/09/2020 77  46 - 116 U/L Final    Magnesium 04/09/2020 1.8  1.8 - 2.4 mg/dL Final   Orders Only on 04/01/2020   Component Date Value Ref Range Status    Neutrophils 04/01/2020 80.0  37 - 80 % Final    Neutrophils Absolute 04/01/2020 7.2  1.8 - 7.7 10*3/uL Final    BANDS 04/01/2020 1.0  0 - 5 % Final    Lymphocytes 04/01/2020 6.0* 25 - 40 % Final    Monocytes 04/01/2020 4.0  1 - 15 % Final    Eosinophils 04/01/2020 8.0* 1 - 3 % Final    Metamyelocytes 04/01/2020 1.0* 0 - 0 % Final    Cells Counted 04/01/2020 100   Final    Platelet Estimate 04/01/2020 Adequate   Final    Anisocytosis 04/01/2020 1+   Final   Ancillary Orders on 03/27/2020   Component Date Value Ref Range Status    WBC 04/01/2020 8.9  4.6 - 10.2 10*3/uL Final    RBC 04/01/2020 4.52  4.2 - 5.4 10*6/uL Final    Hemoglobin 04/01/2020 11.3* 12.0 - 16.0 g/dL Final    Hematocrit 04/01/2020 36.3* 37.0 - 47.0 % Final    MCV 04/01/2020 80.3  80 - 97 fL Final    MCH 04/01/2020 25.0* 27.0 - 31.2 pg Final    MCHC 04/01/2020 31.1* 31.8 - 35.4 g/dL Final    RDW RBC Auto-Rto 04/01/2020 22.9* 12.5 - 18.0 % Final    Platelets 04/01/2020 331  142 - 424 10*3/uL Final    Manual nRBC per 100 Cells 04/01/2020 0.0  % Final    Sodium 04/01/2020 137  135 - 145 mmol/L Final    Potassium 04/01/2020 5.0  3.6 - 5.2 mmol/L Final    Chloride 04/01/2020 101  100 - 108 mmol/L Final    CO2 04/01/2020 31  21 - 32 mmol/L Final    BUN, Bld 04/01/2020 7  7 - 18 mg/dL Final    Creatinine 04/01/2020 0.83  0.55 - 1.02 mg/dL Final    GFR ESTIMATION 04/01/2020 > 60  >60 Final               DESCRIPTION       GLOMERULAR FILTRATION RATE             NORMAL                     >60             KIDNEY  DISEASE           15-60             KIDNEY FAILURE             <15    BUN/Creatinine Ratio 04/01/2020 8.43  7 - 18 Ratio Final    Glucose 04/01/2020 89   70 - 110 mg/dL Final    Calcium 04/01/2020 9.1  8.8 - 10.5 mg/dL Final    Total Bilirubin 04/01/2020 0.5  0.0 - 1.0 mg/dL Final    AST 04/01/2020 21  15 - 37 U/L Final    ALT 04/01/2020 24  12 - 78 U/L Final    Total Protein 04/01/2020 6.5  6.4 - 8.2 g/dL Final    Albumin 04/01/2020 3.1* 3.4 - 5.0 g/dL Final    Globulin 04/01/2020 3.4  2.3 - 3.5 g/dL Final    Albumin/Globulin Ratio 04/01/2020 0.911* 1.1 - 1.8 Ratio Final    Alkaline Phosphatase 04/01/2020 71  46 - 116 U/L Final    Magnesium 04/01/2020 1.9  1.8 - 2.4 mg/dL Final   Ancillary Orders on 03/20/2020   Component Date Value Ref Range Status    WBC 03/25/2020 14.5* 4.6 - 10.2 10*3/uL Final    RBC 03/25/2020 4.39  4.2 - 5.4 10*6/uL Final    Hemoglobin 03/25/2020 10.8* 12.0 - 16.0 g/dL Final    Hematocrit 03/25/2020 35.0* 37.0 - 47.0 % Final    MCV 03/25/2020 79.7* 80 - 97 fL Final    MCH 03/25/2020 24.6* 27.0 - 31.2 pg Final    MCHC 03/25/2020 30.9* 31.8 - 35.4 g/dL Final    RDW RBC Auto-Rto 03/25/2020 23.0* 12.5 - 18.0 % Final    Platelets 03/25/2020 433* 142 - 424 10*3/uL Final    Manual nRBC per 100 Cells 03/25/2020 0.0  % Final    Sodium 03/25/2020 143  135 - 145 mmol/L Final    Potassium 03/25/2020 4.4  3.6 - 5.2 mmol/L Final    Chloride 03/25/2020 104  100 - 108 mmol/L Final    CO2 03/25/2020 34* 21 - 32 mmol/L Final    BUN, Bld 03/25/2020 8  7 - 18 mg/dL Final    Creatinine 03/25/2020 0.90  0.55 - 1.02 mg/dL Final    GFR ESTIMATION 03/25/2020 > 60  >60 Final               DESCRIPTION       GLOMERULAR FILTRATION RATE             NORMAL                     >60             KIDNEY  DISEASE           15-60             KIDNEY FAILURE             <15    BUN/Creatinine Ratio 03/25/2020 8.88  7 - 18 Ratio Final    Glucose 03/25/2020 91  70 - 110 mg/dL Final    Calcium 03/25/2020 8.6* 8.8 - 10.5 mg/dL Final    Total Bilirubin 03/25/2020 0.3  0.0 - 1.0 mg/dL Final    AST 03/25/2020 16  15 - 37 U/L Final    ALT 03/25/2020 18  12 -  78 U/L Final    Total Protein 03/25/2020 6.1* 6.4 - 8.2 g/dL Final    Albumin 03/25/2020 2.5* 3.4 - 5.0 g/dL Final    Globulin 03/25/2020 3.6* 2.3 - 3.5 g/dL Final    Albumin/Globulin Ratio 03/25/2020 0.694* 1.1 - 1.8 Ratio Final    Alkaline Phosphatase 03/25/2020 80  46 - 116 U/L Final    Magnesium 03/25/2020 1.9  1.8 - 2.4 mg/dL Final    Neutrophils 03/25/2020 84.0* 37 - 80 % Final    Neutrophils Absolute 03/25/2020 12.3* 1.8 - 7.7 10*3/uL Final    BANDS 03/25/2020 1.0  0 - 5 % Final    Lymphocytes 03/25/2020 7.0* 25 - 40 % Final    Monocytes 03/25/2020 4.0  1 - 15 % Final    Eosinophils 03/25/2020 3.0  1 - 3 % Final    Basophils 03/25/2020 1.0  0 - 3 % Final    Cells Counted 03/25/2020 100   Final    Platelet Estimate 03/25/2020 Adequate   Final    RBC Morphology 03/25/2020 ABNORMAL   Final    Anisocytosis 03/25/2020 3+   Final    Hypochromia 03/25/2020 1+   Final   Orders Only on 03/18/2020   Component Date Value Ref Range Status    Neutrophils 03/18/2020 62.0  37 - 80 % Final    Neutrophils Absolute 03/18/2020 5.7  1.8 - 7.7 10*3/uL Final    BANDS 03/18/2020 4.0  0 - 5 % Final    Lymphocytes 03/18/2020 8.0* 25 - 40 % Final    Monocytes 03/18/2020 20.0* 1 - 15 % Final    Eosinophils 03/18/2020 1.0  1 - 3 % Final    Metamyelocytes 03/18/2020 5.0* 0 - 0 % Final    Cells Counted 03/18/2020 100   Final    Platelet Estimate 03/18/2020 Adequate   Final    Platelet Clumps 03/18/2020 1+   Final    Anisocytosis 03/18/2020 1+   Final    Microcytes 03/18/2020 1+   Final   Ancillary Orders on 03/13/2020   Component Date Value Ref Range Status    WBC 03/18/2020 8.7  4.6 - 10.2 10*3/uL Final    RBC 03/18/2020 4.39  4.2 - 5.4 10*6/uL Final    Hemoglobin 03/18/2020 10.7* 12.0 - 16.0 g/dL Final    Hematocrit 03/18/2020 34.2* 37.0 - 47.0 % Final    MCV 03/18/2020 77.9* 80 - 97 fL Final    MCH 03/18/2020 24.4* 27.0 - 31.2 pg Final    MCHC 03/18/2020 31.3* 31.8 - 35.4 g/dL Final    RDW RBC  Auto-Rto 2020 21.9* 12.5 - 18.0 % Final    Platelets 2020 250  142 - 424 10*3/uL Final    Manual nRBC per 100 Cells 2020 0.0  % Final    Sodium 2020 135  135 - 145 mmol/L Final    Potassium 2020 3.7  3.6 - 5.2 mmol/L Final    Chloride 2020 102  100 - 108 mmol/L Final    CO2 2020 26  21 - 32 mmol/L Final    BUN, Bld 2020 10  7 - 18 mg/dL Final    Creatinine 2020 0.91  0.55 - 1.02 mg/dL Final    GFR ESTIMATION 2020 60  >60 Final               DESCRIPTION       GLOMERULAR FILTRATION RATE             NORMAL                     >60             KIDNEY  DISEASE           15-60             KIDNEY FAILURE             <15    BUN/Creatinine Ratio 2020 10.98  7 - 18 Ratio Final    Glucose 2020 112* 70 - 110 mg/dL Final    Calcium 2020 8.1* 8.8 - 10.5 mg/dL Final    Total Bilirubin 2020 0.8  0.0 - 1.0 mg/dL Final    AST 2020 15  15 - 37 U/L Final    ALT 2020 19  12 - 78 U/L Final    Total Protein 2020 5.8* 6.4 - 8.2 g/dL Final    Albumin 2020 2.3* 3.4 - 5.0 g/dL Final    Globulin 2020 3.5  2.3 - 3.5 g/dL Final    Albumin/Globulin Ratio 2020 0.657* 1.1 - 1.8 Ratio Final    Alkaline Phosphatase 2020 56  46 - 116 U/L Final    Magnesium 2020 1.7* 1.8 - 2.4 mg/dL Final   Orders Only on 2020   Component Date Value Ref Range Status    RBC Morphology 2020 ABNORMAL   Final    Anisocytosis 2020 2+   Final    Hypochromia 2020 1+   Final   Ancillary Orders on 2020   Component Date Value Ref Range Status    CANCER ANTIGEN 27.29 2020 144.5* 0.0 - 40.0 U/mL Final    Comment: INTERPRETIVE INFORMATION: Cancer Antigen 27.29The CA 27.29 assay is intended for use in monitorin) diseaseprogression and/or response to therapy in patients withmetastatic disease, and 2) disease recurrence in patientstreated previously for stages II   or III breast carcinoma who  areclinically free of the disease. Serial testing in patients whoare clinically free of disease should be used in conjunctionwith other clinical methods for early detection of cancerrecurrence.Limitations: Patients with   confirmed breast carcinoma frequentlyhave CA 27.29 assay values in the same range as healthyindividuals.  Elevations may also be observed in patients withnon malignant disease. Results of this test must always beinterpreted in the context of morphologic   and other relevantdata, and should not be used alone for a diagnosis of malignancy.Methodology: Siemens [employer] Centaur CA 27.29 chemiluminescentimmunoassay was used. Results obtained with different assaymethods or kits cannot be used   in                           terchangeably.Performed by [employer] [facility],[address] [place] [address] [phone].[facility], Sharif Leon MD, Lab. Director      CEA 03/11/2020 9.9* 0.0 - 3.0 ng/mL Final    Comment: INTERPRETIVE INFORMATION: Carcinoembryonic AntigenThe Roche CEA electrochemiluminescent immunoassay was used.Results obtained with different test methods or kits cannot beused interchangeably. Measurement of CEA has been shown to beclinically relevant in   the management of patients withcolorectal, breast, lung, prostatic, pancreatic, and ovariancarcinomas. Smokers may have slightly elevated levels of CEA.The CEA assay value, regardless of level, should not beinterpreted as evidence for the presence or   absence of malignantdisease and is not recommended for use as a screening procedureto detect the presence of cancer in the general population.Performed by [facility],[address] [place] [address] [phone].[facility], Sharif Leon MD,   Lab. Director      Cancer Antigen 15-3 03/11/2020 92* 0 - 31 U/mL Final    Comment: INTERPRETIVE INFORMATION: Cancer Antigen-Breast ()The Roche CA 15-3 electrochemiluminescent immunoassay was used.Results obtained with different methods or  kits cannot be usedinterchangeably. The CA 15-3 test is used to aid in themanagement of   Stage II and III breast cancer patients. Serialtesting for patient CA 15-3 values should be used in conjunctionwith other clinical methods for monitoring breast cancer.Patients with confirmed breast carcinoma frequently have CA 15-3values in the same   range as healthy individuals. Elevations maybe observed in patients with nonmalignant disease. Therefore, Chelsie 15-3 value, regardless of level, should not be interpreted asabsolute evidence of the presence or absence of malignantdisease.Performed by [facility],[address] [place] [id number] [phone].[place], Sharif Leon MD, Lab. Director     Office Visit on 03/05/2020   Component Date Value Ref Range Status    WBC 03/11/2020 5.4  4.6 - 10.2 10*3/uL Final    RBC 03/11/2020 4.61  4.2 - 5.4 10*6/uL Final    Hemoglobin 03/11/2020 10.9* 12.0 - 16.0 g/dL Final    Hematocrit 03/11/2020 36.0* 37.0 - 47.0 % Final    MCV 03/11/2020 78.1* 80 - 97 fL Final    MCH 03/11/2020 23.6* 27.0 - 31.2 pg Final    MCHC 03/11/2020 30.3* 31.8 - 35.4 g/dL Final    RDW RBC Auto-Rto 03/11/2020 21.2* 12.5 - 18.0 % Final    Platelets 03/11/2020 317  142 - 424 10*3/uL Final    Neutrophils/100 leukocytes 03/11/2020 65.3  37 - 80 % Final    Lymphocytes/100 leukocytes 03/11/2020 16.8* 25 - 40 % Final    Monocytes/100 leukocytes 03/11/2020 8.0  1 - 15 % Final    Eosinophils/100 leukocytes 03/11/2020 1.5  1 - 3 % Final    Basophils/100 leukocytes 03/11/2020 1.3  0 - 3 % Final    Manual nRBC per 100 Cells 03/11/2020 0.4  % Final    Neutrophils 03/11/2020 3.49  1.8 - 7.7 10*3/uL Final    Sodium 03/11/2020 138  135 - 145 mmol/L Final    Potassium 03/11/2020 3.7  3.6 - 5.2 mmol/L Final    Chloride 03/11/2020 103  100 - 108 mmol/L Final    CO2 03/11/2020 30  21 - 32 mmol/L Final    BUN, Bld 03/11/2020 10  7 - 18 mg/dL Final    Creatinine 03/11/2020 0.93  0.55 - 1.02 mg/dL Final     GFR ESTIMATION 03/11/2020 59* >60 Final               DESCRIPTION       GLOMERULAR FILTRATION RATE             NORMAL                     >60             KIDNEY  DISEASE           15-60             KIDNEY FAILURE             <15    BUN/Creatinine Ratio 03/11/2020 10.75  7 - 18 Ratio Final    Glucose 03/11/2020 95  70 - 110 mg/dL Final    Calcium 03/11/2020 8.9  8.8 - 10.5 mg/dL Final    Total Bilirubin 03/11/2020 0.6  0.0 - 1.0 mg/dL Final    AST 03/11/2020 14* 15 - 37 U/L Final    ALT 03/11/2020 16  12 - 78 U/L Final    Total Protein 03/11/2020 5.8* 6.4 - 8.2 g/dL Final    Albumin 03/11/2020 2.6* 3.4 - 5.0 g/dL Final    Globulin 03/11/2020 3.2  2.3 - 3.5 g/dL Final    Albumin/Globulin Ratio 03/11/2020 0.812* 1.1 - 1.8 Ratio Final    Alkaline Phosphatase 03/11/2020 77  46 - 116 U/L Final    Magnesium 03/11/2020 1.7* 1.8 - 2.4 mg/dL Final   There may be more visits with results that are not included.       Imaging:     Assessment:     Principle Problem: Cancer, metastatic to bone [C79.51]   Co-morbidity/Active Problems:   Patient Active Problem List   Diagnosis    Breast cancer metastasized to bone    Neuropathy    Fatigue associated with anemia    Drug-related hair loss    Nausea    Primary insomnia    Foot infection    Cancer, metastatic to bone    Malignant neoplasm of overlapping 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, estrogen receptor positive     Chronic fatigue    Diverticulosis    Dehydration    Hypoxemia    Iron deficiency anemia secondary to inadequate dietary iron intake    Hypoalbuminemia        Benita Carballo is a 74 y.o. female with PMH of The primary encounter diagnosis was Cancer, metastatic to bone. A diagnosis of Malignant neoplasm of overlapping 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, estrogen receptor positive  was also pertinent to this visit., with Cancer, metastatic to bone [C79.51]       Metastatic breast cancer ERPR positive HER2 Noah questionable  Status post of  chemotherapy and HER2 targeted therapy with progression on HER2 targeted therapy  Recent testing fish negative for her 2 2018  Repeat the lipid biopsy 2019  shows HER2 positive and ER negative  Bone metastatic disease  Fatigue  Hypoxia  Dehydration    Plan:   Overall Plan:    She has HER2 positive disease with ERPR negative by the new liquid biopsy. Started Taxol/Herceptin 2/2020  Discussed ECHO with pt, showing right heart failure   Seen by Dr Gardner, cardiologist and recommended that Rt heart failure unlikely related to chemo, likely related to COPD and ILD. Will resume herceptin next week with taxol   Hercpetin on hold until muga results  Lasix x 3 days with 10 lbs wt loss, p is instructed to use prn, pt has not had to use Lasix over last 2 weeks   Compression sleeve for dependant upper ext swelling      Rosalind Ricks NP

## 2020-04-20 DIAGNOSIS — C50.812 MALIGNANT NEOPLASM OF OVERLAPPING SITES OF LEFT BREAST IN FEMALE, ESTROGEN RECEPTOR POSITIVE: ICD-10-CM

## 2020-04-20 DIAGNOSIS — Z17.0 MALIGNANT NEOPLASM OF OVERLAPPING SITES OF LEFT BREAST IN FEMALE, ESTROGEN RECEPTOR POSITIVE: ICD-10-CM

## 2020-04-20 RX ORDER — HYDROCODONE BITARTRATE AND ACETAMINOPHEN 10; 325 MG/1; MG/1
1 TABLET ORAL EVERY 6 HOURS PRN
Qty: 30 TABLET | Refills: 0 | Status: SHIPPED | OUTPATIENT
Start: 2020-04-20 | End: 2020-04-29 | Stop reason: SDUPTHER

## 2020-04-20 NOTE — TELEPHONE ENCOUNTER
----- Message from Adilene Keller sent at 4/20/2020 11:06 AM CDT -----  Contact: self  Type:  RX Refill Request    Who Called: pt  Refill or New Rx:refill  RX Name and Strength:oxycotin/hydrocodone  How is the patient currently taking it? (ex. 1XDay):every 12hours/every 6 hours as needed  Is this a 30 day or 90 day RX:30/30  Preferred Pharmacy with phone number:  [facility] #[id number] - [place] - [address]  [address]  [place] [address]  Phone: [phone] Fax: [phone]  Local or Mail Order:local  Ordering Provider:ata  Would the patient rather a call back or a response via [facility]? Call back  Best Call Back Number:[phone]  Additional Information: none

## 2020-04-23 ENCOUNTER — OFFICE VISIT (OUTPATIENT)
Dept: HEMATOLOGY/ONCOLOGY | Facility: CLINIC | Age: 75
End: 2020-04-23
Payer: MEDICARE

## 2020-04-23 VITALS
HEIGHT: 66 IN | DIASTOLIC BLOOD PRESSURE: 58 MMHG | SYSTOLIC BLOOD PRESSURE: 94 MMHG | HEART RATE: 88 BPM | BODY MASS INDEX: 18.16 KG/M2 | TEMPERATURE: 99 F | WEIGHT: 113 LBS

## 2020-04-23 DIAGNOSIS — C79.51 CANCER, METASTATIC TO BONE: ICD-10-CM

## 2020-04-23 DIAGNOSIS — E88.09 HYPOALBUMINEMIA: ICD-10-CM

## 2020-04-23 DIAGNOSIS — C79.51 CARCINOMA OF BREAST METASTATIC TO BONE, UNSPECIFIED LATERALITY: ICD-10-CM

## 2020-04-23 DIAGNOSIS — C50.812 MALIGNANT NEOPLASM OF OVERLAPPING SITES OF LEFT BREAST IN FEMALE, ESTROGEN RECEPTOR POSITIVE: Primary | ICD-10-CM

## 2020-04-23 DIAGNOSIS — C50.919 CARCINOMA OF BREAST METASTATIC TO BONE, UNSPECIFIED LATERALITY: ICD-10-CM

## 2020-04-23 DIAGNOSIS — Z17.0 MALIGNANT NEOPLASM OF OVERLAPPING SITES OF LEFT BREAST IN FEMALE, ESTROGEN RECEPTOR POSITIVE: Primary | ICD-10-CM

## 2020-04-23 PROCEDURE — 99214 PR OFFICE/OUTPT VISIT, EST, LEVL IV, 30-39 MIN: ICD-10-PCS | Mod: S$GLB,,, | Performed by: NURSE PRACTITIONER

## 2020-04-23 PROCEDURE — 99214 OFFICE O/P EST MOD 30 MIN: CPT | Mod: S$GLB,,, | Performed by: NURSE PRACTITIONER

## 2020-04-23 RX ORDER — HEPARIN 100 UNIT/ML
500 SYRINGE INTRAVENOUS
Status: CANCELLED | OUTPATIENT
Start: 2020-04-23

## 2020-04-23 RX ORDER — DIPHENHYDRAMINE HYDROCHLORIDE 50 MG/ML
50 INJECTION INTRAMUSCULAR; INTRAVENOUS ONCE AS NEEDED
Status: CANCELLED | OUTPATIENT
Start: 2020-04-23

## 2020-04-23 RX ORDER — EPINEPHRINE 0.3 MG/.3ML
0.3 INJECTION SUBCUTANEOUS ONCE AS NEEDED
Status: CANCELLED | OUTPATIENT
Start: 2020-04-23

## 2020-04-23 RX ORDER — SODIUM CHLORIDE 0.9 % (FLUSH) 0.9 %
10 SYRINGE (ML) INJECTION
Status: CANCELLED | OUTPATIENT
Start: 2020-04-23

## 2020-04-23 RX ORDER — FAMOTIDINE 10 MG/ML
20 INJECTION INTRAVENOUS
Status: CANCELLED | OUTPATIENT
Start: 2020-04-23

## 2020-04-23 NOTE — PROGRESS NOTES
Medical Oncology Progress Note  [facility]     PATIENT: Benita Carballo  : 1945 74 y.o. female  MRN [id number]     PCP: Solomon Contreras MD  Consult Requested By:      Date of Service: 2020    Subjective:   Interim History:  Breast Cancer (Mestastatic to bone )    Benita Carballo is here for to followup breast cancer treatment.    The patient was started on  chemotherapy last week with Herceptin and Taxol weekly.  She has chronic hypoxia and her oxygen saturation has been declining today her O2 status 77% she looks purple and she also has increasing degree of short of breath; in addition she complains of swelling to her left arm.  She had no chest pain. She was admittted last week to the hospital and underwent thoracentesis, which was not tested for cytology and imaging for possible PE was ruled out. Today she states she is feeling better but POX was 82 % on RA, and still has yosi upper ext swelling which she states has improved. She has not had a echo so will order ECHO stat to r/o any CHF which could be causing her swelling both lung and upper ext. States she had upper u/s with her pulm which was neg for DVT.     3/26/20   She returns with ECHO showing EF 55-60%, but right sided heart failure noted. Pt has an additional 12 # of wt gain since last week. She states she is feeling fine, better than at last visit. Discussed ECHO and will hold herceptin and obtain MUGA, will proceed with weekly taxol and possibly will add herceptin back after MUGA completed and will refer pt to cardiology, but could be delayed due to COVID pandemic.       Today she presents with significant fluid loss noted. No longer upper ext third space swelling noted, alb and T Pro dramatically improved, pt has last 10 + lbs of fluid. She is breathing easy today and denies any SOB or ORELLANA. We will proceed with weekly taxol and continue to hold herceptin until MUGA or cardiology clearance is obtained. She was  seen yesterday with Dr Gardner. Below is a portion of his note:       Oncology History:  Oncology History    2002 Rt breast cancer s/p lump with chemo, X[place] in [place] Tamoxifen x 1 yr, then Arimidex x 5 yrs. Completed tx 2008.     2015  c/o chronic cough with CT chest 1/28/16 showing extensive adenopathy  With base of lt neck mass 4.5 cm with yosi pulm nodules. RF to Dr ANDREWS Mcnally    2/6/16 bx of lt neck mass carcinoma with feature suggestive of breast primary. ER/%, Her2 borderline by IHC and deemed equivocal by FISH          Breast cancer metastasized to bone    2/4/2016 Initial Diagnosis     Breast cancer metastasized to bone, , lymphoid mass       5/25/2016 - 2/9/2017 Chemotherapy     Taxol/Herceptin x 8 cycles then perjeta added 7/16  Prescribed by Dr Mcnally       2/20/2017 - 6/17/2017 Chemotherapy     Kadcycla prescribed by Dr Mcnally       6/28/2017 Biopsy     Biopsy of rt abd met disease consistent with breast cancer. ER/ID + Her 2 neg per Dr Moreno's note.    Dr Moreno noted path review from bx 2/16 showed HER 2 + equivocal with FISH reading as Equivocal but ratio 1.2. Additional report form 2016 states additional testing was done with different probe and the equivocal results can be noted as negative.     Treatment therapy changed from Her2 based tx to endocrine base therapy by Dr Moreno       6/28/2017 -  Hormone Therapy     Arimidex 6/17-2/18  Aromasin 2/18  Ibrance + Aromasin 5/18 -1/19  Ibrance + faslodex 2/19 4/30/2019 Imaging Significant Findings     PET/CT: Lesions involving the medial clavicle , subcarinal LN, pleural based soft tissue density adjacent to T11 and lesion in left lobe of liver diminished in size and SUV.   RECIST 34% decrease in disease      2/17/2020 -  Chemotherapy     Treatment Summary   Plan Name: OP BREAST TRASTUZUMAB PACLITAXEL QW  Treatment Goal: Control  Status: Active  Start Date: 2/26/2020  End Date: 5/28/2020 (Planned)  Provider: Solomon Contreras MD  Chemotherapy: PACLitaxel  (TAXOL) 64 mg/m2 = 102 mg in sodium chloride 0.9% 250 mL chemo infusion, 64 mg/m2 = 102 mg (100 % of original dose 64 mg/m2), Intravenous, Clinic/HOD 1 time, 6 of 12 cycles  Dose modification: 64 mg/m2 (original dose 64 mg/m2, Cycle 1)  trastuzumab 211 mg in sodium chloride 0.9% 250 mL chemo infusion, 4 mg/kg = 211 mg, Intravenous, Clinic/HOD 1 time, 2 of 8 cycles  Dose modification: 2 mg/kg (Cycle 7, Reason: MD Discretion)       ==8/2019 Now She is currently on combined afinitor + aromasin,  repeated CT scan shows improvement  PET 1/2020 showed PD,  afinitor + aromasin stopped  == PET scan 01/20/2020 the progressive  ==Started Taxol/Herceptin weekly 2/2020    Past Medical History:   Past Medical History:   Diagnosis Date    Arthritis     Bone cancer     Breast cancer     Cancer     Cataract     Depression     Diverticulosis 1/15/2020    Fatigue associated with anemia 6/6/2019    GERD (gastroesophageal reflux disease)     High cholesterol     Hypoxemia 3/12/2020    Skin problem     Sleep disorder        Past Surgical HIstory:   Past Surgical History:   Procedure Laterality Date    APPENDECTOMY      BREAST LUMPECTOMY Right 2002    CATARACT EXTRACTION, BILATERAL  2014    COLONOSCOPY  2008    HYSTERECTOMY      PORTACATH PLACEMENT  05/18/2016    SURGICAL REMOVAL OF NODULE OF VOCAL CORD         Allergies:  Review of patient's allergies indicates:   Allergen Reactions    Benadryl [diphenhydramine hcl]      Restless leg     Medrol [methylprednisolone] Hallucinations    Penicillins        Medications:  Current Outpatient Medications   Medication Sig Dispense Refill    ANORO ELLIPTA 62.5-25 mcg/actuation DsDv       dexAMETHasone 0.5 mg/5 mL Soln SWISH WITH 10 ML PO FOR 2 MIN THEN SPIT QID      exemestane (AROMASIN) 25 mg tablet Take 1 tablet (25 mg total) by mouth once daily. 30 tablet 3    furosemide (LASIX) 40 MG tablet Take 1 tablet (40 mg total) by mouth once daily. 30 tablet 11    gabapentin  (NEURONTIN) 400 MG capsule Take 1 capsule (400 mg total) by mouth 3 (three) times daily. 120 capsule 4    HYDROcodone-acetaminophen (NORCO)  mg per tablet Take 1 tablet by mouth every 6 (six) hours as needed for Pain. 30 tablet 0    iron-vitamin C 100-250 mg, ICAR-C, (ICAR-C) 100-250 mg Tab Take 1 tablet by mouth once daily. 30 tablet 5    LORazepam (ATIVAN) 1 MG tablet TAKE 1 TABLET BY MOUTH EVERY EVENING 30 tablet 0    megestrol (MEGACE) 400 mg/10 mL (40 mg/mL) Susp Take 10 mLs (400 mg total) by mouth once daily. 240 mL 2    oxyCODONE (OXYCONTIN) 10 mg 12 hr tablet Take 1 tablet (10 mg total) by mouth every 12 (twelve) hours as needed for Pain. 60 tablet 0    pantoprazole (PROTONIX) 40 MG tablet Take 1 tablet (40 mg total) by mouth once daily. 30 tablet 3    potassium chloride SA (K-DUR,KLOR-CON) 20 MEQ tablet Take 1 tablet (20 mEq total) by mouth once daily. 30 tablet 11    PROAIR HFA 90 mcg/actuation inhaler       promethazine (PHENERGAN) 25 MG tablet Take 1 tablet (25 mg total) by mouth every 6 (six) hours as needed for Nausea. 30 tablet 3    temazepam (RESTORIL) 15 mg Cap TAKE ONE CAPSULE BY MOUTH EVERY NIGHT AT BEDTIME AS NEEDED FOR INSOMNIA 30 capsule 0     No current facility-administered medications for this visit.        Family History:   Family History   Problem Relation Age of Onset    Lung cancer Father     Melanoma Father        Social History:  reports that she has been smoking cigarettes. She has a 2.00 pack-year smoking history. She has never used smokeless tobacco. She reports that she drinks about 4.0 standard drinks of alcohol per week. She reports that she does not use drugs.    Review of Systemas  Constitutional: No change in weight, appetie, fatigue, fever, or night sweats  Eyes: No changes in vision  Ears, Nose, Mouth, Throat, and Face: No hearing problems, ear pain, rummy nose, or sore throat  Respiratory: No shortness of breath or cough  Cardiovascular: No chest pain,  "palpitations or dizziness  Gastrointestinal: No abdominal pain, hematochezia, melena  Genitourinary: No dysuria, hematuria, nocturia, menstrual problems, post menopausal  Integumentary/Breast: No rashes or itching  Hematologic/Lymphatic: No anemia or bleeding abnormalities  Musculoskeletal: No joint or muscle abnormalities  Neurological: No sensory or motor problems, no headaches  Behavioral/Psych: No depression, anxiety, cognitive problems, or stress  Endocrine: No diabetes or thyroid problems  Allergic/Immunologic: See allergy above    Physical Exam      Vitals:   Vitals:    04/23/20 1010   BP: (!) 94/58   BP Location: Left arm   Patient Position: Sitting   BP Method: Small (Automatic)   Pulse: 88   Temp: 99.1 °F (37.3 °C)   TempSrc: Oral   Weight: 51.3 kg (113 lb)   Height: 5' 6" (1.676 m)     BMI: Body mass index is 18.24 kg/m².  BSA Body surface area is 1.55 meters squared.  ECOG Performance Status:   ECOG SCORE         GENERAL APPEARANCE: Well developed, well nourished, in no acute distress.  SKIN: Inspection of the skin reveals no rashes, ulcerations or petechiae.  HEENT: The sclerae were anicteric and conjunctivae were pink and moist. Extraocular movements were intact and pupils were equal, round, and reactive to light with normal accommodation. External inspection of the ears and nose showed no scars, lesions, or masses. Lips, teeth, and gums showed normal mucosa. The oral mucosa, hard and soft palate, tongue and posterior pharynx were normal.  NECK: Supple and symmetric. There was no thyroid enlargement, and no tenderness, or masses were felt.  CRESPIRATORY: Normal AP diameter and normal contour without any kyphoscoliosis. LUNGS: Auscultation of the lungs revealed normal breath sounds without any other adventitious sounds or rubs.  CARDIOVASCULAR: There was a regular rate and rhythm without any murmurs, gallops, rubs. The carotid pulses were normal and 2+ bilaterally without bruits. Peripheral pulses were " 2+ and symmetric.  GASTROINTESTINAL: Soft and nontender with normal bowel sounds. The liver span was approximately 5-6 cm in the right midclavicular line by percussion. The liver edge was nontender. The spleen was not palpable. There were no inguinal or umbilical hernias noted. No ascites was noted.  LYMPH NODES: No lymphadenopathy was appreciated in the neck, axillae or groin.  MUSCULOSKELETAL: Gait was normal. There was no tenderness or effusions noted. Muscle strength and tone were normal. EXTREMITIES: No cyanosis, clubbing or edema.  NEUROLOGIC: Alert and oriented x 3. Normal affect. Gait was normal. Normal deep tendon reflexes with no pathological reflexes. Sensation to touch was normal.  PSYCHIATRIC: good mood, orientated to place, time and person     Labs and Imagings     Orders Only on 04/16/2020   Component Date Value Ref Range Status    Anisocytosis 04/16/2020 2+   Final    Hypochromia 04/16/2020 1+   Final   Ancillary Orders on 04/10/2020   Component Date Value Ref Range Status    WBC 04/16/2020 5.8  4.6 - 10.2 10*3/uL Final    RBC 04/16/2020 4.56  4.2 - 5.4 10*6/uL Final    Hemoglobin 04/16/2020 11.8* 12.0 - 16.0 g/dL Final    Hematocrit 04/16/2020 38.5  37.0 - 47.0 % Final    MCV 04/16/2020 84.4  80 - 97 fL Final    MCH 04/16/2020 25.9* 27.0 - 31.2 pg Final    MCHC 04/16/2020 30.6* 31.8 - 35.4 g/dL Final    RDW RBC Auto-Rto 04/16/2020 24.5* 12.5 - 18.0 % Final    Platelets 04/16/2020 330  142 - 424 10*3/uL Final    Neutrophils/100 leukocytes 04/16/2020 66.8  37 - 80 % Final    Lymphocytes/100 leukocytes 04/16/2020 14.7* 25 - 40 % Final    Monocytes/100 leukocytes 04/16/2020 12.5  1 - 15 % Final    Eosinophils/100 leukocytes 04/16/2020 2.4  1 - 3 % Final    Basophils/100 leukocytes 04/16/2020 1.2  0 - 3 % Final    Manual nRBC per 100 Cells 04/16/2020 0.0  % Final    Neutrophils 04/16/2020 3.90  1.8 - 7.7 10*3/uL Final    Sodium 04/16/2020 139  135 - 145 mmol/L Final    Potassium  2020 4.7  3.6 - 5.2 mmol/L Final    Chloride 2020 103  100 - 108 mmol/L Final    CO2 2020 28  21 - 32 mmol/L Final    BUN, Bld 2020 9  7 - 18 mg/dL Final    Creatinine 2020 0.89  0.55 - 1.02 mg/dL Final    GFR ESTIMATION 2020 > 60  >60 Final               DESCRIPTION       GLOMERULAR FILTRATION RATE             NORMAL                     >60             KIDNEY  DISEASE           15-60             KIDNEY FAILURE             <15    BUN/Creatinine Ratio 2020 10.11  7 - 18 Ratio Final    Glucose 2020 91  70 - 110 mg/dL Final    Calcium 2020 8.9  8.8 - 10.5 mg/dL Final    Total Bilirubin 2020 0.4  0.0 - 1.0 mg/dL Final    AST 2020 17  15 - 37 U/L Final    ALT 2020 23  12 - 78 U/L Final    Total Protein 2020 6.5  6.4 - 8.2 g/dL Final    Albumin 2020 3.2* 3.4 - 5.0 g/dL Final    Globulin 2020 3.3  2.3 - 3.5 g/dL Final    Albumin/Globulin Ratio 2020 0.969* 1.1 - 1.8 Ratio Final    Alkaline Phosphatase 2020 71  46 - 116 U/L Final    Magnesium 2020 2.0  1.8 - 2.4 mg/dL Final   Orders Only on 2020   Component Date Value Ref Range Status    Anisocytosis 2020 1+   Final    Hypochromia 2020 1+   Final   Ancillary Orders on 2020   Component Date Value Ref Range Status    CANCER ANTIGEN 27.29 2020 138.0* 0.0 - 40.0 U/mL Final    Comment: INTERPRETIVE INFORMATION: Cancer Antigen 27.29The CA 27.29 assay is intended for use in monitorin) diseaseprogression and/or response to therapy in patients withmetastatic disease, and 2) disease recurrence in patientstreated previously for stages II   or III breast carcinoma who areclinically free of the disease. Serial testing in patients whoare clinically free of disease should be used in conjunctionwith other clinical methods for early detection of cancerrecurrence.Limitations: Patients with   confirmed breast carcinoma frequentlyhave  CA 27.29 assay values in the same range as healthyindividuals.  Elevations may also be observed in patients withnon malignant disease. Results of this test must always beinterpreted in the context of morphologic   and other relevantdata, and should not be used alone for a diagnosis of malignancy.Methodology: Siemens Advia Centaur CA 27.29 chemiluminescentimmunoassay was used. Results obtained with different assaymethods or kits cannot be used   in                           terchangeably.Performed by [employer],[address] [facility], [place] [address] [phone].[employer]([place]), Sharif Leon MD, Lab. Director      CEA 04/09/2020 9.4* 0.0 - 3.0 ng/mL Final    Comment: INTERPRETIVE INFORMATION: Carcinoembryonic AntigenThe Roche CEA electrochemiluminescent immunoassay was used.Results obtained with different test methods or kits cannot beused interchangeably. Measurement of CEA has been shown to beclinically relevant in   the management of patients withcolorectal, breast, lung, prostatic, pancreatic, and ovariancarcinomas. Smokers may have slightly elevated levels of CEA.The CEA assay value, regardless of level, should not beinterpreted as evidence for the presence or   absence of malignantdisease and is not recommended for use as a screening procedureto detect the presence of cancer in the general population.Performed by [facility] [employer],[address] [facility], [place] [address] [phone].[employer]([place]), Sharif Leon MD,   Lab. Director      Cancer Antigen 15-3 04/09/2020 84* 0 - 31 U/mL Final    Comment: INTERPRETIVE INFORMATION: Cancer Antigen-Breast ()The Roche CA 15-3 electrochemiluminescent immunoassay was used.Results obtained with different methods or kits cannot be usedinterchangeably. The CA 15-3 test is used to aid in themanagement of   Stage II and III breast cancer patients. Serialtesting for patient CA 15-3 values should be used in conjunctionwith other clinical methods for monitoring breast  cancer.Patients with confirmed breast carcinoma frequently have CA 15-3values in the same   range as healthy individuals. Elevations maybe observed in patients with nonmalignant disease. Therefore, Chelsie 15-3 value, regardless of level, should not be interpreted asabsolute evidence of the presence or absence of malignantdisease.Performed by [employer],[address] [place] [id number] [phone], Sharif Leon MD, Lab. Director      WBC 04/09/2020 6.1  4.6 - 10.2 10*3/uL Final    RBC 04/09/2020 4.57  4.2 - 5.4 10*6/uL Final    Hemoglobin 04/09/2020 11.4* 12.0 - 16.0 g/dL Final    Hematocrit 04/09/2020 37.3  37.0 - 47.0 % Final    MCV 04/09/2020 81.6  80 - 97 fL Final    MCH 04/09/2020 24.9* 27.0 - 31.2 pg Final    MCHC 04/09/2020 30.6* 31.8 - 35.4 g/dL Final    RDW RBC Auto-Rto 04/09/2020 23.8* 12.5 - 18.0 % Final    Platelets 04/09/2020 345  142 - 424 10*3/uL Final    Neutrophils/100 leukocytes 04/09/2020 71.3  37 - 80 % Final    Lymphocytes/100 leukocytes 04/09/2020 11.8* 25 - 40 % Final    Monocytes/100 leukocytes 04/09/2020 8.3  1 - 15 % Final    Eosinophils/100 leukocytes 04/09/2020 6.0* 1 - 3 % Final    Basophils/100 leukocytes 04/09/2020 1.0  0 - 3 % Final    Manual nRBC per 100 Cells 04/09/2020 0.0  % Final    Neutrophils 04/09/2020 4.36  1.8 - 7.7 10*3/uL Final    Sodium 04/09/2020 137  135 - 145 mmol/L Final    Potassium 04/09/2020 4.1  3.6 - 5.2 mmol/L Final    Chloride 04/09/2020 100  100 - 108 mmol/L Final    CO2 04/09/2020 30  21 - 32 mmol/L Final    BUN, Bld 04/09/2020 8  7 - 18 mg/dL Final    Creatinine 04/09/2020 0.89  0.55 - 1.02 mg/dL Final    GFR ESTIMATION 04/09/2020 > 60  >60 Final               DESCRIPTION       GLOMERULAR FILTRATION RATE             NORMAL                     >60             KIDNEY  DISEASE           15-60             KIDNEY FAILURE             <15    BUN/Creatinine Ratio 04/09/2020 8.98  7 - 18 Ratio Final    Glucose 04/09/2020  104  70 - 110 mg/dL Final    Calcium 04/09/2020 9.9  8.8 - 10.5 mg/dL Final    Total Bilirubin 04/09/2020 0.5  0.0 - 1.0 mg/dL Final    AST 04/09/2020 15  15 - 37 U/L Final    ALT 04/09/2020 18  12 - 78 U/L Final    Total Protein 04/09/2020 7.3  6.4 - 8.2 g/dL Final    Albumin 04/09/2020 3.2* 3.4 - 5.0 g/dL Final    Globulin 04/09/2020 4.1* 2.3 - 3.5 g/dL Final    Albumin/Globulin Ratio 04/09/2020 0.780* 1.1 - 1.8 Ratio Final    Alkaline Phosphatase 04/09/2020 77  46 - 116 U/L Final    Magnesium 04/09/2020 1.8  1.8 - 2.4 mg/dL Final   Orders Only on 04/01/2020   Component Date Value Ref Range Status    Neutrophils 04/01/2020 80.0  37 - 80 % Final    Neutrophils Absolute 04/01/2020 7.2  1.8 - 7.7 10*3/uL Final    BANDS 04/01/2020 1.0  0 - 5 % Final    Lymphocytes 04/01/2020 6.0* 25 - 40 % Final    Monocytes 04/01/2020 4.0  1 - 15 % Final    Eosinophils 04/01/2020 8.0* 1 - 3 % Final    Metamyelocytes 04/01/2020 1.0* 0 - 0 % Final    Cells Counted 04/01/2020 100   Final    Platelet Estimate 04/01/2020 Adequate   Final    Anisocytosis 04/01/2020 1+   Final   Ancillary Orders on 03/27/2020   Component Date Value Ref Range Status    WBC 04/01/2020 8.9  4.6 - 10.2 10*3/uL Final    RBC 04/01/2020 4.52  4.2 - 5.4 10*6/uL Final    Hemoglobin 04/01/2020 11.3* 12.0 - 16.0 g/dL Final    Hematocrit 04/01/2020 36.3* 37.0 - 47.0 % Final    MCV 04/01/2020 80.3  80 - 97 fL Final    MCH 04/01/2020 25.0* 27.0 - 31.2 pg Final    MCHC 04/01/2020 31.1* 31.8 - 35.4 g/dL Final    RDW RBC Auto-Rto 04/01/2020 22.9* 12.5 - 18.0 % Final    Platelets 04/01/2020 331  142 - 424 10*3/uL Final    Manual nRBC per 100 Cells 04/01/2020 0.0  % Final    Sodium 04/01/2020 137  135 - 145 mmol/L Final    Potassium 04/01/2020 5.0  3.6 - 5.2 mmol/L Final    Chloride 04/01/2020 101  100 - 108 mmol/L Final    CO2 04/01/2020 31  21 - 32 mmol/L Final    BUN, Bld 04/01/2020 7  7 - 18 mg/dL Final    Creatinine 04/01/2020 0.83   0.55 - 1.02 mg/dL Final    GFR ESTIMATION 04/01/2020 > 60  >60 Final               DESCRIPTION       GLOMERULAR FILTRATION RATE             NORMAL                     >60             KIDNEY  DISEASE           15-60             KIDNEY FAILURE             <15    BUN/Creatinine Ratio 04/01/2020 8.43  7 - 18 Ratio Final    Glucose 04/01/2020 89  70 - 110 mg/dL Final    Calcium 04/01/2020 9.1  8.8 - 10.5 mg/dL Final    Total Bilirubin 04/01/2020 0.5  0.0 - 1.0 mg/dL Final    AST 04/01/2020 21  15 - 37 U/L Final    ALT 04/01/2020 24  12 - 78 U/L Final    Total Protein 04/01/2020 6.5  6.4 - 8.2 g/dL Final    Albumin 04/01/2020 3.1* 3.4 - 5.0 g/dL Final    Globulin 04/01/2020 3.4  2.3 - 3.5 g/dL Final    Albumin/Globulin Ratio 04/01/2020 0.911* 1.1 - 1.8 Ratio Final    Alkaline Phosphatase 04/01/2020 71  46 - 116 U/L Final    Magnesium 04/01/2020 1.9  1.8 - 2.4 mg/dL Final   Ancillary Orders on 03/20/2020   Component Date Value Ref Range Status    WBC 03/25/2020 14.5* 4.6 - 10.2 10*3/uL Final    RBC 03/25/2020 4.39  4.2 - 5.4 10*6/uL Final    Hemoglobin 03/25/2020 10.8* 12.0 - 16.0 g/dL Final    Hematocrit 03/25/2020 35.0* 37.0 - 47.0 % Final    MCV 03/25/2020 79.7* 80 - 97 fL Final    MCH 03/25/2020 24.6* 27.0 - 31.2 pg Final    MCHC 03/25/2020 30.9* 31.8 - 35.4 g/dL Final    RDW RBC Auto-Rto 03/25/2020 23.0* 12.5 - 18.0 % Final    Platelets 03/25/2020 433* 142 - 424 10*3/uL Final    Manual nRBC per 100 Cells 03/25/2020 0.0  % Final    Sodium 03/25/2020 143  135 - 145 mmol/L Final    Potassium 03/25/2020 4.4  3.6 - 5.2 mmol/L Final    Chloride 03/25/2020 104  100 - 108 mmol/L Final    CO2 03/25/2020 34* 21 - 32 mmol/L Final    BUN, Bld 03/25/2020 8  7 - 18 mg/dL Final    Creatinine 03/25/2020 0.90  0.55 - 1.02 mg/dL Final    GFR ESTIMATION 03/25/2020 > 60  >60 Final               DESCRIPTION       GLOMERULAR FILTRATION RATE             NORMAL                     >60             KIDNEY  DISEASE            15-60             KIDNEY FAILURE             <15    BUN/Creatinine Ratio 03/25/2020 8.88  7 - 18 Ratio Final    Glucose 03/25/2020 91  70 - 110 mg/dL Final    Calcium 03/25/2020 8.6* 8.8 - 10.5 mg/dL Final    Total Bilirubin 03/25/2020 0.3  0.0 - 1.0 mg/dL Final    AST 03/25/2020 16  15 - 37 U/L Final    ALT 03/25/2020 18  12 - 78 U/L Final    Total Protein 03/25/2020 6.1* 6.4 - 8.2 g/dL Final    Albumin 03/25/2020 2.5* 3.4 - 5.0 g/dL Final    Globulin 03/25/2020 3.6* 2.3 - 3.5 g/dL Final    Albumin/Globulin Ratio 03/25/2020 0.694* 1.1 - 1.8 Ratio Final    Alkaline Phosphatase 03/25/2020 80  46 - 116 U/L Final    Magnesium 03/25/2020 1.9  1.8 - 2.4 mg/dL Final    Neutrophils 03/25/2020 84.0* 37 - 80 % Final    Neutrophils Absolute 03/25/2020 12.3* 1.8 - 7.7 10*3/uL Final    BANDS 03/25/2020 1.0  0 - 5 % Final    Lymphocytes 03/25/2020 7.0* 25 - 40 % Final    Monocytes 03/25/2020 4.0  1 - 15 % Final    Eosinophils 03/25/2020 3.0  1 - 3 % Final    Basophils 03/25/2020 1.0  0 - 3 % Final    Cells Counted 03/25/2020 100   Final    Platelet Estimate 03/25/2020 Adequate   Final    RBC Morphology 03/25/2020 ABNORMAL   Final    Anisocytosis 03/25/2020 3+   Final    Hypochromia 03/25/2020 1+   Final   Orders Only on 03/18/2020   Component Date Value Ref Range Status    Neutrophils 03/18/2020 62.0  37 - 80 % Final    Neutrophils Absolute 03/18/2020 5.7  1.8 - 7.7 10*3/uL Final    BANDS 03/18/2020 4.0  0 - 5 % Final    Lymphocytes 03/18/2020 8.0* 25 - 40 % Final    Monocytes 03/18/2020 20.0* 1 - 15 % Final    Eosinophils 03/18/2020 1.0  1 - 3 % Final    Metamyelocytes 03/18/2020 5.0* 0 - 0 % Final    Cells Counted 03/18/2020 100   Final    Platelet Estimate 03/18/2020 Adequate   Final    Platelet Clumps 03/18/2020 1+   Final    Anisocytosis 03/18/2020 1+   Final    Microcytes 03/18/2020 1+   Final   Ancillary Orders on 03/13/2020   Component Date Value Ref Range Status    WBC  03/18/2020 8.7  4.6 - 10.2 10*3/uL Final    RBC 03/18/2020 4.39  4.2 - 5.4 10*6/uL Final    Hemoglobin 03/18/2020 10.7* 12.0 - 16.0 g/dL Final    Hematocrit 03/18/2020 34.2* 37.0 - 47.0 % Final    MCV 03/18/2020 77.9* 80 - 97 fL Final    MCH 03/18/2020 24.4* 27.0 - 31.2 pg Final    MCHC 03/18/2020 31.3* 31.8 - 35.4 g/dL Final    RDW RBC Auto-Rto 03/18/2020 21.9* 12.5 - 18.0 % Final    Platelets 03/18/2020 250  142 - 424 10*3/uL Final    Manual nRBC per 100 Cells 03/18/2020 0.0  % Final    Sodium 03/18/2020 135  135 - 145 mmol/L Final    Potassium 03/18/2020 3.7  3.6 - 5.2 mmol/L Final    Chloride 03/18/2020 102  100 - 108 mmol/L Final    CO2 03/18/2020 26  21 - 32 mmol/L Final    BUN, Bld 03/18/2020 10  7 - 18 mg/dL Final    Creatinine 03/18/2020 0.91  0.55 - 1.02 mg/dL Final    GFR ESTIMATION 03/18/2020 60  >60 Final               DESCRIPTION       GLOMERULAR FILTRATION RATE             NORMAL                     >60             KIDNEY  DISEASE           15-60             KIDNEY FAILURE             <15    BUN/Creatinine Ratio 03/18/2020 10.98  7 - 18 Ratio Final    Glucose 03/18/2020 112* 70 - 110 mg/dL Final    Calcium 03/18/2020 8.1* 8.8 - 10.5 mg/dL Final    Total Bilirubin 03/18/2020 0.8  0.0 - 1.0 mg/dL Final    AST 03/18/2020 15  15 - 37 U/L Final    ALT 03/18/2020 19  12 - 78 U/L Final    Total Protein 03/18/2020 5.8* 6.4 - 8.2 g/dL Final    Albumin 03/18/2020 2.3* 3.4 - 5.0 g/dL Final    Globulin 03/18/2020 3.5  2.3 - 3.5 g/dL Final    Albumin/Globulin Ratio 03/18/2020 0.657* 1.1 - 1.8 Ratio Final    Alkaline Phosphatase 03/18/2020 56  46 - 116 U/L Final    Magnesium 03/18/2020 1.7* 1.8 - 2.4 mg/dL Final   Orders Only on 03/11/2020   Component Date Value Ref Range Status    RBC Morphology 03/11/2020 ABNORMAL   Final    Anisocytosis 03/11/2020 2+   Final    Hypochromia 03/11/2020 1+   Final   There may be more visits with results that are not included.       Imaging:      Assessment:     Principle Problem: No primary diagnosis found.   Co-morbidity/Active Problems:   Patient Active Problem List   Diagnosis    Breast cancer metastasized to bone    Neuropathy    Fatigue associated with anemia    Drug-related hair loss    Nausea    Primary insomnia    Foot infection    Cancer, metastatic to bone    Malignant neoplasm of overlapping 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, estrogen receptor positive     Chronic fatigue    Diverticulosis    Dehydration    Hypoxemia    Iron deficiency anemia secondary to inadequate dietary iron intake    Hypoalbuminemia        Benita Carballo is a 74 y.o. female with PMH of There were no encounter diagnoses., with No primary diagnosis found.       Metastatic breast cancer ERPR positive HER2 Noah questionable  Status post of chemotherapy and HER2 targeted therapy with progression on HER2 targeted therapy  Recent testing fish negative for her 2 2018  Repeat the lipid biopsy 2019  shows HER2 positive and ER negative  Bone metastatic disease  Fatigue  Hypoxia  Dehydration    Plan:   Overall Plan:    She has HER2 positive disease with ERPR negative by the new liquid biopsy. Started Taxol/Herceptin 2/2020  Discussed ECHO with pt, showing right heart failure   Seen by Dr Gardner, cardiologist and recommended that Rt heart failure unlikely related to chemo, likely related to COPD and ILD. Will resume herceptin with taxol   Lasix x 3 days with 10 lbs wt loss, p is instructed to use prn, pt has not had to use Lasix over last 2 weeks   Compression sleeve for dependant upper ext swelling      Rosalind Ricks NP

## 2020-04-23 NOTE — PROGRESS NOTES
Medical Oncology Progress Note  [facility]     PATIENT: Benita Carballo  : 1945 74 y.o. female  MRN [id number]     PCP: Solomon Contreras MD  Consult Requested By:      Date of Service: 2020    Subjective:   Interim History:  Breast Cancer (Mestastatic to bone )    Benita Carballo is here for to followup breast cancer treatment.    The patient was started on  chemotherapy last week with Herceptin and Taxol weekly.  She has chronic hypoxia and her oxygen saturation has been declining today her O2 status 77% she looks purple and she also has increasing degree of short of breath; in addition she complains of swelling to her left arm.  She had no chest pain. She was admittted last week to the hospital and underwent thoracentesis, which was not tested for cytology and imaging for possible PE was ruled out. Today she states she is feeling better but POX was 82 % on RA, and still has yosi upper ext swelling which she states has improved. She has not had a echo so will order ECHO stat to r/o any CHF which could be causing her swelling both lung and upper ext. States she had upper u/s with her pulm which was neg for DVT.     3/26/20   She returns with ECHO showing EF 55-60%, but right sided heart failure noted. Pt has an additional 12 # of wt gain since last week. She states she is feeling fine, better than at last visit. Discussed ECHO and will hold herceptin and obtain MUGA, will proceed with weekly taxol and possibly will add herceptin back after MUGA completed and will refer pt to cardiology, but could be delayed due to COVID pandemic.       Today she presents with significant fluid loss noted. No longer upper ext third space swelling noted, alb and T Pro dramatically improved, pt has last 10 + lbs of fluid. She is breathing easy today and denies any SOB or ORELLANA. We will proceed with weekly taxol and continue to hold herceptin until MUGA or cardiology clearance is obtained. She was  seen yesterday with Dr Gardner. Below is a portion of his note:       Oncology History:  Oncology History    2002 Rt breast cancer s/p lump with chemo, X[place] in [place] Tamoxifen x 1 yr, then Arimidex x 5 yrs. Completed tx 2008.     2015  c/o chronic cough with CT chest 1/28/16 showing extensive adenopathy  With base of lt neck mass 4.5 cm with yosi pulm nodules. RF to Dr ANDREWS Mcnally    2/6/16 bx of lt neck mass carcinoma with feature suggestive of breast primary. ER/%, Her2 borderline by IHC and deemed equivocal by FISH          Breast cancer metastasized to bone    2/4/2016 Initial Diagnosis     Breast cancer metastasized to bone, , lymphoid mass       5/25/2016 - 2/9/2017 Chemotherapy     Taxol/Herceptin x 8 cycles then perjeta added 7/16  Prescribed by Dr Mcnally       2/20/2017 - 6/17/2017 Chemotherapy     Kadcycla prescribed by Dr Mcnally       6/28/2017 Biopsy     Biopsy of rt abd met disease consistent with breast cancer. ER/[place] + Her 2 neg per Dr Moreno's note.    Dr Moreno noted path review from bx 2/16 showed HER 2 + equivocal with FISH reading as Equivocal but ratio 1.2. Additional report form 2016 states additional testing was done with different probe and the equivocal results can be noted as negative.     Treatment therapy changed from Her2 based tx to endocrine base therapy by Dr Moreno       6/28/2017 -  Hormone Therapy     Arimidex 6/17-2/18  Aromasin 2/18  Ibrance + Aromasin 5/18 -1/19  Ibrance + faslodex 2/19 4/30/2019 Imaging Significant Findings     PET/CT: Lesions involving the medial clavicle , subcarinal LN, pleural based soft tissue density adjacent to T11 and lesion in left lobe of liver diminished in size and SUV.   RECIST 34% decrease in disease      2/17/2020 -  Chemotherapy     Treatment Summary   Plan Name: OP BREAST TRASTUZUMAB PACLITAXEL QW  Treatment Goal: Control  Status: Active  Start Date: 2/26/2020  End Date: 5/28/2020 (Planned)  Provider: Solomon Contreras MD  Chemotherapy: PACLitaxel  (TAXOL) 64 mg/m2 = 102 mg in sodium chloride 0.9% 250 mL chemo infusion, 64 mg/m2 = 102 mg (100 % of original dose 64 mg/m2), Intravenous, Clinic/HOD 1 time, 6 of 12 cycles  Dose modification: 64 mg/m2 (original dose 64 mg/m2, Cycle 1)  trastuzumab 211 mg in sodium chloride 0.9% 250 mL chemo infusion, 4 mg/kg = 211 mg, Intravenous, Clinic/HOD 1 time, 2 of 8 cycles  Dose modification: 2 mg/kg (Cycle 7, Reason: MD Discretion)       ==8/2019 Now She is currently on combined afinitor + aromasin,  repeated CT scan shows improvement  PET 1/2020 showed PD,  afinitor + aromasin stopped  == PET scan 01/20/2020 the progressive  ==Started Taxol/Herceptin weekly 2/2020    Past Medical History:   Past Medical History:   Diagnosis Date    Arthritis     Bone cancer     Breast cancer     Cancer     Cataract     Depression     Diverticulosis 1/15/2020    Fatigue associated with anemia 6/6/2019    GERD (gastroesophageal reflux disease)     High cholesterol     Hypoxemia 3/12/2020    Skin problem     Sleep disorder        Past Surgical HIstory:   Past Surgical History:   Procedure Laterality Date    APPENDECTOMY      BREAST LUMPECTOMY Right 2002    CATARACT EXTRACTION, BILATERAL  2014    COLONOSCOPY  2008    HYSTERECTOMY      PORTACATH PLACEMENT  05/18/2016    SURGICAL REMOVAL OF NODULE OF VOCAL CORD         Allergies:  Review of patient's allergies indicates:   Allergen Reactions    Benadryl [diphenhydramine hcl]      Restless leg     Medrol [methylprednisolone] Hallucinations    Penicillins        Medications:  Current Outpatient Medications   Medication Sig Dispense Refill    ANORO ELLIPTA 62.5-25 mcg/actuation DsDv       dexAMETHasone 0.5 mg/5 mL Soln SWISH WITH 10 ML PO FOR 2 MIN THEN SPIT QID      exemestane (AROMASIN) 25 mg tablet Take 1 tablet (25 mg total) by mouth once daily. 30 tablet 3    furosemide (LASIX) 40 MG tablet Take 1 tablet (40 mg total) by mouth once daily. 30 tablet 11    gabapentin  (NEURONTIN) 400 MG capsule Take 1 capsule (400 mg total) by mouth 3 (three) times daily. 120 capsule 4    HYDROcodone-acetaminophen (NORCO)  mg per tablet Take 1 tablet by mouth every 6 (six) hours as needed for Pain. 30 tablet 0    iron-vitamin C 100-250 mg, ICAR-C, (ICAR-C) 100-250 mg Tab Take 1 tablet by mouth once daily. 30 tablet 5    LORazepam (ATIVAN) 1 MG tablet TAKE 1 TABLET BY MOUTH EVERY EVENING 30 tablet 0    megestrol (MEGACE) 400 mg/10 mL (40 mg/mL) Susp Take 10 mLs (400 mg total) by mouth once daily. 240 mL 2    oxyCODONE (OXYCONTIN) 10 mg 12 hr tablet Take 1 tablet (10 mg total) by mouth every 12 (twelve) hours as needed for Pain. 60 tablet 0    pantoprazole (PROTONIX) 40 MG tablet Take 1 tablet (40 mg total) by mouth once daily. 30 tablet 3    potassium chloride SA (K-DUR,KLOR-CON) 20 MEQ tablet Take 1 tablet (20 mEq total) by mouth once daily. 30 tablet 11    PROAIR HFA 90 mcg/actuation inhaler       promethazine (PHENERGAN) 25 MG tablet Take 1 tablet (25 mg total) by mouth every 6 (six) hours as needed for Nausea. 30 tablet 3    temazepam (RESTORIL) 15 mg Cap TAKE ONE CAPSULE BY MOUTH EVERY NIGHT AT BEDTIME AS NEEDED FOR INSOMNIA 30 capsule 0     No current facility-administered medications for this visit.        Family History:   Family History   Problem Relation Age of Onset    Lung cancer Father     Melanoma Father        Social History:  reports that she has been smoking cigarettes. She has a 2.00 pack-year smoking history. She has never used smokeless tobacco. She reports that she drinks about 4.0 standard drinks of alcohol per week. She reports that she does not use drugs.    Review of Systemas  Constitutional: No change in weight, appetie, fatigue, fever, or night sweats  Eyes: No changes in vision  Ears, Nose, Mouth, Throat, and Face: No hearing problems, ear pain, rummy nose, or sore throat  Respiratory: No shortness of breath or cough  Cardiovascular: No chest pain,  "palpitations or dizziness  Gastrointestinal: No abdominal pain, hematochezia, melena  Genitourinary: No dysuria, hematuria, nocturia, menstrual problems, post menopausal  Integumentary/Breast: No rashes or itching  Hematologic/Lymphatic: No anemia or bleeding abnormalities  Musculoskeletal: No joint or muscle abnormalities  Neurological: No sensory or motor problems, no headaches  Behavioral/Psych: No depression, anxiety, cognitive problems, or stress  Endocrine: No diabetes or thyroid problems  Allergic/Immunologic: See allergy above    Physical Exam      Vitals:   Vitals:    04/23/20 1010   BP: (!) 94/58   BP Location: Left arm   Patient Position: Sitting   BP Method: Small (Automatic)   Pulse: 88   Temp: 99.1 °F (37.3 °C)   TempSrc: Oral   Weight: 51.3 kg (113 lb)   Height: 5' 6" (1.676 m)     BMI: Body mass index is 18.24 kg/m².  BSA Body surface area is 1.55 meters squared.  ECOG Performance Status:   ECOG SCORE         GENERAL APPEARANCE: Well developed, well nourished, in no acute distress.  SKIN: Inspection of the skin reveals no rashes, ulcerations or petechiae.  HEENT: The sclerae were anicteric and conjunctivae were pink and moist. Extraocular movements were intact and pupils were equal, round, and reactive to light with normal accommodation. External inspection of the ears and nose showed no scars, lesions, or masses. Lips, teeth, and gums showed normal mucosa. The oral mucosa, hard and soft palate, tongue and posterior pharynx were normal.  NECK: Supple and symmetric. There was no thyroid enlargement, and no tenderness, or masses were felt.  CRESPIRATORY: Normal AP diameter and normal contour without any kyphoscoliosis. LUNGS: Auscultation of the lungs revealed normal breath sounds without any other adventitious sounds or rubs.  CARDIOVASCULAR: There was a regular rate and rhythm without any murmurs, gallops, rubs. The carotid pulses were normal and 2+ bilaterally without bruits. Peripheral pulses were " 2+ and symmetric.  GASTROINTESTINAL: Soft and nontender with normal bowel sounds. The liver span was approximately 5-6 cm in the right midclavicular line by percussion. The liver edge was nontender. The spleen was not palpable. There were no inguinal or umbilical hernias noted. No ascites was noted.  LYMPH NODES: No lymphadenopathy was appreciated in the neck, axillae or groin.  MUSCULOSKELETAL: Gait was normal. There was no tenderness or effusions noted. Muscle strength and tone were normal. EXTREMITIES: No cyanosis, clubbing or edema.  NEUROLOGIC: Alert and oriented x 3. Normal affect. Gait was normal. Normal deep tendon reflexes with no pathological reflexes. Sensation to touch was normal.  PSYCHIATRIC: good mood, orientated to place, time and person     Labs and Imagings     Orders Only on 04/16/2020   Component Date Value Ref Range Status    Anisocytosis 04/16/2020 2+   Final    Hypochromia 04/16/2020 1+   Final   Ancillary Orders on 04/10/2020   Component Date Value Ref Range Status    WBC 04/16/2020 5.8  4.6 - 10.2 10*3/uL Final    RBC 04/16/2020 4.56  4.2 - 5.4 10*6/uL Final    Hemoglobin 04/16/2020 11.8* 12.0 - 16.0 g/dL Final    Hematocrit 04/16/2020 38.5  37.0 - 47.0 % Final    MCV 04/16/2020 84.4  80 - 97 fL Final    MCH 04/16/2020 25.9* 27.0 - 31.2 pg Final    MCHC 04/16/2020 30.6* 31.8 - 35.4 g/dL Final    RDW RBC Auto-Rto 04/16/2020 24.5* 12.5 - 18.0 % Final    Platelets 04/16/2020 330  142 - 424 10*3/uL Final    Neutrophils/100 leukocytes 04/16/2020 66.8  37 - 80 % Final    Lymphocytes/100 leukocytes 04/16/2020 14.7* 25 - 40 % Final    Monocytes/100 leukocytes 04/16/2020 12.5  1 - 15 % Final    Eosinophils/100 leukocytes 04/16/2020 2.4  1 - 3 % Final    Basophils/100 leukocytes 04/16/2020 1.2  0 - 3 % Final    Manual nRBC per 100 Cells 04/16/2020 0.0  % Final    Neutrophils 04/16/2020 3.90  1.8 - 7.7 10*3/uL Final    Sodium 04/16/2020 139  135 - 145 mmol/L Final    Potassium  2020 4.7  3.6 - 5.2 mmol/L Final    Chloride 2020 103  100 - 108 mmol/L Final    CO2 2020 28  21 - 32 mmol/L Final    BUN, Bld 2020 9  7 - 18 mg/dL Final    Creatinine 2020 0.89  0.55 - 1.02 mg/dL Final    GFR ESTIMATION 2020 > 60  >60 Final               DESCRIPTION       GLOMERULAR FILTRATION RATE             NORMAL                     >60             KIDNEY  DISEASE           15-60             KIDNEY FAILURE             <15    BUN/Creatinine Ratio 2020 10.11  7 - 18 Ratio Final    Glucose 2020 91  70 - 110 mg/dL Final    Calcium 2020 8.9  8.8 - 10.5 mg/dL Final    Total Bilirubin 2020 0.4  0.0 - 1.0 mg/dL Final    AST 2020 17  15 - 37 U/L Final    ALT 2020 23  12 - 78 U/L Final    Total Protein 2020 6.5  6.4 - 8.2 g/dL Final    Albumin 2020 3.2* 3.4 - 5.0 g/dL Final    Globulin 2020 3.3  2.3 - 3.5 g/dL Final    Albumin/Globulin Ratio 2020 0.969* 1.1 - 1.8 Ratio Final    Alkaline Phosphatase 2020 71  46 - 116 U/L Final    Magnesium 2020 2.0  1.8 - 2.4 mg/dL Final   Orders Only on 2020   Component Date Value Ref Range Status    Anisocytosis 2020 1+   Final    Hypochromia 2020 1+   Final   Ancillary Orders on 2020   Component Date Value Ref Range Status    CANCER ANTIGEN 27.29 2020 138.0* 0.0 - 40.0 U/mL Final    Comment: INTERPRETIVE INFORMATION: Cancer Antigen 27.29The CA 27.29 assay is intended for use in monitorin) diseaseprogression and/or response to therapy in patients withmetastatic disease, and 2) disease recurrence in patientstreated previously for stages II   or III breast carcinoma who areclinically free of the disease. Serial testing in patients whoare clinically free of disease should be used in conjunctionwith other clinical methods for early detection of cancerrecurrence.Limitations: Patients with   confirmed breast carcinoma frequentlyhave  CA 27.29 assay values in the same range as healthyindividuals.  Elevations may also be observed in patients withnon malignant disease. Results of this test must always beinterpreted in the context of morphologic   and other relevantdata, and should not be used alone for a diagnosis of malignancy.Methodology: Siemens Advia Centaur CA 27.29 chemiluminescentimmunoassay was used. Results obtained with different assaymethods or kits cannot be used   in                           terchangeably.Performed by [facility],[address] [address] [phone].[employer], Sharif Leon MD, Lab. Director      CEA 04/09/2020 9.4* 0.0 - 3.0 ng/mL Final    Comment: INTERPRETIVE INFORMATION: Carcinoembryonic AntigenThe Roche CEA electrochemiluminescent immunoassay was used.Results obtained with different test methods or kits cannot beused interchangeably. Measurement of CEA has been shown to beclinically relevant in   the management of patients withcolorectal, breast, lung, prostatic, pancreatic, and ovariancarcinomas. Smokers may have slightly elevated levels of CEA.The CEA assay value, regardless of level, should not beinterpreted as evidence for the presence or   absence of malignantdisease and is not recommended for use as a screening procedureto detect the presence of cancer in the general population.Performed by [facility],[address] [place] [address] [phone].[employer], Sharif Leon MD,   Lab. Director      Cancer Antigen 15-3 04/09/2020 84* 0 - 31 U/mL Final    Comment: INTERPRETIVE INFORMATION: Cancer Antigen-Breast ()The Roche CA 15-3 electrochemiluminescent immunoassay was used.Results obtained with different methods or kits cannot be usedinterchangeably. The CA 15-3 test is used to aid in themanagement of   Stage II and III breast cancer patients. Serialtesting for patient CA 15-3 values should be used in conjunctionwith other clinical methods for monitoring breast  cancer.Patients with confirmed breast carcinoma frequently have CA 15-3values in the same   range as healthy individuals. Elevations maybe observed in patients with nonmalignant disease. Therefore, Chelsie 15-3 value, regardless of level, should not be interpreted asabsolute evidence of the presence or absence of malignantdisease.Performed by [facility],[address] [place] [id number] [phone].[employer], Sharif Leon MD, Lab. Director      WBC 04/09/2020 6.1  4.6 - 10.2 10*3/uL Final    RBC 04/09/2020 4.57  4.2 - 5.4 10*6/uL Final    Hemoglobin 04/09/2020 11.4* 12.0 - 16.0 g/dL Final    Hematocrit 04/09/2020 37.3  37.0 - 47.0 % Final    MCV 04/09/2020 81.6  80 - 97 fL Final    MCH 04/09/2020 24.9* 27.0 - 31.2 pg Final    MCHC 04/09/2020 30.6* 31.8 - 35.4 g/dL Final    RDW RBC Auto-Rto 04/09/2020 23.8* 12.5 - 18.0 % Final    Platelets 04/09/2020 345  142 - 424 10*3/uL Final    Neutrophils/100 leukocytes 04/09/2020 71.3  37 - 80 % Final    Lymphocytes/100 leukocytes 04/09/2020 11.8* 25 - 40 % Final    Monocytes/100 leukocytes 04/09/2020 8.3  1 - 15 % Final    Eosinophils/100 leukocytes 04/09/2020 6.0* 1 - 3 % Final    Basophils/100 leukocytes 04/09/2020 1.0  0 - 3 % Final    Manual nRBC per 100 Cells 04/09/2020 0.0  % Final    Neutrophils 04/09/2020 4.36  1.8 - 7.7 10*3/uL Final    Sodium 04/09/2020 137  135 - 145 mmol/L Final    Potassium 04/09/2020 4.1  3.6 - 5.2 mmol/L Final    Chloride 04/09/2020 100  100 - 108 mmol/L Final    CO2 04/09/2020 30  21 - 32 mmol/L Final    BUN, Bld 04/09/2020 8  7 - 18 mg/dL Final    Creatinine 04/09/2020 0.89  0.55 - 1.02 mg/dL Final    GFR ESTIMATION 04/09/2020 > 60  >60 Final               DESCRIPTION       GLOMERULAR FILTRATION RATE             NORMAL                     >60             KIDNEY  DISEASE           15-60             KIDNEY FAILURE             <15    BUN/Creatinine Ratio 04/09/2020 8.98  7 - 18 Ratio Final    Glucose 04/09/2020  104  70 - 110 mg/dL Final    Calcium 04/09/2020 9.9  8.8 - 10.5 mg/dL Final    Total Bilirubin 04/09/2020 0.5  0.0 - 1.0 mg/dL Final    AST 04/09/2020 15  15 - 37 U/L Final    ALT 04/09/2020 18  12 - 78 U/L Final    Total Protein 04/09/2020 7.3  6.4 - 8.2 g/dL Final    Albumin 04/09/2020 3.2* 3.4 - 5.0 g/dL Final    Globulin 04/09/2020 4.1* 2.3 - 3.5 g/dL Final    Albumin/Globulin Ratio 04/09/2020 0.780* 1.1 - 1.8 Ratio Final    Alkaline Phosphatase 04/09/2020 77  46 - 116 U/L Final    Magnesium 04/09/2020 1.8  1.8 - 2.4 mg/dL Final   Orders Only on 04/01/2020   Component Date Value Ref Range Status    Neutrophils 04/01/2020 80.0  37 - 80 % Final    Neutrophils Absolute 04/01/2020 7.2  1.8 - 7.7 10*3/uL Final    BANDS 04/01/2020 1.0  0 - 5 % Final    Lymphocytes 04/01/2020 6.0* 25 - 40 % Final    Monocytes 04/01/2020 4.0  1 - 15 % Final    Eosinophils 04/01/2020 8.0* 1 - 3 % Final    Metamyelocytes 04/01/2020 1.0* 0 - 0 % Final    Cells Counted 04/01/2020 100   Final    Platelet Estimate 04/01/2020 Adequate   Final    Anisocytosis 04/01/2020 1+   Final   Ancillary Orders on 03/27/2020   Component Date Value Ref Range Status    WBC 04/01/2020 8.9  4.6 - 10.2 10*3/uL Final    RBC 04/01/2020 4.52  4.2 - 5.4 10*6/uL Final    Hemoglobin 04/01/2020 11.3* 12.0 - 16.0 g/dL Final    Hematocrit 04/01/2020 36.3* 37.0 - 47.0 % Final    MCV 04/01/2020 80.3  80 - 97 fL Final    MCH 04/01/2020 25.0* 27.0 - 31.2 pg Final    MCHC 04/01/2020 31.1* 31.8 - 35.4 g/dL Final    RDW RBC Auto-Rto 04/01/2020 22.9* 12.5 - 18.0 % Final    Platelets 04/01/2020 331  142 - 424 10*3/uL Final    Manual nRBC per 100 Cells 04/01/2020 0.0  % Final    Sodium 04/01/2020 137  135 - 145 mmol/L Final    Potassium 04/01/2020 5.0  3.6 - 5.2 mmol/L Final    Chloride 04/01/2020 101  100 - 108 mmol/L Final    CO2 04/01/2020 31  21 - 32 mmol/L Final    BUN, Bld 04/01/2020 7  7 - 18 mg/dL Final    Creatinine 04/01/2020 0.83   0.55 - 1.02 mg/dL Final    GFR ESTIMATION 04/01/2020 > 60  >60 Final               DESCRIPTION       GLOMERULAR FILTRATION RATE             NORMAL                     >60             KIDNEY  DISEASE           15-60             KIDNEY FAILURE             <15    BUN/Creatinine Ratio 04/01/2020 8.43  7 - 18 Ratio Final    Glucose 04/01/2020 89  70 - 110 mg/dL Final    Calcium 04/01/2020 9.1  8.8 - 10.5 mg/dL Final    Total Bilirubin 04/01/2020 0.5  0.0 - 1.0 mg/dL Final    AST 04/01/2020 21  15 - 37 U/L Final    ALT 04/01/2020 24  12 - 78 U/L Final    Total Protein 04/01/2020 6.5  6.4 - 8.2 g/dL Final    Albumin 04/01/2020 3.1* 3.4 - 5.0 g/dL Final    Globulin 04/01/2020 3.4  2.3 - 3.5 g/dL Final    Albumin/Globulin Ratio 04/01/2020 0.911* 1.1 - 1.8 Ratio Final    Alkaline Phosphatase 04/01/2020 71  46 - 116 U/L Final    Magnesium 04/01/2020 1.9  1.8 - 2.4 mg/dL Final   Ancillary Orders on 03/20/2020   Component Date Value Ref Range Status    WBC 03/25/2020 14.5* 4.6 - 10.2 10*3/uL Final    RBC 03/25/2020 4.39  4.2 - 5.4 10*6/uL Final    Hemoglobin 03/25/2020 10.8* 12.0 - 16.0 g/dL Final    Hematocrit 03/25/2020 35.0* 37.0 - 47.0 % Final    MCV 03/25/2020 79.7* 80 - 97 fL Final    MCH 03/25/2020 24.6* 27.0 - 31.2 pg Final    MCHC 03/25/2020 30.9* 31.8 - 35.4 g/dL Final    RDW RBC Auto-Rto 03/25/2020 23.0* 12.5 - 18.0 % Final    Platelets 03/25/2020 433* 142 - 424 10*3/uL Final    Manual nRBC per 100 Cells 03/25/2020 0.0  % Final    Sodium 03/25/2020 143  135 - 145 mmol/L Final    Potassium 03/25/2020 4.4  3.6 - 5.2 mmol/L Final    Chloride 03/25/2020 104  100 - 108 mmol/L Final    CO2 03/25/2020 34* 21 - 32 mmol/L Final    BUN, Bld 03/25/2020 8  7 - 18 mg/dL Final    Creatinine 03/25/2020 0.90  0.55 - 1.02 mg/dL Final    GFR ESTIMATION 03/25/2020 > 60  >60 Final               DESCRIPTION       GLOMERULAR FILTRATION RATE             NORMAL                     >60             KIDNEY  DISEASE            15-60             KIDNEY FAILURE             <15    BUN/Creatinine Ratio 03/25/2020 8.88  7 - 18 Ratio Final    Glucose 03/25/2020 91  70 - 110 mg/dL Final    Calcium 03/25/2020 8.6* 8.8 - 10.5 mg/dL Final    Total Bilirubin 03/25/2020 0.3  0.0 - 1.0 mg/dL Final    AST 03/25/2020 16  15 - 37 U/L Final    ALT 03/25/2020 18  12 - 78 U/L Final    Total Protein 03/25/2020 6.1* 6.4 - 8.2 g/dL Final    Albumin 03/25/2020 2.5* 3.4 - 5.0 g/dL Final    Globulin 03/25/2020 3.6* 2.3 - 3.5 g/dL Final    Albumin/Globulin Ratio 03/25/2020 0.694* 1.1 - 1.8 Ratio Final    Alkaline Phosphatase 03/25/2020 80  46 - 116 U/L Final    Magnesium 03/25/2020 1.9  1.8 - 2.4 mg/dL Final    Neutrophils 03/25/2020 84.0* 37 - 80 % Final    Neutrophils Absolute 03/25/2020 12.3* 1.8 - 7.7 10*3/uL Final    BANDS 03/25/2020 1.0  0 - 5 % Final    Lymphocytes 03/25/2020 7.0* 25 - 40 % Final    Monocytes 03/25/2020 4.0  1 - 15 % Final    Eosinophils 03/25/2020 3.0  1 - 3 % Final    Basophils 03/25/2020 1.0  0 - 3 % Final    Cells Counted 03/25/2020 100   Final    Platelet Estimate 03/25/2020 Adequate   Final    RBC Morphology 03/25/2020 ABNORMAL   Final    Anisocytosis 03/25/2020 3+   Final    Hypochromia 03/25/2020 1+   Final   Orders Only on 03/18/2020   Component Date Value Ref Range Status    Neutrophils 03/18/2020 62.0  37 - 80 % Final    Neutrophils Absolute 03/18/2020 5.7  1.8 - 7.7 10*3/uL Final    BANDS 03/18/2020 4.0  0 - 5 % Final    Lymphocytes 03/18/2020 8.0* 25 - 40 % Final    Monocytes 03/18/2020 20.0* 1 - 15 % Final    Eosinophils 03/18/2020 1.0  1 - 3 % Final    Metamyelocytes 03/18/2020 5.0* 0 - 0 % Final    Cells Counted 03/18/2020 100   Final    Platelet Estimate 03/18/2020 Adequate   Final    Platelet Clumps 03/18/2020 1+   Final    Anisocytosis 03/18/2020 1+   Final    Microcytes 03/18/2020 1+   Final   Ancillary Orders on 03/13/2020   Component Date Value Ref Range Status    WBC  03/18/2020 8.7  4.6 - 10.2 10*3/uL Final    RBC 03/18/2020 4.39  4.2 - 5.4 10*6/uL Final    Hemoglobin 03/18/2020 10.7* 12.0 - 16.0 g/dL Final    Hematocrit 03/18/2020 34.2* 37.0 - 47.0 % Final    MCV 03/18/2020 77.9* 80 - 97 fL Final    MCH 03/18/2020 24.4* 27.0 - 31.2 pg Final    MCHC 03/18/2020 31.3* 31.8 - 35.4 g/dL Final    RDW RBC Auto-Rto 03/18/2020 21.9* 12.5 - 18.0 % Final    Platelets 03/18/2020 250  142 - 424 10*3/uL Final    Manual nRBC per 100 Cells 03/18/2020 0.0  % Final    Sodium 03/18/2020 135  135 - 145 mmol/L Final    Potassium 03/18/2020 3.7  3.6 - 5.2 mmol/L Final    Chloride 03/18/2020 102  100 - 108 mmol/L Final    CO2 03/18/2020 26  21 - 32 mmol/L Final    BUN, Bld 03/18/2020 10  7 - 18 mg/dL Final    Creatinine 03/18/2020 0.91  0.55 - 1.02 mg/dL Final    GFR ESTIMATION 03/18/2020 60  >60 Final               DESCRIPTION       GLOMERULAR FILTRATION RATE             NORMAL                     >60             KIDNEY  DISEASE           15-60             KIDNEY FAILURE             <15    BUN/Creatinine Ratio 03/18/2020 10.98  7 - 18 Ratio Final    Glucose 03/18/2020 112* 70 - 110 mg/dL Final    Calcium 03/18/2020 8.1* 8.8 - 10.5 mg/dL Final    Total Bilirubin 03/18/2020 0.8  0.0 - 1.0 mg/dL Final    AST 03/18/2020 15  15 - 37 U/L Final    ALT 03/18/2020 19  12 - 78 U/L Final    Total Protein 03/18/2020 5.8* 6.4 - 8.2 g/dL Final    Albumin 03/18/2020 2.3* 3.4 - 5.0 g/dL Final    Globulin 03/18/2020 3.5  2.3 - 3.5 g/dL Final    Albumin/Globulin Ratio 03/18/2020 0.657* 1.1 - 1.8 Ratio Final    Alkaline Phosphatase 03/18/2020 56  46 - 116 U/L Final    Magnesium 03/18/2020 1.7* 1.8 - 2.4 mg/dL Final   Orders Only on 03/11/2020   Component Date Value Ref Range Status    RBC Morphology 03/11/2020 ABNORMAL   Final    Anisocytosis 03/11/2020 2+   Final    Hypochromia 03/11/2020 1+   Final   There may be more visits with results that are not included.       Imaging:      Assessment:     Principle Problem: Malignant neoplasm of overlapping 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, estrogen receptor positive [C50.812, Z17.0]   Co-morbidity/Active Problems:   Patient Active Problem List   Diagnosis    Breast cancer metastasized to bone    Neuropathy    Fatigue associated with anemia    Drug-related hair loss    Nausea    Primary insomnia    Foot infection    Cancer, metastatic to bone    Malignant neoplasm of overlapping 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, estrogen receptor positive     Chronic fatigue    Diverticulosis    Dehydration    Hypoxemia    Iron deficiency anemia secondary to inadequate dietary iron intake    Hypoalbuminemia        Benita Carballo is a 74 y.o. female with PMH of The primary encounter diagnosis was Malignant neoplasm of overlapping 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, estrogen receptor positive . Diagnoses of Cancer, metastatic to bone, Hypoalbuminemia, and 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, unspecified laterality were also pertinent to this visit., with Malignant neoplasm of overlapping 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, estrogen receptor positive [C50.812, Z17.0]       Metastatic breast cancer ERPR positive HER2 Noah questionable  Status post of chemotherapy and HER2 targeted therapy with progression on HER2 targeted therapy  Recent testing fish negative for her 2 2018  Repeat the lipid biopsy 2019  shows HER2 positive and ER negative  Bone metastatic disease  Fatigue  Hypoxia  Dehydration    Plan:   Overall Plan:    She has HER2 positive disease with ERPR negative by the new liquid biopsy. Started Taxol/Herceptin 2/2020  Discussed ECHO with pt, showing right heart failure   Seen by Dr Gardner, cardiologist and recommended that Rt heart failure unlikely related to chemo, likely related to COPD and ILD. Will resume herceptin next week with taxol   Hercpetin on hold until muga results  Lasix x 3 days with 10 lbs wt loss, p is instructed to use prn, pt  has not had to use Lasix over last 2 weeks   Compression sleeve for dependant upper ext swelling      Rosalind Ricks, NP

## 2020-04-27 DIAGNOSIS — C50.812 MALIGNANT NEOPLASM OF OVERLAPPING SITES OF LEFT BREAST IN FEMALE, ESTROGEN RECEPTOR POSITIVE: ICD-10-CM

## 2020-04-27 DIAGNOSIS — Z17.0 MALIGNANT NEOPLASM OF OVERLAPPING SITES OF LEFT BREAST IN FEMALE, ESTROGEN RECEPTOR POSITIVE: ICD-10-CM

## 2020-04-27 RX ORDER — OXYCODONE HCL 10 MG/1
10 TABLET, FILM COATED, EXTENDED RELEASE ORAL EVERY 12 HOURS PRN
Qty: 60 TABLET | Refills: 0 | Status: SHIPPED | OUTPATIENT
Start: 2020-04-27 | End: 2020-05-11 | Stop reason: SDUPTHER

## 2020-04-27 NOTE — TELEPHONE ENCOUNTER
----- Message from Eladia Scruggs sent at 4/27/2020  9:37 AM CDT -----  .Type:  RX Refill Request    Who Called: self  Refill or New Rx:refill  RX Name and Strength:OXYCONTIN) 10 mg, NORCO  mg  How is the patient currently taking it? (ex. 1XDay):1 every 12 hrs, 1 every 6 hrs as needed  Is this a 30 day or 90 day RX:30, 30  Preferred Pharmacy with phone number:.  [id number] - [place], [place] - [address] AT [facility] OF  ([address] [facility]) &   [address]  [facility] [address]  Phone: [phone] Fax: [phone]    Local or Mail Order:local  Ordering Provider:ata  Would the patient rather a call back or a response via [facility]? call  Best Call Back Number:.[phone] (home)   Additional Information:

## 2020-04-29 DIAGNOSIS — C50.812 MALIGNANT NEOPLASM OF OVERLAPPING SITES OF LEFT BREAST IN FEMALE, ESTROGEN RECEPTOR POSITIVE: ICD-10-CM

## 2020-04-29 DIAGNOSIS — Z17.0 MALIGNANT NEOPLASM OF OVERLAPPING SITES OF LEFT BREAST IN FEMALE, ESTROGEN RECEPTOR POSITIVE: ICD-10-CM

## 2020-04-29 RX ORDER — HYDROCODONE BITARTRATE AND ACETAMINOPHEN 10; 325 MG/1; MG/1
1 TABLET ORAL EVERY 6 HOURS PRN
Qty: 30 TABLET | Refills: 0 | Status: SHIPPED | OUTPATIENT
Start: 2020-04-29 | End: 2020-05-14

## 2020-04-29 NOTE — TELEPHONE ENCOUNTER
----- Message from Eladia Scruggs sent at 4/29/2020 11:15 AM CDT -----  ..Type:  RX Refill Request    Who Called: self  Refill or New Rx: refill  RX Name and Strength:NORCO)  mg   How is the patient currently taking it? (ex. 1XDay):1 tab every 6 hrs  Is this a 30 day or 90 day RX:30  Preferred Pharmacy with phone number:.  [id number] - [place], [place] [address]  [address] [facility] &   [address]  [facility] [address]  Phone: [phone] Fax: [phone]    Local or Mail Order: local  Ordering Provider:ata  Would the patient rather a call back or a response via [facility]? call  Best Call Back Number:.[phone] (home)   Additional Information:

## 2020-04-30 ENCOUNTER — OFFICE VISIT (OUTPATIENT)
Dept: HEMATOLOGY/ONCOLOGY | Facility: CLINIC | Age: 75
End: 2020-04-30
Payer: MEDICARE

## 2020-04-30 VITALS — WEIGHT: 110 LBS | BODY MASS INDEX: 17.68 KG/M2 | HEIGHT: 66 IN

## 2020-04-30 DIAGNOSIS — Z17.0 MALIGNANT NEOPLASM OF OVERLAPPING SITES OF LEFT BREAST IN FEMALE, ESTROGEN RECEPTOR POSITIVE: Primary | ICD-10-CM

## 2020-04-30 DIAGNOSIS — C79.51 CANCER, METASTATIC TO BONE: ICD-10-CM

## 2020-04-30 DIAGNOSIS — L08.9 FOOT INFECTION: ICD-10-CM

## 2020-04-30 DIAGNOSIS — C50.812 MALIGNANT NEOPLASM OF OVERLAPPING SITES OF LEFT BREAST IN FEMALE, ESTROGEN RECEPTOR POSITIVE: Primary | ICD-10-CM

## 2020-04-30 PROCEDURE — 99441 PR PHYSICIAN TELEPHONE EVALUATION 5-10 MIN: ICD-10-PCS | Mod: 95,,, | Performed by: NURSE PRACTITIONER

## 2020-04-30 PROCEDURE — 99441 PR PHYSICIAN TELEPHONE EVALUATION 5-10 MIN: CPT | Mod: 95,,, | Performed by: NURSE PRACTITIONER

## 2020-04-30 NOTE — PROGRESS NOTES
Medical Oncology Progress Note  [facility]     PATIENT: Benita Carballo  : 1945 74 y.o. female  MRN [id number]     PCP: Solomon Contreras MD  Consult Requested By:      Date of Service: 2020    Subjective:   Interim History:  Malignant neoplasm of overlapping sites of left breast    Benita Carballo is here for to followup breast cancer treatment.    The patient was started on  chemotherapy last week with Herceptin and Taxol weekly.  She has chronic hypoxia and her oxygen saturation has been declining today her O2 status 77% she looks purple and she also has increasing degree of short of breath; in addition she complains of swelling to her left arm.  She had no chest pain. She was admittted last week to the hospital and underwent thoracentesis, which was not tested for cytology and imaging for possible PE was ruled out. Today she states she is feeling better but POX was 82 % on RA, and still has yosi upper ext swelling which she states has improved. She has not had a echo so will order ECHO stat to r/o any CHF which could be causing her swelling both lung and upper ext. States she had upper u/s with her pulm which was neg for DVT.     3/26/20   She returns with ECHO showing EF 55-60%, but right sided heart failure noted. Pt has an additional 12 # of wt gain since last week. She states she is feeling fine, better than at last visit. Discussed ECHO and will hold herceptin and obtain MUGA, will proceed with weekly taxol and possibly will add herceptin back after MUGA completed and will refer pt to cardiology, but could be delayed due to COVID pandemic.       Today she presents with significant fluid loss noted. No longer upper ext third space swelling noted, alb and T Pro dramatically improved, pt has last 10 + lbs of fluid. She is breathing easy today and denies any SOB or ORELLANA. We will proceed with weekly taxol and continue to hold herceptin until MUGA or cardiology clearance is  obtained. She was seen yesterday with Dr Gardner. Below is a portion of his note:       Oncology History:  Oncology History    2002 Rt breast cancer s/p lump with chemo, [place] in [place] Tamoxifen x 1 yr, then Arimidex x 5 yrs. Completed tx 2008.     2015  c/o chronic cough with CT chest 1/28/16 showing extensive adenopathy  With base of lt neck mass 4.5 cm with yosi pulm nodules. RF to Dr ANDREWS Mcnally    2/6/16 bx of lt neck mass carcinoma with feature suggestive of breast primary. ER/%, Her2 borderline by IHC and deemed equivocal by FISH          Breast cancer metastasized to bone    2/4/2016 Initial Diagnosis     Breast cancer metastasized to bone, , lymphoid mass       5/25/2016 - 2/9/2017 Chemotherapy     Taxol/Herceptin x 8 cycles then perjeta added 7/16  Prescribed by Dr Mcnally       2/20/2017 - 6/17/2017 Chemotherapy     Kadcycla prescribed by Dr Mcnally       6/28/2017 Biopsy     Biopsy of rt abd met disease consistent with breast cancer. ER/[place] + Her 2 neg per Dr Moreno's note.    Dr Moreno noted path review from bx 2/16 showed HER 2 + equivocal with FISH reading as Equivocal but ratio 1.2. Additional report form 2016 states additional testing was done with different probe and the equivocal results can be noted as negative.     Treatment therapy changed from Her2 based tx to endocrine base therapy by Dr Moreno       6/28/2017 -  Hormone Therapy     Arimidex 6/17-2/18  Aromasin 2/18  Ibrance + Aromasin 5/18 -1/19  Ibrance + faslodex 2/19 4/30/2019 Imaging Significant Findings     PET/CT: Lesions involving the medial clavicle , subcarinal LN, pleural based soft tissue density adjacent to T11 and lesion in left lobe of liver diminished in size and SUV.   RECIST 34% decrease in disease      2/17/2020 -  Chemotherapy     Treatment Summary   Plan Name: OP BREAST TRASTUZUMAB PACLITAXEL QW  Treatment Goal: Control  Status: Active  Start Date: 2/26/2020  End Date: 6/4/2020 (Planned)  Provider: Solomon Contreras  MD  Chemotherapy: PACLitaxel (TAXOL) 64 mg/m2 = 102 mg in sodium chloride 0.9% 250 mL chemo infusion, 64 mg/m2 = 102 mg (100 % of original dose 64 mg/m2), Intravenous, Clinic/HOD 1 time, 7 of 12 cycles  Dose modification: 64 mg/m2 (original dose 64 mg/m2, Cycle 1)  trastuzumab 211 mg in sodium chloride 0.9% 250 mL chemo infusion, 4 mg/kg = 211 mg, Intravenous, Clinic/HOD 1 time, 3 of 8 cycles  Dose modification: 2 mg/kg (original dose 2 mg/kg, Cycle 7, Reason: MD Discretion)       ==8/2019 Now She is currently on combined afinitor + aromasin,  repeated CT scan shows improvement  PET 1/2020 showed PD,  afinitor + aromasin stopped  == PET scan 01/20/2020 the progressive  ==Started Taxol/Herceptin weekly 2/2020    Past Medical History:   Past Medical History:   Diagnosis Date    Arthritis     Bone cancer     Breast cancer     Cancer     Cataract     Depression     Diverticulosis 1/15/2020    Fatigue associated with anemia 6/6/2019    GERD (gastroesophageal reflux disease)     High cholesterol     Hypoxemia 3/12/2020    Skin problem     Sleep disorder        Past Surgical HIstory:   Past Surgical History:   Procedure Laterality Date    APPENDECTOMY      BREAST LUMPECTOMY Right 2002    CATARACT EXTRACTION, BILATERAL  2014    COLONOSCOPY  2008    HYSTERECTOMY      PORTACATH PLACEMENT  05/18/2016    SURGICAL REMOVAL OF NODULE OF VOCAL CORD         Allergies:  Review of patient's allergies indicates:   Allergen Reactions    Benadryl [diphenhydramine hcl]      Restless leg     Medrol [methylprednisolone] Hallucinations    Penicillins        Medications:  Current Outpatient Medications   Medication Sig Dispense Refill    ANORO ELLIPTA 62.5-25 mcg/actuation DsDv       dexAMETHasone 0.5 mg/5 mL Soln SWISH WITH 10 ML PO FOR 2 MIN THEN SPIT QID      exemestane (AROMASIN) 25 mg tablet Take 1 tablet (25 mg total) by mouth once daily. 30 tablet 3    furosemide (LASIX) 40 MG tablet Take 1 tablet (40 mg  total) by mouth once daily. 30 tablet 11    gabapentin (NEURONTIN) 400 MG capsule Take 1 capsule (400 mg total) by mouth 3 (three) times daily. 120 capsule 4    HYDROcodone-acetaminophen (NORCO)  mg per tablet Take 1 tablet by mouth every 6 (six) hours as needed for Pain. 30 tablet 0    iron-vitamin C 100-250 mg, ICAR-C, (ICAR-C) 100-250 mg Tab Take 1 tablet by mouth once daily. 30 tablet 5    LORazepam (ATIVAN) 1 MG tablet TAKE 1 TABLET BY MOUTH EVERY EVENING 30 tablet 0    megestrol (MEGACE) 400 mg/10 mL (40 mg/mL) Susp Take 10 mLs (400 mg total) by mouth once daily. 240 mL 2    oxyCODONE (OXYCONTIN) 10 mg 12 hr tablet Take 1 tablet (10 mg total) by mouth every 12 (twelve) hours as needed for Pain. 60 tablet 0    pantoprazole (PROTONIX) 40 MG tablet Take 1 tablet (40 mg total) by mouth once daily. 30 tablet 3    potassium chloride SA (K-DUR,KLOR-CON) 20 MEQ tablet Take 1 tablet (20 mEq total) by mouth once daily. 30 tablet 11    PROAIR HFA 90 mcg/actuation inhaler       promethazine (PHENERGAN) 25 MG tablet Take 1 tablet (25 mg total) by mouth every 6 (six) hours as needed for Nausea. 30 tablet 3    temazepam (RESTORIL) 15 mg Cap TAKE ONE CAPSULE BY MOUTH EVERY NIGHT AT BEDTIME AS NEEDED FOR INSOMNIA 30 capsule 0     No current facility-administered medications for this visit.        Family History:   Family History   Problem Relation Age of Onset    Lung cancer Father     Melanoma Father        Social History:  reports that she has been smoking cigarettes. She has a 2.00 pack-year smoking history. She has never used smokeless tobacco. She reports that she drinks about 4.0 standard drinks of alcohol per week. She reports that she does not use drugs.    Review of Systemas  Constitutional: No change in weight, appetie, fatigue, fever, or night sweats  Eyes: No changes in vision  Ears, Nose, Mouth, Throat, and Face: No hearing problems, ear pain, rummy nose, or sore throat  Respiratory: No  "shortness of breath or cough  Cardiovascular: No chest pain, palpitations or dizziness  Gastrointestinal: No abdominal pain, hematochezia, melena  Genitourinary: No dysuria, hematuria, nocturia, menstrual problems, post menopausal  Integumentary/Breast: No rashes or itching  Hematologic/Lymphatic: No anemia or bleeding abnormalities  Musculoskeletal: No joint or muscle abnormalities  Neurological: No sensory or motor problems, no headaches  Behavioral/Psych: No depression, anxiety, cognitive problems, or stress  Endocrine: No diabetes or thyroid problems  Allergic/Immunologic: See allergy above    Physical Exam      Vitals:   Vitals:    04/30/20 1009   Weight: 49.9 kg (110 lb)   Height: 5' 6" (1.676 m)     BMI: Body mass index is 17.75 kg/m².  BSA Body surface area is 1.52 meters squared.  ECOG Performance Status:   ECOG SCORE         GENERAL APPEARANCE: Well developed, well nourished, in no acute distress.  SKIN: Inspection of the skin reveals no rashes, ulcerations or petechiae.  HEENT: The sclerae were anicteric and conjunctivae were pink and moist. Extraocular movements were intact and pupils were equal, round, and reactive to light with normal accommodation. External inspection of the ears and nose showed no scars, lesions, or masses. Lips, teeth, and gums showed normal mucosa. The oral mucosa, hard and soft palate, tongue and posterior pharynx were normal.  NECK: Supple and symmetric. There was no thyroid enlargement, and no tenderness, or masses were felt.  CRESPIRATORY: Normal AP diameter and normal contour without any kyphoscoliosis. LUNGS: Auscultation of the lungs revealed normal breath sounds without any other adventitious sounds or rubs.  CARDIOVASCULAR: There was a regular rate and rhythm without any murmurs, gallops, rubs. The carotid pulses were normal and 2+ bilaterally without bruits. Peripheral pulses were 2+ and symmetric.  GASTROINTESTINAL: Soft and nontender with normal bowel sounds. The liver " span was approximately 5-6 cm in the right midclavicular line by percussion. The liver edge was nontender. The spleen was not palpable. There were no inguinal or umbilical hernias noted. No ascites was noted.  LYMPH NODES: No lymphadenopathy was appreciated in the neck, axillae or groin.  MUSCULOSKELETAL: Gait was normal. There was no tenderness or effusions noted. Muscle strength and tone were normal. EXTREMITIES: No cyanosis, clubbing or edema.  NEUROLOGIC: Alert and oriented x 3. Normal affect. Gait was normal. Normal deep tendon reflexes with no pathological reflexes. Sensation to touch was normal.  PSYCHIATRIC: good mood, orientated to place, time and person     Labs and Imagings     Ancillary Orders on 04/17/2020   Component Date Value Ref Range Status    WBC 04/23/2020 6.5  4.6 - 10.2 10*3/uL Final    RBC 04/23/2020 4.24  4.2 - 5.4 10*6/uL Final    Hemoglobin 04/23/2020 11.2* 12.0 - 16.0 g/dL Final    Hematocrit 04/23/2020 36.0* 37.0 - 47.0 % Final    MCV 04/23/2020 84.9  80 - 97 fL Final    MCH 04/23/2020 26.4* 27.0 - 31.2 pg Final    MCHC 04/23/2020 31.1* 31.8 - 35.4 g/dL Final    RDW RBC Auto-Rto 04/23/2020 24.6* 12.5 - 18.0 % Final    Platelets 04/23/2020 320  142 - 424 10*3/uL Final    Neutrophils/100 leukocytes 04/23/2020 67.2  37 - 80 % Final    Lymphocytes/100 leukocytes 04/23/2020 14.8* 25 - 40 % Final    Monocytes/100 leukocytes 04/23/2020 11.2  1 - 15 % Final    Eosinophils/100 leukocytes 04/23/2020 2.8  1 - 3 % Final    Basophils/100 leukocytes 04/23/2020 1.4  0 - 3 % Final    Manual nRBC per 100 Cells 04/23/2020 0.0  % Final    Neutrophils 04/23/2020 4.36  1.8 - 7.7 10*3/uL Final    Sodium 04/23/2020 140  135 - 145 mmol/L Final    Potassium 04/23/2020 4.4  3.6 - 5.2 mmol/L Final    Chloride 04/23/2020 103  100 - 108 mmol/L Final    CO2 04/23/2020 30  21 - 32 mmol/L Final    BUN, Bld 04/23/2020 9  7 - 18 mg/dL Final    Creatinine 04/23/2020 0.84  0.55 - 1.02 mg/dL Final     GFR ESTIMATION 04/23/2020 > 60  >60 Final               DESCRIPTION       GLOMERULAR FILTRATION RATE             NORMAL                     >60             KIDNEY  DISEASE           15-60             KIDNEY FAILURE             <15    BUN/Creatinine Ratio 04/23/2020 10.71  7 - 18 Ratio Final    Glucose 04/23/2020 107  70 - 110 mg/dL Final    Calcium 04/23/2020 8.7* 8.8 - 10.5 mg/dL Final    Total Bilirubin 04/23/2020 0.4  0.0 - 1.0 mg/dL Final    AST 04/23/2020 19  15 - 37 U/L Final    ALT 04/23/2020 18  12 - 78 U/L Final    Total Protein 04/23/2020 6.2* 6.4 - 8.2 g/dL Final    Albumin 04/23/2020 3.1* 3.4 - 5.0 g/dL Final    Globulin 04/23/2020 3.1  2.3 - 3.5 g/dL Final    Albumin/Globulin Ratio 04/23/2020 1.000* 1.1 - 1.8 Ratio Final    Alkaline Phosphatase 04/23/2020 60  46 - 116 U/L Final    Magnesium 04/23/2020 1.9  1.8 - 2.4 mg/dL Final   Orders Only on 04/16/2020   Component Date Value Ref Range Status    Anisocytosis 04/16/2020 2+   Final    Hypochromia 04/16/2020 1+   Final   Ancillary Orders on 04/10/2020   Component Date Value Ref Range Status    WBC 04/16/2020 5.8  4.6 - 10.2 10*3/uL Final    RBC 04/16/2020 4.56  4.2 - 5.4 10*6/uL Final    Hemoglobin 04/16/2020 11.8* 12.0 - 16.0 g/dL Final    Hematocrit 04/16/2020 38.5  37.0 - 47.0 % Final    MCV 04/16/2020 84.4  80 - 97 fL Final    MCH 04/16/2020 25.9* 27.0 - 31.2 pg Final    MCHC 04/16/2020 30.6* 31.8 - 35.4 g/dL Final    RDW RBC Auto-Rto 04/16/2020 24.5* 12.5 - 18.0 % Final    Platelets 04/16/2020 330  142 - 424 10*3/uL Final    Neutrophils/100 leukocytes 04/16/2020 66.8  37 - 80 % Final    Lymphocytes/100 leukocytes 04/16/2020 14.7* 25 - 40 % Final    Monocytes/100 leukocytes 04/16/2020 12.5  1 - 15 % Final    Eosinophils/100 leukocytes 04/16/2020 2.4  1 - 3 % Final    Basophils/100 leukocytes 04/16/2020 1.2  0 - 3 % Final    Manual nRBC per 100 Cells 04/16/2020 0.0  % Final    Neutrophils 04/16/2020 3.90  1.8 - 7.7  10*3/uL Final    Sodium 2020 139  135 - 145 mmol/L Final    Potassium 2020 4.7  3.6 - 5.2 mmol/L Final    Chloride 2020 103  100 - 108 mmol/L Final    CO2 2020 28  21 - 32 mmol/L Final    BUN, Bld 2020 9  7 - 18 mg/dL Final    Creatinine 2020 0.89  0.55 - 1.02 mg/dL Final    GFR ESTIMATION 2020 > 60  >60 Final               DESCRIPTION       GLOMERULAR FILTRATION RATE             NORMAL                     >60             KIDNEY  DISEASE           15-60             KIDNEY FAILURE             <15    BUN/Creatinine Ratio 2020 10.11  7 - 18 Ratio Final    Glucose 2020 91  70 - 110 mg/dL Final    Calcium 2020 8.9  8.8 - 10.5 mg/dL Final    Total Bilirubin 2020 0.4  0.0 - 1.0 mg/dL Final    AST 2020 17  15 - 37 U/L Final    ALT 2020 23  12 - 78 U/L Final    Total Protein 2020 6.5  6.4 - 8.2 g/dL Final    Albumin 2020 3.2* 3.4 - 5.0 g/dL Final    Globulin 2020 3.3  2.3 - 3.5 g/dL Final    Albumin/Globulin Ratio 2020 0.969* 1.1 - 1.8 Ratio Final    Alkaline Phosphatase 2020 71  46 - 116 U/L Final    Magnesium 2020 2.0  1.8 - 2.4 mg/dL Final   Orders Only on 2020   Component Date Value Ref Range Status    Anisocytosis 2020 1+   Final    Hypochromia 2020 1+   Final   Ancillary Orders on 2020   Component Date Value Ref Range Status    CANCER ANTIGEN 27.29 2020 138.0* 0.0 - 40.0 U/mL Final    Comment: INTERPRETIVE INFORMATION: Cancer Antigen 27.29The CA 27.29 assay is intended for use in monitorin) diseaseprogression and/or response to therapy in patients withmetastatic disease, and 2) disease recurrence in patientstreated previously for stages II   or III breast carcinoma who areclinically free of the disease. Serial testing in patients whoare clinically free of disease should be used in conjunctionwith other clinical methods for early detection of  cancerrecurrence.Limitations: Patients with   confirmed breast carcinoma frequentlyhave CA 27.29 assay values in the same range as healthyindividuals.  Elevations may also be observed in patients withnon malignant disease. Results of this test must always beinterpreted in the context of morphologic   and other relevantdata, and should not be used alone for a diagnosis of malignancy.Methodology: Siemens Advia Centaur CA 27.29 chemiluminescentimmunoassay was used. Results obtained with different assaymethods or kits cannot be used   in                           terchangeably.Performed by [facility] (?????),[address] [place] [address] [phone].[employer], Sharif Leon MD, Lab. Director      CEA 04/09/2020 9.4* 0.0 - 3.0 ng/mL Final    Comment: INTERPRETIVE INFORMATION: Carcinoembryonic AntigenThe Roche CEA electrochemiluminescent immunoassay was used.Results obtained with different test methods or kits cannot beused interchangeably. Measurement of CEA has been shown to beclinically relevant in   the management of patients withcolorectal, breast, lung, prostatic, pancreatic, and ovariancarcinomas. Smokers may have slightly elevated levels of CEA.The CEA assay value, regardless of level, should not beinterpreted as evidence for the presence or   absence of malignantdisease and is not recommended for use as a screening procedureto detect the presence of cancer in the general population.Performed by [facility] (?????),[address] [place] [address] [phone].[employer], Sharif Leon MD,   Lab. Director      Cancer Antigen 15-3 04/09/2020 84* 0 - 31 U/mL Final    Comment: INTERPRETIVE INFORMATION: Cancer Antigen-Breast ()The Roche CA 15-3 electrochemiluminescent immunoassay was used.Results obtained with different methods or kits cannot be usedinterchangeably. The CA 15-3 test is used to aid in themanagement of   Stage II and III breast cancer patients. Serialtesting for patient CA 15-3 values  should be used in conjunctionwith other clinical methods for monitoring breast cancer.Patients with confirmed breast carcinoma frequently have CA 15-3values in the same   range as healthy individuals. Elevations maybe observed in patients with nonmalignant disease. Therefore, Chelsie 15-3 value, regardless of level, should not be interpreted asabsolute evidence of the presence or absence of malignantdisease.Performed by [facility],[address] [place] [phone].[employer], Sharif Leon MD, Lab. Director      WBC 04/09/2020 6.1  4.6 - 10.2 10*3/uL Final    RBC 04/09/2020 4.57  4.2 - 5.4 10*6/uL Final    Hemoglobin 04/09/2020 11.4* 12.0 - 16.0 g/dL Final    Hematocrit 04/09/2020 37.3  37.0 - 47.0 % Final    MCV 04/09/2020 81.6  80 - 97 fL Final    MCH 04/09/2020 24.9* 27.0 - 31.2 pg Final    MCHC 04/09/2020 30.6* 31.8 - 35.4 g/dL Final    RDW RBC Auto-Rto 04/09/2020 23.8* 12.5 - 18.0 % Final    Platelets 04/09/2020 345  142 - 424 10*3/uL Final    Neutrophils/100 leukocytes 04/09/2020 71.3  37 - 80 % Final    Lymphocytes/100 leukocytes 04/09/2020 11.8* 25 - 40 % Final    Monocytes/100 leukocytes 04/09/2020 8.3  1 - 15 % Final    Eosinophils/100 leukocytes 04/09/2020 6.0* 1 - 3 % Final    Basophils/100 leukocytes 04/09/2020 1.0  0 - 3 % Final    Manual nRBC per 100 Cells 04/09/2020 0.0  % Final    Neutrophils 04/09/2020 4.36  1.8 - 7.7 10*3/uL Final    Sodium 04/09/2020 137  135 - 145 mmol/L Final    Potassium 04/09/2020 4.1  3.6 - 5.2 mmol/L Final    Chloride 04/09/2020 100  100 - 108 mmol/L Final    CO2 04/09/2020 30  21 - 32 mmol/L Final    BUN, Bld 04/09/2020 8  7 - 18 mg/dL Final    Creatinine 04/09/2020 0.89  0.55 - 1.02 mg/dL Final    GFR ESTIMATION 04/09/2020 > 60  >60 Final               DESCRIPTION       GLOMERULAR FILTRATION RATE             NORMAL                     >60             KIDNEY  DISEASE           15-60             KIDNEY FAILURE             <15     BUN/Creatinine Ratio 04/09/2020 8.98  7 - 18 Ratio Final    Glucose 04/09/2020 104  70 - 110 mg/dL Final    Calcium 04/09/2020 9.9  8.8 - 10.5 mg/dL Final    Total Bilirubin 04/09/2020 0.5  0.0 - 1.0 mg/dL Final    AST 04/09/2020 15  15 - 37 U/L Final    ALT 04/09/2020 18  12 - 78 U/L Final    Total Protein 04/09/2020 7.3  6.4 - 8.2 g/dL Final    Albumin 04/09/2020 3.2* 3.4 - 5.0 g/dL Final    Globulin 04/09/2020 4.1* 2.3 - 3.5 g/dL Final    Albumin/Globulin Ratio 04/09/2020 0.780* 1.1 - 1.8 Ratio Final    Alkaline Phosphatase 04/09/2020 77  46 - 116 U/L Final    Magnesium 04/09/2020 1.8  1.8 - 2.4 mg/dL Final   Orders Only on 04/01/2020   Component Date Value Ref Range Status    Neutrophils 04/01/2020 80.0  37 - 80 % Final    Neutrophils Absolute 04/01/2020 7.2  1.8 - 7.7 10*3/uL Final    BANDS 04/01/2020 1.0  0 - 5 % Final    Lymphocytes 04/01/2020 6.0* 25 - 40 % Final    Monocytes 04/01/2020 4.0  1 - 15 % Final    Eosinophils 04/01/2020 8.0* 1 - 3 % Final    Metamyelocytes 04/01/2020 1.0* 0 - 0 % Final    Cells Counted 04/01/2020 100   Final    Platelet Estimate 04/01/2020 Adequate   Final    Anisocytosis 04/01/2020 1+   Final   Ancillary Orders on 03/27/2020   Component Date Value Ref Range Status    WBC 04/01/2020 8.9  4.6 - 10.2 10*3/uL Final    RBC 04/01/2020 4.52  4.2 - 5.4 10*6/uL Final    Hemoglobin 04/01/2020 11.3* 12.0 - 16.0 g/dL Final    Hematocrit 04/01/2020 36.3* 37.0 - 47.0 % Final    MCV 04/01/2020 80.3  80 - 97 fL Final    MCH 04/01/2020 25.0* 27.0 - 31.2 pg Final    MCHC 04/01/2020 31.1* 31.8 - 35.4 g/dL Final    RDW RBC Auto-Rto 04/01/2020 22.9* 12.5 - 18.0 % Final    Platelets 04/01/2020 331  142 - 424 10*3/uL Final    Manual nRBC per 100 Cells 04/01/2020 0.0  % Final    Sodium 04/01/2020 137  135 - 145 mmol/L Final    Potassium 04/01/2020 5.0  3.6 - 5.2 mmol/L Final    Chloride 04/01/2020 101  100 - 108 mmol/L Final    CO2 04/01/2020 31  21 - 32 mmol/L Final     BUN, Bld 04/01/2020 7  7 - 18 mg/dL Final    Creatinine 04/01/2020 0.83  0.55 - 1.02 mg/dL Final    GFR ESTIMATION 04/01/2020 > 60  >60 Final               DESCRIPTION       GLOMERULAR FILTRATION RATE             NORMAL                     >60             KIDNEY  DISEASE           15-60             KIDNEY FAILURE             <15    BUN/Creatinine Ratio 04/01/2020 8.43  7 - 18 Ratio Final    Glucose 04/01/2020 89  70 - 110 mg/dL Final    Calcium 04/01/2020 9.1  8.8 - 10.5 mg/dL Final    Total Bilirubin 04/01/2020 0.5  0.0 - 1.0 mg/dL Final    AST 04/01/2020 21  15 - 37 U/L Final    ALT 04/01/2020 24  12 - 78 U/L Final    Total Protein 04/01/2020 6.5  6.4 - 8.2 g/dL Final    Albumin 04/01/2020 3.1* 3.4 - 5.0 g/dL Final    Globulin 04/01/2020 3.4  2.3 - 3.5 g/dL Final    Albumin/Globulin Ratio 04/01/2020 0.911* 1.1 - 1.8 Ratio Final    Alkaline Phosphatase 04/01/2020 71  46 - 116 U/L Final    Magnesium 04/01/2020 1.9  1.8 - 2.4 mg/dL Final   Ancillary Orders on 03/20/2020   Component Date Value Ref Range Status    WBC 03/25/2020 14.5* 4.6 - 10.2 10*3/uL Final    RBC 03/25/2020 4.39  4.2 - 5.4 10*6/uL Final    Hemoglobin 03/25/2020 10.8* 12.0 - 16.0 g/dL Final    Hematocrit 03/25/2020 35.0* 37.0 - 47.0 % Final    MCV 03/25/2020 79.7* 80 - 97 fL Final    MCH 03/25/2020 24.6* 27.0 - 31.2 pg Final    MCHC 03/25/2020 30.9* 31.8 - 35.4 g/dL Final    RDW RBC Auto-Rto 03/25/2020 23.0* 12.5 - 18.0 % Final    Platelets 03/25/2020 433* 142 - 424 10*3/uL Final    Manual nRBC per 100 Cells 03/25/2020 0.0  % Final    Sodium 03/25/2020 143  135 - 145 mmol/L Final    Potassium 03/25/2020 4.4  3.6 - 5.2 mmol/L Final    Chloride 03/25/2020 104  100 - 108 mmol/L Final    CO2 03/25/2020 34* 21 - 32 mmol/L Final    BUN, Bld 03/25/2020 8  7 - 18 mg/dL Final    Creatinine 03/25/2020 0.90  0.55 - 1.02 mg/dL Final    GFR ESTIMATION 03/25/2020 > 60  >60 Final               DESCRIPTION       GLOMERULAR  FILTRATION RATE             NORMAL                     >60             KIDNEY  DISEASE           15-60             KIDNEY FAILURE             <15    BUN/Creatinine Ratio 03/25/2020 8.88  7 - 18 Ratio Final    Glucose 03/25/2020 91  70 - 110 mg/dL Final    Calcium 03/25/2020 8.6* 8.8 - 10.5 mg/dL Final    Total Bilirubin 03/25/2020 0.3  0.0 - 1.0 mg/dL Final    AST 03/25/2020 16  15 - 37 U/L Final    ALT 03/25/2020 18  12 - 78 U/L Final    Total Protein 03/25/2020 6.1* 6.4 - 8.2 g/dL Final    Albumin 03/25/2020 2.5* 3.4 - 5.0 g/dL Final    Globulin 03/25/2020 3.6* 2.3 - 3.5 g/dL Final    Albumin/Globulin Ratio 03/25/2020 0.694* 1.1 - 1.8 Ratio Final    Alkaline Phosphatase 03/25/2020 80  46 - 116 U/L Final    Magnesium 03/25/2020 1.9  1.8 - 2.4 mg/dL Final    Neutrophils 03/25/2020 84.0* 37 - 80 % Final    Neutrophils Absolute 03/25/2020 12.3* 1.8 - 7.7 10*3/uL Final    BANDS 03/25/2020 1.0  0 - 5 % Final    Lymphocytes 03/25/2020 7.0* 25 - 40 % Final    Monocytes 03/25/2020 4.0  1 - 15 % Final    Eosinophils 03/25/2020 3.0  1 - 3 % Final    Basophils 03/25/2020 1.0  0 - 3 % Final    Cells Counted 03/25/2020 100   Final    Platelet Estimate 03/25/2020 Adequate   Final    RBC Morphology 03/25/2020 ABNORMAL   Final    Anisocytosis 03/25/2020 3+   Final    Hypochromia 03/25/2020 1+   Final   Orders Only on 03/18/2020   Component Date Value Ref Range Status    Neutrophils 03/18/2020 62.0  37 - 80 % Final    Neutrophils Absolute 03/18/2020 5.7  1.8 - 7.7 10*3/uL Final    BANDS 03/18/2020 4.0  0 - 5 % Final    Lymphocytes 03/18/2020 8.0* 25 - 40 % Final    Monocytes 03/18/2020 20.0* 1 - 15 % Final    Eosinophils 03/18/2020 1.0  1 - 3 % Final    Metamyelocytes 03/18/2020 5.0* 0 - 0 % Final    Cells Counted 03/18/2020 100   Final    Platelet Estimate 03/18/2020 Adequate   Final    Platelet Clumps 03/18/2020 1+   Final    Anisocytosis 03/18/2020 1+   Final    Microcytes 03/18/2020 1+    Final   Ancillary Orders on 03/13/2020   Component Date Value Ref Range Status    WBC 03/18/2020 8.7  4.6 - 10.2 10*3/uL Final    RBC 03/18/2020 4.39  4.2 - 5.4 10*6/uL Final    Hemoglobin 03/18/2020 10.7* 12.0 - 16.0 g/dL Final    Hematocrit 03/18/2020 34.2* 37.0 - 47.0 % Final    MCV 03/18/2020 77.9* 80 - 97 fL Final    MCH 03/18/2020 24.4* 27.0 - 31.2 pg Final    MCHC 03/18/2020 31.3* 31.8 - 35.4 g/dL Final    RDW RBC Auto-Rto 03/18/2020 21.9* 12.5 - 18.0 % Final    Platelets 03/18/2020 250  142 - 424 10*3/uL Final    Manual nRBC per 100 Cells 03/18/2020 0.0  % Final    Sodium 03/18/2020 135  135 - 145 mmol/L Final    Potassium 03/18/2020 3.7  3.6 - 5.2 mmol/L Final    Chloride 03/18/2020 102  100 - 108 mmol/L Final    CO2 03/18/2020 26  21 - 32 mmol/L Final    BUN, Bld 03/18/2020 10  7 - 18 mg/dL Final    Creatinine 03/18/2020 0.91  0.55 - 1.02 mg/dL Final    GFR ESTIMATION 03/18/2020 60  >60 Final               DESCRIPTION       GLOMERULAR FILTRATION RATE             NORMAL                     >60             KIDNEY  DISEASE           15-60             KIDNEY FAILURE             <15    BUN/Creatinine Ratio 03/18/2020 10.98  7 - 18 Ratio Final    Glucose 03/18/2020 112* 70 - 110 mg/dL Final    Calcium 03/18/2020 8.1* 8.8 - 10.5 mg/dL Final    Total Bilirubin 03/18/2020 0.8  0.0 - 1.0 mg/dL Final    AST 03/18/2020 15  15 - 37 U/L Final    ALT 03/18/2020 19  12 - 78 U/L Final    Total Protein 03/18/2020 5.8* 6.4 - 8.2 g/dL Final    Albumin 03/18/2020 2.3* 3.4 - 5.0 g/dL Final    Globulin 03/18/2020 3.5  2.3 - 3.5 g/dL Final    Albumin/Globulin Ratio 03/18/2020 0.657* 1.1 - 1.8 Ratio Final    Alkaline Phosphatase 03/18/2020 56  46 - 116 U/L Final    Magnesium 03/18/2020 1.7* 1.8 - 2.4 mg/dL Final   There may be more visits with results that are not included.       Imaging:     Assessment:     Principle Problem: No primary diagnosis found.   Co-morbidity/Active Problems:   Patient Active  Problem List   Diagnosis    Breast cancer metastasized to bone    Neuropathy    Fatigue associated with anemia    Drug-related hair loss    Nausea    Primary insomnia    Foot infection    Cancer, metastatic to bone    Malignant neoplasm of overlapping 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, estrogen receptor positive     Chronic fatigue    Diverticulosis    Dehydration    Hypoxemia    Iron deficiency anemia secondary to inadequate dietary iron intake    Hypoalbuminemia        Benita Carballo is a 74 y.o. female with PMH of The primary encounter diagnosis was Malignant neoplasm of overlapping 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, estrogen receptor positive . Diagnoses of Cancer, metastatic to bone and Foot infection were also pertinent to this visit., with Malignant neoplasm of overlapping 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, estrogen receptor positive [C50.812, Z17.0]       Metastatic breast cancer ERPR positive HER2 Noah questionable  Status post of chemotherapy and HER2 targeted therapy with progression on HER2 targeted therapy  Recent testing fish negative for her 2 2018  Repeat the lipid biopsy 2019  shows HER2 positive and ER negative  Bone metastatic disease  Fatigue  Hypoxia  Dehydration    Plan:   Overall Plan:    She has HER2 positive disease with ERPR negative by the new liquid biopsy. Started Taxol/Herceptin 2/2020  Discussed ECHO with pt, showing right heart failure   Seen by Dr Gardner, cardiologist and recommended that Rt heart failure unlikely related to chemo, likely related to COPD and ILD.   Herceptin/taxol resumed last week with poor tolerance by pt, weight loss, fatigue reported, will hold tx this week, plan to resume next week at low dose, discussed with Dr Contreras  Discussed with Dr Contreras and he recommended reducing dose of herceptin further  Lasix x 3 days with 10 lbs wt loss, p is instructed to use prn, pt has not had to use Lasix over last 3 weeks   Compression sleeve for dependant upper ext  Nargis Barragan Patient - Audio Only Telehealth Visit     The patient location is: home  The chief complaint leading to consultation is: chemo clearnace  Visit type: Virtual visit with audio only (telephone)     The reason for the audio only service rather than synchronous audio and video virtual visit was related to technical difficulties or patient preference/necessity.     Each patient to whom I provide medical services by telemedicine is:  (1) informed of the relationship between the physician and patient and the respective role of any other health care provider with respect to management of the patient; and (2) notified that they may decline to receive medical services by telemedicine and may withdraw from such care at any time. Patient verbally consented to receive this service via voice-only telephone call.       HPI: see above     Assessment and plan:  See above                         This service was not originating from a related E/M service provided within the previous 7 days nor will  to an E/M service or procedure within the next 24 hours or my soonest available appointment.  Prevailing standard of care was able to be met in this audio-only visit.

## 2020-05-07 ENCOUNTER — OFFICE VISIT (OUTPATIENT)
Dept: HEMATOLOGY/ONCOLOGY | Facility: CLINIC | Age: 75
End: 2020-05-07
Payer: MEDICARE

## 2020-05-07 VITALS — WEIGHT: 112.81 LBS | BODY MASS INDEX: 18.21 KG/M2

## 2020-05-07 DIAGNOSIS — C50.812 MALIGNANT NEOPLASM OF OVERLAPPING SITES OF LEFT BREAST IN FEMALE, ESTROGEN RECEPTOR POSITIVE: Primary | ICD-10-CM

## 2020-05-07 DIAGNOSIS — C50.919 CARCINOMA OF BREAST METASTATIC TO BONE, UNSPECIFIED LATERALITY: ICD-10-CM

## 2020-05-07 DIAGNOSIS — Z17.0 MALIGNANT NEOPLASM OF OVERLAPPING SITES OF LEFT BREAST IN FEMALE, ESTROGEN RECEPTOR POSITIVE: Primary | ICD-10-CM

## 2020-05-07 DIAGNOSIS — C79.51 CARCINOMA OF BREAST METASTATIC TO BONE, UNSPECIFIED LATERALITY: ICD-10-CM

## 2020-05-07 DIAGNOSIS — C79.51 CANCER, METASTATIC TO BONE: ICD-10-CM

## 2020-05-07 PROCEDURE — 99441 PR PHYSICIAN TELEPHONE EVALUATION 5-10 MIN: CPT | Mod: 95,,, | Performed by: NURSE PRACTITIONER

## 2020-05-07 PROCEDURE — 99441 PR PHYSICIAN TELEPHONE EVALUATION 5-10 MIN: ICD-10-PCS | Mod: 95,,, | Performed by: NURSE PRACTITIONER

## 2020-05-07 RX ORDER — HEPARIN 100 UNIT/ML
500 SYRINGE INTRAVENOUS
Status: CANCELLED | OUTPATIENT
Start: 2020-05-07

## 2020-05-07 RX ORDER — EPINEPHRINE 0.3 MG/.3ML
0.3 INJECTION SUBCUTANEOUS ONCE AS NEEDED
Status: CANCELLED | OUTPATIENT
Start: 2020-05-07

## 2020-05-07 RX ORDER — FAMOTIDINE 10 MG/ML
20 INJECTION INTRAVENOUS
Status: CANCELLED | OUTPATIENT
Start: 2020-05-07

## 2020-05-07 RX ORDER — SODIUM CHLORIDE 0.9 % (FLUSH) 0.9 %
10 SYRINGE (ML) INJECTION
Status: CANCELLED | OUTPATIENT
Start: 2020-05-07

## 2020-05-07 RX ORDER — DIPHENHYDRAMINE HYDROCHLORIDE 50 MG/ML
50 INJECTION INTRAMUSCULAR; INTRAVENOUS ONCE AS NEEDED
Status: CANCELLED | OUTPATIENT
Start: 2020-05-07

## 2020-05-07 NOTE — PROGRESS NOTES
Medical Oncology Progress Note  [facility]     PATIENT: Benita Carballo  : 1945 74 y.o. female  MRN [id number]     PCP: Solomon Contreras MD  Consult Requested By:      Date of Service: 2020    Subjective:   Interim History:  Malignant neoplasm of overlapping sites of left breast    Benita Carballo is here for to followup breast cancer treatment.    The patient was started on  chemotherapy last week with Herceptin and Taxol weekly.  She has chronic hypoxia and her oxygen saturation has been declining today her O2 status 77% she looks purple and she also has increasing degree of short of breath; in addition she complains of swelling to her left arm.  She had no chest pain. She was admittted last week to the hospital and underwent thoracentesis, which was not tested for cytology and imaging for possible PE was ruled out. Today she states she is feeling better but POX was 82 % on RA, and still has yosi upper ext swelling which she states has improved. She has not had a echo so will order ECHO stat to r/o any CHF which could be causing her swelling both lung and upper ext. States she had upper u/s with her pulm which was neg for DVT.     3/26/20   She returns with ECHO showing EF 55-60%, but right sided heart failure noted. Pt has an additional 12 # of wt gain since last week. She states she is feeling fine, better than at last visit. Discussed ECHO and will hold herceptin and obtain MUGA, will proceed with weekly taxol and possibly will add herceptin back after MUGA completed and will refer pt to cardiology, but could be delayed due to COVID pandemic.     2020  Today she presents with significant fluid loss noted. No longer upper ext third space swelling noted, alb and T Pro dramatically improved, pt has last 10 + lbs of fluid. She is breathing easy today and denies any SOB or ORELLANA. We will proceed with weekly taxol and continue to hold herceptin until MUGA or cardiology clearance  is obtained. She was seen yesterday with Dr Gardner. Below is a portion of his note:       Oncology History:  Oncology History    2002 Rt breast cancer s/p lump with chemo, [place] in [place] Tamoxifen x 1 yr, then Arimidex x 5 yrs. Completed tx 2008.     2015  c/o chronic cough with CT chest 1/28/16 showing extensive adenopathy  With base of lt neck mass 4.5 cm with yosi pulm nodules. RF to Dr ANDREWS Mcnally    2/6/16 bx of lt neck mass carcinoma with feature suggestive of breast primary. ER/%, Her2 borderline by IHC and deemed equivocal by FISH          Breast cancer metastasized to bone    2/4/2016 Initial Diagnosis     Breast cancer metastasized to bone, , lymphoid mass       5/25/2016 - 2/9/2017 Chemotherapy     Taxol/Herceptin x 8 cycles then perjeta added 7/16  Prescribed by Dr Mcnally       2/20/2017 - 6/17/2017 Chemotherapy     Kadcycla prescribed by Dr Mcnally       6/28/2017 Biopsy     Biopsy of rt abd met disease consistent with breast cancer. ER/DE + Her 2 neg per Dr Moreno's note.    Dr Moreno noted path review from bx 2/16 showed HER 2 + equivocal with FISH reading as Equivocal but ratio 1.2. Additional report form 2016 states additional testing was done with different probe and the equivocal results can be noted as negative.     Treatment therapy changed from Her2 based tx to endocrine base therapy by Dr Moreno       6/28/2017 -  Hormone Therapy     Arimidex 6/17-2/18  Aromasin 2/18  Ibrance + Aromasin 5/18 -1/19  Ibrance + faslodex 2/19 4/30/2019 Imaging Significant Findings     PET/CT: Lesions involving the medial clavicle , subcarinal LN, pleural based soft tissue density adjacent to T11 and lesion in left lobe of liver diminished in size and SUV.   RECIST 34% decrease in disease      2/17/2020 -  Chemotherapy     Treatment Summary   Plan Name: OP BREAST TRASTUZUMAB PACLITAXEL QW  Treatment Goal: Control  Status: Active  Start Date: 2/26/2020  End Date: 6/4/2020 (Planned)  Provider: Solomon Contreras  MD  Chemotherapy: PACLitaxel (TAXOL) 64 mg/m2 = 102 mg in sodium chloride 0.9% 250 mL chemo infusion, 64 mg/m2 = 102 mg (100 % of original dose 64 mg/m2), Intravenous, Clinic/HOD 1 time, 7 of 12 cycles  Dose modification: 64 mg/m2 (original dose 64 mg/m2, Cycle 1)  trastuzumab 211 mg in sodium chloride 0.9% 250 mL chemo infusion, 4 mg/kg = 211 mg, Intravenous, Clinic/HOD 1 time, 3 of 8 cycles  Dose modification: 2 mg/kg (original dose 2 mg/kg, Cycle 7, Reason: MD Discretion), 80 mg (original dose 2 mg/kg, Cycle 8)       ==8/2019 Now She is currently on combined afinitor + aromasin,  repeated CT scan shows improvement  PET 1/2020 showed PD,  afinitor + aromasin stopped  == PET scan 01/20/2020 the progressive  ==Started Taxol/Herceptin weekly 2/2020    Past Medical History:   Past Medical History:   Diagnosis Date    Arthritis     Bone cancer     Breast cancer     Cancer     Cataract     Depression     Diverticulosis 1/15/2020    Fatigue associated with anemia 6/6/2019    GERD (gastroesophageal reflux disease)     High cholesterol     Hypoxemia 3/12/2020    Skin problem     Sleep disorder        Past Surgical HIstory:   Past Surgical History:   Procedure Laterality Date    APPENDECTOMY      BREAST LUMPECTOMY Right 2002    CATARACT EXTRACTION, BILATERAL  2014    COLONOSCOPY  2008    HYSTERECTOMY      PORTACATH PLACEMENT  05/18/2016    SURGICAL REMOVAL OF NODULE OF VOCAL CORD         Allergies:  Review of patient's allergies indicates:   Allergen Reactions    Benadryl [diphenhydramine hcl]      Restless leg     Medrol [methylprednisolone] Hallucinations    Penicillins        Medications:  Current Outpatient Medications   Medication Sig Dispense Refill    ANORO ELLIPTA 62.5-25 mcg/actuation DsDv       dexAMETHasone 0.5 mg/5 mL Soln SWISH WITH 10 ML PO FOR 2 MIN THEN SPIT QID      exemestane (AROMASIN) 25 mg tablet Take 1 tablet (25 mg total) by mouth once daily. 30 tablet 3    furosemide  (LASIX) 40 MG tablet Take 1 tablet (40 mg total) by mouth once daily. 30 tablet 11    gabapentin (NEURONTIN) 400 MG capsule Take 1 capsule (400 mg total) by mouth 3 (three) times daily. 120 capsule 4    HYDROcodone-acetaminophen (NORCO)  mg per tablet Take 1 tablet by mouth every 6 (six) hours as needed for Pain. 30 tablet 0    iron-vitamin C 100-250 mg, ICAR-C, (ICAR-C) 100-250 mg Tab Take 1 tablet by mouth once daily. 30 tablet 5    LORazepam (ATIVAN) 1 MG tablet TAKE 1 TABLET BY MOUTH EVERY EVENING 30 tablet 0    megestrol (MEGACE) 400 mg/10 mL (40 mg/mL) Susp Take 10 mLs (400 mg total) by mouth once daily. 240 mL 2    oxyCODONE (OXYCONTIN) 10 mg 12 hr tablet Take 1 tablet (10 mg total) by mouth every 12 (twelve) hours as needed for Pain. 60 tablet 0    pantoprazole (PROTONIX) 40 MG tablet Take 1 tablet (40 mg total) by mouth once daily. 30 tablet 3    potassium chloride SA (K-DUR,KLOR-CON) 20 MEQ tablet Take 1 tablet (20 mEq total) by mouth once daily. 30 tablet 11    PROAIR HFA 90 mcg/actuation inhaler       promethazine (PHENERGAN) 25 MG tablet Take 1 tablet (25 mg total) by mouth every 6 (six) hours as needed for Nausea. 30 tablet 3    temazepam (RESTORIL) 15 mg Cap TAKE ONE CAPSULE BY MOUTH EVERY NIGHT AT BEDTIME AS NEEDED FOR INSOMNIA 30 capsule 0     No current facility-administered medications for this visit.        Family History:   Family History   Problem Relation Age of Onset    Lung cancer Father     Melanoma Father        Social History:  reports that she has been smoking cigarettes. She has a 2.00 pack-year smoking history. She has never used smokeless tobacco. She reports that she drinks about 4.0 standard drinks of alcohol per week. She reports that she does not use drugs.    Review of Systemas  Constitutional: No change in weight, appetie, fatigue, fever, or night sweats  Eyes: No changes in vision  Ears, Nose, Mouth, Throat, and Face: No hearing problems, ear pain, rummy  nose, or sore throat  Respiratory: No shortness of breath or cough  Cardiovascular: No chest pain, palpitations or dizziness  Gastrointestinal: No abdominal pain, hematochezia, melena  Genitourinary: No dysuria, hematuria, nocturia, menstrual problems, post menopausal  Integumentary/Breast: No rashes or itching  Hematologic/Lymphatic: No anemia or bleeding abnormalities  Musculoskeletal: No joint or muscle abnormalities  Neurological: No sensory or motor problems, no headaches  Behavioral/Psych: No depression, anxiety, cognitive problems, or stress  Endocrine: No diabetes or thyroid problems  Allergic/Immunologic: See allergy above    Physical Exam      Vitals:   Vitals:    05/07/20 1014   Weight: 51.2 kg (112 lb 12.8 oz)     BMI: Body mass index is 18.21 kg/m².  BSA Body surface area is 1.54 meters squared.  ECOG Performance Status:   ECOG SCORE         GENERAL APPEARANCE: Well developed, well nourished, in no acute distress.  SKIN: Inspection of the skin reveals no rashes, ulcerations or petechiae.  HEENT: The sclerae were anicteric and conjunctivae were pink and moist. Extraocular movements were intact and pupils were equal, round, and reactive to light with normal accommodation. External inspection of the ears and nose showed no scars, lesions, or masses. Lips, teeth, and gums showed normal mucosa. The oral mucosa, hard and soft palate, tongue and posterior pharynx were normal.  NECK: Supple and symmetric. There was no thyroid enlargement, and no tenderness, or masses were felt.  CRESPIRATORY: Normal AP diameter and normal contour without any kyphoscoliosis. LUNGS: Auscultation of the lungs revealed normal breath sounds without any other adventitious sounds or rubs.  CARDIOVASCULAR: There was a regular rate and rhythm without any murmurs, gallops, rubs. The carotid pulses were normal and 2+ bilaterally without bruits. Peripheral pulses were 2+ and symmetric.  GASTROINTESTINAL: Soft and nontender with normal  bowel sounds. The liver span was approximately 5-6 cm in the right midclavicular line by percussion. The liver edge was nontender. The spleen was not palpable. There were no inguinal or umbilical hernias noted. No ascites was noted.  LYMPH NODES: No lymphadenopathy was appreciated in the neck, axillae or groin.  MUSCULOSKELETAL: Gait was normal. There was no tenderness or effusions noted. Muscle strength and tone were normal. EXTREMITIES: No cyanosis, clubbing or edema.  NEUROLOGIC: Alert and oriented x 3. Normal affect. Gait was normal. Normal deep tendon reflexes with no pathological reflexes. Sensation to touch was normal.  PSYCHIATRIC: good mood, orientated to place, time and person     Labs and Imagings     Ancillary Orders on 04/17/2020   Component Date Value Ref Range Status    WBC 04/23/2020 6.5  4.6 - 10.2 10*3/uL Final    RBC 04/23/2020 4.24  4.2 - 5.4 10*6/uL Final    Hemoglobin 04/23/2020 11.2* 12.0 - 16.0 g/dL Final    Hematocrit 04/23/2020 36.0* 37.0 - 47.0 % Final    MCV 04/23/2020 84.9  80 - 97 fL Final    MCH 04/23/2020 26.4* 27.0 - 31.2 pg Final    MCHC 04/23/2020 31.1* 31.8 - 35.4 g/dL Final    RDW RBC Auto-Rto 04/23/2020 24.6* 12.5 - 18.0 % Final    Platelets 04/23/2020 320  142 - 424 10*3/uL Final    Neutrophils/100 leukocytes 04/23/2020 67.2  37 - 80 % Final    Lymphocytes/100 leukocytes 04/23/2020 14.8* 25 - 40 % Final    Monocytes/100 leukocytes 04/23/2020 11.2  1 - 15 % Final    Eosinophils/100 leukocytes 04/23/2020 2.8  1 - 3 % Final    Basophils/100 leukocytes 04/23/2020 1.4  0 - 3 % Final    Manual nRBC per 100 Cells 04/23/2020 0.0  % Final    Neutrophils 04/23/2020 4.36  1.8 - 7.7 10*3/uL Final    Sodium 04/23/2020 140  135 - 145 mmol/L Final    Potassium 04/23/2020 4.4  3.6 - 5.2 mmol/L Final    Chloride 04/23/2020 103  100 - 108 mmol/L Final    CO2 04/23/2020 30  21 - 32 mmol/L Final    BUN, Bld 04/23/2020 9  7 - 18 mg/dL Final    Creatinine 04/23/2020 0.84  0.55  - 1.02 mg/dL Final    GFR ESTIMATION 04/23/2020 > 60  >60 Final               DESCRIPTION       GLOMERULAR FILTRATION RATE             NORMAL                     >60             KIDNEY  DISEASE           15-60             KIDNEY FAILURE             <15    BUN/Creatinine Ratio 04/23/2020 10.71  7 - 18 Ratio Final    Glucose 04/23/2020 107  70 - 110 mg/dL Final    Calcium 04/23/2020 8.7* 8.8 - 10.5 mg/dL Final    Total Bilirubin 04/23/2020 0.4  0.0 - 1.0 mg/dL Final    AST 04/23/2020 19  15 - 37 U/L Final    ALT 04/23/2020 18  12 - 78 U/L Final    Total Protein 04/23/2020 6.2* 6.4 - 8.2 g/dL Final    Albumin 04/23/2020 3.1* 3.4 - 5.0 g/dL Final    Globulin 04/23/2020 3.1  2.3 - 3.5 g/dL Final    Albumin/Globulin Ratio 04/23/2020 1.000* 1.1 - 1.8 Ratio Final    Alkaline Phosphatase 04/23/2020 60  46 - 116 U/L Final    Magnesium 04/23/2020 1.9  1.8 - 2.4 mg/dL Final   Orders Only on 04/16/2020   Component Date Value Ref Range Status    Anisocytosis 04/16/2020 2+   Final    Hypochromia 04/16/2020 1+   Final   Ancillary Orders on 04/10/2020   Component Date Value Ref Range Status    WBC 04/16/2020 5.8  4.6 - 10.2 10*3/uL Final    RBC 04/16/2020 4.56  4.2 - 5.4 10*6/uL Final    Hemoglobin 04/16/2020 11.8* 12.0 - 16.0 g/dL Final    Hematocrit 04/16/2020 38.5  37.0 - 47.0 % Final    MCV 04/16/2020 84.4  80 - 97 fL Final    MCH 04/16/2020 25.9* 27.0 - 31.2 pg Final    MCHC 04/16/2020 30.6* 31.8 - 35.4 g/dL Final    RDW RBC Auto-Rto 04/16/2020 24.5* 12.5 - 18.0 % Final    Platelets 04/16/2020 330  142 - 424 10*3/uL Final    Neutrophils/100 leukocytes 04/16/2020 66.8  37 - 80 % Final    Lymphocytes/100 leukocytes 04/16/2020 14.7* 25 - 40 % Final    Monocytes/100 leukocytes 04/16/2020 12.5  1 - 15 % Final    Eosinophils/100 leukocytes 04/16/2020 2.4  1 - 3 % Final    Basophils/100 leukocytes 04/16/2020 1.2  0 - 3 % Final    Manual nRBC per 100 Cells 04/16/2020 0.0  % Final    Neutrophils 04/16/2020  3.90  1.8 - 7.7 10*3/uL Final    Sodium 2020 139  135 - 145 mmol/L Final    Potassium 2020 4.7  3.6 - 5.2 mmol/L Final    Chloride 2020 103  100 - 108 mmol/L Final    CO2 2020 28  21 - 32 mmol/L Final    BUN, Bld 2020 9  7 - 18 mg/dL Final    Creatinine 2020 0.89  0.55 - 1.02 mg/dL Final    GFR ESTIMATION 2020 > 60  >60 Final               DESCRIPTION       GLOMERULAR FILTRATION RATE             NORMAL                     >60             KIDNEY  DISEASE           15-60             KIDNEY FAILURE             <15    BUN/Creatinine Ratio 2020 10.11  7 - 18 Ratio Final    Glucose 2020 91  70 - 110 mg/dL Final    Calcium 2020 8.9  8.8 - 10.5 mg/dL Final    Total Bilirubin 2020 0.4  0.0 - 1.0 mg/dL Final    AST 2020 17  15 - 37 U/L Final    ALT 2020 23  12 - 78 U/L Final    Total Protein 2020 6.5  6.4 - 8.2 g/dL Final    Albumin 2020 3.2* 3.4 - 5.0 g/dL Final    Globulin 2020 3.3  2.3 - 3.5 g/dL Final    Albumin/Globulin Ratio 2020 0.969* 1.1 - 1.8 Ratio Final    Alkaline Phosphatase 2020 71  46 - 116 U/L Final    Magnesium 2020 2.0  1.8 - 2.4 mg/dL Final   Orders Only on 2020   Component Date Value Ref Range Status    Anisocytosis 2020 1+   Final    Hypochromia 2020 1+   Final   Ancillary Orders on 2020   Component Date Value Ref Range Status    CANCER ANTIGEN 27.29 2020 138.0* 0.0 - 40.0 U/mL Final    Comment: INTERPRETIVE INFORMATION: Cancer Antigen 27.29The CA 27.29 assay is intended for use in monitorin) diseaseprogression and/or response to therapy in patients withmetastatic disease, and 2) disease recurrence in patientstreated previously for stages II   or III breast carcinoma who areclinically free of the disease. Serial testing in patients whoare clinically free of disease should be used in conjunctionwith other clinical methods for early  detection of cancerrecurrence.Limitations: Patients with   confirmed breast carcinoma frequentlyhave CA 27.29 assay values in the same range as healthyindividuals.  Elevations may also be observed in patients withnon malignant disease. Results of this test must always beinterpreted in the context of morphologic   and other relevantdata, and should not be used alone for a diagnosis of malignancy.Methodology: Siemens Advia Centaur CA 27.29 chemiluminescentimmunoassay was used. Results obtained with different assaymethods or kits cannot be used   in                           terchangeably.Performed by [facility],[address] [facility] [place] [address] [phone].[employer], Sharif Leon MD, Lab. Director      CEA 04/09/2020 9.4* 0.0 - 3.0 ng/mL Final    Comment: INTERPRETIVE INFORMATION: Carcinoembryonic AntigenThe Roche CEA electrochemiluminescent immunoassay was used.Results obtained with different test methods or kits cannot beused interchangeably. Measurement of CEA has been shown to beclinically relevant in   the management of patients withcolorectal, breast, lung, prostatic, pancreatic, and ovariancarcinomas. Smokers may have slightly elevated levels of CEA.The CEA assay value, regardless of level, should not beinterpreted as evidence for the presence or   absence of malignantdisease and is not recommended for use as a screening procedureto detect the presence of cancer in the general population.Performed by [facility],[address] [facility] [place] [address] [phone].[employer], Sharif Leon MD,   Lab. Director      Cancer Antigen 15-3 04/09/2020 84* 0 - 31 U/mL Final    Comment: INTERPRETIVE INFORMATION: Cancer Antigen-Breast ()The Roche CA 15-3 electrochemiluminescent immunoassay was used.Results obtained with different methods or kits cannot be usedinterchangeably. The CA 15-3 test is used to aid in themanagement of   Stage II and III breast cancer patients. Serialtesting for patient CA  15-3 values should be used in conjunctionwith other clinical methods for monitoring breast cancer.Patients with confirmed breast carcinoma frequently have CA 15-3values in the same   range as healthy individuals. Elevations maybe observed in patients with nonmalignant disease. Therefore, Chelsie 15-3 value, regardless of level, should not be interpreted asabsolute evidence of the presence or absence of malignantdisease.Performed by [facility],[address] [place] [id number] [phone].[employer], Sharif Leon MD, Lab. Director      WBC 04/09/2020 6.1  4.6 - 10.2 10*3/uL Final    RBC 04/09/2020 4.57  4.2 - 5.4 10*6/uL Final    Hemoglobin 04/09/2020 11.4* 12.0 - 16.0 g/dL Final    Hematocrit 04/09/2020 37.3  37.0 - 47.0 % Final    MCV 04/09/2020 81.6  80 - 97 fL Final    MCH 04/09/2020 24.9* 27.0 - 31.2 pg Final    MCHC 04/09/2020 30.6* 31.8 - 35.4 g/dL Final    RDW RBC Auto-Rto 04/09/2020 23.8* 12.5 - 18.0 % Final    Platelets 04/09/2020 345  142 - 424 10*3/uL Final    Neutrophils/100 leukocytes 04/09/2020 71.3  37 - 80 % Final    Lymphocytes/100 leukocytes 04/09/2020 11.8* 25 - 40 % Final    Monocytes/100 leukocytes 04/09/2020 8.3  1 - 15 % Final    Eosinophils/100 leukocytes 04/09/2020 6.0* 1 - 3 % Final    Basophils/100 leukocytes 04/09/2020 1.0  0 - 3 % Final    Manual nRBC per 100 Cells 04/09/2020 0.0  % Final    Neutrophils 04/09/2020 4.36  1.8 - 7.7 10*3/uL Final    Sodium 04/09/2020 137  135 - 145 mmol/L Final    Potassium 04/09/2020 4.1  3.6 - 5.2 mmol/L Final    Chloride 04/09/2020 100  100 - 108 mmol/L Final    CO2 04/09/2020 30  21 - 32 mmol/L Final    BUN, Bld 04/09/2020 8  7 - 18 mg/dL Final    Creatinine 04/09/2020 0.89  0.55 - 1.02 mg/dL Final    GFR ESTIMATION 04/09/2020 > 60  >60 Final               DESCRIPTION       GLOMERULAR FILTRATION RATE             NORMAL                     >60             KIDNEY  DISEASE           15-60             KIDNEY FAILURE              <15    BUN/Creatinine Ratio 04/09/2020 8.98  7 - 18 Ratio Final    Glucose 04/09/2020 104  70 - 110 mg/dL Final    Calcium 04/09/2020 9.9  8.8 - 10.5 mg/dL Final    Total Bilirubin 04/09/2020 0.5  0.0 - 1.0 mg/dL Final    AST 04/09/2020 15  15 - 37 U/L Final    ALT 04/09/2020 18  12 - 78 U/L Final    Total Protein 04/09/2020 7.3  6.4 - 8.2 g/dL Final    Albumin 04/09/2020 3.2* 3.4 - 5.0 g/dL Final    Globulin 04/09/2020 4.1* 2.3 - 3.5 g/dL Final    Albumin/Globulin Ratio 04/09/2020 0.780* 1.1 - 1.8 Ratio Final    Alkaline Phosphatase 04/09/2020 77  46 - 116 U/L Final    Magnesium 04/09/2020 1.8  1.8 - 2.4 mg/dL Final   Orders Only on 04/01/2020   Component Date Value Ref Range Status    Neutrophils 04/01/2020 80.0  37 - 80 % Final    Neutrophils Absolute 04/01/2020 7.2  1.8 - 7.7 10*3/uL Final    BANDS 04/01/2020 1.0  0 - 5 % Final    Lymphocytes 04/01/2020 6.0* 25 - 40 % Final    Monocytes 04/01/2020 4.0  1 - 15 % Final    Eosinophils 04/01/2020 8.0* 1 - 3 % Final    Metamyelocytes 04/01/2020 1.0* 0 - 0 % Final    Cells Counted 04/01/2020 100   Final    Platelet Estimate 04/01/2020 Adequate   Final    Anisocytosis 04/01/2020 1+   Final   Ancillary Orders on 03/27/2020   Component Date Value Ref Range Status    WBC 04/01/2020 8.9  4.6 - 10.2 10*3/uL Final    RBC 04/01/2020 4.52  4.2 - 5.4 10*6/uL Final    Hemoglobin 04/01/2020 11.3* 12.0 - 16.0 g/dL Final    Hematocrit 04/01/2020 36.3* 37.0 - 47.0 % Final    MCV 04/01/2020 80.3  80 - 97 fL Final    MCH 04/01/2020 25.0* 27.0 - 31.2 pg Final    MCHC 04/01/2020 31.1* 31.8 - 35.4 g/dL Final    RDW RBC Auto-Rto 04/01/2020 22.9* 12.5 - 18.0 % Final    Platelets 04/01/2020 331  142 - 424 10*3/uL Final    Manual nRBC per 100 Cells 04/01/2020 0.0  % Final    Sodium 04/01/2020 137  135 - 145 mmol/L Final    Potassium 04/01/2020 5.0  3.6 - 5.2 mmol/L Final    Chloride 04/01/2020 101  100 - 108 mmol/L Final    CO2 04/01/2020 31  21 - 32  mmol/L Final    BUN, Bld 04/01/2020 7  7 - 18 mg/dL Final    Creatinine 04/01/2020 0.83  0.55 - 1.02 mg/dL Final    GFR ESTIMATION 04/01/2020 > 60  >60 Final               DESCRIPTION       GLOMERULAR FILTRATION RATE             NORMAL                     >60             KIDNEY  DISEASE           15-60             KIDNEY FAILURE             <15    BUN/Creatinine Ratio 04/01/2020 8.43  7 - 18 Ratio Final    Glucose 04/01/2020 89  70 - 110 mg/dL Final    Calcium 04/01/2020 9.1  8.8 - 10.5 mg/dL Final    Total Bilirubin 04/01/2020 0.5  0.0 - 1.0 mg/dL Final    AST 04/01/2020 21  15 - 37 U/L Final    ALT 04/01/2020 24  12 - 78 U/L Final    Total Protein 04/01/2020 6.5  6.4 - 8.2 g/dL Final    Albumin 04/01/2020 3.1* 3.4 - 5.0 g/dL Final    Globulin 04/01/2020 3.4  2.3 - 3.5 g/dL Final    Albumin/Globulin Ratio 04/01/2020 0.911* 1.1 - 1.8 Ratio Final    Alkaline Phosphatase 04/01/2020 71  46 - 116 U/L Final    Magnesium 04/01/2020 1.9  1.8 - 2.4 mg/dL Final   Ancillary Orders on 03/20/2020   Component Date Value Ref Range Status    WBC 03/25/2020 14.5* 4.6 - 10.2 10*3/uL Final    RBC 03/25/2020 4.39  4.2 - 5.4 10*6/uL Final    Hemoglobin 03/25/2020 10.8* 12.0 - 16.0 g/dL Final    Hematocrit 03/25/2020 35.0* 37.0 - 47.0 % Final    MCV 03/25/2020 79.7* 80 - 97 fL Final    MCH 03/25/2020 24.6* 27.0 - 31.2 pg Final    MCHC 03/25/2020 30.9* 31.8 - 35.4 g/dL Final    RDW RBC Auto-Rto 03/25/2020 23.0* 12.5 - 18.0 % Final    Platelets 03/25/2020 433* 142 - 424 10*3/uL Final    Manual nRBC per 100 Cells 03/25/2020 0.0  % Final    Sodium 03/25/2020 143  135 - 145 mmol/L Final    Potassium 03/25/2020 4.4  3.6 - 5.2 mmol/L Final    Chloride 03/25/2020 104  100 - 108 mmol/L Final    CO2 03/25/2020 34* 21 - 32 mmol/L Final    BUN, Bld 03/25/2020 8  7 - 18 mg/dL Final    Creatinine 03/25/2020 0.90  0.55 - 1.02 mg/dL Final    GFR ESTIMATION 03/25/2020 > 60  >60 Final               DESCRIPTION        GLOMERULAR FILTRATION RATE             NORMAL                     >60             KIDNEY  DISEASE           15-60             KIDNEY FAILURE             <15    BUN/Creatinine Ratio 03/25/2020 8.88  7 - 18 Ratio Final    Glucose 03/25/2020 91  70 - 110 mg/dL Final    Calcium 03/25/2020 8.6* 8.8 - 10.5 mg/dL Final    Total Bilirubin 03/25/2020 0.3  0.0 - 1.0 mg/dL Final    AST 03/25/2020 16  15 - 37 U/L Final    ALT 03/25/2020 18  12 - 78 U/L Final    Total Protein 03/25/2020 6.1* 6.4 - 8.2 g/dL Final    Albumin 03/25/2020 2.5* 3.4 - 5.0 g/dL Final    Globulin 03/25/2020 3.6* 2.3 - 3.5 g/dL Final    Albumin/Globulin Ratio 03/25/2020 0.694* 1.1 - 1.8 Ratio Final    Alkaline Phosphatase 03/25/2020 80  46 - 116 U/L Final    Magnesium 03/25/2020 1.9  1.8 - 2.4 mg/dL Final    Neutrophils 03/25/2020 84.0* 37 - 80 % Final    Neutrophils Absolute 03/25/2020 12.3* 1.8 - 7.7 10*3/uL Final    BANDS 03/25/2020 1.0  0 - 5 % Final    Lymphocytes 03/25/2020 7.0* 25 - 40 % Final    Monocytes 03/25/2020 4.0  1 - 15 % Final    Eosinophils 03/25/2020 3.0  1 - 3 % Final    Basophils 03/25/2020 1.0  0 - 3 % Final    Cells Counted 03/25/2020 100   Final    Platelet Estimate 03/25/2020 Adequate   Final    RBC Morphology 03/25/2020 ABNORMAL   Final    Anisocytosis 03/25/2020 3+   Final    Hypochromia 03/25/2020 1+   Final   Orders Only on 03/18/2020   Component Date Value Ref Range Status    Neutrophils 03/18/2020 62.0  37 - 80 % Final    Neutrophils Absolute 03/18/2020 5.7  1.8 - 7.7 10*3/uL Final    BANDS 03/18/2020 4.0  0 - 5 % Final    Lymphocytes 03/18/2020 8.0* 25 - 40 % Final    Monocytes 03/18/2020 20.0* 1 - 15 % Final    Eosinophils 03/18/2020 1.0  1 - 3 % Final    Metamyelocytes 03/18/2020 5.0* 0 - 0 % Final    Cells Counted 03/18/2020 100   Final    Platelet Estimate 03/18/2020 Adequate   Final    Platelet Clumps 03/18/2020 1+   Final    Anisocytosis 03/18/2020 1+   Final    Microcytes  03/18/2020 1+   Final   Ancillary Orders on 03/13/2020   Component Date Value Ref Range Status    WBC 03/18/2020 8.7  4.6 - 10.2 10*3/uL Final    RBC 03/18/2020 4.39  4.2 - 5.4 10*6/uL Final    Hemoglobin 03/18/2020 10.7* 12.0 - 16.0 g/dL Final    Hematocrit 03/18/2020 34.2* 37.0 - 47.0 % Final    MCV 03/18/2020 77.9* 80 - 97 fL Final    MCH 03/18/2020 24.4* 27.0 - 31.2 pg Final    MCHC 03/18/2020 31.3* 31.8 - 35.4 g/dL Final    RDW RBC Auto-Rto 03/18/2020 21.9* 12.5 - 18.0 % Final    Platelets 03/18/2020 250  142 - 424 10*3/uL Final    Manual nRBC per 100 Cells 03/18/2020 0.0  % Final    Sodium 03/18/2020 135  135 - 145 mmol/L Final    Potassium 03/18/2020 3.7  3.6 - 5.2 mmol/L Final    Chloride 03/18/2020 102  100 - 108 mmol/L Final    CO2 03/18/2020 26  21 - 32 mmol/L Final    BUN, Bld 03/18/2020 10  7 - 18 mg/dL Final    Creatinine 03/18/2020 0.91  0.55 - 1.02 mg/dL Final    GFR ESTIMATION 03/18/2020 60  >60 Final               DESCRIPTION       GLOMERULAR FILTRATION RATE             NORMAL                     >60             KIDNEY  DISEASE           15-60             KIDNEY FAILURE             <15    BUN/Creatinine Ratio 03/18/2020 10.98  7 - 18 Ratio Final    Glucose 03/18/2020 112* 70 - 110 mg/dL Final    Calcium 03/18/2020 8.1* 8.8 - 10.5 mg/dL Final    Total Bilirubin 03/18/2020 0.8  0.0 - 1.0 mg/dL Final    AST 03/18/2020 15  15 - 37 U/L Final    ALT 03/18/2020 19  12 - 78 U/L Final    Total Protein 03/18/2020 5.8* 6.4 - 8.2 g/dL Final    Albumin 03/18/2020 2.3* 3.4 - 5.0 g/dL Final    Globulin 03/18/2020 3.5  2.3 - 3.5 g/dL Final    Albumin/Globulin Ratio 03/18/2020 0.657* 1.1 - 1.8 Ratio Final    Alkaline Phosphatase 03/18/2020 56  46 - 116 U/L Final    Magnesium 03/18/2020 1.7* 1.8 - 2.4 mg/dL Final   There may be more visits with results that are not included.       Imaging:     Assessment:     Principle Problem: No primary diagnosis found.   Co-morbidity/Active Problems:    Patient Active Problem List   Diagnosis    Breast cancer metastasized to bone    Neuropathy    Fatigue associated with anemia    Drug-related hair loss    Nausea    Primary insomnia    Foot infection    Cancer, metastatic to bone    Malignant neoplasm of overlapping 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, estrogen receptor positive     Chronic fatigue    Diverticulosis    Dehydration    Hypoxemia    Iron deficiency anemia secondary to inadequate dietary iron intake    Hypoalbuminemia        Benita Carballo is a 74 y.o. female with PMH of There were no encounter diagnoses., with No primary diagnosis found.       Metastatic breast cancer ERPR positive HER2 Noah questionable  Status post of chemotherapy and HER2 targeted therapy with progression on HER2 targeted therapy  Recent testing fish negative for her 2 2018  Repeat the lipid biopsy 2019  shows HER2 positive and ER negative  Bone metastatic disease  Fatigue  Hypoxia  Dehydration    Plan:   Overall Plan:    She has HER2 positive disease with ERPR negative by the new liquid biopsy. Started Taxol/Herceptin 2/2020  Discussed ECHO with pt, showing right heart failure   Seen by Dr Gardner, cardiologist and recommended that Rt heart failure unlikely related to chemo, likely related to COPD and ILD.   Herceptin/taxol resumed last week with poor tolerance by pt, weight loss, fatigue reported, will hold tx this week, plan to resume next week at low dose, discussed with Dr Contreras  Discussed with Dr Contreras and he recommended reducing dose of herceptin further  Lasix x 3 days with 10 lbs wt loss, p is instructed to use prn, pt has not had to use Lasix over last 3 weeks   Compression sleeve for dependant upper ext swelling  Pt is wanting to stop oxycontin and norco due to out of pocket cost. She is requesting to resume oxycodone 5 mg TID when she runs out of oxycontin/norco. She states works better and more affordable.  Labs reviewed today and she is cleared for tx.       Nargis Larose Patient - Audio Only Telehealth Visit     The patient location is: home  The chief complaint leading to consultation is: chemo clearnace  Visit type: Virtual visit with audio only (telephone)     The reason for the audio only service rather than synchronous audio and video virtual visit was related to technical difficulties or patient preference/necessity.     Each patient to whom I provide medical services by telemedicine is:  (1) informed of the relationship between the physician and patient and the respective role of any other health care provider with respect to management of the patient; and (2) notified that they may decline to receive medical services by telemedicine and may withdraw from such care at any time. Patient verbally consented to receive this service via voice-only telephone call.       HPI: see above     Assessment and plan:  See above

## 2020-05-08 DIAGNOSIS — F51.01 PRIMARY INSOMNIA: ICD-10-CM

## 2020-05-08 RX ORDER — TEMAZEPAM 15 MG/1
15 CAPSULE ORAL NIGHTLY
Qty: 30 CAPSULE | Refills: 0 | Status: SHIPPED | OUTPATIENT
Start: 2020-05-08 | End: 2020-06-09

## 2020-05-08 NOTE — TELEPHONE ENCOUNTER
----- Message from Sundeep Davila sent at 5/8/2020  9:06 AM CDT -----  Contact: Pt  Type:  RX Refill Request    Who Called: Benita  Refill or New Rx:Refill  RX Name and Strength:Temazepam 15mg   How is the patient currently taking it? (ex. 1XDay):1xday  Is this a 30 day or 90 day RX:30  Preferred Pharmacy with phone number:  [id number] - [place], [place] - [address] AT [facility] [address]  [address] [facility]  [address]  [facility] [address]  Phone: [phone] Fax: [phone]  Local or Mail Order:Local  Ordering Provider:Rosalind  Would the patient rather a call back or a response via [facility]? Call back  Best Call Back Number:[phone] (home)   Additional Information: abigail

## 2020-05-11 ENCOUNTER — TELEPHONE (OUTPATIENT)
Dept: HEMATOLOGY/ONCOLOGY | Facility: CLINIC | Age: 75
End: 2020-05-11

## 2020-05-11 DIAGNOSIS — C50.812 MALIGNANT NEOPLASM OF OVERLAPPING SITES OF LEFT BREAST IN FEMALE, ESTROGEN RECEPTOR POSITIVE: ICD-10-CM

## 2020-05-11 DIAGNOSIS — Z17.0 MALIGNANT NEOPLASM OF OVERLAPPING SITES OF LEFT BREAST IN FEMALE, ESTROGEN RECEPTOR POSITIVE: ICD-10-CM

## 2020-05-11 RX ORDER — OXYCODONE HCL 10 MG/1
10 TABLET, FILM COATED, EXTENDED RELEASE ORAL EVERY 12 HOURS PRN
Qty: 60 TABLET | Refills: 0 | Status: SHIPPED | OUTPATIENT
Start: 2020-05-11 | End: 2020-05-14

## 2020-05-11 NOTE — TELEPHONE ENCOUNTER
----- Message from Marisabel Westbrook LPN sent at 5/11/2020  1:26 PM CDT -----  Contact: Patient  Pt requesting refill on oxycodone    Thank you  UNRULY Petit  ----- Message -----  From: Chelsea Ontiveros  Sent: 5/11/2020   9:57 AM CDT  To: Rambo Boyer Staff    Type:  RX Refill Request    Who Called: Patient  Refill or New Rx:refill  RX Name and Strength:oxyCODONE (OXYCONTIN) 10 mg 12 hr tablet   How is the patient currently taking it? (ex. 1XDay):  Is this a 30 day or 90 day RX:30  Preferred Pharmacy with phone number:  [id number] - [facility] [address] [facility] [address]  ([address] [facility]   [address]  [facility] [address]  Phone: [phone] Fax: [phone]  Local or Mail Order: Local  Ordering Provider: Rambo  Would the patient rather a call back or a response via [facility]? Call back  Best Call Back Number:[phone]  Additional Information: Pt states that she may need a new script. She talk with the dt last work.

## 2020-05-12 ENCOUNTER — TELEPHONE (OUTPATIENT)
Dept: HEMATOLOGY/ONCOLOGY | Facility: CLINIC | Age: 75
End: 2020-05-12

## 2020-05-12 NOTE — TELEPHONE ENCOUNTER
Spoke with Ms. Carballo about her medicine that was called out on 05/11/2020. Pt. States that she asked for the oxycodone instead of the oxycontin. Basically she doesn't want the generic brand.

## 2020-05-14 ENCOUNTER — OFFICE VISIT (OUTPATIENT)
Dept: HEMATOLOGY/ONCOLOGY | Facility: CLINIC | Age: 75
End: 2020-05-14
Payer: MEDICARE

## 2020-05-14 VITALS
RESPIRATION RATE: 18 BRPM | DIASTOLIC BLOOD PRESSURE: 73 MMHG | SYSTOLIC BLOOD PRESSURE: 124 MMHG | HEIGHT: 66 IN | WEIGHT: 112.38 LBS | HEART RATE: 85 BPM | BODY MASS INDEX: 18.06 KG/M2 | OXYGEN SATURATION: 96 %

## 2020-05-14 DIAGNOSIS — C50.812 MALIGNANT NEOPLASM OF OVERLAPPING SITES OF LEFT BREAST IN FEMALE, ESTROGEN RECEPTOR POSITIVE: ICD-10-CM

## 2020-05-14 DIAGNOSIS — Z17.0 MALIGNANT NEOPLASM OF OVERLAPPING SITES OF LEFT BREAST IN FEMALE, ESTROGEN RECEPTOR POSITIVE: ICD-10-CM

## 2020-05-14 DIAGNOSIS — C79.51 CANCER, METASTATIC TO BONE: ICD-10-CM

## 2020-05-14 DIAGNOSIS — C50.919 CARCINOMA OF BREAST METASTATIC TO BONE, UNSPECIFIED LATERALITY: Primary | ICD-10-CM

## 2020-05-14 DIAGNOSIS — C79.51 CARCINOMA OF BREAST METASTATIC TO BONE, UNSPECIFIED LATERALITY: Primary | ICD-10-CM

## 2020-05-14 PROCEDURE — 99214 OFFICE O/P EST MOD 30 MIN: CPT | Mod: S$GLB,,, | Performed by: NURSE PRACTITIONER

## 2020-05-14 PROCEDURE — 99214 PR OFFICE/OUTPT VISIT, EST, LEVL IV, 30-39 MIN: ICD-10-PCS | Mod: S$GLB,,, | Performed by: NURSE PRACTITIONER

## 2020-05-14 RX ORDER — SODIUM CHLORIDE 0.9 % (FLUSH) 0.9 %
10 SYRINGE (ML) INJECTION
Status: CANCELLED | OUTPATIENT
Start: 2020-05-14

## 2020-05-14 RX ORDER — OXYCODONE HYDROCHLORIDE 10 MG/1
10 TABLET ORAL EVERY 8 HOURS PRN
Qty: 45 TABLET | Refills: 0 | Status: SHIPPED | OUTPATIENT
Start: 2020-05-14 | End: 2020-05-28 | Stop reason: SDUPTHER

## 2020-05-14 RX ORDER — HEPARIN 100 UNIT/ML
500 SYRINGE INTRAVENOUS
Status: CANCELLED | OUTPATIENT
Start: 2020-05-14

## 2020-05-14 RX ORDER — FAMOTIDINE 10 MG/ML
20 INJECTION INTRAVENOUS
Status: CANCELLED | OUTPATIENT
Start: 2020-05-14

## 2020-05-14 RX ORDER — DIPHENHYDRAMINE HYDROCHLORIDE 50 MG/ML
50 INJECTION INTRAMUSCULAR; INTRAVENOUS ONCE AS NEEDED
Status: CANCELLED | OUTPATIENT
Start: 2020-05-14

## 2020-05-14 RX ORDER — EPINEPHRINE 0.3 MG/.3ML
0.3 INJECTION SUBCUTANEOUS ONCE AS NEEDED
Status: CANCELLED | OUTPATIENT
Start: 2020-05-14

## 2020-05-14 NOTE — PROGRESS NOTES
Medical Oncology Progress Note  [facility]     PATIENT: Benita Carballo  : 1945 74 y.o. female  MRN [id number]     PCP: Solomon Contreras MD  Consult Requested By:      Date of Service: 2020    Subjective:   Interim History:  Malignant neoplasm of overlapping sites of left breast in fe    Benita Carballo is here for to followup breast cancer treatment.    The patient was started on  chemotherapy last week with Herceptin and Taxol weekly.  She has chronic hypoxia and her oxygen saturation has been declining today her O2 status 77% she looks purple and she also has increasing degree of short of breath; in addition she complains of swelling to her left arm.  She had no chest pain. She was admittted last week to the hospital and underwent thoracentesis, which was not tested for cytology and imaging for possible PE was ruled out. Today she states she is feeling better but POX was 82 % on RA, and still has yosi upper ext swelling which she states has improved. She has not had a echo so will order ECHO stat to r/o any CHF which could be causing her swelling both lung and upper ext. States she had upper u/s with her pulm which was neg for DVT.     3/26/20   She returns with ECHO showing EF 55-60%, but right sided heart failure noted. Pt has an additional 12 # of wt gain since last week. She states she is feeling fine, better than at last visit. Discussed ECHO and will hold herceptin and obtain MUGA, will proceed with weekly taxol and possibly will add herceptin back after MUGA completed and will refer pt to cardiology, but could be delayed due to COVID pandemic.     2020  Today she presents with significant fluid loss noted. No longer upper ext third space swelling noted, alb and T Pro dramatically improved, pt has last 10 + lbs of fluid. She is breathing easy today and denies any SOB or ORELLANA. We will proceed with weekly taxol and continue to hold herceptin until MUGA or cardiology  clearance is obtained. She was seen yesterday with Dr Gardner. Below is a portion of his note:       Oncology History:  Oncology History    2002 Rt breast cancer s/p lump with chemo, [place] in [place] Tamoxifen x 1 yr, then Arimidex x 5 yrs. Completed tx 2008.     2015  c/o chronic cough with CT chest 1/28/16 showing extensive adenopathy  With base of lt neck mass 4.5 cm with yosi pulm nodules. RF to Dr ANDREWS Mcnally    2/6/16 bx of lt neck mass carcinoma with feature suggestive of breast primary. ER/%, Her2 borderline by IHC and deemed equivocal by FISH          Breast cancer metastasized to bone    2/4/2016 Initial Diagnosis     Breast cancer metastasized to bone, , lymphoid mass       5/25/2016 - 2/9/2017 Chemotherapy     Taxol/Herceptin x 8 cycles then perjeta added 7/16  Prescribed by Dr Mcnally       2/20/2017 - 6/17/2017 Chemotherapy     Kadcycla prescribed by Dr Mcnally       6/28/2017 Biopsy     Biopsy of rt abd met disease consistent with breast cancer. ER/MS + Her 2 neg per Dr Moreno's note.    Dr Moreno noted path review from bx 2/16 showed HER 2 + equivocal with FISH reading as Equivocal but ratio 1.2. Additional report form 2016 states additional testing was done with different probe and the equivocal results can be noted as negative.     Treatment therapy changed from Her2 based tx to endocrine base therapy by Dr Moreno       6/28/2017 -  Hormone Therapy     Arimidex 6/17-2/18  Aromasin 2/18  Ibrance + Aromasin 5/18 -1/19  Ibrance + faslodex 2/19 4/30/2019 Imaging Significant Findings     PET/CT: Lesions involving the medial clavicle , subcarinal LN, pleural based soft tissue density adjacent to T11 and lesion in left lobe of liver diminished in size and SUV.   RECIST 34% decrease in disease      2/17/2020 -  Chemotherapy     Treatment Summary   Plan Name: OP BREAST TRASTUZUMAB PACLITAXEL QW  Treatment Goal: Control  Status: Active  Start Date: 2/26/2020  End Date: 6/4/2020 (Planned)  Provider: Solomon Contreras  MD  Chemotherapy: PACLitaxel (TAXOL) 64 mg/m2 = 102 mg in sodium chloride 0.9% 250 mL chemo infusion, 64 mg/m2 = 102 mg (100 % of original dose 64 mg/m2), Intravenous, Clinic/HOD 1 time, 8 of 12 cycles  Dose modification: 64 mg/m2 (original dose 64 mg/m2, Cycle 1)  trastuzumab 211 mg in sodium chloride 0.9% 250 mL chemo infusion, 4 mg/kg = 211 mg, Intravenous, Clinic/HOD 1 time, 4 of 8 cycles  Dose modification: 2 mg/kg (original dose 2 mg/kg, Cycle 7, Reason: MD Discretion), 80 mg (original dose 2 mg/kg, Cycle 8)       ==8/2019 Now She is currently on combined afinitor + aromasin,  repeated CT scan shows improvement  PET 1/2020 showed PD,  afinitor + aromasin stopped  == PET scan 01/20/2020 the progressive  ==Started Taxol/Herceptin weekly 2/2020    Past Medical History:   Past Medical History:   Diagnosis Date    Arthritis     Bone cancer     Breast cancer     Cancer     Cataract     Depression     Diverticulosis 1/15/2020    Fatigue associated with anemia 6/6/2019    GERD (gastroesophageal reflux disease)     High cholesterol     Hypoxemia 3/12/2020    Skin problem     Sleep disorder        Past Surgical HIstory:   Past Surgical History:   Procedure Laterality Date    APPENDECTOMY      BREAST LUMPECTOMY Right 2002    CATARACT EXTRACTION, BILATERAL  2014    COLONOSCOPY  2008    HYSTERECTOMY      PORTACATH PLACEMENT  05/18/2016    SURGICAL REMOVAL OF NODULE OF VOCAL CORD         Allergies:  Review of patient's allergies indicates:   Allergen Reactions    Benadryl [diphenhydramine hcl]      Restless leg     Medrol [methylprednisolone] Hallucinations    Penicillins        Medications:  Current Outpatient Medications   Medication Sig Dispense Refill    ANORO ELLIPTA 62.5-25 mcg/actuation DsDv       dexAMETHasone 0.5 mg/5 mL Soln SWISH WITH 10 ML PO FOR 2 MIN THEN SPIT QID      exemestane (AROMASIN) 25 mg tablet Take 1 tablet (25 mg total) by mouth once daily. 30 tablet 3    furosemide  (LASIX) 40 MG tablet Take 1 tablet (40 mg total) by mouth once daily. 30 tablet 11    gabapentin (NEURONTIN) 400 MG capsule Take 1 capsule (400 mg total) by mouth 3 (three) times daily. 120 capsule 4    HYDROcodone-acetaminophen (NORCO)  mg per tablet Take 1 tablet by mouth every 6 (six) hours as needed for Pain. 30 tablet 0    iron-vitamin C 100-250 mg, ICAR-C, (ICAR-C) 100-250 mg Tab Take 1 tablet by mouth once daily. 30 tablet 5    LORazepam (ATIVAN) 1 MG tablet TAKE 1 TABLET BY MOUTH EVERY EVENING 30 tablet 0    megestrol (MEGACE) 400 mg/10 mL (40 mg/mL) Susp Take 10 mLs (400 mg total) by mouth once daily. 240 mL 2    oxyCODONE (OXYCONTIN) 10 mg 12 hr tablet Take 1 tablet (10 mg total) by mouth every 12 (twelve) hours as needed for Pain. 60 tablet 0    pantoprazole (PROTONIX) 40 MG tablet Take 1 tablet (40 mg total) by mouth once daily. 30 tablet 3    potassium chloride SA (K-DUR,KLOR-CON) 20 MEQ tablet Take 1 tablet (20 mEq total) by mouth once daily. 30 tablet 11    PROAIR HFA 90 mcg/actuation inhaler       promethazine (PHENERGAN) 25 MG tablet Take 1 tablet (25 mg total) by mouth every 6 (six) hours as needed for Nausea. 30 tablet 3    temazepam (RESTORIL) 15 mg Cap Take 1 capsule (15 mg total) by mouth nightly. 30 capsule 0     No current facility-administered medications for this visit.        Family History:   Family History   Problem Relation Age of Onset    Lung cancer Father     Melanoma Father        Social History:  reports that she has been smoking cigarettes. She has a 2.00 pack-year smoking history. She has never used smokeless tobacco. She reports that she drinks about 4.0 standard drinks of alcohol per week. She reports that she does not use drugs.    Review of Systemas  Constitutional: No change in weight, appetie, fatigue, fever, or night sweats  Eyes: No changes in vision  Ears, Nose, Mouth, Throat, and Face: No hearing problems, ear pain, rummy nose, or sore  "throat  Respiratory: No shortness of breath or cough  Cardiovascular: No chest pain, palpitations or dizziness  Gastrointestinal: No abdominal pain, hematochezia, melena  Genitourinary: No dysuria, hematuria, nocturia, menstrual problems, post menopausal  Integumentary/Breast: No rashes or itching  Hematologic/Lymphatic: No anemia or bleeding abnormalities  Musculoskeletal: No joint or muscle abnormalities  Neurological: No sensory or motor problems, no headaches  Behavioral/Psych: No depression, anxiety, cognitive problems, or stress  Endocrine: No diabetes or thyroid problems  Allergic/Immunologic: See allergy above    Physical Exam      Vitals:   Vitals:    05/14/20 1037   BP: 124/73   BP Location: Left arm   Patient Position: Sitting   BP Method: Medium (Automatic)   Pulse: 85   Resp: 18   SpO2: 96%   Weight: 51 kg (112 lb 6.4 oz)   Height: 5' 6" (1.676 m)     BMI: Body mass index is 18.14 kg/m².  BSA Body surface area is 1.54 meters squared.  ECOG Performance Status:   ECOG SCORE         GENERAL APPEARANCE: Well developed, well nourished, in no acute distress.  SKIN: Inspection of the skin reveals no rashes, ulcerations or petechiae.  HEENT: The sclerae were anicteric and conjunctivae were pink and moist. Extraocular movements were intact and pupils were equal, round, and reactive to light with normal accommodation. External inspection of the ears and nose showed no scars, lesions, or masses. Lips, teeth, and gums showed normal mucosa. The oral mucosa, hard and soft palate, tongue and posterior pharynx were normal.  NECK: Supple and symmetric. There was no thyroid enlargement, and no tenderness, or masses were felt.  CRESPIRATORY: Normal AP diameter and normal contour without any kyphoscoliosis. LUNGS: Auscultation of the lungs revealed normal breath sounds without any other adventitious sounds or rubs.  CARDIOVASCULAR: There was a regular rate and rhythm without any murmurs, gallops, rubs. The carotid pulses " were normal and 2+ bilaterally without bruits. Peripheral pulses were 2+ and symmetric.  GASTROINTESTINAL: Soft and nontender with normal bowel sounds. The liver span was approximately 5-6 cm in the right midclavicular line by percussion. The liver edge was nontender. The spleen was not palpable. There were no inguinal or umbilical hernias noted. No ascites was noted.  LYMPH NODES: No lymphadenopathy was appreciated in the neck, axillae or groin.  MUSCULOSKELETAL: Gait was normal. There was no tenderness or effusions noted. Muscle strength and tone were normal. EXTREMITIES: No cyanosis, clubbing or edema.  NEUROLOGIC: Alert and oriented x 3. Normal affect. Gait was normal. Normal deep tendon reflexes with no pathological reflexes. Sensation to touch was normal.  PSYCHIATRIC: good mood, orientated to place, time and person     Labs and Imagings     Ancillary Orders on 2020   Component Date Value Ref Range Status    CANCER ANTIGEN 27.29 2020 108.3* 0.0 - 40.0 U/mL Final    Comment: INTERPRETIVE INFORMATION: Cancer Antigen 27.29The CA 27.29 assay is intended for use in monitorin) diseaseprogression and/or response to therapy in patients withmetastatic disease, and 2) disease recurrence in patientstreated previously for stages II   or III breast carcinoma who areclinically free of the disease. Serial testing in patients whoare clinically free of disease should be used in conjunctionwith other clinical methods for early detection of cancerrecurrence.Limitations: Patients with   confirmed breast carcinoma frequentlyhave CA 27.29 assay values in the same range as healthyindividuals.  Elevations may also be observed in patients withnon malignant disease. Results of this test must always beinterpreted in the context of morphologic   and other relevantdata, and should not be used alone for a diagnosis of malignancy.Methodology: Siemens Flat.toaur CA 27.29 chemiluminescentimmunoassay was used. Results  obtained with different assaymethods or kits cannot be used   in                           terchangeably.Performed by [facility] [facility] [place] [place] [address] [phone].[email or web address], Sharif Leon MD, Lab. Director      CEA 05/07/2020 7.5* 0.0 - 3.0 ng/mL Final    Comment: INTERPRETIVE INFORMATION: Carcinoembryonic AntigenThe Roche CEA electrochemiluminescent immunoassay was used.Results obtained with different test methods or kits cannot beused interchangeably. Measurement of CEA has been shown to beclinically relevant in   the management of patients withcolorectal, breast, lung, prostatic, pancreatic, and ovariancarcinomas. Smokers may have slightly elevated levels of CEA.The CEA assay value, regardless of level, should not beinterpreted as evidence for the presence or   absence of malignantdisease and is not recommended for use as a screening procedureto detect the presence of cancer in the general population.Performed by [facility] [facility] [place] [place] [address] [phone].[email or web address], Sharif Leon MD,   Lab. Director      Cancer Antigen 15-3 05/07/2020 79* 0 - 31 U/mL Final    Comment: INTERPRETIVE INFORMATION: Cancer Antigen-Breast ()The Roche CA 15-3 electrochemiluminescent immunoassay was used.Results obtained with different methods or kits cannot be usedinterchangeably. The CA 15-3 test is used to aid in themanagement of   Stage II and III breast cancer patients. Serialtesting for patient CA 15-3 values should be used in conjunctionwith other clinical methods for monitoring breast cancer.Patients with confirmed breast carcinoma frequently have CA 15-3values in the same   range as healthy individuals. Elevations maybe observed in patients with nonmalignant disease. Therefore, Chelsie 15-3 value, regardless of level, should not be interpreted asabsolute evidence of the presence or absence of malignantdisease.Performed by [facility] [facility] [place] [place] [address]  [phone], Sharif Leon MD, Lab. Director      WBC 05/07/2020 9.2  4.6 - 10.2 10*3/uL Final    RBC 05/07/2020 4.29  4.2 - 5.4 10*6/uL Final    Hemoglobin 05/07/2020 11.2* 12.0 - 16.0 g/dL Final    Hematocrit 05/07/2020 36.5* 37.0 - 47.0 % Final    MCV 05/07/2020 85.1  80 - 97 fL Final    MCH 05/07/2020 26.1* 27.0 - 31.2 pg Final    MCHC 05/07/2020 30.7* 31.8 - 35.4 g/dL Final    RDW RBC Auto-Rto 05/07/2020 22.9* 12.5 - 18.0 % Final    Platelets 05/07/2020 379  142 - 424 10*3/uL Final    Manual nRBC per 100 Cells 05/07/2020 0.0  % Final    Sodium 05/07/2020 139  135 - 145 mmol/L Final    Potassium 05/07/2020 4.4  3.6 - 5.2 mmol/L Final    Chloride 05/07/2020 102  100 - 108 mmol/L Final    CO2 05/07/2020 30  21 - 32 mmol/L Final    BUN, Bld 05/07/2020 8  7 - 18 mg/dL Final    Creatinine 05/07/2020 0.98  0.55 - 1.02 mg/dL Final    GFR ESTIMATION 05/07/2020 55* >60 Final               DESCRIPTION       GLOMERULAR FILTRATION RATE             NORMAL                     >60             KIDNEY  DISEASE           15-60             KIDNEY FAILURE             <15    BUN/Creatinine Ratio 05/07/2020 8.16  7 - 18 Ratio Final    Glucose 05/07/2020 99  70 - 110 mg/dL Final    Calcium 05/07/2020 8.4* 8.8 - 10.5 mg/dL Final    Total Bilirubin 05/07/2020 0.4  0.0 - 1.0 mg/dL Final    AST 05/07/2020 17  15 - 37 U/L Final    ALT 05/07/2020 19  12 - 78 U/L Final    Total Protein 05/07/2020 6.5  6.4 - 8.2 g/dL Final    Albumin 05/07/2020 3.1* 3.4 - 5.0 g/dL Final    Globulin 05/07/2020 3.4  2.3 - 3.5 g/dL Final    Albumin/Globulin Ratio 05/07/2020 0.911* 1.1 - 1.8 Ratio Final    Alkaline Phosphatase 05/07/2020 58  46 - 116 U/L Final    Magnesium 05/07/2020 2.1  1.8 - 2.4 mg/dL Final    Neutrophils 05/07/2020 66.0  37 - 80 % Final    Neutrophils Absolute 05/07/2020 6.1  1.8 - 7.7 10*3/uL Final    Lymphocytes 05/07/2020 24.0* 25 - 40 % Final    Monocytes 05/07/2020 6.0  1 - 15 % Final     Eosinophils 05/07/2020 4.0* 1 - 3 % Final    Cells Counted 05/07/2020 100   Final    Platelet Estimate 05/07/2020 Normal   Final    Anisocytosis 05/07/2020 2+   Final    Hypochromia 05/07/2020 1+   Final   Ancillary Orders on 04/17/2020   Component Date Value Ref Range Status    WBC 04/23/2020 6.5  4.6 - 10.2 10*3/uL Final    RBC 04/23/2020 4.24  4.2 - 5.4 10*6/uL Final    Hemoglobin 04/23/2020 11.2* 12.0 - 16.0 g/dL Final    Hematocrit 04/23/2020 36.0* 37.0 - 47.0 % Final    MCV 04/23/2020 84.9  80 - 97 fL Final    MCH 04/23/2020 26.4* 27.0 - 31.2 pg Final    MCHC 04/23/2020 31.1* 31.8 - 35.4 g/dL Final    RDW RBC Auto-Rto 04/23/2020 24.6* 12.5 - 18.0 % Final    Platelets 04/23/2020 320  142 - 424 10*3/uL Final    Neutrophils/100 leukocytes 04/23/2020 67.2  37 - 80 % Final    Lymphocytes/100 leukocytes 04/23/2020 14.8* 25 - 40 % Final    Monocytes/100 leukocytes 04/23/2020 11.2  1 - 15 % Final    Eosinophils/100 leukocytes 04/23/2020 2.8  1 - 3 % Final    Basophils/100 leukocytes 04/23/2020 1.4  0 - 3 % Final    Manual nRBC per 100 Cells 04/23/2020 0.0  % Final    Neutrophils 04/23/2020 4.36  1.8 - 7.7 10*3/uL Final    Sodium 04/23/2020 140  135 - 145 mmol/L Final    Potassium 04/23/2020 4.4  3.6 - 5.2 mmol/L Final    Chloride 04/23/2020 103  100 - 108 mmol/L Final    CO2 04/23/2020 30  21 - 32 mmol/L Final    BUN, Bld 04/23/2020 9  7 - 18 mg/dL Final    Creatinine 04/23/2020 0.84  0.55 - 1.02 mg/dL Final    GFR ESTIMATION 04/23/2020 > 60  >60 Final               DESCRIPTION       GLOMERULAR FILTRATION RATE             NORMAL                     >60             KIDNEY  DISEASE           15-60             KIDNEY FAILURE             <15    BUN/Creatinine Ratio 04/23/2020 10.71  7 - 18 Ratio Final    Glucose 04/23/2020 107  70 - 110 mg/dL Final    Calcium 04/23/2020 8.7* 8.8 - 10.5 mg/dL Final    Total Bilirubin 04/23/2020 0.4  0.0 - 1.0 mg/dL Final    AST 04/23/2020 19  15 - 37 U/L  Final    ALT 04/23/2020 18  12 - 78 U/L Final    Total Protein 04/23/2020 6.2* 6.4 - 8.2 g/dL Final    Albumin 04/23/2020 3.1* 3.4 - 5.0 g/dL Final    Globulin 04/23/2020 3.1  2.3 - 3.5 g/dL Final    Albumin/Globulin Ratio 04/23/2020 1.000* 1.1 - 1.8 Ratio Final    Alkaline Phosphatase 04/23/2020 60  46 - 116 U/L Final    Magnesium 04/23/2020 1.9  1.8 - 2.4 mg/dL Final    Anisocytosis 04/23/2020 2+   Final    Spherocytes 04/23/2020 1+   Final   Orders Only on 04/16/2020   Component Date Value Ref Range Status    Anisocytosis 04/16/2020 2+   Final    Hypochromia 04/16/2020 1+   Final   Ancillary Orders on 04/10/2020   Component Date Value Ref Range Status    WBC 04/16/2020 5.8  4.6 - 10.2 10*3/uL Final    RBC 04/16/2020 4.56  4.2 - 5.4 10*6/uL Final    Hemoglobin 04/16/2020 11.8* 12.0 - 16.0 g/dL Final    Hematocrit 04/16/2020 38.5  37.0 - 47.0 % Final    MCV 04/16/2020 84.4  80 - 97 fL Final    MCH 04/16/2020 25.9* 27.0 - 31.2 pg Final    MCHC 04/16/2020 30.6* 31.8 - 35.4 g/dL Final    RDW RBC Auto-Rto 04/16/2020 24.5* 12.5 - 18.0 % Final    Platelets 04/16/2020 330  142 - 424 10*3/uL Final    Neutrophils/100 leukocytes 04/16/2020 66.8  37 - 80 % Final    Lymphocytes/100 leukocytes 04/16/2020 14.7* 25 - 40 % Final    Monocytes/100 leukocytes 04/16/2020 12.5  1 - 15 % Final    Eosinophils/100 leukocytes 04/16/2020 2.4  1 - 3 % Final    Basophils/100 leukocytes 04/16/2020 1.2  0 - 3 % Final    Manual nRBC per 100 Cells 04/16/2020 0.0  % Final    Neutrophils 04/16/2020 3.90  1.8 - 7.7 10*3/uL Final    Sodium 04/16/2020 139  135 - 145 mmol/L Final    Potassium 04/16/2020 4.7  3.6 - 5.2 mmol/L Final    Chloride 04/16/2020 103  100 - 108 mmol/L Final    CO2 04/16/2020 28  21 - 32 mmol/L Final    BUN, Bld 04/16/2020 9  7 - 18 mg/dL Final    Creatinine 04/16/2020 0.89  0.55 - 1.02 mg/dL Final    GFR ESTIMATION 04/16/2020 > 60  >60 Final               DESCRIPTION       GLOMERULAR FILTRATION  RATE             NORMAL                     >60             KIDNEY  DISEASE           15-60             KIDNEY FAILURE             <15    BUN/Creatinine Ratio 2020 10.11  7 - 18 Ratio Final    Glucose 2020 91  70 - 110 mg/dL Final    Calcium 2020 8.9  8.8 - 10.5 mg/dL Final    Total Bilirubin 2020 0.4  0.0 - 1.0 mg/dL Final    AST 2020 17  15 - 37 U/L Final    ALT 2020 23  12 - 78 U/L Final    Total Protein 2020 6.5  6.4 - 8.2 g/dL Final    Albumin 2020 3.2* 3.4 - 5.0 g/dL Final    Globulin 2020 3.3  2.3 - 3.5 g/dL Final    Albumin/Globulin Ratio 2020 0.969* 1.1 - 1.8 Ratio Final    Alkaline Phosphatase 2020 71  46 - 116 U/L Final    Magnesium 2020 2.0  1.8 - 2.4 mg/dL Final   Orders Only on 2020   Component Date Value Ref Range Status    Anisocytosis 2020 1+   Final    Hypochromia 2020 1+   Final   Ancillary Orders on 2020   Component Date Value Ref Range Status    CANCER ANTIGEN 27.29 2020 138.0* 0.0 - 40.0 U/mL Final    Comment: INTERPRETIVE INFORMATION: Cancer Antigen 27.29The CA 27.29 assay is intended for use in monitorin) diseaseprogression and/or response to therapy in patients withmetastatic disease, and 2) disease recurrence in patientstreated previously for stages II   or III breast carcinoma who areclinically free of the disease. Serial testing in patients whoare clinically free of disease should be used in conjunctionwith other clinical methods for early detection of cancerrecurrence.Limitations: Patients with   confirmed breast carcinoma frequentlyhave CA 27.29 assay values in the same range as healthyindividuals.  Elevations may also be observed in patients withnon malignant disease. Results of this test must always beinterpreted in the context of morphologic   and other relevantdata, and should not be used alone for a diagnosis of malignancy.Methodology: Siemens CAXAaur CA  27.29 chemiluminescentimmunoassay was used. Results obtained with different assaymethods or kits cannot be used   in                           terchangeably.Performed by [facility],[address] [facility] [place] [address] [phone], Sharif Leon MD, Lab. Director      CEA 04/09/2020 9.4* 0.0 - 3.0 ng/mL Final    Comment: INTERPRETIVE INFORMATION: Carcinoembryonic AntigenThe Roche CEA electrochemiluminescent immunoassay was used.Results obtained with different test methods or kits cannot beused interchangeably. Measurement of CEA has been shown to beclinically relevant in   the management of patients withcolorectal, breast, lung, prostatic, pancreatic, and ovariancarcinomas. Smokers may have slightly elevated levels of CEA.The CEA assay value, regardless of level, should not beinterpreted as evidence for the presence or   absence of malignantdisease and is not recommended for use as a screening procedureto detect the presence of cancer in the general population.Performed by [facility],[address] [facility] [place] [address] [phone], Sharif Leon MD,   Lab. Director      Cancer Antigen 15-3 04/09/2020 84* 0 - 31 U/mL Final    Comment: INTERPRETIVE INFORMATION: Cancer Antigen-Breast ()The Roche CA 15-3 electrochemiluminescent immunoassay was used.Results obtained with different methods or kits cannot be usedinterchangeably. The CA 15-3 test is used to aid in themanagement of   Stage II and III breast cancer patients. Serialtesting for patient CA 15-3 values should be used in conjunctionwith other clinical methods for monitoring breast cancer.Patients with confirmed breast carcinoma frequently have CA 15-3values in the same   range as healthy individuals. Elevations maybe observed in patients with nonmalignant disease. Therefore, Chelsie 15-3 value, regardless of level, should not be interpreted asabsolute evidence of the presence or absence of malignantdisease.Performed by [facility] [place] [address] [phone], Sharif Leon MD, Lab. Director      WBC 04/09/2020 6.1  4.6 - 10.2 10*3/uL Final    RBC 04/09/2020 4.57  4.2 - 5.4 10*6/uL Final    Hemoglobin 04/09/2020 11.4* 12.0 - 16.0 g/dL Final    Hematocrit 04/09/2020 37.3  37.0 - 47.0 % Final    MCV 04/09/2020 81.6  80 - 97 fL Final    MCH 04/09/2020 24.9* 27.0 - 31.2 pg Final    MCHC 04/09/2020 30.6* 31.8 - 35.4 g/dL Final    RDW RBC Auto-Rto 04/09/2020 23.8* 12.5 - 18.0 % Final    Platelets 04/09/2020 345  142 - 424 10*3/uL Final    Neutrophils/100 leukocytes 04/09/2020 71.3  37 - 80 % Final    Lymphocytes/100 leukocytes 04/09/2020 11.8* 25 - 40 % Final    Monocytes/100 leukocytes 04/09/2020 8.3  1 - 15 % Final    Eosinophils/100 leukocytes 04/09/2020 6.0* 1 - 3 % Final    Basophils/100 leukocytes 04/09/2020 1.0  0 - 3 % Final    Manual nRBC per 100 Cells 04/09/2020 0.0  % Final    Neutrophils 04/09/2020 4.36  1.8 - 7.7 10*3/uL Final    Sodium 04/09/2020 137  135 - 145 mmol/L Final    Potassium 04/09/2020 4.1  3.6 - 5.2 mmol/L Final    Chloride 04/09/2020 100  100 - 108 mmol/L Final    CO2 04/09/2020 30  21 - 32 mmol/L Final    BUN, Bld 04/09/2020 8  7 - 18 mg/dL Final    Creatinine 04/09/2020 0.89  0.55 - 1.02 mg/dL Final    GFR ESTIMATION 04/09/2020 > 60  >60 Final               DESCRIPTION       GLOMERULAR FILTRATION RATE             NORMAL                     >60             KIDNEY  DISEASE           15-60             KIDNEY FAILURE             <15    BUN/Creatinine Ratio 04/09/2020 8.98  7 - 18 Ratio Final    Glucose 04/09/2020 104  70 - 110 mg/dL Final    Calcium 04/09/2020 9.9  8.8 - 10.5 mg/dL Final    Total Bilirubin 04/09/2020 0.5  0.0 - 1.0 mg/dL Final    AST 04/09/2020 15  15 - 37 U/L Final    ALT 04/09/2020 18  12 - 78 U/L Final    Total Protein 04/09/2020 7.3  6.4 - 8.2 g/dL Final    Albumin 04/09/2020 3.2* 3.4 - 5.0 g/dL Final    Globulin 04/09/2020  4.1* 2.3 - 3.5 g/dL Final    Albumin/Globulin Ratio 04/09/2020 0.780* 1.1 - 1.8 Ratio Final    Alkaline Phosphatase 04/09/2020 77  46 - 116 U/L Final    Magnesium 04/09/2020 1.8  1.8 - 2.4 mg/dL Final   Orders Only on 04/01/2020   Component Date Value Ref Range Status    Neutrophils 04/01/2020 80.0  37 - 80 % Final    Neutrophils Absolute 04/01/2020 7.2  1.8 - 7.7 10*3/uL Final    BANDS 04/01/2020 1.0  0 - 5 % Final    Lymphocytes 04/01/2020 6.0* 25 - 40 % Final    Monocytes 04/01/2020 4.0  1 - 15 % Final    Eosinophils 04/01/2020 8.0* 1 - 3 % Final    Metamyelocytes 04/01/2020 1.0* 0 - 0 % Final    Cells Counted 04/01/2020 100   Final    Platelet Estimate 04/01/2020 Adequate   Final    Anisocytosis 04/01/2020 1+   Final   Ancillary Orders on 03/27/2020   Component Date Value Ref Range Status    WBC 04/01/2020 8.9  4.6 - 10.2 10*3/uL Final    RBC 04/01/2020 4.52  4.2 - 5.4 10*6/uL Final    Hemoglobin 04/01/2020 11.3* 12.0 - 16.0 g/dL Final    Hematocrit 04/01/2020 36.3* 37.0 - 47.0 % Final    MCV 04/01/2020 80.3  80 - 97 fL Final    MCH 04/01/2020 25.0* 27.0 - 31.2 pg Final    MCHC 04/01/2020 31.1* 31.8 - 35.4 g/dL Final    RDW RBC Auto-Rto 04/01/2020 22.9* 12.5 - 18.0 % Final    Platelets 04/01/2020 331  142 - 424 10*3/uL Final    Manual nRBC per 100 Cells 04/01/2020 0.0  % Final    Sodium 04/01/2020 137  135 - 145 mmol/L Final    Potassium 04/01/2020 5.0  3.6 - 5.2 mmol/L Final    Chloride 04/01/2020 101  100 - 108 mmol/L Final    CO2 04/01/2020 31  21 - 32 mmol/L Final    BUN, Bld 04/01/2020 7  7 - 18 mg/dL Final    Creatinine 04/01/2020 0.83  0.55 - 1.02 mg/dL Final    GFR ESTIMATION 04/01/2020 > 60  >60 Final               DESCRIPTION       GLOMERULAR FILTRATION RATE             NORMAL                     >60             KIDNEY  DISEASE           15-60             KIDNEY FAILURE             <15    BUN/Creatinine Ratio 04/01/2020 8.43  7 - 18 Ratio Final    Glucose 04/01/2020 89   70 - 110 mg/dL Final    Calcium 04/01/2020 9.1  8.8 - 10.5 mg/dL Final    Total Bilirubin 04/01/2020 0.5  0.0 - 1.0 mg/dL Final    AST 04/01/2020 21  15 - 37 U/L Final    ALT 04/01/2020 24  12 - 78 U/L Final    Total Protein 04/01/2020 6.5  6.4 - 8.2 g/dL Final    Albumin 04/01/2020 3.1* 3.4 - 5.0 g/dL Final    Globulin 04/01/2020 3.4  2.3 - 3.5 g/dL Final    Albumin/Globulin Ratio 04/01/2020 0.911* 1.1 - 1.8 Ratio Final    Alkaline Phosphatase 04/01/2020 71  46 - 116 U/L Final    Magnesium 04/01/2020 1.9  1.8 - 2.4 mg/dL Final   Ancillary Orders on 03/20/2020   Component Date Value Ref Range Status    WBC 03/25/2020 14.5* 4.6 - 10.2 10*3/uL Final    RBC 03/25/2020 4.39  4.2 - 5.4 10*6/uL Final    Hemoglobin 03/25/2020 10.8* 12.0 - 16.0 g/dL Final    Hematocrit 03/25/2020 35.0* 37.0 - 47.0 % Final    MCV 03/25/2020 79.7* 80 - 97 fL Final    MCH 03/25/2020 24.6* 27.0 - 31.2 pg Final    MCHC 03/25/2020 30.9* 31.8 - 35.4 g/dL Final    RDW RBC Auto-Rto 03/25/2020 23.0* 12.5 - 18.0 % Final    Platelets 03/25/2020 433* 142 - 424 10*3/uL Final    Manual nRBC per 100 Cells 03/25/2020 0.0  % Final    Sodium 03/25/2020 143  135 - 145 mmol/L Final    Potassium 03/25/2020 4.4  3.6 - 5.2 mmol/L Final    Chloride 03/25/2020 104  100 - 108 mmol/L Final    CO2 03/25/2020 34* 21 - 32 mmol/L Final    BUN, Bld 03/25/2020 8  7 - 18 mg/dL Final    Creatinine 03/25/2020 0.90  0.55 - 1.02 mg/dL Final    GFR ESTIMATION 03/25/2020 > 60  >60 Final               DESCRIPTION       GLOMERULAR FILTRATION RATE             NORMAL                     >60             KIDNEY  DISEASE           15-60             KIDNEY FAILURE             <15    BUN/Creatinine Ratio 03/25/2020 8.88  7 - 18 Ratio Final    Glucose 03/25/2020 91  70 - 110 mg/dL Final    Calcium 03/25/2020 8.6* 8.8 - 10.5 mg/dL Final    Total Bilirubin 03/25/2020 0.3  0.0 - 1.0 mg/dL Final    AST 03/25/2020 16  15 - 37 U/L Final    ALT 03/25/2020 18  12 -  78 U/L Final    Total Protein 03/25/2020 6.1* 6.4 - 8.2 g/dL Final    Albumin 03/25/2020 2.5* 3.4 - 5.0 g/dL Final    Globulin 03/25/2020 3.6* 2.3 - 3.5 g/dL Final    Albumin/Globulin Ratio 03/25/2020 0.694* 1.1 - 1.8 Ratio Final    Alkaline Phosphatase 03/25/2020 80  46 - 116 U/L Final    Magnesium 03/25/2020 1.9  1.8 - 2.4 mg/dL Final    Neutrophils 03/25/2020 84.0* 37 - 80 % Final    Neutrophils Absolute 03/25/2020 12.3* 1.8 - 7.7 10*3/uL Final    BANDS 03/25/2020 1.0  0 - 5 % Final    Lymphocytes 03/25/2020 7.0* 25 - 40 % Final    Monocytes 03/25/2020 4.0  1 - 15 % Final    Eosinophils 03/25/2020 3.0  1 - 3 % Final    Basophils 03/25/2020 1.0  0 - 3 % Final    Cells Counted 03/25/2020 100   Final    Platelet Estimate 03/25/2020 Adequate   Final    RBC Morphology 03/25/2020 ABNORMAL   Final    Anisocytosis 03/25/2020 3+   Final    Hypochromia 03/25/2020 1+   Final   Orders Only on 03/18/2020   Component Date Value Ref Range Status    Neutrophils 03/18/2020 62.0  37 - 80 % Final    Neutrophils Absolute 03/18/2020 5.7  1.8 - 7.7 10*3/uL Final    BANDS 03/18/2020 4.0  0 - 5 % Final    Lymphocytes 03/18/2020 8.0* 25 - 40 % Final    Monocytes 03/18/2020 20.0* 1 - 15 % Final    Eosinophils 03/18/2020 1.0  1 - 3 % Final    Metamyelocytes 03/18/2020 5.0* 0 - 0 % Final    Cells Counted 03/18/2020 100   Final    Platelet Estimate 03/18/2020 Adequate   Final    Platelet Clumps 03/18/2020 1+   Final    Anisocytosis 03/18/2020 1+   Final    Microcytes 03/18/2020 1+   Final   There may be more visits with results that are not included.       Imaging:     Assessment:     Principle Problem: No primary diagnosis found.   Co-morbidity/Active Problems:   Patient Active Problem List   Diagnosis    Breast cancer metastasized to bone    Neuropathy    Fatigue associated with anemia    Drug-related hair loss    Nausea    Primary insomnia    Foot infection    Cancer, metastatic to bone    Malignant  neoplasm of overlapping 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, estrogen receptor positive     Chronic fatigue    Diverticulosis    Dehydration    Hypoxemia    Iron deficiency anemia secondary to inadequate dietary iron intake    Hypoalbuminemia        Benita Carballo is a 74 y.o. female with PMH of There were no encounter diagnoses., with No primary diagnosis found.       Metastatic breast cancer ERPR positive HER2 Noah questionable  Status post of chemotherapy and HER2 targeted therapy with progression on HER2 targeted therapy  Recent testing fish negative for her 2 2018  Repeat the lipid biopsy 2019  shows HER2 positive and ER negative  Bone metastatic disease  Fatigue  Hypoxia  Dehydration    Plan:   Overall Plan:    She has HER2 positive disease with ERPR negative by the new liquid biopsy. Started Taxol/Herceptin 2/2020  Discussed ECHO with pt, showing right heart failure   Seen by Dr Gardner, cardiologist and recommended that Rt heart failure unlikely related to chemo, likely related to COPD and ILD.   Herceptin/taxol resumed last week with poor tolerance by pt, weight loss, fatigue reported, will hold tx this week, plan to resume next week at low dose, discussed with Dr Contreras  Discussed with Dr Contreras and he recommended reducing dose of herceptin further  Lasix x 3 days with 10 lbs wt loss, p is instructed to use prn, pt has not had to use Lasix over last 3 weeks   Compression sleeve for dependant upper ext swelling  Pt is wanting to stop oxycontin and norco due to out of pocket cost. She is requesting to resume oxycodone 5 mg TID when she runs out of oxycontin/norco. She states works better and more affordable.  Labs reviewed today and she is cleared for tx.      Rosalind Ricks, NPEstablished Patient - Audio Only Telehealth Visit     The patient location is: home  The chief complaint leading to consultation is: chemo clearnace  Visit type: Virtual visit with audio only (telephone)     The reason for the audio  only service rather than synchronous audio and video virtual visit was related to technical difficulties or patient preference/necessity.     Each patient to whom I provide medical services by telemedicine is:  (1) informed of the relationship between the physician and patient and the respective role of any other health care provider with respect to management of the patient; and (2) notified that they may decline to receive medical services by telemedicine and may withdraw from such care at any time. Patient verbally consented to receive this service via voice-only telephone call.       HPI: see above     Assessment and plan:  See above        Medical Oncology Progress Note  [facility]     PATIENT: Benita Carballo  : 1945 74 y.o. female  MRN [id number]     PCP: Solomon Contreras MD  Consult Requested By:      Date of Service: 2020    Subjective:   Interim History:  Malignant neoplasm of overlapping sites of left breast in fe    Benita Carballo is here for to followup breast cancer treatment.    The patient was started on  chemotherapy last week with Herceptin and Taxol weekly.  She has chronic hypoxia and her oxygen saturation has been declining today her O2 status 77% she looks purple and she also has increasing degree of short of breath; in addition she complains of swelling to her left arm.  She had no chest pain. She was admittted last week to the hospital and underwent thoracentesis, which was not tested for cytology and imaging for possible PE was ruled out. Today she states she is feeling better but POX was 82 % on RA, and still has yosi upper ext swelling which she states has improved. She has not had a echo so will order ECHO stat to r/o any CHF which could be causing her swelling both lung and upper ext. States she had upper u/s with her pulm which was neg for DVT.     3/26/20   She returns with ECHO showing EF 55-60%, but right sided heart failure noted. Pt has an  additional 12 # of wt gain since last week. She states she is feeling fine, better than at last visit. Discussed ECHO and will hold herceptin and obtain MUGA, will proceed with weekly taxol and possibly will add herceptin back after MUGA completed and will refer pt to cardiology, but could be delayed due to COVID pandemic.     4/2020  Today she presents with significant fluid loss noted. No longer upper ext third space swelling noted, alb and T Pro dramatically improved, pt has last 10 + lbs of fluid. She is breathing easy today and denies any SOB or ORELLANA. We will proceed with weekly taxol and continue to hold herceptin until MUGA or cardiology clearance is obtained. She was seen yesterday with Dr Gardner. Below is a portion of his note:       Oncology History:  Oncology History    2002 Rt breast cancer s/p lump with chemo, X[place] in [place]. Tamoxifen x 1 yr, then Arimidex x 5 yrs. Completed tx 2008.     2015  c/o chronic cough with CT chest 1/28/16 showing extensive adenopathy  With base of lt neck mass 4.5 cm with yosi pulm nodules. RF to Dr ANDREWS Mcnally    2/6/16 bx of lt neck mass carcinoma with feature suggestive of breast primary. ER/%, Her2 borderline by IHC and deemed equivocal by FISH          Breast cancer metastasized to bone    2/4/2016 Initial Diagnosis     Breast cancer metastasized to bone, , lymphoid mass       5/25/2016 - 2/9/2017 Chemotherapy     Taxol/Herceptin x 8 cycles then perjeta added 7/16  Prescribed by Dr Mcnally       2/20/2017 - 6/17/2017 Chemotherapy     Kadcycla prescribed by Dr Mcnally       6/28/2017 Biopsy     Biopsy of rt abd met disease consistent with breast cancer. ER/WV + Her 2 neg per Dr Moreno's note.    Dr Moreno noted path review from bx 2/16 showed HER 2 + equivocal with FISH reading as Equivocal but ratio 1.2. Additional report form 2016 states additional testing was done with different probe and the equivocal results can be noted as negative.     Treatment therapy changed from Her2 based  tx to endocrine base therapy by Dr Moreno       6/28/2017 -  Hormone Therapy     Arimidex 6/17-2/18  Aromasin 2/18  Ibrance + Aromasin 5/18 -1/19  Ibrance + faslodex 2/19 4/30/2019 Imaging Significant Findings     PET/CT: Lesions involving the medial clavicle , subcarinal LN, pleural based soft tissue density adjacent to T11 and lesion in left lobe of liver diminished in size and SUV.   RECIST 34% decrease in disease      2/17/2020 -  Chemotherapy     Treatment Summary   Plan Name: OP BREAST TRASTUZUMAB PACLITAXEL QW  Treatment Goal: Control  Status: Active  Start Date: 2/26/2020  End Date: 6/4/2020 (Planned)  Provider: Solomon Contreras MD  Chemotherapy: PACLitaxel (TAXOL) 64 mg/m2 = 102 mg in sodium chloride 0.9% 250 mL chemo infusion, 64 mg/m2 = 102 mg (100 % of original dose 64 mg/m2), Intravenous, Clinic/HOD 1 time, 8 of 12 cycles  Dose modification: 64 mg/m2 (original dose 64 mg/m2, Cycle 1)  trastuzumab 211 mg in sodium chloride 0.9% 250 mL chemo infusion, 4 mg/kg = 211 mg, Intravenous, Clinic/HOD 1 time, 4 of 8 cycles  Dose modification: 2 mg/kg (original dose 2 mg/kg, Cycle 7, Reason: MD Discretion), 80 mg (original dose 2 mg/kg, Cycle 8)       ==8/2019 Now She is currently on combined afinitor + aromasin,  repeated CT scan shows improvement  PET 1/2020 showed PD,  afinitor + aromasin stopped  == PET scan 01/20/2020 the progressive  ==Started Taxol/Herceptin weekly 2/2020    Past Medical History:   Past Medical History:   Diagnosis Date    Arthritis     Bone cancer     Breast cancer     Cancer     Cataract     Depression     Diverticulosis 1/15/2020    Fatigue associated with anemia 6/6/2019    GERD (gastroesophageal reflux disease)     High cholesterol     Hypoxemia 3/12/2020    Skin problem     Sleep disorder        Past Surgical HIstory:   Past Surgical History:   Procedure Laterality Date    APPENDECTOMY      BREAST LUMPECTOMY Right 2002    CATARACT EXTRACTION, BILATERAL  2014     COLONOSCOPY  2008    HYSTERECTOMY      PORTACATH PLACEMENT  05/18/2016    SURGICAL REMOVAL OF NODULE OF VOCAL CORD         Allergies:  Review of patient's allergies indicates:   Allergen Reactions    Benadryl [diphenhydramine hcl]      Restless leg     Medrol [methylprednisolone] Hallucinations    Penicillins        Medications:  Current Outpatient Medications   Medication Sig Dispense Refill    ANORO ELLIPTA 62.5-25 mcg/actuation DsDv       dexAMETHasone 0.5 mg/5 mL Soln SWISH WITH 10 ML PO FOR 2 MIN THEN SPIT QID      exemestane (AROMASIN) 25 mg tablet Take 1 tablet (25 mg total) by mouth once daily. 30 tablet 3    furosemide (LASIX) 40 MG tablet Take 1 tablet (40 mg total) by mouth once daily. 30 tablet 11    gabapentin (NEURONTIN) 400 MG capsule Take 1 capsule (400 mg total) by mouth 3 (three) times daily. 120 capsule 4    HYDROcodone-acetaminophen (NORCO)  mg per tablet Take 1 tablet by mouth every 6 (six) hours as needed for Pain. 30 tablet 0    iron-vitamin C 100-250 mg, ICAR-C, (ICAR-C) 100-250 mg Tab Take 1 tablet by mouth once daily. 30 tablet 5    LORazepam (ATIVAN) 1 MG tablet TAKE 1 TABLET BY MOUTH EVERY EVENING 30 tablet 0    megestrol (MEGACE) 400 mg/10 mL (40 mg/mL) Susp Take 10 mLs (400 mg total) by mouth once daily. 240 mL 2    oxyCODONE (OXYCONTIN) 10 mg 12 hr tablet Take 1 tablet (10 mg total) by mouth every 12 (twelve) hours as needed for Pain. 60 tablet 0    pantoprazole (PROTONIX) 40 MG tablet Take 1 tablet (40 mg total) by mouth once daily. 30 tablet 3    potassium chloride SA (K-DUR,KLOR-CON) 20 MEQ tablet Take 1 tablet (20 mEq total) by mouth once daily. 30 tablet 11    PROAIR HFA 90 mcg/actuation inhaler       promethazine (PHENERGAN) 25 MG tablet Take 1 tablet (25 mg total) by mouth every 6 (six) hours as needed for Nausea. 30 tablet 3    temazepam (RESTORIL) 15 mg Cap Take 1 capsule (15 mg total) by mouth nightly. 30 capsule 0     No current  "facility-administered medications for this visit.        Family History:   Family History   Problem Relation Age of Onset    Lung cancer Father     Melanoma Father        Social History:  reports that she has been smoking cigarettes. She has a 2.00 pack-year smoking history. She has never used smokeless tobacco. She reports that she drinks about 4.0 standard drinks of alcohol per week. She reports that she does not use drugs.    Review of Systemas  Constitutional: No change in weight, appetie, fatigue, fever, or night sweats  Eyes: No changes in vision  Ears, Nose, Mouth, Throat, and Face: No hearing problems, ear pain, rummy nose, or sore throat  Respiratory: No shortness of breath or cough  Cardiovascular: No chest pain, palpitations or dizziness  Gastrointestinal: No abdominal pain, hematochezia, melena  Genitourinary: No dysuria, hematuria, nocturia, menstrual problems, post menopausal  Integumentary/Breast: No rashes or itching  Hematologic/Lymphatic: No anemia or bleeding abnormalities  Musculoskeletal: No joint or muscle abnormalities  Neurological: No sensory or motor problems, no headaches  Behavioral/Psych: No depression, anxiety, cognitive problems, or stress  Endocrine: No diabetes or thyroid problems  Allergic/Immunologic: See allergy above    Physical Exam      Vitals:   Vitals:    05/14/20 1037   BP: 124/73   BP Location: Left arm   Patient Position: Sitting   BP Method: Medium (Automatic)   Pulse: 85   Resp: 18   SpO2: 96%   Weight: 51 kg (112 lb 6.4 oz)   Height: 5' 6" (1.676 m)     BMI: Body mass index is 18.14 kg/m².  BSA Body surface area is 1.54 meters squared.  ECOG Performance Status:   ECOG SCORE         GENERAL APPEARANCE: Well developed, well nourished, in no acute distress.  SKIN: Inspection of the skin reveals no rashes, ulcerations or petechiae.  HEENT: The sclerae were anicteric and conjunctivae were pink and moist. Extraocular movements were intact and pupils were equal, round, and " reactive to light with normal accommodation. External inspection of the ears and nose showed no scars, lesions, or masses. Lips, teeth, and gums showed normal mucosa. The oral mucosa, hard and soft palate, tongue and posterior pharynx were normal.  NECK: Supple and symmetric. There was no thyroid enlargement, and no tenderness, or masses were felt.  CRESPIRATORY: Normal AP diameter and normal contour without any kyphoscoliosis. LUNGS: Auscultation of the lungs revealed normal breath sounds without any other adventitious sounds or rubs.  CARDIOVASCULAR: There was a regular rate and rhythm without any murmurs, gallops, rubs. The carotid pulses were normal and 2+ bilaterally without bruits. Peripheral pulses were 2+ and symmetric.  GASTROINTESTINAL: Soft and nontender with normal bowel sounds. The liver span was approximately 5-6 cm in the right midclavicular line by percussion. The liver edge was nontender. The spleen was not palpable. There were no inguinal or umbilical hernias noted. No ascites was noted.  LYMPH NODES: No lymphadenopathy was appreciated in the neck, axillae or groin.  MUSCULOSKELETAL: Gait was normal. There was no tenderness or effusions noted. Muscle strength and tone were normal. EXTREMITIES: No cyanosis, clubbing or edema.  NEUROLOGIC: Alert and oriented x 3. Normal affect. Gait was normal. Normal deep tendon reflexes with no pathological reflexes. Sensation to touch was normal.  PSYCHIATRIC: good mood, orientated to place, time and person     Labs and Imagings     Ancillary Orders on 2020   Component Date Value Ref Range Status    CANCER ANTIGEN 27.29 2020 108.3* 0.0 - 40.0 U/mL Final    Comment: INTERPRETIVE INFORMATION: Cancer Antigen 27.29The CA 27.29 assay is intended for use in monitorin) diseaseprogression and/or response to therapy in patients withmetastatic disease, and 2) disease recurrence in patientstreated previously for stages II   or III breast carcinoma who  areclinically free of the disease. Serial testing in patients whoare clinically free of disease should be used in conjunctionwith other clinical methods for early detection of cancerrecurrence.Limitations: Patients with   confirmed breast carcinoma frequentlyhave CA 27.29 assay values in the same range as healthyindividuals.  Elevations may also be observed in patients withnon malignant disease. Results of this test must always beinterpreted in the context of morphologic   and other relevantdata, and should not be used alone for a diagnosis of malignancy.Methodology: Siemens Advia Centaur CA 27.29 chemiluminescentimmunoassay was used. Results obtained with different assaymethods or kits cannot be used   in                           terchangeably.Performed by [facility],[address] [place] [place] [address] [phone], Sharif Leon MD, Lab. Director      CEA 05/07/2020 7.5* 0.0 - 3.0 ng/mL Final    Comment: INTERPRETIVE INFORMATION: Carcinoembryonic AntigenThe Roche CEA electrochemiluminescent immunoassay was used.Results obtained with different test methods or kits cannot beused interchangeably. Measurement of CEA has been shown to beclinically relevant in   the management of patients withcolorectal, breast, lung, prostatic, pancreatic, and ovariancarcinomas. Smokers may have slightly elevated levels of CEA.The CEA assay value, regardless of level, should not beinterpreted as evidence for the presence or   absence of malignantdisease and is not recommended for use as a screening procedureto detect the presence of cancer in the general population.Performed by [facility],[address] [place] [address] [phone], Sharif Leon MD,   Lab. Director      Cancer Antigen 15-3 05/07/2020 79* 0 - 31 U/mL Final    Comment: INTERPRETIVE INFORMATION: Cancer Antigen-Breast ()The Roche CA 15-3 electrochemiluminescent immunoassay was used.Results obtained with different methods or  kits cannot be usedinterchangeably. The CA 15-3 test is used to aid in themanagement of   Stage II and III breast cancer patients. Serialtesting for patient CA 15-3 values should be used in conjunctionwith other clinical methods for monitoring breast cancer.Patients with confirmed breast carcinoma frequently have CA 15-3values in the same   range as healthy individuals. Elevations maybe observed in patients with nonmalignant disease. Therefore, Chelsie 15-3 value, regardless of level, should not be interpreted asabsolute evidence of the presence or absence of malignantdisease.Performed by [facility],[address] [place] [id number] [phone], Sharif Leon MD, Lab. Director      WBC 05/07/2020 9.2  4.6 - 10.2 10*3/uL Final    RBC 05/07/2020 4.29  4.2 - 5.4 10*6/uL Final    Hemoglobin 05/07/2020 11.2* 12.0 - 16.0 g/dL Final    Hematocrit 05/07/2020 36.5* 37.0 - 47.0 % Final    MCV 05/07/2020 85.1  80 - 97 fL Final    MCH 05/07/2020 26.1* 27.0 - 31.2 pg Final    MCHC 05/07/2020 30.7* 31.8 - 35.4 g/dL Final    RDW RBC Auto-Rto 05/07/2020 22.9* 12.5 - 18.0 % Final    Platelets 05/07/2020 379  142 - 424 10*3/uL Final    Manual nRBC per 100 Cells 05/07/2020 0.0  % Final    Sodium 05/07/2020 139  135 - 145 mmol/L Final    Potassium 05/07/2020 4.4  3.6 - 5.2 mmol/L Final    Chloride 05/07/2020 102  100 - 108 mmol/L Final    CO2 05/07/2020 30  21 - 32 mmol/L Final    BUN, Bld 05/07/2020 8  7 - 18 mg/dL Final    Creatinine 05/07/2020 0.98  0.55 - 1.02 mg/dL Final    GFR ESTIMATION 05/07/2020 55* >60 Final               DESCRIPTION       GLOMERULAR FILTRATION RATE             NORMAL                     >60             KIDNEY  DISEASE           15-60             KIDNEY FAILURE             <15    BUN/Creatinine Ratio 05/07/2020 8.16  7 - 18 Ratio Final    Glucose 05/07/2020 99  70 - 110 mg/dL Final    Calcium 05/07/2020 8.4* 8.8 - 10.5 mg/dL Final    Total Bilirubin 05/07/2020 0.4  0.0 -  1.0 mg/dL Final    AST 05/07/2020 17  15 - 37 U/L Final    ALT 05/07/2020 19  12 - 78 U/L Final    Total Protein 05/07/2020 6.5  6.4 - 8.2 g/dL Final    Albumin 05/07/2020 3.1* 3.4 - 5.0 g/dL Final    Globulin 05/07/2020 3.4  2.3 - 3.5 g/dL Final    Albumin/Globulin Ratio 05/07/2020 0.911* 1.1 - 1.8 Ratio Final    Alkaline Phosphatase 05/07/2020 58  46 - 116 U/L Final    Magnesium 05/07/2020 2.1  1.8 - 2.4 mg/dL Final    Neutrophils 05/07/2020 66.0  37 - 80 % Final    Neutrophils Absolute 05/07/2020 6.1  1.8 - 7.7 10*3/uL Final    Lymphocytes 05/07/2020 24.0* 25 - 40 % Final    Monocytes 05/07/2020 6.0  1 - 15 % Final    Eosinophils 05/07/2020 4.0* 1 - 3 % Final    Cells Counted 05/07/2020 100   Final    Platelet Estimate 05/07/2020 Normal   Final    Anisocytosis 05/07/2020 2+   Final    Hypochromia 05/07/2020 1+   Final   Ancillary Orders on 04/17/2020   Component Date Value Ref Range Status    WBC 04/23/2020 6.5  4.6 - 10.2 10*3/uL Final    RBC 04/23/2020 4.24  4.2 - 5.4 10*6/uL Final    Hemoglobin 04/23/2020 11.2* 12.0 - 16.0 g/dL Final    Hematocrit 04/23/2020 36.0* 37.0 - 47.0 % Final    MCV 04/23/2020 84.9  80 - 97 fL Final    MCH 04/23/2020 26.4* 27.0 - 31.2 pg Final    MCHC 04/23/2020 31.1* 31.8 - 35.4 g/dL Final    RDW RBC Auto-Rto 04/23/2020 24.6* 12.5 - 18.0 % Final    Platelets 04/23/2020 320  142 - 424 10*3/uL Final    Neutrophils/100 leukocytes 04/23/2020 67.2  37 - 80 % Final    Lymphocytes/100 leukocytes 04/23/2020 14.8* 25 - 40 % Final    Monocytes/100 leukocytes 04/23/2020 11.2  1 - 15 % Final    Eosinophils/100 leukocytes 04/23/2020 2.8  1 - 3 % Final    Basophils/100 leukocytes 04/23/2020 1.4  0 - 3 % Final    Manual nRBC per 100 Cells 04/23/2020 0.0  % Final    Neutrophils 04/23/2020 4.36  1.8 - 7.7 10*3/uL Final    Sodium 04/23/2020 140  135 - 145 mmol/L Final    Potassium 04/23/2020 4.4  3.6 - 5.2 mmol/L Final    Chloride 04/23/2020 103  100 - 108 mmol/L  Final    CO2 04/23/2020 30  21 - 32 mmol/L Final    BUN, Bld 04/23/2020 9  7 - 18 mg/dL Final    Creatinine 04/23/2020 0.84  0.55 - 1.02 mg/dL Final    GFR ESTIMATION 04/23/2020 > 60  >60 Final               DESCRIPTION       GLOMERULAR FILTRATION RATE             NORMAL                     >60             KIDNEY  DISEASE           15-60             KIDNEY FAILURE             <15    BUN/Creatinine Ratio 04/23/2020 10.71  7 - 18 Ratio Final    Glucose 04/23/2020 107  70 - 110 mg/dL Final    Calcium 04/23/2020 8.7* 8.8 - 10.5 mg/dL Final    Total Bilirubin 04/23/2020 0.4  0.0 - 1.0 mg/dL Final    AST 04/23/2020 19  15 - 37 U/L Final    ALT 04/23/2020 18  12 - 78 U/L Final    Total Protein 04/23/2020 6.2* 6.4 - 8.2 g/dL Final    Albumin 04/23/2020 3.1* 3.4 - 5.0 g/dL Final    Globulin 04/23/2020 3.1  2.3 - 3.5 g/dL Final    Albumin/Globulin Ratio 04/23/2020 1.000* 1.1 - 1.8 Ratio Final    Alkaline Phosphatase 04/23/2020 60  46 - 116 U/L Final    Magnesium 04/23/2020 1.9  1.8 - 2.4 mg/dL Final    Anisocytosis 04/23/2020 2+   Final    Spherocytes 04/23/2020 1+   Final   Orders Only on 04/16/2020   Component Date Value Ref Range Status    Anisocytosis 04/16/2020 2+   Final    Hypochromia 04/16/2020 1+   Final   Ancillary Orders on 04/10/2020   Component Date Value Ref Range Status    WBC 04/16/2020 5.8  4.6 - 10.2 10*3/uL Final    RBC 04/16/2020 4.56  4.2 - 5.4 10*6/uL Final    Hemoglobin 04/16/2020 11.8* 12.0 - 16.0 g/dL Final    Hematocrit 04/16/2020 38.5  37.0 - 47.0 % Final    MCV 04/16/2020 84.4  80 - 97 fL Final    MCH 04/16/2020 25.9* 27.0 - 31.2 pg Final    MCHC 04/16/2020 30.6* 31.8 - 35.4 g/dL Final    RDW RBC Auto-Rto 04/16/2020 24.5* 12.5 - 18.0 % Final    Platelets 04/16/2020 330  142 - 424 10*3/uL Final    Neutrophils/100 leukocytes 04/16/2020 66.8  37 - 80 % Final    Lymphocytes/100 leukocytes 04/16/2020 14.7* 25 - 40 % Final    Monocytes/100 leukocytes 04/16/2020 12.5  1 -  15 % Final    Eosinophils/100 leukocytes 2020 2.4  1 - 3 % Final    Basophils/100 leukocytes 2020 1.2  0 - 3 % Final    Manual nRBC per 100 Cells 2020 0.0  % Final    Neutrophils 2020 3.90  1.8 - 7.7 10*3/uL Final    Sodium 2020 139  135 - 145 mmol/L Final    Potassium 2020 4.7  3.6 - 5.2 mmol/L Final    Chloride 2020 103  100 - 108 mmol/L Final    CO2 2020 28  21 - 32 mmol/L Final    BUN, Bld 2020 9  7 - 18 mg/dL Final    Creatinine 2020 0.89  0.55 - 1.02 mg/dL Final    GFR ESTIMATION 2020 > 60  >60 Final               DESCRIPTION       GLOMERULAR FILTRATION RATE             NORMAL                     >60             KIDNEY  DISEASE           15-60             KIDNEY FAILURE             <15    BUN/Creatinine Ratio 2020 10.11  7 - 18 Ratio Final    Glucose 2020 91  70 - 110 mg/dL Final    Calcium 2020 8.9  8.8 - 10.5 mg/dL Final    Total Bilirubin 2020 0.4  0.0 - 1.0 mg/dL Final    AST 2020 17  15 - 37 U/L Final    ALT 2020 23  12 - 78 U/L Final    Total Protein 2020 6.5  6.4 - 8.2 g/dL Final    Albumin 2020 3.2* 3.4 - 5.0 g/dL Final    Globulin 2020 3.3  2.3 - 3.5 g/dL Final    Albumin/Globulin Ratio 2020 0.969* 1.1 - 1.8 Ratio Final    Alkaline Phosphatase 2020 71  46 - 116 U/L Final    Magnesium 2020 2.0  1.8 - 2.4 mg/dL Final   Orders Only on 2020   Component Date Value Ref Range Status    Anisocytosis 2020 1+   Final    Hypochromia 2020 1+   Final   Ancillary Orders on 2020   Component Date Value Ref Range Status    CANCER ANTIGEN 27.29 2020 138.0* 0.0 - 40.0 U/mL Final    Comment: INTERPRETIVE INFORMATION: Cancer Antigen 27.29The CA 27.29 assay is intended for use in monitorin) diseaseprogression and/or response to therapy in patients withmetastatic disease, and 2) disease recurrence in patientstreated previously  for stages II   or III breast carcinoma who areclinically free of the disease. Serial testing in patients whoare clinically free of disease should be used in conjunctionwith other clinical methods for early detection of cancerrecurrence.Limitations: Patients with   confirmed breast carcinoma frequentlyhave CA 27.29 assay values in the same range as healthyindividuals.  Elevations may also be observed in patients withnon malignant disease. Results of this test must always beinterpreted in the context of morphologic   and other relevantdata, and should not be used alone for a diagnosis of malignancy.Methodology: Siemens Advia Centaur CA 27.29 chemiluminescentimmunoassay was used. Results obtained with different assaymethods or kits cannot be used   in                           terchangeably.Performed by [facility],[address] [place] [place] [address] [phone].[employer], Sharif Leon MD, Lab. Director      CEA 04/09/2020 9.4* 0.0 - 3.0 ng/mL Final    Comment: INTERPRETIVE INFORMATION: Carcinoembryonic AntigenThe Roche CEA electrochemiluminescent immunoassay was used.Results obtained with different test methods or kits cannot beused interchangeably. Measurement of CEA has been shown to beclinically relevant in   the management of patients withcolorectal, breast, lung, prostatic, pancreatic, and ovariancarcinomas. Smokers may have slightly elevated levels of CEA.The CEA assay value, regardless of level, should not beinterpreted as evidence for the presence or   absence of malignantdisease and is not recommended for use as a screening procedureto detect the presence of cancer in the general population.Performed by [facility],[address] [facility] [place] [address] [phone].[employer], Sharif Leon MD,   Lab. Director      Cancer Antigen 15-3 04/09/2020 84* 0 - 31 U/mL Final    Comment: INTERPRETIVE INFORMATION: Cancer Antigen-Breast ()The Roche CA 15-3 electrochemiluminescent immunoassay was  used.Results obtained with different methods or kits cannot be usedinterchangeably. The CA 15-3 test is used to aid in themanagement of   Stage II and III breast cancer patients. Serialtesting for patient CA 15-3 values should be used in conjunctionwith other clinical methods for monitoring breast cancer.Patients with confirmed breast carcinoma frequently have CA 15-3values in the same   range as healthy individuals. Elevations maybe observed in patients with nonmalignant disease. Therefore, Chelsie 15-3 value, regardless of level, should not be interpreted asabsolute evidence of the presence or absence of malignantdisease.Performed by [employer],[address] [place] [id number] [phone].[employer], Sharif Leon MD, Lab. Director      WBC 04/09/2020 6.1  4.6 - 10.2 10*3/uL Final    RBC 04/09/2020 4.57  4.2 - 5.4 10*6/uL Final    Hemoglobin 04/09/2020 11.4* 12.0 - 16.0 g/dL Final    Hematocrit 04/09/2020 37.3  37.0 - 47.0 % Final    MCV 04/09/2020 81.6  80 - 97 fL Final    MCH 04/09/2020 24.9* 27.0 - 31.2 pg Final    MCHC 04/09/2020 30.6* 31.8 - 35.4 g/dL Final    RDW RBC Auto-Rto 04/09/2020 23.8* 12.5 - 18.0 % Final    Platelets 04/09/2020 345  142 - 424 10*3/uL Final    Neutrophils/100 leukocytes 04/09/2020 71.3  37 - 80 % Final    Lymphocytes/100 leukocytes 04/09/2020 11.8* 25 - 40 % Final    Monocytes/100 leukocytes 04/09/2020 8.3  1 - 15 % Final    Eosinophils/100 leukocytes 04/09/2020 6.0* 1 - 3 % Final    Basophils/100 leukocytes 04/09/2020 1.0  0 - 3 % Final    Manual nRBC per 100 Cells 04/09/2020 0.0  % Final    Neutrophils 04/09/2020 4.36  1.8 - 7.7 10*3/uL Final    Sodium 04/09/2020 137  135 - 145 mmol/L Final    Potassium 04/09/2020 4.1  3.6 - 5.2 mmol/L Final    Chloride 04/09/2020 100  100 - 108 mmol/L Final    CO2 04/09/2020 30  21 - 32 mmol/L Final    BUN, Bld 04/09/2020 8  7 - 18 mg/dL Final    Creatinine 04/09/2020 0.89  0.55 - 1.02 mg/dL Final    GFR ESTIMATION  04/09/2020 > 60  >60 Final               DESCRIPTION       GLOMERULAR FILTRATION RATE             NORMAL                     >60             KIDNEY  DISEASE           15-60             KIDNEY FAILURE             <15    BUN/Creatinine Ratio 04/09/2020 8.98  7 - 18 Ratio Final    Glucose 04/09/2020 104  70 - 110 mg/dL Final    Calcium 04/09/2020 9.9  8.8 - 10.5 mg/dL Final    Total Bilirubin 04/09/2020 0.5  0.0 - 1.0 mg/dL Final    AST 04/09/2020 15  15 - 37 U/L Final    ALT 04/09/2020 18  12 - 78 U/L Final    Total Protein 04/09/2020 7.3  6.4 - 8.2 g/dL Final    Albumin 04/09/2020 3.2* 3.4 - 5.0 g/dL Final    Globulin 04/09/2020 4.1* 2.3 - 3.5 g/dL Final    Albumin/Globulin Ratio 04/09/2020 0.780* 1.1 - 1.8 Ratio Final    Alkaline Phosphatase 04/09/2020 77  46 - 116 U/L Final    Magnesium 04/09/2020 1.8  1.8 - 2.4 mg/dL Final   Orders Only on 04/01/2020   Component Date Value Ref Range Status    Neutrophils 04/01/2020 80.0  37 - 80 % Final    Neutrophils Absolute 04/01/2020 7.2  1.8 - 7.7 10*3/uL Final    BANDS 04/01/2020 1.0  0 - 5 % Final    Lymphocytes 04/01/2020 6.0* 25 - 40 % Final    Monocytes 04/01/2020 4.0  1 - 15 % Final    Eosinophils 04/01/2020 8.0* 1 - 3 % Final    Metamyelocytes 04/01/2020 1.0* 0 - 0 % Final    Cells Counted 04/01/2020 100   Final    Platelet Estimate 04/01/2020 Adequate   Final    Anisocytosis 04/01/2020 1+   Final   Ancillary Orders on 03/27/2020   Component Date Value Ref Range Status    WBC 04/01/2020 8.9  4.6 - 10.2 10*3/uL Final    RBC 04/01/2020 4.52  4.2 - 5.4 10*6/uL Final    Hemoglobin 04/01/2020 11.3* 12.0 - 16.0 g/dL Final    Hematocrit 04/01/2020 36.3* 37.0 - 47.0 % Final    MCV 04/01/2020 80.3  80 - 97 fL Final    MCH 04/01/2020 25.0* 27.0 - 31.2 pg Final    MCHC 04/01/2020 31.1* 31.8 - 35.4 g/dL Final    RDW RBC Auto-Rto 04/01/2020 22.9* 12.5 - 18.0 % Final    Platelets 04/01/2020 331  142 - 424 10*3/uL Final    Manual nRBC per 100 Cells  04/01/2020 0.0  % Final    Sodium 04/01/2020 137  135 - 145 mmol/L Final    Potassium 04/01/2020 5.0  3.6 - 5.2 mmol/L Final    Chloride 04/01/2020 101  100 - 108 mmol/L Final    CO2 04/01/2020 31  21 - 32 mmol/L Final    BUN, Bld 04/01/2020 7  7 - 18 mg/dL Final    Creatinine 04/01/2020 0.83  0.55 - 1.02 mg/dL Final    GFR ESTIMATION 04/01/2020 > 60  >60 Final               DESCRIPTION       GLOMERULAR FILTRATION RATE             NORMAL                     >60             KIDNEY  DISEASE           15-60             KIDNEY FAILURE             <15    BUN/Creatinine Ratio 04/01/2020 8.43  7 - 18 Ratio Final    Glucose 04/01/2020 89  70 - 110 mg/dL Final    Calcium 04/01/2020 9.1  8.8 - 10.5 mg/dL Final    Total Bilirubin 04/01/2020 0.5  0.0 - 1.0 mg/dL Final    AST 04/01/2020 21  15 - 37 U/L Final    ALT 04/01/2020 24  12 - 78 U/L Final    Total Protein 04/01/2020 6.5  6.4 - 8.2 g/dL Final    Albumin 04/01/2020 3.1* 3.4 - 5.0 g/dL Final    Globulin 04/01/2020 3.4  2.3 - 3.5 g/dL Final    Albumin/Globulin Ratio 04/01/2020 0.911* 1.1 - 1.8 Ratio Final    Alkaline Phosphatase 04/01/2020 71  46 - 116 U/L Final    Magnesium 04/01/2020 1.9  1.8 - 2.4 mg/dL Final   Ancillary Orders on 03/20/2020   Component Date Value Ref Range Status    WBC 03/25/2020 14.5* 4.6 - 10.2 10*3/uL Final    RBC 03/25/2020 4.39  4.2 - 5.4 10*6/uL Final    Hemoglobin 03/25/2020 10.8* 12.0 - 16.0 g/dL Final    Hematocrit 03/25/2020 35.0* 37.0 - 47.0 % Final    MCV 03/25/2020 79.7* 80 - 97 fL Final    MCH 03/25/2020 24.6* 27.0 - 31.2 pg Final    MCHC 03/25/2020 30.9* 31.8 - 35.4 g/dL Final    RDW RBC Auto-Rto 03/25/2020 23.0* 12.5 - 18.0 % Final    Platelets 03/25/2020 433* 142 - 424 10*3/uL Final    Manual nRBC per 100 Cells 03/25/2020 0.0  % Final    Sodium 03/25/2020 143  135 - 145 mmol/L Final    Potassium 03/25/2020 4.4  3.6 - 5.2 mmol/L Final    Chloride 03/25/2020 104  100 - 108 mmol/L Final    CO2 03/25/2020  34* 21 - 32 mmol/L Final    BUN, Bld 03/25/2020 8  7 - 18 mg/dL Final    Creatinine 03/25/2020 0.90  0.55 - 1.02 mg/dL Final    GFR ESTIMATION 03/25/2020 > 60  >60 Final               DESCRIPTION       GLOMERULAR FILTRATION RATE             NORMAL                     >60             KIDNEY  DISEASE           15-60             KIDNEY FAILURE             <15    BUN/Creatinine Ratio 03/25/2020 8.88  7 - 18 Ratio Final    Glucose 03/25/2020 91  70 - 110 mg/dL Final    Calcium 03/25/2020 8.6* 8.8 - 10.5 mg/dL Final    Total Bilirubin 03/25/2020 0.3  0.0 - 1.0 mg/dL Final    AST 03/25/2020 16  15 - 37 U/L Final    ALT 03/25/2020 18  12 - 78 U/L Final    Total Protein 03/25/2020 6.1* 6.4 - 8.2 g/dL Final    Albumin 03/25/2020 2.5* 3.4 - 5.0 g/dL Final    Globulin 03/25/2020 3.6* 2.3 - 3.5 g/dL Final    Albumin/Globulin Ratio 03/25/2020 0.694* 1.1 - 1.8 Ratio Final    Alkaline Phosphatase 03/25/2020 80  46 - 116 U/L Final    Magnesium 03/25/2020 1.9  1.8 - 2.4 mg/dL Final    Neutrophils 03/25/2020 84.0* 37 - 80 % Final    Neutrophils Absolute 03/25/2020 12.3* 1.8 - 7.7 10*3/uL Final    BANDS 03/25/2020 1.0  0 - 5 % Final    Lymphocytes 03/25/2020 7.0* 25 - 40 % Final    Monocytes 03/25/2020 4.0  1 - 15 % Final    Eosinophils 03/25/2020 3.0  1 - 3 % Final    Basophils 03/25/2020 1.0  0 - 3 % Final    Cells Counted 03/25/2020 100   Final    Platelet Estimate 03/25/2020 Adequate   Final    RBC Morphology 03/25/2020 ABNORMAL   Final    Anisocytosis 03/25/2020 3+   Final    Hypochromia 03/25/2020 1+   Final   Orders Only on 03/18/2020   Component Date Value Ref Range Status    Neutrophils 03/18/2020 62.0  37 - 80 % Final    Neutrophils Absolute 03/18/2020 5.7  1.8 - 7.7 10*3/uL Final    BANDS 03/18/2020 4.0  0 - 5 % Final    Lymphocytes 03/18/2020 8.0* 25 - 40 % Final    Monocytes 03/18/2020 20.0* 1 - 15 % Final    Eosinophils 03/18/2020 1.0  1 - 3 % Final    Metamyelocytes 03/18/2020 5.0* 0 - 0  % Final    Cells Counted 03/18/2020 100   Final    Platelet Estimate 03/18/2020 Adequate   Final    Platelet Clumps 03/18/2020 1+   Final    Anisocytosis 03/18/2020 1+   Final    Microcytes 03/18/2020 1+   Final   There may be more visits with results that are not included.       Imaging:     Assessment:     Principle Problem: No primary diagnosis found.   Co-morbidity/Active Problems:   Patient Active Problem List   Diagnosis    Breast cancer metastasized to bone    Neuropathy    Fatigue associated with anemia    Drug-related hair loss    Nausea    Primary insomnia    Foot infection    Cancer, metastatic to bone    Malignant neoplasm of overlapping 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, estrogen receptor positive     Chronic fatigue    Diverticulosis    Dehydration    Hypoxemia    Iron deficiency anemia secondary to inadequate dietary iron intake    Hypoalbuminemia        Benita Carballo is a 74 y.o. female with PMH of There were no encounter diagnoses., with No primary diagnosis found.       Metastatic breast cancer ERPR positive HER2 Noah questionable  Status post of chemotherapy and HER2 targeted therapy with progression on HER2 targeted therapy  Recent testing fish negative for her 2 2018  Repeat the lipid biopsy 2019  shows HER2 positive and ER negative  Bone metastatic disease  Fatigue  Hypoxia  Dehydration    Plan:   Overall Plan:    She has HER2 positive disease with ERPR negative by the new liquid biopsy. Started Taxol/Herceptin 2/2020  Discussed ECHO with pt, showing right heart failure   Seen by Dr Gardner, cardiologist and recommended that Rt heart failure unlikely related to chemo, likely related to COPD and ILD.   Herceptin/taxol resumed last week with poor tolerance by pt, weight loss, fatigue reported, will hold tx this week, plan to resume next week at low dose, discussed with Dr Contreras  Discussed with Dr Contreras and he recommended reducing dose of herceptin further  Lasix x 3 days  with 10 lbs wt loss, p is instructed to use prn, pt has not had to use Lasix over last 3 weeks   Compression sleeve for dependant upper ext swelling  Pt is wanting to stop oxycontin and norco due to out of pocket cost. She is requesting to resume oxycodone 5 mg TID when she runs out of oxycontin/norco. She states works better and more affordable.  Labs reviewed today and she is cleared for tx.      Rosalind Ricks NPEstabldanii Patient - Audio Only Telehealth Visit     The patient location is: home  The chief complaint leading to consultation is: chemo clearnace  Visit type: Virtual visit with audio only (telephone)     The reason for the audio only service rather than synchronous audio and video virtual visit was related to technical difficulties or patient preference/necessity.     Each patient to whom I provide medical services by telemedicine is:  (1) informed of the relationship between the physician and patient and the respective role of any other health care provider with respect to management of the patient; and (2) notified that they may decline to receive medical services by telemedicine and may withdraw from such care at any time. Patient verbally consented to receive this service via voice-only telephone call.       HPI: see above     Assessment and plan:  See above        Medical Oncology Progress Note  [facility]     PATIENT: Benita Carballo  : 1945 74 y.o. female  MRN [id number]     PCP: Solomon Contreras MD  Consult Requested By:      Date of Service: 2020    Subjective:   Interim History:  Malignant neoplasm of overlapping sites of left breast in fe    Benita Carballo is here for to followup breast cancer treatment.    The patient was started on  chemotherapy last week with Herceptin and Taxol weekly.  She has chronic hypoxia and her oxygen saturation has been declining today her O2 status 77% she looks purple and she also has increasing degree of short of breath;  in addition she complains of swelling to her left arm.  She had no chest pain. She was admittted last week to the hospital and underwent thoracentesis, which was not tested for cytology and imaging for possible PE was ruled out. Today she states she is feeling better but POX was 82 % on RA, and still has yosi upper ext swelling which she states has improved. She has not had a echo so will order ECHO stat to r/o any CHF which could be causing her swelling both lung and upper ext. States she had upper u/s with her pulm which was neg for DVT.     3/26/20   She returns with ECHO showing EF 55-60%, but right sided heart failure noted. Pt has an additional 12 # of wt gain since last week. She states she is feeling fine, better than at last visit. Discussed ECHO and will hold herceptin and obtain MUGA, will proceed with weekly taxol and possibly will add herceptin back after MUGA completed and will refer pt to cardiology, but could be delayed due to COVID pandemic.     4/2020  Today she presents with significant fluid loss noted. No longer upper ext third space swelling noted, alb and T Pro dramatically improved, pt has last 10 + lbs of fluid. She is breathing easy today and denies any SOB or ORELLANA. We will proceed with weekly taxol and continue to hold herceptin until MUGA or cardiology clearance is obtained. She was seen yesterday with Dr Gardner. Below is a portion of his note:       Oncology History:  Oncology History    2002 Rt breast cancer s/p lump with chemo, XRT in [place]. Tamoxifen x 1 yr, then Arimidex x 5 yrs. Completed tx 2008.     2015  c/o chronic cough with CT chest 1/28/16 showing extensive adenopathy  With base of lt neck mass 4.5 cm with yosi pulm nodules. RF to Dr ANDREWS Mcnally    2/6/16 bx of lt neck mass carcinoma with feature suggestive of breast primary. ER/%, Her2 borderline by IHC and deemed equivocal by FISH          Breast cancer metastasized to bone    2/4/2016 Initial Diagnosis     Breast cancer  metastasized to bone, , lymphoid mass       5/25/2016 - 2/9/2017 Chemotherapy     Taxol/Herceptin x 8 cycles then perjeta added 7/16  Prescribed by Dr Mcnally       2/20/2017 - 6/17/2017 Chemotherapy     Kadcycla prescribed by Dr Mcnally       6/28/2017 Biopsy     Biopsy of rt abd met disease consistent with breast cancer. ER/VT + Her 2 neg per Dr Moreno's note.    Dr Moreno noted path review from bx 2/16 showed HER 2 + equivocal with FISH reading as Equivocal but ratio 1.2. Additional report form 2016 states additional testing was done with different probe and the equivocal results can be noted as negative.     Treatment therapy changed from Her2 based tx to endocrine base therapy by Dr Moreno       6/28/2017 -  Hormone Therapy     Arimidex 6/17-2/18  Aromasin 2/18  Ibrance + Aromasin 5/18 -1/19  Ibrance + faslodex 2/19 4/30/2019 Imaging Significant Findings     PET/CT: Lesions involving the medial clavicle , subcarinal LN, pleural based soft tissue density adjacent to T11 and lesion in left lobe of liver diminished in size and SUV.   RECIST 34% decrease in disease      2/17/2020 -  Chemotherapy     Treatment Summary   Plan Name: OP BREAST TRASTUZUMAB PACLITAXEL QW  Treatment Goal: Control  Status: Active  Start Date: 2/26/2020  End Date: 6/4/2020 (Planned)  Provider: Solomon Contreras MD  Chemotherapy: PACLitaxel (TAXOL) 64 mg/m2 = 102 mg in sodium chloride 0.9% 250 mL chemo infusion, 64 mg/m2 = 102 mg (100 % of original dose 64 mg/m2), Intravenous, Clinic/HOD 1 time, 8 of 12 cycles  Dose modification: 64 mg/m2 (original dose 64 mg/m2, Cycle 1)  trastuzumab 211 mg in sodium chloride 0.9% 250 mL chemo infusion, 4 mg/kg = 211 mg, Intravenous, Clinic/HOD 1 time, 4 of 8 cycles  Dose modification: 2 mg/kg (original dose 2 mg/kg, Cycle 7, Reason: MD Discretion), 80 mg (original dose 2 mg/kg, Cycle 8)       ==8/2019 Now She is currently on combined afinitor + aromasin,  repeated CT scan shows improvement  PET 1/2020 showed PD,   afinitor + aromasin stopped  == PET scan 01/20/2020 the progressive  ==Started Taxol/Herceptin weekly 2/2020    Past Medical History:   Past Medical History:   Diagnosis Date    Arthritis     Bone cancer     Breast cancer     Cancer     Cataract     Depression     Diverticulosis 1/15/2020    Fatigue associated with anemia 6/6/2019    GERD (gastroesophageal reflux disease)     High cholesterol     Hypoxemia 3/12/2020    Skin problem     Sleep disorder        Past Surgical HIstory:   Past Surgical History:   Procedure Laterality Date    APPENDECTOMY      BREAST LUMPECTOMY Right 2002    CATARACT EXTRACTION, BILATERAL  2014    COLONOSCOPY  2008    HYSTERECTOMY      PORTACATH PLACEMENT  05/18/2016    SURGICAL REMOVAL OF NODULE OF VOCAL CORD         Allergies:  Review of patient's allergies indicates:   Allergen Reactions    Benadryl [diphenhydramine hcl]      Restless leg     Medrol [methylprednisolone] Hallucinations    Penicillins        Medications:  Current Outpatient Medications   Medication Sig Dispense Refill    ANORO ELLIPTA 62.5-25 mcg/actuation DsDv       dexAMETHasone 0.5 mg/5 mL Soln SWISH WITH 10 ML PO FOR 2 MIN THEN SPIT QID      exemestane (AROMASIN) 25 mg tablet Take 1 tablet (25 mg total) by mouth once daily. 30 tablet 3    furosemide (LASIX) 40 MG tablet Take 1 tablet (40 mg total) by mouth once daily. 30 tablet 11    gabapentin (NEURONTIN) 400 MG capsule Take 1 capsule (400 mg total) by mouth 3 (three) times daily. 120 capsule 4    HYDROcodone-acetaminophen (NORCO)  mg per tablet Take 1 tablet by mouth every 6 (six) hours as needed for Pain. 30 tablet 0    iron-vitamin C 100-250 mg, ICAR-C, (ICAR-C) 100-250 mg Tab Take 1 tablet by mouth once daily. 30 tablet 5    LORazepam (ATIVAN) 1 MG tablet TAKE 1 TABLET BY MOUTH EVERY EVENING 30 tablet 0    megestrol (MEGACE) 400 mg/10 mL (40 mg/mL) Susp Take 10 mLs (400 mg total) by mouth once daily. 240 mL 2    pantoprazole  (PROTONIX) 40 MG tablet Take 1 tablet (40 mg total) by mouth once daily. 30 tablet 3    potassium chloride SA (K-DUR,KLOR-CON) 20 MEQ tablet Take 1 tablet (20 mEq total) by mouth once daily. 30 tablet 11    PROAIR HFA 90 mcg/actuation inhaler       promethazine (PHENERGAN) 25 MG tablet Take 1 tablet (25 mg total) by mouth every 6 (six) hours as needed for Nausea. 30 tablet 3    temazepam (RESTORIL) 15 mg Cap Take 1 capsule (15 mg total) by mouth nightly. 30 capsule 0     No current facility-administered medications for this visit.        Family History:   Family History   Problem Relation Age of Onset    Lung cancer Father     Melanoma Father        Social History:  reports that she has been smoking cigarettes. She has a 2.00 pack-year smoking history. She has never used smokeless tobacco. She reports that she drinks about 4.0 standard drinks of alcohol per week. She reports that she does not use drugs.    Review of Systemas  Constitutional: No change in weight, appetie, fatigue, fever, or night sweats  Eyes: No changes in vision  Ears, Nose, Mouth, Throat, and Face: No hearing problems, ear pain, rummy nose, or sore throat  Respiratory: No shortness of breath or cough  Cardiovascular: No chest pain, palpitations or dizziness  Gastrointestinal: No abdominal pain, hematochezia, melena  Genitourinary: No dysuria, hematuria, nocturia, menstrual problems, post menopausal  Integumentary/Breast: No rashes or itching  Hematologic/Lymphatic: No anemia or bleeding abnormalities  Musculoskeletal: No joint or muscle abnormalities  Neurological: No sensory or motor problems, no headaches  Behavioral/Psych: No depression, anxiety, cognitive problems, or stress  Endocrine: No diabetes or thyroid problems  Allergic/Immunologic: See allergy above    Physical Exam      Vitals:   Vitals:    05/14/20 1037   BP: 124/73   BP Location: Left arm   Patient Position: Sitting   BP Method: Medium (Automatic)   Pulse: 85   Resp: 18  "  SpO2: 96%   Weight: 51 kg (112 lb 6.4 oz)   Height: 5' 6" (1.676 m)     BMI: Body mass index is 18.14 kg/m².  BSA Body surface area is 1.54 meters squared.  ECOG Performance Status:   ECOG SCORE         GENERAL APPEARANCE: Well developed, well nourished, in no acute distress.  SKIN: Inspection of the skin reveals no rashes, ulcerations or petechiae.  HEENT: The sclerae were anicteric and conjunctivae were pink and moist. Extraocular movements were intact and pupils were equal, round, and reactive to light with normal accommodation. External inspection of the ears and nose showed no scars, lesions, or masses. Lips, teeth, and gums showed normal mucosa. The oral mucosa, hard and soft palate, tongue and posterior pharynx were normal.  NECK: Supple and symmetric. There was no thyroid enlargement, and no tenderness, or masses were felt.  CRESPIRATORY: Normal AP diameter and normal contour without any kyphoscoliosis. LUNGS: Auscultation of the lungs revealed normal breath sounds without any other adventitious sounds or rubs.  CARDIOVASCULAR: There was a regular rate and rhythm without any murmurs, gallops, rubs. The carotid pulses were normal and 2+ bilaterally without bruits. Peripheral pulses were 2+ and symmetric.  GASTROINTESTINAL: Soft and nontender with normal bowel sounds. The liver span was approximately 5-6 cm in the right midclavicular line by percussion. The liver edge was nontender. The spleen was not palpable. There were no inguinal or umbilical hernias noted. No ascites was noted.  LYMPH NODES: No lymphadenopathy was appreciated in the neck, axillae or groin.  MUSCULOSKELETAL: Gait was normal. There was no tenderness or effusions noted. Muscle strength and tone were normal. EXTREMITIES: No cyanosis, clubbing or edema.  NEUROLOGIC: Alert and oriented x 3. Normal affect. Gait was normal. Normal deep tendon reflexes with no pathological reflexes. Sensation to touch was normal.  PSYCHIATRIC: good mood, " orientated to place, time and person     Labs and Imagings     Ancillary Orders on 2020   Component Date Value Ref Range Status    CANCER ANTIGEN 27.29 2020 108.3* 0.0 - 40.0 U/mL Final    Comment: INTERPRETIVE INFORMATION: Cancer Antigen 27.29The CA 27.29 assay is intended for use in monitorin) diseaseprogression and/or response to therapy in patients withmetastatic disease, and 2) disease recurrence in patientstreated previously for stages II   or III breast carcinoma who areclinically free of the disease. Serial testing in patients whoare clinically free of disease should be used in conjunctionwith other clinical methods for early detection of cancerrecurrence.Limitations: Patients with   confirmed breast carcinoma frequentlyhave CA 27.29 assay values in the same range as healthyindividuals.  Elevations may also be observed in patients withnon malignant disease. Results of this test must always beinterpreted in the context of morphologic   and other relevantdata, and should not be used alone for a diagnosis of malignancy.Methodology: Siemens Advia Centaur CA 27.29 chemiluminescentimmunoassay was used. Results obtained with different assaymethods or kits cannot be used   in                           terchangeably.Performed by [facility],[address] [place] [address] [phone].[facility], Sharif Leon MD, Lab. Director      CEA 2020 7.5* 0.0 - 3.0 ng/mL Final    Comment: INTERPRETIVE INFORMATION: Carcinoembryonic AntigenThe Roche CEA electrochemiluminescent immunoassay was used.Results obtained with different test methods or kits cannot beused interchangeably. Measurement of CEA has been shown to beclinically relevant in   the management of patients withcolorectal, breast, lung, prostatic, pancreatic, and ovariancarcinomas. Smokers may have slightly elevated levels of CEA.The CEA assay value, regardless of level, should not beinterpreted as evidence for the presence or    absence of malignantdisease and is not recommended for use as a screening procedureto detect the presence of cancer in the general population.Performed by [facility],[address] [facility] [place] [address] [phone].[place], Sharif Leon MD,   Lab. Director      Cancer Antigen 15-3 05/07/2020 79* 0 - 31 U/mL Final    Comment: INTERPRETIVE INFORMATION: Cancer Antigen-Breast ()The Roche CA 15-3 electrochemiluminescent immunoassay was used.Results obtained with different methods or kits cannot be usedinterchangeably. The CA 15-3 test is used to aid in themanagement of   Stage II and III breast cancer patients. Serialtesting for patient CA 15-3 values should be used in conjunctionwith other clinical methods for monitoring breast cancer.Patients with confirmed breast carcinoma frequently have CA 15-3values in the same   range as healthy individuals. Elevations maybe observed in patients with nonmalignant disease. Therefore, Chelsie 15-3 value, regardless of level, should not be interpreted asabsolute evidence of the presence or absence of malignantdisease.Performed by [facility],[address] [facility] [place] [address] [phone].[place], Sharif Leon MD, Lab. Director      WBC 05/07/2020 9.2  4.6 - 10.2 10*3/uL Final    RBC 05/07/2020 4.29  4.2 - 5.4 10*6/uL Final    Hemoglobin 05/07/2020 11.2* 12.0 - 16.0 g/dL Final    Hematocrit 05/07/2020 36.5* 37.0 - 47.0 % Final    MCV 05/07/2020 85.1  80 - 97 fL Final    MCH 05/07/2020 26.1* 27.0 - 31.2 pg Final    MCHC 05/07/2020 30.7* 31.8 - 35.4 g/dL Final    RDW RBC Auto-Rto 05/07/2020 22.9* 12.5 - 18.0 % Final    Platelets 05/07/2020 379  142 - 424 10*3/uL Final    Manual nRBC per 100 Cells 05/07/2020 0.0  % Final    Sodium 05/07/2020 139  135 - 145 mmol/L Final    Potassium 05/07/2020 4.4  3.6 - 5.2 mmol/L Final    Chloride 05/07/2020 102  100 - 108 mmol/L Final    CO2 05/07/2020 30  21 - 32 mmol/L Final    BUN, Bld 05/07/2020 8  7 - 18  mg/dL Final    Creatinine 05/07/2020 0.98  0.55 - 1.02 mg/dL Final    GFR ESTIMATION 05/07/2020 55* >60 Final               DESCRIPTION       GLOMERULAR FILTRATION RATE             NORMAL                     >60             KIDNEY  DISEASE           15-60             KIDNEY FAILURE             <15    BUN/Creatinine Ratio 05/07/2020 8.16  7 - 18 Ratio Final    Glucose 05/07/2020 99  70 - 110 mg/dL Final    Calcium 05/07/2020 8.4* 8.8 - 10.5 mg/dL Final    Total Bilirubin 05/07/2020 0.4  0.0 - 1.0 mg/dL Final    AST 05/07/2020 17  15 - 37 U/L Final    ALT 05/07/2020 19  12 - 78 U/L Final    Total Protein 05/07/2020 6.5  6.4 - 8.2 g/dL Final    Albumin 05/07/2020 3.1* 3.4 - 5.0 g/dL Final    Globulin 05/07/2020 3.4  2.3 - 3.5 g/dL Final    Albumin/Globulin Ratio 05/07/2020 0.911* 1.1 - 1.8 Ratio Final    Alkaline Phosphatase 05/07/2020 58  46 - 116 U/L Final    Magnesium 05/07/2020 2.1  1.8 - 2.4 mg/dL Final    Neutrophils 05/07/2020 66.0  37 - 80 % Final    Neutrophils Absolute 05/07/2020 6.1  1.8 - 7.7 10*3/uL Final    Lymphocytes 05/07/2020 24.0* 25 - 40 % Final    Monocytes 05/07/2020 6.0  1 - 15 % Final    Eosinophils 05/07/2020 4.0* 1 - 3 % Final    Cells Counted 05/07/2020 100   Final    Platelet Estimate 05/07/2020 Normal   Final    Anisocytosis 05/07/2020 2+   Final    Hypochromia 05/07/2020 1+   Final   Ancillary Orders on 04/17/2020   Component Date Value Ref Range Status    WBC 04/23/2020 6.5  4.6 - 10.2 10*3/uL Final    RBC 04/23/2020 4.24  4.2 - 5.4 10*6/uL Final    Hemoglobin 04/23/2020 11.2* 12.0 - 16.0 g/dL Final    Hematocrit 04/23/2020 36.0* 37.0 - 47.0 % Final    MCV 04/23/2020 84.9  80 - 97 fL Final    MCH 04/23/2020 26.4* 27.0 - 31.2 pg Final    MCHC 04/23/2020 31.1* 31.8 - 35.4 g/dL Final    RDW RBC Auto-Rto 04/23/2020 24.6* 12.5 - 18.0 % Final    Platelets 04/23/2020 320  142 - 424 10*3/uL Final    Neutrophils/100 leukocytes 04/23/2020 67.2  37 - 80 % Final     Lymphocytes/100 leukocytes 04/23/2020 14.8* 25 - 40 % Final    Monocytes/100 leukocytes 04/23/2020 11.2  1 - 15 % Final    Eosinophils/100 leukocytes 04/23/2020 2.8  1 - 3 % Final    Basophils/100 leukocytes 04/23/2020 1.4  0 - 3 % Final    Manual nRBC per 100 Cells 04/23/2020 0.0  % Final    Neutrophils 04/23/2020 4.36  1.8 - 7.7 10*3/uL Final    Sodium 04/23/2020 140  135 - 145 mmol/L Final    Potassium 04/23/2020 4.4  3.6 - 5.2 mmol/L Final    Chloride 04/23/2020 103  100 - 108 mmol/L Final    CO2 04/23/2020 30  21 - 32 mmol/L Final    BUN, Bld 04/23/2020 9  7 - 18 mg/dL Final    Creatinine 04/23/2020 0.84  0.55 - 1.02 mg/dL Final    GFR ESTIMATION 04/23/2020 > 60  >60 Final               DESCRIPTION       GLOMERULAR FILTRATION RATE             NORMAL                     >60             KIDNEY  DISEASE           15-60             KIDNEY FAILURE             <15    BUN/Creatinine Ratio 04/23/2020 10.71  7 - 18 Ratio Final    Glucose 04/23/2020 107  70 - 110 mg/dL Final    Calcium 04/23/2020 8.7* 8.8 - 10.5 mg/dL Final    Total Bilirubin 04/23/2020 0.4  0.0 - 1.0 mg/dL Final    AST 04/23/2020 19  15 - 37 U/L Final    ALT 04/23/2020 18  12 - 78 U/L Final    Total Protein 04/23/2020 6.2* 6.4 - 8.2 g/dL Final    Albumin 04/23/2020 3.1* 3.4 - 5.0 g/dL Final    Globulin 04/23/2020 3.1  2.3 - 3.5 g/dL Final    Albumin/Globulin Ratio 04/23/2020 1.000* 1.1 - 1.8 Ratio Final    Alkaline Phosphatase 04/23/2020 60  46 - 116 U/L Final    Magnesium 04/23/2020 1.9  1.8 - 2.4 mg/dL Final    Anisocytosis 04/23/2020 2+   Final    Spherocytes 04/23/2020 1+   Final   Orders Only on 04/16/2020   Component Date Value Ref Range Status    Anisocytosis 04/16/2020 2+   Final    Hypochromia 04/16/2020 1+   Final   Ancillary Orders on 04/10/2020   Component Date Value Ref Range Status    WBC 04/16/2020 5.8  4.6 - 10.2 10*3/uL Final    RBC 04/16/2020 4.56  4.2 - 5.4 10*6/uL Final    Hemoglobin 04/16/2020 11.8*  12.0 - 16.0 g/dL Final    Hematocrit 04/16/2020 38.5  37.0 - 47.0 % Final    MCV 04/16/2020 84.4  80 - 97 fL Final    MCH 04/16/2020 25.9* 27.0 - 31.2 pg Final    MCHC 04/16/2020 30.6* 31.8 - 35.4 g/dL Final    RDW RBC Auto-Rto 04/16/2020 24.5* 12.5 - 18.0 % Final    Platelets 04/16/2020 330  142 - 424 10*3/uL Final    Neutrophils/100 leukocytes 04/16/2020 66.8  37 - 80 % Final    Lymphocytes/100 leukocytes 04/16/2020 14.7* 25 - 40 % Final    Monocytes/100 leukocytes 04/16/2020 12.5  1 - 15 % Final    Eosinophils/100 leukocytes 04/16/2020 2.4  1 - 3 % Final    Basophils/100 leukocytes 04/16/2020 1.2  0 - 3 % Final    Manual nRBC per 100 Cells 04/16/2020 0.0  % Final    Neutrophils 04/16/2020 3.90  1.8 - 7.7 10*3/uL Final    Sodium 04/16/2020 139  135 - 145 mmol/L Final    Potassium 04/16/2020 4.7  3.6 - 5.2 mmol/L Final    Chloride 04/16/2020 103  100 - 108 mmol/L Final    CO2 04/16/2020 28  21 - 32 mmol/L Final    BUN, Bld 04/16/2020 9  7 - 18 mg/dL Final    Creatinine 04/16/2020 0.89  0.55 - 1.02 mg/dL Final    GFR ESTIMATION 04/16/2020 > 60  >60 Final               DESCRIPTION       GLOMERULAR FILTRATION RATE             NORMAL                     >60             KIDNEY  DISEASE           15-60             KIDNEY FAILURE             <15    BUN/Creatinine Ratio 04/16/2020 10.11  7 - 18 Ratio Final    Glucose 04/16/2020 91  70 - 110 mg/dL Final    Calcium 04/16/2020 8.9  8.8 - 10.5 mg/dL Final    Total Bilirubin 04/16/2020 0.4  0.0 - 1.0 mg/dL Final    AST 04/16/2020 17  15 - 37 U/L Final    ALT 04/16/2020 23  12 - 78 U/L Final    Total Protein 04/16/2020 6.5  6.4 - 8.2 g/dL Final    Albumin 04/16/2020 3.2* 3.4 - 5.0 g/dL Final    Globulin 04/16/2020 3.3  2.3 - 3.5 g/dL Final    Albumin/Globulin Ratio 04/16/2020 0.969* 1.1 - 1.8 Ratio Final    Alkaline Phosphatase 04/16/2020 71  46 - 116 U/L Final    Magnesium 04/16/2020 2.0  1.8 - 2.4 mg/dL Final   Orders Only on 04/09/2020    Component Date Value Ref Range Status    Anisocytosis 2020 1+   Final    Hypochromia 2020 1+   Final   Ancillary Orders on 2020   Component Date Value Ref Range Status    CANCER ANTIGEN 27.29 2020 138.0* 0.0 - 40.0 U/mL Final    Comment: INTERPRETIVE INFORMATION: Cancer Antigen 27.29The CA 27.29 assay is intended for use in monitorin) diseaseprogression and/or response to therapy in patients withmetastatic disease, and 2) disease recurrence in patientstreated previously for stages II   or III breast carcinoma who areclinically free of the disease. Serial testing in patients whoare clinically free of disease should be used in conjunctionwith other clinical methods for early detection of cancerrecurrence.Limitations: Patients with   confirmed breast carcinoma frequentlyhave CA 27.29 assay values in the same range as healthyindividuals.  Elevations may also be observed in patients withnon malignant disease. Results of this test must always beinterpreted in the context of morphologic   and other relevantdata, and should not be used alone for a diagnosis of malignancy.Methodology: Siemens Advia Centaur CA 27.29 chemiluminescentimmunoassay was used. Results obtained with different assaymethods or kits cannot be used   in                           terchangeably.Performed by [facility],[address] [place] [id number] [phone].[employer], Sharif Leon MD, Lab. Director      CEA 2020 9.4* 0.0 - 3.0 ng/mL Final    Comment: INTERPRETIVE INFORMATION: Carcinoembryonic AntigenThe Roche CEA electrochemiluminescent immunoassay was used.Results obtained with different test methods or kits cannot beused interchangeably. Measurement of CEA has been shown to beclinically relevant in   the management of patients withcolorectal, breast, lung, prostatic, pancreatic, and ovariancarcinomas. Smokers may have slightly elevated levels of CEA.The CEA assay value, regardless of level, should  not beinterpreted as evidence for the presence or   absence of malignantdisease and is not recommended for use as a screening procedureto detect the presence of cancer in the general population.Performed by [facility],[address] [facility] [place] [address] [phone].[facility], Sharif Leon MD,   Lab. Director      Cancer Antigen 15-3 04/09/2020 84* 0 - 31 U/mL Final    Comment: INTERPRETIVE INFORMATION: Cancer Antigen-Breast ()The Roche CA 15-3 electrochemiluminescent immunoassay was used.Results obtained with different methods or kits cannot be usedinterchangeably. The CA 15-3 test is used to aid in themanagement of   Stage II and III breast cancer patients. Serialtesting for patient CA 15-3 values should be used in conjunctionwith other clinical methods for monitoring breast cancer.Patients with confirmed breast carcinoma frequently have CA 15-3values in the same   range as healthy individuals. Elevations maybe observed in patients with nonmalignant disease. Therefore, Chelsie 15-3 value, regardless of level, should not be interpreted asabsolute evidence of the presence or absence of malignantdisease.Performed by [facility],[address] [facility] [place] [address] [phone].[facility] [employer], Sharif Leon MD, Lab. Director      WBC 04/09/2020 6.1  4.6 - 10.2 10*3/uL Final    RBC 04/09/2020 4.57  4.2 - 5.4 10*6/uL Final    Hemoglobin 04/09/2020 11.4* 12.0 - 16.0 g/dL Final    Hematocrit 04/09/2020 37.3  37.0 - 47.0 % Final    MCV 04/09/2020 81.6  80 - 97 fL Final    MCH 04/09/2020 24.9* 27.0 - 31.2 pg Final    MCHC 04/09/2020 30.6* 31.8 - 35.4 g/dL Final    RDW RBC Auto-Rto 04/09/2020 23.8* 12.5 - 18.0 % Final    Platelets 04/09/2020 345  142 - 424 10*3/uL Final    Neutrophils/100 leukocytes 04/09/2020 71.3  37 - 80 % Final    Lymphocytes/100 leukocytes 04/09/2020 11.8* 25 - 40 % Final    Monocytes/100 leukocytes 04/09/2020 8.3  1 - 15 % Final    Eosinophils/100 leukocytes 04/09/2020 6.0* 1  - 3 % Final    Basophils/100 leukocytes 04/09/2020 1.0  0 - 3 % Final    Manual nRBC per 100 Cells 04/09/2020 0.0  % Final    Neutrophils 04/09/2020 4.36  1.8 - 7.7 10*3/uL Final    Sodium 04/09/2020 137  135 - 145 mmol/L Final    Potassium 04/09/2020 4.1  3.6 - 5.2 mmol/L Final    Chloride 04/09/2020 100  100 - 108 mmol/L Final    CO2 04/09/2020 30  21 - 32 mmol/L Final    BUN, Bld 04/09/2020 8  7 - 18 mg/dL Final    Creatinine 04/09/2020 0.89  0.55 - 1.02 mg/dL Final    GFR ESTIMATION 04/09/2020 > 60  >60 Final               DESCRIPTION       GLOMERULAR FILTRATION RATE             NORMAL                     >60             KIDNEY  DISEASE           15-60             KIDNEY FAILURE             <15    BUN/Creatinine Ratio 04/09/2020 8.98  7 - 18 Ratio Final    Glucose 04/09/2020 104  70 - 110 mg/dL Final    Calcium 04/09/2020 9.9  8.8 - 10.5 mg/dL Final    Total Bilirubin 04/09/2020 0.5  0.0 - 1.0 mg/dL Final    AST 04/09/2020 15  15 - 37 U/L Final    ALT 04/09/2020 18  12 - 78 U/L Final    Total Protein 04/09/2020 7.3  6.4 - 8.2 g/dL Final    Albumin 04/09/2020 3.2* 3.4 - 5.0 g/dL Final    Globulin 04/09/2020 4.1* 2.3 - 3.5 g/dL Final    Albumin/Globulin Ratio 04/09/2020 0.780* 1.1 - 1.8 Ratio Final    Alkaline Phosphatase 04/09/2020 77  46 - 116 U/L Final    Magnesium 04/09/2020 1.8  1.8 - 2.4 mg/dL Final   Orders Only on 04/01/2020   Component Date Value Ref Range Status    Neutrophils 04/01/2020 80.0  37 - 80 % Final    Neutrophils Absolute 04/01/2020 7.2  1.8 - 7.7 10*3/uL Final    BANDS 04/01/2020 1.0  0 - 5 % Final    Lymphocytes 04/01/2020 6.0* 25 - 40 % Final    Monocytes 04/01/2020 4.0  1 - 15 % Final    Eosinophils 04/01/2020 8.0* 1 - 3 % Final    Metamyelocytes 04/01/2020 1.0* 0 - 0 % Final    Cells Counted 04/01/2020 100   Final    Platelet Estimate 04/01/2020 Adequate   Final    Anisocytosis 04/01/2020 1+   Final   Ancillary Orders on 03/27/2020   Component Date Value  Ref Range Status    WBC 04/01/2020 8.9  4.6 - 10.2 10*3/uL Final    RBC 04/01/2020 4.52  4.2 - 5.4 10*6/uL Final    Hemoglobin 04/01/2020 11.3* 12.0 - 16.0 g/dL Final    Hematocrit 04/01/2020 36.3* 37.0 - 47.0 % Final    MCV 04/01/2020 80.3  80 - 97 fL Final    MCH 04/01/2020 25.0* 27.0 - 31.2 pg Final    MCHC 04/01/2020 31.1* 31.8 - 35.4 g/dL Final    RDW RBC Auto-Rto 04/01/2020 22.9* 12.5 - 18.0 % Final    Platelets 04/01/2020 331  142 - 424 10*3/uL Final    Manual nRBC per 100 Cells 04/01/2020 0.0  % Final    Sodium 04/01/2020 137  135 - 145 mmol/L Final    Potassium 04/01/2020 5.0  3.6 - 5.2 mmol/L Final    Chloride 04/01/2020 101  100 - 108 mmol/L Final    CO2 04/01/2020 31  21 - 32 mmol/L Final    BUN, Bld 04/01/2020 7  7 - 18 mg/dL Final    Creatinine 04/01/2020 0.83  0.55 - 1.02 mg/dL Final    GFR ESTIMATION 04/01/2020 > 60  >60 Final               DESCRIPTION       GLOMERULAR FILTRATION RATE             NORMAL                     >60             KIDNEY  DISEASE           15-60             KIDNEY FAILURE             <15    BUN/Creatinine Ratio 04/01/2020 8.43  7 - 18 Ratio Final    Glucose 04/01/2020 89  70 - 110 mg/dL Final    Calcium 04/01/2020 9.1  8.8 - 10.5 mg/dL Final    Total Bilirubin 04/01/2020 0.5  0.0 - 1.0 mg/dL Final    AST 04/01/2020 21  15 - 37 U/L Final    ALT 04/01/2020 24  12 - 78 U/L Final    Total Protein 04/01/2020 6.5  6.4 - 8.2 g/dL Final    Albumin 04/01/2020 3.1* 3.4 - 5.0 g/dL Final    Globulin 04/01/2020 3.4  2.3 - 3.5 g/dL Final    Albumin/Globulin Ratio 04/01/2020 0.911* 1.1 - 1.8 Ratio Final    Alkaline Phosphatase 04/01/2020 71  46 - 116 U/L Final    Magnesium 04/01/2020 1.9  1.8 - 2.4 mg/dL Final   Ancillary Orders on 03/20/2020   Component Date Value Ref Range Status    WBC 03/25/2020 14.5* 4.6 - 10.2 10*3/uL Final    RBC 03/25/2020 4.39  4.2 - 5.4 10*6/uL Final    Hemoglobin 03/25/2020 10.8* 12.0 - 16.0 g/dL Final    Hematocrit 03/25/2020  35.0* 37.0 - 47.0 % Final    MCV 03/25/2020 79.7* 80 - 97 fL Final    MCH 03/25/2020 24.6* 27.0 - 31.2 pg Final    MCHC 03/25/2020 30.9* 31.8 - 35.4 g/dL Final    RDW RBC Auto-Rto 03/25/2020 23.0* 12.5 - 18.0 % Final    Platelets 03/25/2020 433* 142 - 424 10*3/uL Final    Manual nRBC per 100 Cells 03/25/2020 0.0  % Final    Sodium 03/25/2020 143  135 - 145 mmol/L Final    Potassium 03/25/2020 4.4  3.6 - 5.2 mmol/L Final    Chloride 03/25/2020 104  100 - 108 mmol/L Final    CO2 03/25/2020 34* 21 - 32 mmol/L Final    BUN, Bld 03/25/2020 8  7 - 18 mg/dL Final    Creatinine 03/25/2020 0.90  0.55 - 1.02 mg/dL Final    GFR ESTIMATION 03/25/2020 > 60  >60 Final               DESCRIPTION       GLOMERULAR FILTRATION RATE             NORMAL                     >60             KIDNEY  DISEASE           15-60             KIDNEY FAILURE             <15    BUN/Creatinine Ratio 03/25/2020 8.88  7 - 18 Ratio Final    Glucose 03/25/2020 91  70 - 110 mg/dL Final    Calcium 03/25/2020 8.6* 8.8 - 10.5 mg/dL Final    Total Bilirubin 03/25/2020 0.3  0.0 - 1.0 mg/dL Final    AST 03/25/2020 16  15 - 37 U/L Final    ALT 03/25/2020 18  12 - 78 U/L Final    Total Protein 03/25/2020 6.1* 6.4 - 8.2 g/dL Final    Albumin 03/25/2020 2.5* 3.4 - 5.0 g/dL Final    Globulin 03/25/2020 3.6* 2.3 - 3.5 g/dL Final    Albumin/Globulin Ratio 03/25/2020 0.694* 1.1 - 1.8 Ratio Final    Alkaline Phosphatase 03/25/2020 80  46 - 116 U/L Final    Magnesium 03/25/2020 1.9  1.8 - 2.4 mg/dL Final    Neutrophils 03/25/2020 84.0* 37 - 80 % Final    Neutrophils Absolute 03/25/2020 12.3* 1.8 - 7.7 10*3/uL Final    BANDS 03/25/2020 1.0  0 - 5 % Final    Lymphocytes 03/25/2020 7.0* 25 - 40 % Final    Monocytes 03/25/2020 4.0  1 - 15 % Final    Eosinophils 03/25/2020 3.0  1 - 3 % Final    Basophils 03/25/2020 1.0  0 - 3 % Final    Cells Counted 03/25/2020 100   Final    Platelet Estimate 03/25/2020 Adequate   Final    RBC Morphology  03/25/2020 ABNORMAL   Final    Anisocytosis 03/25/2020 3+   Final    Hypochromia 03/25/2020 1+   Final   Orders Only on 03/18/2020   Component Date Value Ref Range Status    Neutrophils 03/18/2020 62.0  37 - 80 % Final    Neutrophils Absolute 03/18/2020 5.7  1.8 - 7.7 10*3/uL Final    BANDS 03/18/2020 4.0  0 - 5 % Final    Lymphocytes 03/18/2020 8.0* 25 - 40 % Final    Monocytes 03/18/2020 20.0* 1 - 15 % Final    Eosinophils 03/18/2020 1.0  1 - 3 % Final    Metamyelocytes 03/18/2020 5.0* 0 - 0 % Final    Cells Counted 03/18/2020 100   Final    Platelet Estimate 03/18/2020 Adequate   Final    Platelet Clumps 03/18/2020 1+   Final    Anisocytosis 03/18/2020 1+   Final    Microcytes 03/18/2020 1+   Final   There may be more visits with results that are not included.       Imaging:     Assessment:     Principle Problem: No primary diagnosis found.   Co-morbidity/Active Problems:   Patient Active Problem List   Diagnosis    Breast cancer metastasized to bone    Neuropathy    Fatigue associated with anemia    Drug-related hair loss    Nausea    Primary insomnia    Foot infection    Cancer, metastatic to bone    Malignant neoplasm of overlapping 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, estrogen receptor positive     Chronic fatigue    Diverticulosis    Dehydration    Hypoxemia    Iron deficiency anemia secondary to inadequate dietary iron intake    Hypoalbuminemia        Benita Carballo is a 74 y.o. female with PMH of There were no encounter diagnoses., with No primary diagnosis found.       Metastatic breast cancer ERPR positive HER2 Noah questionable  Status post of chemotherapy and HER2 targeted therapy with progression on HER2 targeted therapy  Recent testing fish negative for her 2 2018  Repeat the lipid biopsy 2019  shows HER2 positive and ER negative  Bone metastatic disease  Fatigue  Hypoxia  Dehydration    Plan:   Overall Plan:    She has HER2 positive disease with ERPR negative by the new  liquid biopsy. Started Taxol/Herceptin 2/2020  Discussed ECHO with pt, showing right heart failure   Seen by Dr Gardner, cardiologist and recommended that Rt heart failure unlikely related to chemo, likely related to COPD and ILD.   Herceptin/taxol resumed last week with poor tolerance by pt, weight loss, fatigue reported, will hold tx this week, plan to resume next week at low dose, discussed with Dr Contreras  Discussed with Dr Contreras and he recommended reducing dose of herceptin further  Lasix x 3 days with 10 lbs wt loss, p is instructed to use prn, pt has not had to use Lasix over last 3 weeks   Compression sleeve for dependant upper ext swelling  Pt is wanting to stop oxycontin and norco due to out of pocket cost. She is requesting to resume oxycodone 5 mg TID when she runs out of oxycontin/norco. She states works better and more affordable.  Labs reviewed today and she is cleared for tx.  Herceptin held today and will proceed with every other cycle due poor tolerable   [facility] one molecular testing requested     Rosalind Ricks NP

## 2020-05-15 ENCOUNTER — TELEPHONE (OUTPATIENT)
Dept: HEMATOLOGY/ONCOLOGY | Facility: CLINIC | Age: 75
End: 2020-05-15

## 2020-05-15 NOTE — TELEPHONE ENCOUNTER
----- Message from Charo Khanna sent at 5/15/2020 10:04 AM CDT -----  Contact: Patient   Patient need to speak with the nurse regarding her appointment on 5/18/20. Patient is scheduled for labs & covid swap and have some concerns. Call back number [phone].

## 2020-05-21 ENCOUNTER — OFFICE VISIT (OUTPATIENT)
Dept: HEMATOLOGY/ONCOLOGY | Facility: CLINIC | Age: 75
End: 2020-05-21
Payer: MEDICARE

## 2020-05-21 VITALS
TEMPERATURE: 99 F | RESPIRATION RATE: 16 BRPM | OXYGEN SATURATION: 90 % | SYSTOLIC BLOOD PRESSURE: 95 MMHG | DIASTOLIC BLOOD PRESSURE: 59 MMHG | HEIGHT: 66 IN | BODY MASS INDEX: 18.45 KG/M2 | WEIGHT: 114.81 LBS | HEART RATE: 86 BPM

## 2020-05-21 DIAGNOSIS — C50.919 CARCINOMA OF BREAST METASTATIC TO BONE, UNSPECIFIED LATERALITY: ICD-10-CM

## 2020-05-21 DIAGNOSIS — C50.812 MALIGNANT NEOPLASM OF OVERLAPPING SITES OF LEFT BREAST IN FEMALE, ESTROGEN RECEPTOR POSITIVE: Primary | ICD-10-CM

## 2020-05-21 DIAGNOSIS — C79.51 CARCINOMA OF BREAST METASTATIC TO BONE, UNSPECIFIED LATERALITY: ICD-10-CM

## 2020-05-21 DIAGNOSIS — Z17.0 MALIGNANT NEOPLASM OF OVERLAPPING SITES OF LEFT BREAST IN FEMALE, ESTROGEN RECEPTOR POSITIVE: Primary | ICD-10-CM

## 2020-05-21 DIAGNOSIS — C79.51 CANCER, METASTATIC TO BONE: ICD-10-CM

## 2020-05-21 PROCEDURE — 99214 OFFICE O/P EST MOD 30 MIN: CPT | Mod: S$GLB,,, | Performed by: NURSE PRACTITIONER

## 2020-05-21 PROCEDURE — 99214 PR OFFICE/OUTPT VISIT, EST, LEVL IV, 30-39 MIN: ICD-10-PCS | Mod: S$GLB,,, | Performed by: NURSE PRACTITIONER

## 2020-05-21 RX ORDER — HEPARIN 100 UNIT/ML
500 SYRINGE INTRAVENOUS
Status: CANCELLED | OUTPATIENT
Start: 2020-05-21

## 2020-05-21 RX ORDER — DIPHENHYDRAMINE HYDROCHLORIDE 50 MG/ML
50 INJECTION INTRAMUSCULAR; INTRAVENOUS ONCE AS NEEDED
Status: CANCELLED | OUTPATIENT
Start: 2020-05-21

## 2020-05-21 RX ORDER — EPINEPHRINE 0.3 MG/.3ML
0.3 INJECTION SUBCUTANEOUS ONCE AS NEEDED
Status: CANCELLED | OUTPATIENT
Start: 2020-05-21

## 2020-05-21 RX ORDER — SODIUM CHLORIDE 0.9 % (FLUSH) 0.9 %
10 SYRINGE (ML) INJECTION
Status: CANCELLED | OUTPATIENT
Start: 2020-05-21

## 2020-05-21 RX ORDER — FAMOTIDINE 10 MG/ML
20 INJECTION INTRAVENOUS
Status: CANCELLED | OUTPATIENT
Start: 2020-05-21

## 2020-05-21 NOTE — PROGRESS NOTES
Medical Oncology Progress Note  [facility]     PATIENT: Benita Carballo  : 1945 74 y.o. 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    The patient was started on  chemotherapy last week with Herceptin and Taxol weekly.  She has chronic hypoxia and her oxygen saturation has been declining today her O2 status 77% she looks purple and she also has increasing degree of short of breath; in addition she complains of swelling to her left arm.  She had no chest pain. She was admittted last week to the hospital and underwent thoracentesis, which was not tested for cytology and imaging for possible PE was ruled out. Today she states she is feeling better but POX was 82 % on RA, and still has yosi upper ext swelling which she states has improved. She has not had a echo so will order ECHO stat to r/o any CHF which could be causing her swelling both lung and upper ext. States she had upper u/s with her pulm which was neg for DVT.     3/26/20   She returns with ECHO showing EF 55-60%, but right sided heart failure noted. Pt has an additional 12 # of wt gain since last week. She states she is feeling fine, better than at last visit. Discussed ECHO and will hold herceptin and obtain MUGA, will proceed with weekly taxol and possibly will add herceptin back after MUGA completed and will refer pt to cardiology, but could be delayed due to COVID pandemic.     2020  Today she presents with significant fluid loss noted. No longer upper ext third space swelling noted, alb and T Pro dramatically improved, pt has last 10 + lbs of fluid. She is breathing easy today and denies any SOB or ORELLANA. We will proceed with weekly taxol and continue to hold herceptin until MUGA or cardiology clearance  is obtained. She was seen yesterday with Dr Gardner. Below is a portion of his note:       Oncology History:  Oncology History    2002 Rt breast cancer s/p lump with [place] Tamoxifen x 1 yr, then Arimidex x 5 yrs. Completed tx 2008.     2015  c/o chronic cough with CT chest 1/28/16 showing extensive adenopathy  With base of lt neck mass 4.5 cm with yosi pulm nodules. RF to Dr ANDREWS Mcnally    2/6/16 bx of lt neck mass carcinoma with feature suggestive of breast primary. ER/%, Her2 borderline by IHC and deemed equivocal by FISH          Breast cancer metastasized to bone    2/4/2016 Initial Diagnosis     Breast cancer metastasized to bone, , lymphoid mass       5/25/2016 - 2/9/2017 Chemotherapy     Taxol/Herceptin x 8 cycles then perjeta added 7/16  Prescribed by Dr Mcnally       2/20/2017 - 6/17/2017 Chemotherapy     Kadcycla prescribed by Dr Mcnally       6/28/2017 Biopsy     Biopsy of rt abd met disease consistent with breast cancer. ER/TX + Her 2 neg per Dr Moreno's note.    Dr Moreno noted path review from bx 2/16 showed HER 2 + equivocal with FISH reading as Equivocal but ratio 1.2. Additional report form 2016 states additional testing was done with different probe and the equivocal results can be noted as negative.     Treatment therapy changed from Her2 based tx to endocrine base therapy by Dr Moreno       6/28/2017 -  Hormone Therapy     Arimidex 6/17-2/18  Aromasin 2/18  Ibrance + Aromasin 5/18 -1/19  Ibrance + faslodex 2/19 4/30/2019 Imaging Significant Findings     PET/CT: Lesions involving the medial clavicle , subcarinal LN, pleural based soft tissue density adjacent to T11 and lesion in left lobe of liver diminished in size and SUV.   RECIST 34% decrease in disease      2/17/2020 -  Chemotherapy     Treatment Summary   Plan Name: OP BREAST TRASTUZUMAB PACLITAXEL QW  Treatment Goal: Control  Status: Active  Start Date: 2/26/2020  End Date: 6/4/2020 (Planned)  Provider: Solomon Contreras  MD  Chemotherapy: PACLitaxel (TAXOL) 64 mg/m2 = 102 mg in sodium chloride 0.9% 250 mL chemo infusion, 64 mg/m2 = 102 mg (100 % of original dose 64 mg/m2), Intravenous, Clinic/HOD 1 time, 9 of 12 cycles  Dose modification: 64 mg/m2 (original dose 64 mg/m2, Cycle 1)  trastuzumab 211 mg in sodium chloride 0.9% 250 mL chemo infusion, 4 mg/kg = 211 mg, Intravenous, Clinic/HOD 1 time, 4 of 7 cycles  Dose modification: 2 mg/kg (original dose 2 mg/kg, Cycle 7, Reason: MD Discretion), 80 mg (original dose 2 mg/kg, Cycle 8)       ==8/2019 Now She is currently on combined afinitor + aromasin,  repeated CT scan shows improvement  PET 1/2020 showed PD,  afinitor + aromasin stopped  == PET scan 01/20/2020 the progressive  ==Started Taxol/Herceptin weekly 2/2020    Past Medical History:   Past Medical History:   Diagnosis Date    Arthritis     Bone cancer     Breast cancer     Cancer     Cataract     Depression     Diverticulosis 1/15/2020    Fatigue associated with anemia 6/6/2019    GERD (gastroesophageal reflux disease)     High cholesterol     Hypoxemia 3/12/2020    Skin problem     Sleep disorder        Past Surgical HIstory:   Past Surgical History:   Procedure Laterality Date    APPENDECTOMY      BREAST LUMPECTOMY Right 2002    CATARACT EXTRACTION, BILATERAL  2014    COLONOSCOPY  2008    HYSTERECTOMY      PORTACATH PLACEMENT  05/18/2016    SURGICAL REMOVAL OF NODULE OF VOCAL CORD         Allergies:  Review of patient's allergies indicates:   Allergen Reactions    Benadryl [diphenhydramine hcl]      Restless leg     Medrol [methylprednisolone] Hallucinations    Penicillins        Medications:  Current Outpatient Medications   Medication Sig Dispense Refill    ANORO ELLIPTA 62.5-25 mcg/actuation DsDv       dexAMETHasone 0.5 mg/5 mL Soln SWISH WITH 10 ML PO FOR 2 MIN THEN SPIT QID      exemestane (AROMASIN) 25 mg tablet Take 1 tablet (25 mg total) by mouth once daily. 30 tablet 3    furosemide  (LASIX) 40 MG tablet Take 1 tablet (40 mg total) by mouth once daily. 30 tablet 11    gabapentin (NEURONTIN) 400 MG capsule Take 1 capsule (400 mg total) by mouth 3 (three) times daily. 120 capsule 4    iron-vitamin C 100-250 mg, ICAR-C, (ICAR-C) 100-250 mg Tab Take 1 tablet by mouth once daily. 30 tablet 5    LORazepam (ATIVAN) 1 MG tablet TAKE 1 TABLET BY MOUTH EVERY EVENING 30 tablet 0    megestrol (MEGACE) 400 mg/10 mL (40 mg/mL) Susp Take 10 mLs (400 mg total) by mouth once daily. 240 mL 2    oxyCODONE (ROXICODONE) 10 mg Tab immediate release tablet Take 1 tablet (10 mg total) by mouth every 8 (eight) hours as needed for Pain. 45 tablet 0    pantoprazole (PROTONIX) 40 MG tablet Take 1 tablet (40 mg total) by mouth once daily. 30 tablet 3    potassium chloride SA (K-DUR,KLOR-CON) 20 MEQ tablet Take 1 tablet (20 mEq total) by mouth once daily. 30 tablet 11    PROAIR HFA 90 mcg/actuation inhaler       promethazine (PHENERGAN) 25 MG tablet Take 1 tablet (25 mg total) by mouth every 6 (six) hours as needed for Nausea. 30 tablet 3    temazepam (RESTORIL) 15 mg Cap Take 1 capsule (15 mg total) by mouth nightly. 30 capsule 0     No current facility-administered medications for this visit.        Family History:   Family History   Problem Relation Age of Onset    Lung cancer Father     Melanoma Father        Social History:  reports that she has been smoking cigarettes. She has a 2.00 pack-year smoking history. She has never used smokeless tobacco. She reports that she drinks about 4.0 standard drinks of alcohol per week. She reports that she does not use drugs.    Review of Systemas  Constitutional: No change in weight, appetie, fatigue, fever, or night sweats  Eyes: No changes in vision  Ears, Nose, Mouth, Throat, and Face: No hearing problems, ear pain, rummy nose, or sore throat  Respiratory: No shortness of breath or cough  Cardiovascular: No chest pain, palpitations or dizziness  Gastrointestinal: No  "abdominal pain, hematochezia, melena  Genitourinary: No dysuria, hematuria, nocturia, menstrual problems, post menopausal  Integumentary/Breast: No rashes or itching  Hematologic/Lymphatic: No anemia or bleeding abnormalities  Musculoskeletal: No joint or muscle abnormalities  Neurological: No sensory or motor problems, no headaches  Behavioral/Psych: No depression, anxiety, cognitive problems, or stress  Endocrine: No diabetes or thyroid problems  Allergic/Immunologic: See allergy above    Physical Exam      Vitals:   Vitals:    05/21/20 0948   BP: (!) 95/59   BP Location: Left arm   Patient Position: Sitting   BP Method: Small (Automatic)   Pulse: 86   Resp: 16   Temp: 98.5 °F (36.9 °C)   TempSrc: Temporal   SpO2: (!) 90%   Weight: 52.1 kg (114 lb 12.8 oz)   Height: 5' 6" (1.676 m)     BMI: Body mass index is 18.53 kg/m².  BSA Body surface area is 1.56 meters squared.  ECOG Performance Status:   ECOG SCORE         GENERAL APPEARANCE: Well developed, well nourished, in no acute distress.  SKIN: Inspection of the skin reveals no rashes, ulcerations or petechiae.  HEENT: The sclerae were anicteric and conjunctivae were pink and moist. Extraocular movements were intact and pupils were equal, round, and reactive to light with normal accommodation. External inspection of the ears and nose showed no scars, lesions, or masses. Lips, teeth, and gums showed normal mucosa. The oral mucosa, hard and soft palate, tongue and posterior pharynx were normal.  NECK: Supple and symmetric. There was no thyroid enlargement, and no tenderness, or masses were felt.  CRESPIRATORY: Normal AP diameter and normal contour without any kyphoscoliosis. LUNGS: Auscultation of the lungs revealed normal breath sounds without any other adventitious sounds or rubs.  CARDIOVASCULAR: There was a regular rate and rhythm without any murmurs, gallops, rubs. The carotid pulses were normal and 2+ bilaterally without bruits. Peripheral pulses were 2+ and " symmetric.  GASTROINTESTINAL: Soft and nontender with normal bowel sounds. The liver span was approximately 5-6 cm in the right midclavicular line by percussion. The liver edge was nontender. The spleen was not palpable. There were no inguinal or umbilical hernias noted. No ascites was noted.  LYMPH NODES: No lymphadenopathy was appreciated in the neck, axillae or groin.  MUSCULOSKELETAL: Gait was normal. There was no tenderness or effusions noted. Muscle strength and tone were normal. EXTREMITIES: No cyanosis, clubbing or edema.  NEUROLOGIC: Alert and oriented x 3. Normal affect. Gait was normal. Normal deep tendon reflexes with no pathological reflexes. Sensation to touch was normal.  PSYCHIATRIC: good mood, orientated to place, time and person     Labs and Imagings         Imaging:     Assessment:     Principle Problem: No primary diagnosis found.   Co-morbidity/Active Problems:   Patient Active Problem List   Diagnosis    Breast cancer metastasized to bone    Neuropathy    Fatigue associated with anemia    Drug-related hair loss    Nausea    Primary insomnia    Foot infection    Cancer, metastatic to bone    Malignant neoplasm of overlapping 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, estrogen receptor positive     Chronic fatigue    Diverticulosis    Dehydration    Hypoxemia    Iron deficiency anemia secondary to inadequate dietary iron intake    Hypoalbuminemia        Benita Carballo is a 74 y.o. female with PMH of There were no encounter diagnoses., with No primary diagnosis found.       Metastatic breast cancer ERPR positive HER2 Noah questionable  Status post of chemotherapy and HER2 targeted therapy with progression on HER2 targeted therapy  Recent testing fish negative for her 2 2018  Repeat the lipid biopsy 2019  shows HER2 positive and ER negative  Bone metastatic disease  Fatigue  Hypoxia  Dehydration    Plan:   Overall Plan:    She has HER2 positive disease with ERPR negative by the new liquid  biopsy. Started Taxol/Herceptin 2020  Discussed ECHO with pt, showing right heart failure   Seen by Dr Gardner, cardiologist and recommended that Rt heart failure unlikely related to chemo, likely related to COPD and ILD.   Herceptin/taxol resumed last week with poor tolerance by pt, weight loss, fatigue reported, will hold tx this week, plan to resume next week at low dose, discussed with Dr Contreras  Discussed with Dr Contreras and he recommended reducing dose of herceptin further  Lasix x 3 days with 10 lbs wt loss, p is instructed to use prn, pt has not had to use Lasix over last 3 weeks   Compression sleeve for dependant upper ext swelling  Pt is wanting to stop oxycontin and norco due to out of pocket cost. She is requesting to resume oxycodone 5 mg TID when she runs out of oxycontin/norco. She states works better and more affordable.  Labs reviewed today and she is cleared for tx.      Rosalind Ricks, NPEstablished Patient - Audio Only Telehealth Visit     The patient location is: home  The chief complaint leading to consultation is: chemo clearnace  Visit type: Virtual visit with audio only (telephone)     The reason for the audio only service rather than synchronous audio and video virtual visit was related to technical difficulties or patient preference/necessity.     Each patient to whom I provide medical services by telemedicine is:  (1) informed of the relationship between the physician and patient and the respective role of any other health care provider with respect to management of the patient; and (2) notified that they may decline to receive medical services by telemedicine and may withdraw from such care at any time. Patient verbally consented to receive this service via voice-only telephone call.       HPI: see above     Assessment and plan:  See above        Medical Oncology Progress Note  [facility]     PATIENT: Benita Carballo  : 1945 74 y.o. 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    The patient was started on  chemotherapy last week with Herceptin and Taxol weekly.  She has chronic hypoxia and her oxygen saturation has been declining today her O2 status 77% she looks purple and she also has increasing degree of short of breath; in addition she complains of swelling to her left arm.  She had no chest pain. She was admittted last week to the hospital and underwent thoracentesis, which was not tested for cytology and imaging for possible PE was ruled out. Today she states she is feeling better but POX was 82 % on RA, and still has yosi upper ext swelling which she states has improved. She has not had a echo so will order ECHO stat to r/o any CHF which could be causing her swelling both lung and upper ext. States she had upper u/s with her pulm which was neg for DVT.     3/26/20   She returns with ECHO showing EF 55-60%, but right sided heart failure noted. Pt has an additional 12 # of wt gain since last week. She states she is feeling fine, better than at last visit. Discussed ECHO and will hold herceptin and obtain MUGA, will proceed with weekly taxol and possibly will add herceptin back after MUGA completed and will refer pt to cardiology, but could be delayed due to COVID pandemic.     4/2020  Today she presents with significant fluid loss noted. No longer upper ext third space swelling noted, alb and T Pro dramatically improved, pt has last 10 + lbs of fluid. She is breathing easy today and denies any SOB or ORELLANA. We will proceed with weekly taxol and continue to hold herceptin until MUGA or cardiology clearance is obtained. She was seen yesterday with Dr Gardner. Below is a portion of his note:       Oncology History:  Oncology History    2002 Rt breast  cancer s/p lump with [place] Tamoxifen x 1 yr, then Arimidex x 5 yrs. Completed tx 2008.     2015  c/o chronic cough with CT chest 1/28/16 showing extensive adenopathy  With base of lt neck mass 4.5 cm with yosi pulm nodules. RF to Dr ANDREWS Mcnally    2/6/16 bx of lt neck mass carcinoma with feature suggestive of breast primary. ER/%, Her2 borderline by IHC and deemed equivocal by FISH          Breast cancer metastasized to bone    2/4/2016 Initial Diagnosis     Breast cancer metastasized to bone, , lymphoid mass       5/25/2016 - 2/9/2017 Chemotherapy     Taxol/Herceptin x 8 cycles then perjeta added 7/16  Prescribed by Dr Mcnally       2/20/2017 - 6/17/2017 Chemotherapy     Kadcycla prescribed by Dr Mcnally       6/28/2017 Biopsy     Biopsy of rt abd met disease consistent with breast cancer. ER/ID + Her 2 neg per Dr Moreno's note.    Dr Moreno noted path review from bx 2/16 showed HER 2 + equivocal with FISH reading as Equivocal but ratio 1.2. Additional report form 2016 states additional testing was done with different probe and the equivocal results can be noted as negative.     Treatment therapy changed from Her2 based tx to endocrine base therapy by Dr Moreno       6/28/2017 -  Hormone Therapy     Arimidex 6/17-2/18  Aromasin 2/18  Ibrance + Aromasin 5/18 -1/19  Ibrance + faslodex 2/19 4/30/2019 Imaging Significant Findings     PET/CT: Lesions involving the medial clavicle , subcarinal LN, pleural based soft tissue density adjacent to T11 and lesion in left lobe of liver diminished in size and SUV.   RECIST 34% decrease in disease      2/17/2020 -  Chemotherapy     Treatment Summary   Plan Name: OP BREAST TRASTUZUMAB PACLITAXEL QW  Treatment Goal: Control  Status: Active  Start Date: 2/26/2020  End Date: 6/4/2020 (Planned)  Provider: Solomon Contreras MD  Chemotherapy: PACLitaxel (TAXOL) 64 mg/m2 = 102 mg in sodium chloride 0.9% 250 mL chemo infusion, 64 mg/m2 = 102 mg (100 % of original dose 64 mg/m2),  Intravenous, Clinic/HOD 1 time, 9 of 12 cycles  Dose modification: 64 mg/m2 (original dose 64 mg/m2, Cycle 1)  trastuzumab 211 mg in sodium chloride 0.9% 250 mL chemo infusion, 4 mg/kg = 211 mg, Intravenous, Clinic/HOD 1 time, 4 of 7 cycles  Dose modification: 2 mg/kg (original dose 2 mg/kg, Cycle 7, Reason: MD Discretion), 80 mg (original dose 2 mg/kg, Cycle 8)       ==8/2019 Now She is currently on combined afinitor + aromasin,  repeated CT scan shows improvement  PET 1/2020 showed PD,  afinitor + aromasin stopped  == PET scan 01/20/2020 the progressive  ==Started Taxol/Herceptin weekly 2/2020    Past Medical History:   Past Medical History:   Diagnosis Date    Arthritis     Bone cancer     Breast cancer     Cancer     Cataract     Depression     Diverticulosis 1/15/2020    Fatigue associated with anemia 6/6/2019    GERD (gastroesophageal reflux disease)     High cholesterol     Hypoxemia 3/12/2020    Skin problem     Sleep disorder        Past Surgical HIstory:   Past Surgical History:   Procedure Laterality Date    APPENDECTOMY      BREAST LUMPECTOMY Right 2002    CATARACT EXTRACTION, BILATERAL  2014    COLONOSCOPY  2008    HYSTERECTOMY      PORTACATH PLACEMENT  05/18/2016    SURGICAL REMOVAL OF NODULE OF VOCAL CORD         Allergies:  Review of patient's allergies indicates:   Allergen Reactions    Benadryl [diphenhydramine hcl]      Restless leg     Medrol [methylprednisolone] Hallucinations    Penicillins        Medications:  Current Outpatient Medications   Medication Sig Dispense Refill    ANORO ELLIPTA 62.5-25 mcg/actuation DsDv       dexAMETHasone 0.5 mg/5 mL Soln SWISH WITH 10 ML PO FOR 2 MIN THEN SPIT QID      exemestane (AROMASIN) 25 mg tablet Take 1 tablet (25 mg total) by mouth once daily. 30 tablet 3    furosemide (LASIX) 40 MG tablet Take 1 tablet (40 mg total) by mouth once daily. 30 tablet 11    gabapentin (NEURONTIN) 400 MG capsule Take 1 capsule (400 mg total) by  mouth 3 (three) times daily. 120 capsule 4    iron-vitamin C 100-250 mg, ICAR-C, (ICAR-C) 100-250 mg Tab Take 1 tablet by mouth once daily. 30 tablet 5    LORazepam (ATIVAN) 1 MG tablet TAKE 1 TABLET BY MOUTH EVERY EVENING 30 tablet 0    megestrol (MEGACE) 400 mg/10 mL (40 mg/mL) Susp Take 10 mLs (400 mg total) by mouth once daily. 240 mL 2    oxyCODONE (ROXICODONE) 10 mg Tab immediate release tablet Take 1 tablet (10 mg total) by mouth every 8 (eight) hours as needed for Pain. 45 tablet 0    pantoprazole (PROTONIX) 40 MG tablet Take 1 tablet (40 mg total) by mouth once daily. 30 tablet 3    potassium chloride SA (K-DUR,KLOR-CON) 20 MEQ tablet Take 1 tablet (20 mEq total) by mouth once daily. 30 tablet 11    PROAIR HFA 90 mcg/actuation inhaler       promethazine (PHENERGAN) 25 MG tablet Take 1 tablet (25 mg total) by mouth every 6 (six) hours as needed for Nausea. 30 tablet 3    temazepam (RESTORIL) 15 mg Cap Take 1 capsule (15 mg total) by mouth nightly. 30 capsule 0     No current facility-administered medications for this visit.        Family History:   Family History   Problem Relation Age of Onset    Lung cancer Father     Melanoma Father        Social History:  reports that she has been smoking cigarettes. She has a 2.00 pack-year smoking history. She has never used smokeless tobacco. She reports that she drinks about 4.0 standard drinks of alcohol per week. She reports that she does not use drugs.    Review of Systemas  Constitutional: No change in weight, appetie, fatigue, fever, or night sweats  Eyes: No changes in vision  Ears, Nose, Mouth, Throat, and Face: No hearing problems, ear pain, rummy nose, or sore throat  Respiratory: No shortness of breath or cough  Cardiovascular: No chest pain, palpitations or dizziness  Gastrointestinal: No abdominal pain, hematochezia, melena  Genitourinary: No dysuria, hematuria, nocturia, menstrual problems, post menopausal  Integumentary/Breast: No rashes or  "itching  Hematologic/Lymphatic: No anemia or bleeding abnormalities  Musculoskeletal: No joint or muscle abnormalities  Neurological: No sensory or motor problems, no headaches  Behavioral/Psych: No depression, anxiety, cognitive problems, or stress  Endocrine: No diabetes or thyroid problems  Allergic/Immunologic: See allergy above    Physical Exam      Vitals:   Vitals:    05/21/20 0948   BP: (!) 95/59   BP Location: Left arm   Patient Position: Sitting   BP Method: Small (Automatic)   Pulse: 86   Resp: 16   Temp: 98.5 °F (36.9 °C)   TempSrc: Temporal   SpO2: (!) 90%   Weight: 52.1 kg (114 lb 12.8 oz)   Height: 5' 6" (1.676 m)     BMI: Body mass index is 18.53 kg/m².  BSA Body surface area is 1.56 meters squared.  ECOG Performance Status:   ECOG SCORE         GENERAL APPEARANCE: Well developed, well nourished, in no acute distress.  SKIN: Inspection of the skin reveals no rashes, ulcerations or petechiae.  HEENT: The sclerae were anicteric and conjunctivae were pink and moist. Extraocular movements were intact and pupils were equal, round, and reactive to light with normal accommodation. External inspection of the ears and nose showed no scars, lesions, or masses. Lips, teeth, and gums showed normal mucosa. The oral mucosa, hard and soft palate, tongue and posterior pharynx were normal.  NECK: Supple and symmetric. There was no thyroid enlargement, and no tenderness, or masses were felt.  CRESPIRATORY: Normal AP diameter and normal contour without any kyphoscoliosis. LUNGS: Auscultation of the lungs revealed normal breath sounds without any other adventitious sounds or rubs.  CARDIOVASCULAR: There was a regular rate and rhythm without any murmurs, gallops, rubs. The carotid pulses were normal and 2+ bilaterally without bruits. Peripheral pulses were 2+ and symmetric.  GASTROINTESTINAL: Soft and nontender with normal bowel sounds. The liver span was approximately 5-6 cm in the right midclavicular line by " percussion. The liver edge was nontender. The spleen was not palpable. There were no inguinal or umbilical hernias noted. No ascites was noted.  LYMPH NODES: No lymphadenopathy was appreciated in the neck, axillae or groin.  MUSCULOSKELETAL: Gait was normal. There was no tenderness or effusions noted. Muscle strength and tone were normal. EXTREMITIES: No cyanosis, clubbing or edema.  NEUROLOGIC: Alert and oriented x 3. Normal affect. Gait was normal. Normal deep tendon reflexes with no pathological reflexes. Sensation to touch was normal.  PSYCHIATRIC: good mood, orientated to place, time and person     Labs and Imagings     Lab Results   Component Value Date    WBC 7.1 05/21/2020    HGB 11.4 (L) 05/21/2020    HCT 37.4 05/21/2020    LABPLAT 295 05/21/2020       CMP  Sodium   Date Value Ref Range Status   05/21/2020 139 135 - 145 mmol/L Final     Potassium   Date Value Ref Range Status   05/21/2020 4.8 3.6 - 5.2 mmol/L Final     Chloride   Date Value Ref Range Status   05/21/2020 103 100 - 108 mmol/L Final     CO2   Date Value Ref Range Status   05/21/2020 30 21 - 32 mmol/L Final     Glucose   Date Value Ref Range Status   05/21/2020 103 70 - 110 mg/dL Final     BUN, Bld   Date Value Ref Range Status   05/21/2020 10 7 - 18 mg/dL Final     Creatinine   Date Value Ref Range Status   05/21/2020 0.91 0.55 - 1.02 mg/dL Final     Calcium   Date Value Ref Range Status   05/21/2020 8.1 (L) 8.8 - 10.5 mg/dL Final     Total Protein   Date Value Ref Range Status   05/21/2020 6.1 (L) 6.4 - 8.2 g/dL Final     Albumin   Date Value Ref Range Status   05/21/2020 3.0 (L) 3.4 - 5.0 g/dL Final     Total Bilirubin   Date Value Ref Range Status   05/21/2020 0.3 0.0 - 1.0 mg/dL Final     Alkaline Phosphatase   Date Value Ref Range Status   05/21/2020 67 46 - 116 U/L Final     AST   Date Value Ref Range Status   05/21/2020 14 (L) 15 - 37 U/L Final     ALT   Date Value Ref Range Status   05/21/2020 15 12 - 78 U/L Final     Anion Gap   Date  Value Ref Range Status   05/21/2020 6.0 3.0 - 11.0 mmol/L Final         Imaging:     Assessment:     Principle Problem: No primary diagnosis found.   Co-morbidity/Active Problems:   Patient Active Problem List   Diagnosis    Breast cancer metastasized to bone    Neuropathy    Fatigue associated with anemia    Drug-related hair loss    Nausea    Primary insomnia    Foot infection    Cancer, metastatic to bone    Malignant neoplasm of overlapping 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, estrogen receptor positive     Chronic fatigue    Diverticulosis    Dehydration    Hypoxemia    Iron deficiency anemia secondary to inadequate dietary iron intake    Hypoalbuminemia        Benita Carballo is a 74 y.o. female with PMH of The primary encounter diagnosis was Malignant neoplasm of overlapping 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, estrogen receptor positive . Diagnoses of Cancer, metastatic to bone and 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, unspecified laterality were also pertinent to this visit., with Malignant neoplasm of overlapping 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, estrogen receptor positive [C50.812, Z17.0]       Metastatic breast cancer ERPR positive HER2 Noah questionable  Status post of chemotherapy and HER2 targeted therapy with progression on HER2 targeted therapy  Recent testing fish negative for her 2 2018  Repeat the lipid biopsy 2019  shows HER2 positive and ER negative  Bone metastatic disease  Fatigue  Hypoxia  Dehydration    Plan:   Overall Plan:    She has HER2 positive disease with ERPR negative by the new liquid biopsy. Started Taxol/Herceptin 2/2020  Discussed ECHO with pt, showing right heart failure   Seen by Dr Gardner, cardiologist and recommended that Rt heart failure unlikely related to chemo, likely related to COPD and ILD.   Herceptin/taxol resumed last week with poor tolerance by pt, weight loss, fatigue reported, will hold tx this week, plan to resume next week at low dose, discussed with  Dr Contreras  Discussed with Dr Contreras and he recommended reducing dose of herceptin further  Lasix x 3 days with 10 lbs wt loss, p is instructed to use prn, pt has not had to use Lasix over last 3 weeks   Compression sleeve for dependant upper ext swelling  Pt is wanting to stop oxycontin and norco due to out of pocket cost. She is requesting to resume oxycodone 5 mg TID when she runs out of oxycontin/norco. She states works better and more affordable.  Labs reviewed today and she is cleared for tx.      Rosalind Ricks NP  Plan:   Overall Plan:    She has HER2 positive disease with ERPR negative by the new liquid biopsy. Started Taxol/Herceptin 2/2020  Discussed ECHO with pt, showing right heart failure   Seen by Dr Gardner, cardiologist and recommended that Rt heart failure unlikely related to chemo, likely related to COPD and ILD.   Herceptin/taxol resumed last week with poor tolerance by pt, weight loss, fatigue reported, will hold tx this week, plan to resume next week at low dose, discussed with Dr Contreras  Discussed with Dr Contreras and he recommended reducing dose of herceptin further    Lasix x 3 days with 10 lbs wt loss, p is instructed to use prn, pt has not had to use Lasix over last 3 weeks   Compression sleeve for dependant upper ext swelling  Pt is wanting to stop oxycontin and norco due to out of pocket cost. She is requesting to resume oxycodone 5 mg TID when she runs out of oxycontin/norco. She states works better and more affordable.  Labs reviewed today and she is cleared for tx.  Herceptin given today   [facility] one molecular testing requested     Rosalind Ricks NP

## 2020-05-22 DIAGNOSIS — K21.9 GASTROESOPHAGEAL REFLUX DISEASE, ESOPHAGITIS PRESENCE NOT SPECIFIED: ICD-10-CM

## 2020-05-22 RX ORDER — PANTOPRAZOLE SODIUM 40 MG/1
TABLET, DELAYED RELEASE ORAL
Qty: 90 TABLET | Refills: 0 | Status: SHIPPED | OUTPATIENT
Start: 2020-05-22 | End: 2020-10-13

## 2020-05-28 ENCOUNTER — OFFICE VISIT (OUTPATIENT)
Dept: HEMATOLOGY/ONCOLOGY | Facility: CLINIC | Age: 75
End: 2020-05-28
Payer: MEDICARE

## 2020-05-28 VITALS
HEIGHT: 66 IN | RESPIRATION RATE: 16 BRPM | HEART RATE: 75 BPM | DIASTOLIC BLOOD PRESSURE: 57 MMHG | TEMPERATURE: 98 F | SYSTOLIC BLOOD PRESSURE: 110 MMHG | BODY MASS INDEX: 18.64 KG/M2 | WEIGHT: 116 LBS | OXYGEN SATURATION: 93 %

## 2020-05-28 DIAGNOSIS — C79.51 CARCINOMA OF BREAST METASTATIC TO BONE, UNSPECIFIED LATERALITY: ICD-10-CM

## 2020-05-28 DIAGNOSIS — C50.812 MALIGNANT NEOPLASM OF OVERLAPPING SITES OF LEFT BREAST IN FEMALE, ESTROGEN RECEPTOR POSITIVE: Primary | ICD-10-CM

## 2020-05-28 DIAGNOSIS — C79.51 CANCER, METASTATIC TO BONE: ICD-10-CM

## 2020-05-28 DIAGNOSIS — C50.919 CARCINOMA OF BREAST METASTATIC TO BONE, UNSPECIFIED LATERALITY: ICD-10-CM

## 2020-05-28 DIAGNOSIS — Z17.0 MALIGNANT NEOPLASM OF OVERLAPPING SITES OF LEFT BREAST IN FEMALE, ESTROGEN RECEPTOR POSITIVE: Primary | ICD-10-CM

## 2020-05-28 LAB
ANISOCYTOSIS: NORMAL
HYPOCHROMIA BLD QL SMEAR: NORMAL

## 2020-05-28 PROCEDURE — 99214 PR OFFICE/OUTPT VISIT, EST, LEVL IV, 30-39 MIN: ICD-10-PCS | Mod: S$GLB,,, | Performed by: NURSE PRACTITIONER

## 2020-05-28 PROCEDURE — 99214 OFFICE O/P EST MOD 30 MIN: CPT | Mod: S$GLB,,, | Performed by: NURSE PRACTITIONER

## 2020-05-28 RX ORDER — DIPHENHYDRAMINE HYDROCHLORIDE 50 MG/ML
50 INJECTION INTRAMUSCULAR; INTRAVENOUS ONCE AS NEEDED
Status: CANCELLED | OUTPATIENT
Start: 2020-05-28

## 2020-05-28 RX ORDER — OXYCODONE HYDROCHLORIDE 10 MG/1
10 TABLET ORAL EVERY 8 HOURS PRN
Qty: 45 TABLET | Refills: 0 | Status: SHIPPED | OUTPATIENT
Start: 2020-05-28 | End: 2020-06-11 | Stop reason: SDUPTHER

## 2020-05-28 RX ORDER — HEPARIN 100 UNIT/ML
500 SYRINGE INTRAVENOUS
Status: CANCELLED | OUTPATIENT
Start: 2020-05-28

## 2020-05-28 RX ORDER — SODIUM CHLORIDE 0.9 % (FLUSH) 0.9 %
10 SYRINGE (ML) INJECTION
Status: CANCELLED | OUTPATIENT
Start: 2020-05-28

## 2020-05-28 RX ORDER — FAMOTIDINE 10 MG/ML
20 INJECTION INTRAVENOUS
Status: CANCELLED | OUTPATIENT
Start: 2020-05-28

## 2020-05-28 RX ORDER — EPINEPHRINE 0.3 MG/.3ML
0.3 INJECTION SUBCUTANEOUS ONCE AS NEEDED
Status: CANCELLED | OUTPATIENT
Start: 2020-05-28

## 2020-05-28 NOTE — PROGRESS NOTES
Medical Oncology Progress Note  [facility]     PATIENT: Benita Carballo  : 1945 74 y.o. female  MRN [id number]     PCP: Solomon Contreras MD  Consult Requested By:      Date of Service: 2020    Subjective:   Interim History:  Malignant neoplasm of overlapping sites of left breast    Benita Carballo is here for to followup breast cancer treatment.    The patient was started on  chemotherapy last week with Herceptin and Taxol weekly.  She has chronic hypoxia and her oxygen saturation has been declining today her O2 status 77% she looks purple and she also has increasing degree of short of breath; in addition she complains of swelling to her left arm.  She had no chest pain. She was admittted last week to the hospital and underwent thoracentesis, which was not tested for cytology and imaging for possible PE was ruled out. Today she states she is feeling better but POX was 82 % on RA, and still has yosi upper ext swelling which she states has improved. She has not had a echo so will order ECHO stat to r/o any CHF which could be causing her swelling both lung and upper ext. States she had upper u/s with her pulm which was neg for DVT.     3/26/20   She returns with ECHO showing EF 55-60%, but right sided heart failure noted. Pt has an additional 12 # of wt gain since last week. She states she is feeling fine, better than at last visit. Discussed ECHO and will hold herceptin and obtain MUGA, will proceed with weekly taxol and possibly will add herceptin back after MUGA completed and will refer pt to cardiology, but could be delayed due to COVID pandemic.     2020  Today she presents with significant fluid loss noted. No longer upper ext third space swelling noted, alb and T Pro dramatically improved, pt has last 10 + lbs of fluid. She is breathing easy today and denies any SOB or ORELLANA. We will proceed with weekly taxol and continue to hold herceptin until MUGA or cardiology clearance  is obtained. She was seen yesterday with Dr Gardner. Below is a portion of his note:       Oncology History:  Oncology History    2002 Rt breast cancer s/p lump with [place] Tamoxifen x 1 yr, then Arimidex x 5 yrs. Completed tx 2008.     2015  c/o chronic cough with CT chest 1/28/16 showing extensive adenopathy  With base of lt neck mass 4.5 cm with yosi pulm nodules. RF to Dr ANDREWS Mcnally    2/6/16 bx of lt neck mass carcinoma with feature suggestive of breast primary. ER/%, Her2 borderline by IHC and deemed equivocal by FISH          Breast cancer metastasized to bone    2/4/2016 Initial Diagnosis     Breast cancer metastasized to bone, , lymphoid mass       5/25/2016 - 2/9/2017 Chemotherapy     Taxol/Herceptin x 8 cycles then perjeta added 7/16  Prescribed by Dr Mcnally       2/20/2017 - 6/17/2017 Chemotherapy     Kadcycla prescribed by Dr Mcnally       6/28/2017 Biopsy     Biopsy of rt abd met disease consistent with breast cancer. ER/[place] + Her 2 neg per Dr Moreno's note.    Dr Moreon noted path review from bx 2/16 showed HER 2 + equivocal with FISH reading as Equivocal but ratio 1.2. Additional report form 2016 states additional testing was done with different probe and the equivocal results can be noted as negative.     Treatment therapy changed from Her2 based tx to endocrine base therapy by Dr Moreno       6/28/2017 -  Hormone Therapy     Arimidex 6/17-2/18  Aromasin 2/18  Ibrance + Aromasin 5/18 -1/19  Ibrance + faslodex 2/19 4/30/2019 Imaging Significant Findings     PET/CT: Lesions involving the medial clavicle , subcarinal LN, pleural based soft tissue density adjacent to T11 and lesion in left lobe of liver diminished in size and SUV.   RECIST 34% decrease in disease      2/17/2020 -  Chemotherapy     Treatment Summary   Plan Name: OP BREAST TRASTUZUMAB PACLITAXEL QW  Treatment Goal: Control  Status: Active  Start Date: 2/26/2020  End Date: 6/4/2020 (Planned)  Provider: Solomon Contreras  MD  Chemotherapy: PACLitaxel (TAXOL) 64 mg/m2 = 102 mg in sodium chloride 0.9% 250 mL chemo infusion, 64 mg/m2 = 102 mg (100 % of original dose 64 mg/m2), Intravenous, Clinic/HOD 1 time, 10 of 12 cycles  Dose modification: 64 mg/m2 (original dose 64 mg/m2, Cycle 1)  trastuzumab 211 mg in sodium chloride 0.9% 250 mL chemo infusion, 4 mg/kg = 211 mg, Intravenous, Clinic/HOD 1 time, 5 of 7 cycles  Dose modification: 2 mg/kg (original dose 2 mg/kg, Cycle 7, Reason: MD Discretion), 80 mg (original dose 2 mg/kg, Cycle 8)       ==8/2019 Now She is currently on combined afinitor + aromasin,  repeated CT scan shows improvement  PET 1/2020 showed PD,  afinitor + aromasin stopped  == PET scan 01/20/2020 the progressive  ==Started Taxol/Herceptin weekly 2/2020    Past Medical History:   Past Medical History:   Diagnosis Date    Arthritis     Bone cancer     Breast cancer     Cancer     Cataract     Depression     Diverticulosis 1/15/2020    Fatigue associated with anemia 6/6/2019    GERD (gastroesophageal reflux disease)     High cholesterol     Hypoxemia 3/12/2020    Skin problem     Sleep disorder        Past Surgical HIstory:   Past Surgical History:   Procedure Laterality Date    APPENDECTOMY      BREAST LUMPECTOMY Right 2002    CATARACT EXTRACTION, BILATERAL  2014    COLONOSCOPY  2008    HYSTERECTOMY      PORTACATH PLACEMENT  05/18/2016    SURGICAL REMOVAL OF NODULE OF VOCAL CORD         Allergies:  Review of patient's allergies indicates:   Allergen Reactions    Benadryl [diphenhydramine hcl]      Restless leg     Medrol [methylprednisolone] Hallucinations    Penicillins        Medications:  Current Outpatient Medications   Medication Sig Dispense Refill    ANORO ELLIPTA 62.5-25 mcg/actuation DsDv       dexAMETHasone 0.5 mg/5 mL Soln SWISH WITH 10 ML PO FOR 2 MIN THEN SPIT QID      exemestane (AROMASIN) 25 mg tablet Take 1 tablet (25 mg total) by mouth once daily. 30 tablet 3    furosemide  (LASIX) 40 MG tablet Take 1 tablet (40 mg total) by mouth once daily. 30 tablet 11    gabapentin (NEURONTIN) 400 MG capsule Take 1 capsule (400 mg total) by mouth 3 (three) times daily. 120 capsule 4    iron-vitamin C 100-250 mg, ICAR-C, (ICAR-C) 100-250 mg Tab Take 1 tablet by mouth once daily. 30 tablet 5    LORazepam (ATIVAN) 1 MG tablet TAKE 1 TABLET BY MOUTH EVERY EVENING 30 tablet 0    megestrol (MEGACE) 400 mg/10 mL (40 mg/mL) Susp Take 10 mLs (400 mg total) by mouth once daily. 240 mL 2    oxyCODONE (ROXICODONE) 10 mg Tab immediate release tablet Take 1 tablet (10 mg total) by mouth every 8 (eight) hours as needed for Pain. 45 tablet 0    pantoprazole (PROTONIX) 40 MG tablet TAKE 1 TABLET BY MOUTH EVERY DAY 90 tablet 0    potassium chloride SA (K-DUR,KLOR-CON) 20 MEQ tablet Take 1 tablet (20 mEq total) by mouth once daily. 30 tablet 11    PROAIR HFA 90 mcg/actuation inhaler       promethazine (PHENERGAN) 25 MG tablet Take 1 tablet (25 mg total) by mouth every 6 (six) hours as needed for Nausea. 30 tablet 3    temazepam (RESTORIL) 15 mg Cap Take 1 capsule (15 mg total) by mouth nightly. 30 capsule 0     No current facility-administered medications for this visit.        Family History:   Family History   Problem Relation Age of Onset    Lung cancer Father     Melanoma Father        Social History:  reports that she has been smoking cigarettes. She has a 2.00 pack-year smoking history. She has never used smokeless tobacco. She reports that she drinks about 4.0 standard drinks of alcohol per week. She reports that she does not use drugs.    Review of Systemas  Constitutional: No change in weight, appetie, fatigue, fever, or night sweats  Eyes: No changes in vision  Ears, Nose, Mouth, Throat, and Face: No hearing problems, ear pain, rummy nose, or sore throat  Respiratory: No shortness of breath or cough  Cardiovascular: No chest pain, palpitations or dizziness  Gastrointestinal: No abdominal pain,  "hematochezia, melena  Genitourinary: No dysuria, hematuria, nocturia, menstrual problems, post menopausal  Integumentary/Breast: No rashes or itching  Hematologic/Lymphatic: No anemia or bleeding abnormalities  Musculoskeletal: No joint or muscle abnormalities  Neurological: No sensory or motor problems, no headaches  Behavioral/Psych: No depression, anxiety, cognitive problems, or stress  Endocrine: No diabetes or thyroid problems  Allergic/Immunologic: See allergy above    Physical Exam      Vitals:   Vitals:    05/28/20 1017   BP: (!) 110/57   BP Location: Left arm   Patient Position: Sitting   BP Method: Large (Automatic)   Pulse: 75   Resp: 16   Temp: 97.5 °F (36.4 °C)   TempSrc: Temporal   SpO2: (!) 93%   Weight: 52.6 kg (116 lb)   Height: 5' 6" (1.676 m)     BMI: Body mass index is 18.72 kg/m².  BSA Body surface area is 1.57 meters squared.  ECOG Performance Status:   ECOG SCORE         GENERAL APPEARANCE: Well developed, well nourished, in no acute distress.  SKIN: Inspection of the skin reveals no rashes, ulcerations or petechiae.  HEENT: The sclerae were anicteric and conjunctivae were pink and moist. Extraocular movements were intact and pupils were equal, round, and reactive to light with normal accommodation. External inspection of the ears and nose showed no scars, lesions, or masses. Lips, teeth, and gums showed normal mucosa. The oral mucosa, hard and soft palate, tongue and posterior pharynx were normal.  NECK: Supple and symmetric. There was no thyroid enlargement, and no tenderness, or masses were felt.  CRESPIRATORY: Normal AP diameter and normal contour without any kyphoscoliosis. LUNGS: Auscultation of the lungs revealed normal breath sounds without any other adventitious sounds or rubs.  CARDIOVASCULAR: There was a regular rate and rhythm without any murmurs, gallops, rubs. The carotid pulses were normal and 2+ bilaterally without bruits. Peripheral pulses were 2+ and " symmetric.  GASTROINTESTINAL: Soft and nontender with normal bowel sounds. The liver span was approximately 5-6 cm in the right midclavicular line by percussion. The liver edge was nontender. The spleen was not palpable. There were no inguinal or umbilical hernias noted. No ascites was noted.  LYMPH NODES: No lymphadenopathy was appreciated in the neck, axillae or groin.  MUSCULOSKELETAL: Gait was normal. There was no tenderness or effusions noted. Muscle strength and tone were normal. EXTREMITIES: No cyanosis, clubbing or edema.  NEUROLOGIC: Alert and oriented x 3. Normal affect. Gait was normal. Normal deep tendon reflexes with no pathological reflexes. Sensation to touch was normal.  PSYCHIATRIC: good mood, orientated to place, time and person     Labs and Imagings         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, estrogen receptor positive [C50.812, Z17.0]   Co-morbidity/Active Problems:   Patient Active Problem List   Diagnosis    Breast cancer metastasized to bone    Neuropathy    Fatigue associated with anemia    Drug-related hair loss    Nausea    Primary insomnia    Foot infection    Cancer, metastatic to bone    Malignant neoplasm of overlapping 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, estrogen receptor positive     Chronic fatigue    Diverticulosis    Dehydration    Hypoxemia    Iron deficiency anemia secondary to inadequate dietary iron intake    Hypoalbuminemia        Benita Carballo is a 74 y.o. female with PMH of The primary encounter diagnosis was Malignant neoplasm of overlapping 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, estrogen receptor positive . Diagnoses of 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, unspecified laterality and Cancer, metastatic to bone were also pertinent to this visit., with Malignant neoplasm of overlapping 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, estrogen receptor positive [C50.812, Z17.0]       Metastatic breast  cancer ERPR positive HER2 Noah questionable  Status post of chemotherapy and HER2 targeted therapy with progression on HER2 targeted therapy  Recent testing fish negative for her 2 2018  Repeat the lipid biopsy 2019  shows HER2 positive and ER negative  Bone metastatic disease  Fatigue  Hypoxia  Dehydration    Plan:   Overall Plan:    She has HER2 positive disease with ERPR negative by the new liquid biopsy. Started Taxol/Herceptin 2/2020  Discussed ECHO with pt, showing right heart failure   Seen by Dr Gardner, cardiologist and recommended that Rt heart failure unlikely related to chemo, likely related to COPD and ILD.   Herceptin/taxol resumed last week with poor tolerance by pt, weight loss, fatigue reported, will hold tx this week, plan to resume next week at low dose, discussed with Dr Contreras  Discussed with Dr Contreras and he recommended reducing dose of herceptin further  Lasix x 3 days with 10 lbs wt loss, p is instructed to use prn, pt has not had to use Lasix over last 3 weeks   Compression sleeve for dependant upper ext swelling  Pt is wanting to stop oxycontin and norco due to out of pocket cost. She is requesting to resume oxycodone 5 mg TID when she runs out of oxycontin/norco. She states works better and more affordable.  Labs reviewed today and she is cleared for tx.      Rosalind Ricks, NPEstablished Patient - Audio Only Telehealth Visit     The patient location is: home  The chief complaint leading to consultation is: chemo clearnace  Visit type: Virtual visit with audio only (telephone)     The reason for the audio only service rather than synchronous audio and video virtual visit was related to technical difficulties or patient preference/necessity.     Each patient to whom I provide medical services by telemedicine is:  (1) informed of the relationship between the physician and patient and the respective role of any other health care provider with respect to management of the patient; and (2) notified  that they may decline to receive medical services by telemedicine and may withdraw from such care at any time. Patient verbally consented to receive this service via voice-only telephone call.       HPI: see above     Assessment and plan:  See above        Medical Oncology Progress Note  [facility]     PATIENT: Benita Carballo  : 1945 74 y.o. female  MRN [id number]     PCP: Solomon Contreras MD  Consult Requested By:      Date of Service: 2020    Subjective:   Interim History:  Malignant neoplasm of overlapping sites of left breast    Benita Carballo is here for to followup breast cancer treatment.    The patient was started on  chemotherapy last week with Herceptin and Taxol weekly.  She has chronic hypoxia and her oxygen saturation has been declining today her O2 status 77% she looks purple and she also has increasing degree of short of breath; in addition she complains of swelling to her left arm.  She had no chest pain. She was admittted last week to the hospital and underwent thoracentesis, which was not tested for cytology and imaging for possible PE was ruled out. Today she states she is feeling better but POX was 82 % on RA, and still has yosi upper ext swelling which she states has improved. She has not had a echo so will order ECHO stat to r/o any CHF which could be causing her swelling both lung and upper ext. States she had upper u/s with her pulm which was neg for DVT.     3/26/20   She returns with ECHO showing EF 55-60%, but right sided heart failure noted. Pt has an additional 12 # of wt gain since last week. She states she is feeling fine, better than at last visit. Discussed ECHO and will hold herceptin and obtain MUGA, will proceed with weekly taxol and possibly will add herceptin back after MUGA completed and will refer pt to cardiology, but could be delayed due to COVID pandemic.     2020  Today she presents with significant fluid loss noted. No longer  upper ext third space swelling noted, alb and T Pro dramatically improved, pt has last 10 + lbs of fluid. She is breathing easy today and denies any SOB or ORELLANA. We will proceed with weekly taxol and continue to hold herceptin until MUGA or cardiology clearance is obtained. She was seen yesterday with Dr Gardner. Below is a portion of his note:       Oncology History:  Oncology History    2002 Rt breast cancer s/p lump with [place] Tamoxifen x 1 yr, then Arimidex x 5 yrs. Completed tx 2008.     2015  c/o chronic cough with CT chest 1/28/16 showing extensive adenopathy  With base of lt neck mass 4.5 cm with yosi pulm nodules. RF to Dr ANDREWS Mcnally    2/6/16 bx of lt neck mass carcinoma with feature suggestive of breast primary. ER/%, Her2 borderline by IHC and deemed equivocal by FISH          Breast cancer metastasized to bone    2/4/2016 Initial Diagnosis     Breast cancer metastasized to bone, , lymphoid mass       5/25/2016 - 2/9/2017 Chemotherapy     Taxol/Herceptin x 8 cycles then perjeta added 7/16  Prescribed by Dr Mcnally       2/20/2017 - 6/17/2017 Chemotherapy     Kadcycla prescribed by Dr Mcnally       6/28/2017 Biopsy     Biopsy of rt abd met disease consistent with breast cancer. ER/[place] + Her 2 neg per Dr Moreno's note.    Dr Moreno noted path review from bx 2/16 showed HER 2 + equivocal with FISH reading as Equivocal but ratio 1.2. Additional report form 2016 states additional testing was done with different probe and the equivocal results can be noted as negative.     Treatment therapy changed from Her2 based tx to endocrine base therapy by Dr Moreno       6/28/2017 -  Hormone Therapy     Arimidex 6/17-2/18  Aromasin 2/18  Ibrance + Aromasin 5/18 -1/19  Ibrance + faslodex 2/19 4/30/2019 Imaging Significant Findings     PET/CT: Lesions involving the medial clavicle , subcarinal LN, pleural based soft tissue density adjacent to T11 and lesion in left lobe of liver diminished in size and SUV.    RECIST 34% decrease in disease      2/17/2020 -  Chemotherapy     Treatment Summary   Plan Name: OP BREAST TRASTUZUMAB PACLITAXEL QW  Treatment Goal: Control  Status: Active  Start Date: 2/26/2020  End Date: 6/4/2020 (Planned)  Provider: Solomon Contreras MD  Chemotherapy: PACLitaxel (TAXOL) 64 mg/m2 = 102 mg in sodium chloride 0.9% 250 mL chemo infusion, 64 mg/m2 = 102 mg (100 % of original dose 64 mg/m2), Intravenous, Clinic/HOD 1 time, 10 of 12 cycles  Dose modification: 64 mg/m2 (original dose 64 mg/m2, Cycle 1)  trastuzumab 211 mg in sodium chloride 0.9% 250 mL chemo infusion, 4 mg/kg = 211 mg, Intravenous, Clinic/HOD 1 time, 5 of 7 cycles  Dose modification: 2 mg/kg (original dose 2 mg/kg, Cycle 7, Reason: MD Discretion), 80 mg (original dose 2 mg/kg, Cycle 8)       ==8/2019 Now She is currently on combined afinitor + aromasin,  repeated CT scan shows improvement  PET 1/2020 showed PD,  afinitor + aromasin stopped  == PET scan 01/20/2020 the progressive  ==Started Taxol/Herceptin weekly 2/2020    Past Medical History:   Past Medical History:   Diagnosis Date    Arthritis     Bone cancer     Breast cancer     Cancer     Cataract     Depression     Diverticulosis 1/15/2020    Fatigue associated with anemia 6/6/2019    GERD (gastroesophageal reflux disease)     High cholesterol     Hypoxemia 3/12/2020    Skin problem     Sleep disorder        Past Surgical HIstory:   Past Surgical History:   Procedure Laterality Date    APPENDECTOMY      BREAST LUMPECTOMY Right 2002    CATARACT EXTRACTION, BILATERAL  2014    COLONOSCOPY  2008    HYSTERECTOMY      PORTACATH PLACEMENT  05/18/2016    SURGICAL REMOVAL OF NODULE OF VOCAL CORD         Allergies:  Review of patient's allergies indicates:   Allergen Reactions    Benadryl [diphenhydramine hcl]      Restless leg     Medrol [methylprednisolone] Hallucinations    Penicillins        Medications:  Current Outpatient Medications   Medication Sig Dispense  Refill    ANORO ELLIPTA 62.5-25 mcg/actuation DsDv       dexAMETHasone 0.5 mg/5 mL Soln SWISH WITH 10 ML PO FOR 2 MIN THEN SPIT QID      exemestane (AROMASIN) 25 mg tablet Take 1 tablet (25 mg total) by mouth once daily. 30 tablet 3    furosemide (LASIX) 40 MG tablet Take 1 tablet (40 mg total) by mouth once daily. 30 tablet 11    gabapentin (NEURONTIN) 400 MG capsule Take 1 capsule (400 mg total) by mouth 3 (three) times daily. 120 capsule 4    iron-vitamin C 100-250 mg, ICAR-C, (ICAR-C) 100-250 mg Tab Take 1 tablet by mouth once daily. 30 tablet 5    LORazepam (ATIVAN) 1 MG tablet TAKE 1 TABLET BY MOUTH EVERY EVENING 30 tablet 0    megestrol (MEGACE) 400 mg/10 mL (40 mg/mL) Susp Take 10 mLs (400 mg total) by mouth once daily. 240 mL 2    oxyCODONE (ROXICODONE) 10 mg Tab immediate release tablet Take 1 tablet (10 mg total) by mouth every 8 (eight) hours as needed for Pain. 45 tablet 0    pantoprazole (PROTONIX) 40 MG tablet TAKE 1 TABLET BY MOUTH EVERY DAY 90 tablet 0    potassium chloride SA (K-DUR,KLOR-CON) 20 MEQ tablet Take 1 tablet (20 mEq total) by mouth once daily. 30 tablet 11    PROAIR HFA 90 mcg/actuation inhaler       promethazine (PHENERGAN) 25 MG tablet Take 1 tablet (25 mg total) by mouth every 6 (six) hours as needed for Nausea. 30 tablet 3    temazepam (RESTORIL) 15 mg Cap Take 1 capsule (15 mg total) by mouth nightly. 30 capsule 0     No current facility-administered medications for this visit.        Family History:   Family History   Problem Relation Age of Onset    Lung cancer Father     Melanoma Father        Social History:  reports that she has been smoking cigarettes. She has a 2.00 pack-year smoking history. She has never used smokeless tobacco. She reports that she drinks about 4.0 standard drinks of alcohol per week. She reports that she does not use drugs.    Review of Systemas  Constitutional: No change in weight, appetie, fatigue, fever, or night sweats  Eyes: No  "changes in vision  Ears, Nose, Mouth, Throat, and Face: No hearing problems, ear pain, rummy nose, or sore throat  Respiratory: No shortness of breath or cough  Cardiovascular: No chest pain, palpitations or dizziness  Gastrointestinal: No abdominal pain, hematochezia, melena  Genitourinary: No dysuria, hematuria, nocturia, menstrual problems, post menopausal  Integumentary/Breast: No rashes or itching  Hematologic/Lymphatic: No anemia or bleeding abnormalities  Musculoskeletal: No joint or muscle abnormalities  Neurological: No sensory or motor problems, no headaches  Behavioral/Psych: No depression, anxiety, cognitive problems, or stress  Endocrine: No diabetes or thyroid problems  Allergic/Immunologic: See allergy above    Physical Exam      Vitals:   Vitals:    05/28/20 1017   BP: (!) 110/57   BP Location: Left arm   Patient Position: Sitting   BP Method: Large (Automatic)   Pulse: 75   Resp: 16   Temp: 97.5 °F (36.4 °C)   TempSrc: Temporal   SpO2: (!) 93%   Weight: 52.6 kg (116 lb)   Height: 5' 6" (1.676 m)     BMI: Body mass index is 18.72 kg/m².  BSA Body surface area is 1.57 meters squared.  ECOG Performance Status:   ECOG SCORE         GENERAL APPEARANCE: Well developed, well nourished, in no acute distress.  SKIN: Inspection of the skin reveals no rashes, ulcerations or petechiae.  HEENT: The sclerae were anicteric and conjunctivae were pink and moist. Extraocular movements were intact and pupils were equal, round, and reactive to light with normal accommodation. External inspection of the ears and nose showed no scars, lesions, or masses. Lips, teeth, and gums showed normal mucosa. The oral mucosa, hard and soft palate, tongue and posterior pharynx were normal.  NECK: Supple and symmetric. There was no thyroid enlargement, and no tenderness, or masses were felt.  CRESPIRATORY: Normal AP diameter and normal contour without any kyphoscoliosis. LUNGS: Auscultation of the lungs revealed normal breath sounds " without any other adventitious sounds or rubs.  CARDIOVASCULAR: There was a regular rate and rhythm without any murmurs, gallops, rubs. The carotid pulses were normal and 2+ bilaterally without bruits. Peripheral pulses were 2+ and symmetric.  GASTROINTESTINAL: Soft and nontender with normal bowel sounds. The liver span was approximately 5-6 cm in the right midclavicular line by percussion. The liver edge was nontender. The spleen was not palpable. There were no inguinal or umbilical hernias noted. No ascites was noted.  LYMPH NODES: No lymphadenopathy was appreciated in the neck, axillae or groin.  MUSCULOSKELETAL: Gait was normal. There was no tenderness or effusions noted. Muscle strength and tone were normal. EXTREMITIES: No cyanosis, clubbing or edema.  NEUROLOGIC: Alert and oriented x 3. Normal affect. Gait was normal. Normal deep tendon reflexes with no pathological reflexes. Sensation to touch was normal.  PSYCHIATRIC: good mood, orientated to place, time and person     Labs and Imagings     Lab Results   Component Value Date    WBC 7.1 05/21/2020    HGB 11.4 (L) 05/21/2020    HCT 37.4 05/21/2020    LABPLAT 295 05/21/2020       CMP  Sodium   Date Value Ref Range Status   05/21/2020 139 135 - 145 mmol/L Final     Potassium   Date Value Ref Range Status   05/21/2020 4.8 3.6 - 5.2 mmol/L Final     Chloride   Date Value Ref Range Status   05/21/2020 103 100 - 108 mmol/L Final     CO2   Date Value Ref Range Status   05/21/2020 30 21 - 32 mmol/L Final     Glucose   Date Value Ref Range Status   05/21/2020 103 70 - 110 mg/dL Final     BUN, Bld   Date Value Ref Range Status   05/21/2020 10 7 - 18 mg/dL Final     Creatinine   Date Value Ref Range Status   05/21/2020 0.91 0.55 - 1.02 mg/dL Final     Calcium   Date Value Ref Range Status   05/21/2020 8.1 (L) 8.8 - 10.5 mg/dL Final     Total Protein   Date Value Ref Range Status   05/21/2020 6.1 (L) 6.4 - 8.2 g/dL Final     Albumin   Date Value Ref Range Status    05/21/2020 3.0 (L) 3.4 - 5.0 g/dL Final     Total Bilirubin   Date Value Ref Range Status   05/21/2020 0.3 0.0 - 1.0 mg/dL Final     Alkaline Phosphatase   Date Value Ref Range Status   05/21/2020 67 46 - 116 U/L Final     AST   Date Value Ref Range Status   05/21/2020 14 (L) 15 - 37 U/L Final     ALT   Date Value Ref Range Status   05/21/2020 15 12 - 78 U/L Final     Anion Gap   Date Value Ref Range Status   05/21/2020 6.0 3.0 - 11.0 mmol/L Final         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, estrogen receptor positive [C50.812, Z17.0]   Co-morbidity/Active Problems:   Patient Active Problem List   Diagnosis    Breast cancer metastasized to bone    Neuropathy    Fatigue associated with anemia    Drug-related hair loss    Nausea    Primary insomnia    Foot infection    Cancer, metastatic to bone    Malignant neoplasm of overlapping 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, estrogen receptor positive     Chronic fatigue    Diverticulosis    Dehydration    Hypoxemia    Iron deficiency anemia secondary to inadequate dietary iron intake    Hypoalbuminemia        Benita Carballo is a 74 y.o. female with PMH of The primary encounter diagnosis was Malignant neoplasm of overlapping 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, estrogen receptor positive . Diagnoses of 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, unspecified laterality and Cancer, metastatic to bone were also pertinent to this visit., with Malignant neoplasm of overlapping 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, estrogen receptor positive [C50.812, Z17.0]       Metastatic breast cancer ERPR positive HER2 Noah questionable  Status post of chemotherapy and HER2 targeted therapy with progression on HER2 targeted therapy  Recent testing fish negative for her 2 2018  Repeat the lipid biopsy 2019  shows HER2 positive and ER negative  Bone metastatic disease  Fatigue  Hypoxia  Dehydration    Plan:   Overall  Plan:    She has HER2 positive disease with ERPR negative by the new liquid biopsy. Started Taxol/Herceptin 2/2020  Discussed ECHO with pt, showing right heart failure   Seen by Dr Gardner, cardiologist and recommended that Rt heart failure unlikely related to chemo, likely related to COPD and ILD.   Herceptin/taxol resumed last week with poor tolerance by pt, weight loss, fatigue reported, will hold tx this week, plan to resume next week at low dose, discussed with Dr Contreras  Discussed with Dr Contreras and he recommended reducing dose of herceptin further  Lasix x 3 days with 10 lbs wt loss, p is instructed to use prn, pt has not had to use Lasix over last 3 weeks   Compression sleeve for dependant upper ext swelling  Pt is wanting to stop oxycontin and norco due to out of pocket cost. She is requesting to resume oxycodone 5 mg TID when she runs out of oxycontin/norco. She states works better and more affordable.  Labs reviewed today and she is cleared for tx.      Rosalind Ricks NP  Plan:   Overall Plan:    She has HER2 positive disease with ERPR negative by the new liquid biopsy. Started Taxol/Herceptin 2/2020  Discussed ECHO with pt, showing right heart failure 3/2020  Seen by Dr Gardner, cardiologist and recommended that Rt heart failure unlikely related to chemo, likely related to COPD and ILD.   Lasix x 3 days with 10 lbs wt loss, p is instructed to use prn, pt has not had to use Lasix over last 3 weeks   Compression sleeve for dependant upper ext swelling  Pt is wanting to stop oxycontin and norco due to out of pocket cost. She is requesting to resume oxycodone 5 mg TID when she runs out of oxycontin/norco. She states works better and more affordable.  Labs reviewed today and she is cleared for tx.  Only taxol given today as herceptin is not available but will resume Herceptint/taxol next week, will see pt every other week in clinic  [facility] one molecular testing requested     Rosalind Ricks NP

## 2020-06-02 ENCOUNTER — TELEPHONE (OUTPATIENT)
Dept: HEMATOLOGY/ONCOLOGY | Facility: CLINIC | Age: 75
End: 2020-06-02

## 2020-06-04 LAB — ANISOCYTOSIS: NORMAL

## 2020-06-04 RX ORDER — SODIUM CHLORIDE 0.9 % (FLUSH) 0.9 %
10 SYRINGE (ML) INJECTION
Status: CANCELLED | OUTPATIENT
Start: 2020-06-04

## 2020-06-04 RX ORDER — EPINEPHRINE 0.3 MG/.3ML
0.3 INJECTION SUBCUTANEOUS ONCE AS NEEDED
Status: CANCELLED | OUTPATIENT
Start: 2020-06-04

## 2020-06-04 RX ORDER — DIPHENHYDRAMINE HYDROCHLORIDE 50 MG/ML
50 INJECTION INTRAMUSCULAR; INTRAVENOUS ONCE AS NEEDED
Status: CANCELLED | OUTPATIENT
Start: 2020-06-04

## 2020-06-04 RX ORDER — HEPARIN 100 UNIT/ML
500 SYRINGE INTRAVENOUS
Status: CANCELLED | OUTPATIENT
Start: 2020-06-04

## 2020-06-04 RX ORDER — FAMOTIDINE 10 MG/ML
20 INJECTION INTRAVENOUS
Status: CANCELLED | OUTPATIENT
Start: 2020-06-04

## 2020-06-11 ENCOUNTER — OFFICE VISIT (OUTPATIENT)
Dept: HEMATOLOGY/ONCOLOGY | Facility: CLINIC | Age: 75
End: 2020-06-11
Payer: MEDICARE

## 2020-06-11 ENCOUNTER — TELEPHONE (OUTPATIENT)
Dept: HEMATOLOGY/ONCOLOGY | Facility: CLINIC | Age: 75
End: 2020-06-11

## 2020-06-11 VITALS
OXYGEN SATURATION: 76 % | TEMPERATURE: 98 F | SYSTOLIC BLOOD PRESSURE: 106 MMHG | BODY MASS INDEX: 18.99 KG/M2 | RESPIRATION RATE: 16 BRPM | HEART RATE: 89 BPM | DIASTOLIC BLOOD PRESSURE: 64 MMHG | HEIGHT: 66 IN | WEIGHT: 118.19 LBS

## 2020-06-11 DIAGNOSIS — Z17.0 MALIGNANT NEOPLASM OF OVERLAPPING SITES OF LEFT BREAST IN FEMALE, ESTROGEN RECEPTOR POSITIVE: Primary | ICD-10-CM

## 2020-06-11 DIAGNOSIS — C79.51 CARCINOMA OF BREAST METASTATIC TO BONE, UNSPECIFIED LATERALITY: ICD-10-CM

## 2020-06-11 DIAGNOSIS — C50.919 CARCINOMA OF BREAST METASTATIC TO BONE, UNSPECIFIED LATERALITY: ICD-10-CM

## 2020-06-11 DIAGNOSIS — C79.51 CANCER, METASTATIC TO BONE: ICD-10-CM

## 2020-06-11 DIAGNOSIS — C50.812 MALIGNANT NEOPLASM OF OVERLAPPING SITES OF LEFT BREAST IN FEMALE, ESTROGEN RECEPTOR POSITIVE: Primary | ICD-10-CM

## 2020-06-11 PROCEDURE — 99214 PR OFFICE/OUTPT VISIT, EST, LEVL IV, 30-39 MIN: ICD-10-PCS | Mod: S$GLB,,, | Performed by: NURSE PRACTITIONER

## 2020-06-11 PROCEDURE — 99214 OFFICE O/P EST MOD 30 MIN: CPT | Mod: S$GLB,,, | Performed by: NURSE PRACTITIONER

## 2020-06-11 RX ORDER — DIPHENHYDRAMINE HYDROCHLORIDE 50 MG/ML
50 INJECTION INTRAMUSCULAR; INTRAVENOUS ONCE AS NEEDED
Status: CANCELLED | OUTPATIENT
Start: 2020-06-11

## 2020-06-11 RX ORDER — HEPARIN 100 UNIT/ML
500 SYRINGE INTRAVENOUS
Status: CANCELLED | OUTPATIENT
Start: 2020-06-11

## 2020-06-11 RX ORDER — FAMOTIDINE 10 MG/ML
20 INJECTION INTRAVENOUS
Status: CANCELLED | OUTPATIENT
Start: 2020-06-11

## 2020-06-11 RX ORDER — OXYCODONE HYDROCHLORIDE 10 MG/1
10 TABLET ORAL EVERY 6 HOURS PRN
Qty: 60 TABLET | Refills: 0 | Status: SHIPPED | OUTPATIENT
Start: 2020-06-11 | End: 2020-06-18

## 2020-06-11 RX ORDER — EPINEPHRINE 0.3 MG/.3ML
0.3 INJECTION SUBCUTANEOUS ONCE AS NEEDED
Status: CANCELLED | OUTPATIENT
Start: 2020-06-11

## 2020-06-11 RX ORDER — SODIUM CHLORIDE 0.9 % (FLUSH) 0.9 %
10 SYRINGE (ML) INJECTION
Status: CANCELLED | OUTPATIENT
Start: 2020-06-11

## 2020-06-11 NOTE — TELEPHONE ENCOUNTER
----- Message from Charo Khanna sent at 6/10/2020  4:35 PM CDT -----  Contact: Patient   Patient has an appointment 6/11 @ 9.30 her son Mehdi Wiliknson would like to come in to talk with the doctor regarding his mother condition. Call back number  [phone]. Tks

## 2020-06-11 NOTE — PROGRESS NOTES
Medical Oncology Progress Note  [facility]     PATIENT: Benita Carballo  : 1945 74 y.o. female  MRN [id number]     PCP: Solomon Contreras MD  Consult Requested By:      Date of Service: 2020    Subjective:   Interim History:  Malignant neoplasm of overlapping sites of left breast    Benita Carballo is here for to followup breast cancer treatment.    The patient was started on  chemotherapy last week with Herceptin and Taxol weekly.  She has chronic hypoxia and her oxygen saturation has been declining today her O2 status 77% she looks purple and she also has increasing degree of short of breath; in addition she complains of swelling to her left arm.  She had no chest pain. She was admittted last week to the hospital and underwent thoracentesis, which was not tested for cytology and imaging for possible PE was ruled out. Today she states she is feeling better but POX was 82 % on RA, and still has yosi upper ext swelling which she states has improved. She has not had a echo so will order ECHO stat to r/o any CHF which could be causing her swelling both lung and upper ext. States she had upper u/s with her pulm which was neg for DVT.     3/26/20   She returns with ECHO showing EF 55-60%, but right sided heart failure noted. Pt has an additional 12 # of wt gain since last week. She states she is feeling fine, better than at last visit. Discussed ECHO and will hold herceptin and obtain MUGA, will proceed with weekly taxol and possibly will add herceptin back after MUGA completed and will refer pt to cardiology, but could be delayed due to COVID pandemic.     2020  Today she presents with significant fluid loss noted. No longer upper ext third space swelling noted, alb and T Pro dramatically improved, pt has last 10 + lbs of fluid. She is breathing easy today and denies any SOB or ORELLANA. We will proceed with weekly taxol and continue to hold herceptin until MUGA or cardiology clearance  is obtained. She was seen yesterday with Dr Gardner. Below is a portion of his note:       Oncology History:  Oncology History    2002 Rt breast cancer s/p lump with chemo[place] Tamoxifen x 1 yr, then Arimidex x 5 yrs. Completed tx 2008.     2015  c/o chronic cough with CT chest 1/28/16 showing extensive adenopathy  With base of lt neck mass 4.5 cm with yosi pulm nodules. RF to Dr ANDREWS Mcnally    2/6/16 bx of lt neck mass carcinoma with feature suggestive of breast primary. ER/%, Her2 borderline by IHC and deemed equivocal by FISH          Breast cancer metastasized to bone    2/4/2016 Initial Diagnosis     Breast cancer metastasized to bone, , lymphoid mass       5/25/2016 - 2/9/2017 Chemotherapy     Taxol/Herceptin x 8 cycles then perjeta added 7/16  Prescribed by Dr Mcnally       2/20/2017 - 6/17/2017 Chemotherapy     Kadcycla prescribed by Dr Mcnally       6/28/2017 Biopsy     Biopsy of rt abd met disease consistent with breast cancer. ER/MT + Her 2 neg per Dr Moreno's note.    Dr Moreno noted path review from bx 2/16 showed HER 2 + equivocal with FISH reading as Equivocal but ratio 1.2. Additional report form 2016 states additional testing was done with different probe and the equivocal results can be noted as negative.     Treatment therapy changed from Her2 based tx to endocrine base therapy by Dr Moreno       6/28/2017 -  Hormone Therapy     Arimidex 6/17-2/18  Aromasin 2/18  Ibrance + Aromasin 5/18 -1/19  Ibrance + faslodex 2/19 4/30/2019 Imaging Significant Findings     PET/CT: Lesions involving the medial clavicle , subcarinal LN, pleural based soft tissue density adjacent to T11 and lesion in left lobe of liver diminished in size and SUV.   RECIST 34% decrease in disease      2/17/2020 -  Chemotherapy     Treatment Summary   Plan Name: OP BREAST TRASTUZUMAB PACLITAXEL QW  Treatment Goal: Control  Status: Active  Start Date: 2/26/2020  End Date: 6/25/2020 (Planned)  Provider: Solomon Contreras  MD  Chemotherapy: PACLitaxel (TAXOL) 64 mg/m2 = 102 mg in sodium chloride 0.9% 250 mL chemo infusion, 64 mg/m2 = 102 mg (100 % of original dose 64 mg/m2), Intravenous, Clinic/HOD 1 time, 11 of 14 cycles  Dose modification: 64 mg/m2 (original dose 64 mg/m2, Cycle 1), 50 mg/m2 (original dose 64 mg/m2, Cycle 14)  trastuzumab 211 mg in sodium chloride 0.9% 250 mL chemo infusion, 4 mg/kg = 211 mg, Intravenous, Clinic/HOD 1 time, 6 of 9 cycles  Dose modification: 2 mg/kg (original dose 2 mg/kg, Cycle 7, Reason: MD Discretion), 80 mg (original dose 2 mg/kg, Cycle 8)       ==8/2019 Now She is currently on combined afinitor + aromasin,  repeated CT scan shows improvement  PET 1/2020 showed PD,  afinitor + aromasin stopped  == PET scan 01/20/2020 the progressive  ==Started Taxol/Herceptin weekly 2/2020    Past Medical History:   Past Medical History:   Diagnosis Date    Arthritis     Bone cancer     Breast cancer     Cancer     Cataract     Depression     Diverticulosis 1/15/2020    Fatigue associated with anemia 6/6/2019    GERD (gastroesophageal reflux disease)     High cholesterol     Hypoxemia 3/12/2020    Skin problem     Sleep disorder        Past Surgical HIstory:   Past Surgical History:   Procedure Laterality Date    APPENDECTOMY      BREAST LUMPECTOMY Right 2002    CATARACT EXTRACTION, BILATERAL  2014    COLONOSCOPY  2008    HYSTERECTOMY      PORTACATH PLACEMENT  05/18/2016    SURGICAL REMOVAL OF NODULE OF VOCAL CORD         Allergies:  Review of patient's allergies indicates:   Allergen Reactions    Benadryl [diphenhydramine hcl]      Restless leg     Medrol [methylprednisolone] Hallucinations    Penicillins        Medications:  Current Outpatient Medications   Medication Sig Dispense Refill    ANORO ELLIPTA 62.5-25 mcg/actuation DsDv       dexAMETHasone 0.5 mg/5 mL Soln SWISH WITH 10 ML PO FOR 2 MIN THEN SPIT QID      exemestane (AROMASIN) 25 mg tablet Take 1 tablet (25 mg total) by mouth  once daily. 30 tablet 3    furosemide (LASIX) 40 MG tablet Take 1 tablet (40 mg total) by mouth once daily. 30 tablet 11    gabapentin (NEURONTIN) 400 MG capsule Take 1 capsule (400 mg total) by mouth 3 (three) times daily. 120 capsule 4    iron-vitamin C 100-250 mg, ICAR-C, (ICAR-C) 100-250 mg Tab Take 1 tablet by mouth once daily. 30 tablet 5    LORazepam (ATIVAN) 1 MG tablet TAKE 1 TABLET BY MOUTH EVERY EVENING 30 tablet 0    megestrol (MEGACE) 400 mg/10 mL (40 mg/mL) Susp Take 10 mLs (400 mg total) by mouth once daily. 240 mL 2    oxyCODONE (ROXICODONE) 10 mg Tab immediate release tablet Take 1 tablet (10 mg total) by mouth every 8 (eight) hours as needed for Pain. 45 tablet 0    pantoprazole (PROTONIX) 40 MG tablet TAKE 1 TABLET BY MOUTH EVERY DAY 90 tablet 0    potassium chloride SA (K-DUR,KLOR-CON) 20 MEQ tablet Take 1 tablet (20 mEq total) by mouth once daily. 30 tablet 11    PROAIR HFA 90 mcg/actuation inhaler       promethazine (PHENERGAN) 25 MG tablet Take 1 tablet (25 mg total) by mouth every 6 (six) hours as needed for Nausea. 30 tablet 3    temazepam (RESTORIL) 15 mg Cap TAKE 1 CAPSULE(15 MG) BY MOUTH EVERY NIGHT 30 capsule 0     No current facility-administered medications for this visit.        Family History:   Family History   Problem Relation Age of Onset    Lung cancer Father     Melanoma Father        Social History:  reports that she has been smoking cigarettes. She has a 2.00 pack-year smoking history. She has never used smokeless tobacco. She reports that she drinks about 4.0 standard drinks of alcohol per week. She reports that she does not use drugs.    Review of Systemas  Constitutional: No change in weight, appetie, fatigue, fever, or night sweats  Eyes: No changes in vision  Ears, Nose, Mouth, Throat, and Face: No hearing problems, ear pain, rummy nose, or sore throat  Respiratory: No shortness of breath or cough  Cardiovascular: No chest pain, palpitations or  "dizziness  Gastrointestinal: No abdominal pain, hematochezia, melena  Genitourinary: No dysuria, hematuria, nocturia, menstrual problems, post menopausal  Integumentary/Breast: No rashes or itching  Hematologic/Lymphatic: No anemia or bleeding abnormalities  Musculoskeletal: No joint or muscle abnormalities  Neurological: No sensory or motor problems, no headaches  Behavioral/Psych: No depression, anxiety, cognitive problems, or stress  Endocrine: No diabetes or thyroid problems  Allergic/Immunologic: See allergy above    Physical Exam      Vitals:   Vitals:    06/11/20 1012   BP: 106/64   BP Location: Left arm   Patient Position: Sitting   BP Method: Small (Automatic)   Pulse: 89   Resp: 16   Temp: 98.2 °F (36.8 °C)   TempSrc: Temporal   SpO2: (!) 76%   Weight: 53.6 kg (118 lb 3.2 oz)   Height: 5' 6" (1.676 m)     BMI: Body mass index is 19.08 kg/m².  BSA Body surface area is 1.58 meters squared.  ECOG Performance Status:   ECOG SCORE         GENERAL APPEARANCE: Well developed, well nourished, in no acute distress.  SKIN: Inspection of the skin reveals no rashes, ulcerations or petechiae.  HEENT: The sclerae were anicteric and conjunctivae were pink and moist. Extraocular movements were intact and pupils were equal, round, and reactive to light with normal accommodation. External inspection of the ears and nose showed no scars, lesions, or masses. Lips, teeth, and gums showed normal mucosa. The oral mucosa, hard and soft palate, tongue and posterior pharynx were normal.  NECK: Supple and symmetric. There was no thyroid enlargement, and no tenderness, or masses were felt.  CRESPIRATORY: Normal AP diameter and normal contour without any kyphoscoliosis. LUNGS: Auscultation of the lungs revealed normal breath sounds without any other adventitious sounds or rubs.  CARDIOVASCULAR: There was a regular rate and rhythm without any murmurs, gallops, rubs. The carotid pulses were normal and 2+ bilaterally without bruits. " Peripheral pulses were 2+ and symmetric.  GASTROINTESTINAL: Soft and nontender with normal bowel sounds. The liver span was approximately 5-6 cm in the right midclavicular line by percussion. The liver edge was nontender. The spleen was not palpable. There were no inguinal or umbilical hernias noted. No ascites was noted.  LYMPH NODES: No lymphadenopathy was appreciated in the neck, axillae or groin.  MUSCULOSKELETAL: Gait was normal. There was no tenderness or effusions noted. Muscle strength and tone were normal. EXTREMITIES: No cyanosis, clubbing or edema.  NEUROLOGIC: Alert and oriented x 3. Normal affect. Gait was normal. Normal deep tendon reflexes with no pathological reflexes. Sensation to touch was normal.  PSYCHIATRIC: good mood, orientated to place, time and person     Labs and Imagings         Imaging:     Assessment:     Principle Problem: No primary diagnosis found.   Co-morbidity/Active Problems:   Patient Active Problem List   Diagnosis    Breast cancer metastasized to bone    Neuropathy    Fatigue associated with anemia    Drug-related hair loss    Nausea    Primary insomnia    Foot infection    Cancer, metastatic to bone    Malignant neoplasm of overlapping 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, estrogen receptor positive     Chronic fatigue    Diverticulosis    Dehydration    Hypoxemia    Iron deficiency anemia secondary to inadequate dietary iron intake    Hypoalbuminemia        Benita Carballo is a 74 y.o. female with PMH of There were no encounter diagnoses., with No primary diagnosis found.       Metastatic breast cancer ERPR positive HER2 Noah questionable  Status post of chemotherapy and HER2 targeted therapy with progression on HER2 targeted therapy  Recent testing fish negative for her 2 2018  Repeat the lipid biopsy 2019  shows HER2 positive and ER negative  Bone metastatic disease  Fatigue  Hypoxia  Dehydration    Plan:   Overall Plan:    She has HER2 positive disease with  ERPR negative by the new liquid biopsy. Started Taxol/Herceptin 2020  Discussed ECHO with pt, showing right heart failure   Seen by Dr Gardner, cardiologist and recommended that Rt heart failure unlikely related to chemo, likely related to COPD and ILD.   Herceptin/taxol resumed last week with poor tolerance by pt, weight loss, fatigue reported, will hold tx this week, plan to resume next week at low dose, discussed with Dr Contreras  Discussed with Dr Contreras and he recommended reducing dose of herceptin further  Lasix x 3 days with 10 lbs wt loss, p is instructed to use prn, pt has not had to use Lasix over last 3 weeks   Compression sleeve for dependant upper ext swelling  Pt is wanting to stop oxycontin and norco due to out of pocket cost. She is requesting to resume oxycodone 5 mg TID when she runs out of oxycontin/norco. She states works better and more affordable.  Labs reviewed today and she is cleared for tx.      Rosalind Ricks, NPEstablished Patient - Audio Only Telehealth Visit     The patient location is: home  The chief complaint leading to consultation is: chemo clearnace  Visit type: Virtual visit with audio only (telephone)     The reason for the audio only service rather than synchronous audio and video virtual visit was related to technical difficulties or patient preference/necessity.     Each patient to whom I provide medical services by telemedicine is:  (1) informed of the relationship between the physician and patient and the respective role of any other health care provider with respect to management of the patient; and (2) notified that they may decline to receive medical services by telemedicine and may withdraw from such care at any time. Patient verbally consented to receive this service via voice-only telephone call.       HPI: see above     Assessment and plan:  See above        Medical Oncology Progress Note  [facility]     PATIENT: Benita HEATHER Carballo  :  1945 74 y.o. female  MRN [id number]     PCP: Solomon Contreras MD  Consult Requested By:      Date of Service: 6/11/2020    Subjective:   Interim History:  Malignant neoplasm of overlapping sites of left breast    Benita Carballo is here for to followup breast cancer treatment.    The patient was started on  chemotherapy last week with Herceptin and Taxol weekly.  She has chronic hypoxia and her oxygen saturation has been declining today her O2 status 77% she looks purple and she also has increasing degree of short of breath; in addition she complains of swelling to her left arm.  She had no chest pain. She was admittted last week to the hospital and underwent thoracentesis, which was not tested for cytology and imaging for possible PE was ruled out. Today she states she is feeling better but POX was 82 % on RA, and still has yosi upper ext swelling which she states has improved. She has not had a echo so will order ECHO stat to r/o any CHF which could be causing her swelling both lung and upper ext. States she had upper u/s with her pulm which was neg for DVT.     3/26/20   She returns with ECHO showing EF 55-60%, but right sided heart failure noted. Pt has an additional 12 # of wt gain since last week. She states she is feeling fine, better than at last visit. Discussed ECHO and will hold herceptin and obtain MUGA, will proceed with weekly taxol and possibly will add herceptin back after MUGA completed and will refer pt to cardiology, but could be delayed due to COVID pandemic.     4/2020  Today she presents with significant fluid loss noted. No longer upper ext third space swelling noted, alb and T Pro dramatically improved, pt has last 10 + lbs of fluid. She is breathing easy today and denies any SOB or ORELLANA. We will proceed with weekly taxol and continue to hold herceptin until MUGA or cardiology clearance is obtained. She was seen yesterday with Dr Gardner. Below is a portion of his note:       Oncology  History:  Oncology History    2002 Rt breast cancer s/p lump with [place] Tam[place] x 1 yr, then Arimidex x 5 yrs. Completed tx 2008.     2015  c/o chronic cough with CT chest 1/28/16 showing extensive adenopathy  With base of lt neck mass 4.5 cm with yosi pulm nodules. RF to Dr ANDREWS Mcnally    2/6/16 bx of lt neck mass carcinoma with feature suggestive of breast primary. ER/%, Her2 borderline by IHC and deemed equivocal by FISH          Breast cancer metastasized to bone    2/4/2016 Initial Diagnosis     Breast cancer metastasized to bone, , lymphoid mass       5/25/2016 - 2/9/2017 Chemotherapy     Taxol/Herceptin x 8 cycles then perjeta added 7/16  Prescribed by Dr Mcnally       2/20/2017 - 6/17/2017 Chemotherapy     Kadcycla prescribed by Dr Mcnally       6/28/2017 Biopsy     Biopsy of rt abd met disease consistent with breast cancer. ER/CA + Her 2 neg per Dr Moreno's note.    Dr Moreno noted path review from bx 2/16 showed HER 2 + equivocal with FISH reading as Equivocal but ratio 1.2. Additional report form 2016 states additional testing was done with different probe and the equivocal results can be noted as negative.     Treatment therapy changed from Her2 based tx to endocrine base therapy by Dr Moreno       6/28/2017 -  Hormone Therapy     Arimidex 6/17-2/18  Aromasin 2/18  Ibrance + Aromasin 5/18 -1/19  Ibrance + faslodex 2/19 4/30/2019 Imaging Significant Findings     PET/CT: Lesions involving the medial clavicle , subcarinal LN, pleural based soft tissue density adjacent to T11 and lesion in left lobe of liver diminished in size and SUV.   RECIST 34% decrease in disease      2/17/2020 -  Chemotherapy     Treatment Summary   Plan Name: OP BREAST TRASTUZUMAB PACLITAXEL QW  Treatment Goal: Control  Status: Active  Start Date: 2/26/2020  End Date: 6/25/2020 (Planned)  Provider: Solomon Contreras MD  Chemotherapy: PACLitaxel (TAXOL) 64 mg/m2 = 102 mg in sodium chloride 0.9% 250 mL chemo infusion, 64 mg/m2 =  102 mg (100 % of original dose 64 mg/m2), Intravenous, Clinic/HOD 1 time, 11 of 14 cycles  Dose modification: 64 mg/m2 (original dose 64 mg/m2, Cycle 1), 50 mg/m2 (original dose 64 mg/m2, Cycle 14)  trastuzumab 211 mg in sodium chloride 0.9% 250 mL chemo infusion, 4 mg/kg = 211 mg, Intravenous, Clinic/HOD 1 time, 6 of 9 cycles  Dose modification: 2 mg/kg (original dose 2 mg/kg, Cycle 7, Reason: MD Discretion), 80 mg (original dose 2 mg/kg, Cycle 8)       ==8/2019 Now She is currently on combined afinitor + aromasin,  repeated CT scan shows improvement  PET 1/2020 showed PD,  afinitor + aromasin stopped  == PET scan 01/20/2020 the progressive  ==Started Taxol/Herceptin weekly 2/2020    Past Medical History:   Past Medical History:   Diagnosis Date    Arthritis     Bone cancer     Breast cancer     Cancer     Cataract     Depression     Diverticulosis 1/15/2020    Fatigue associated with anemia 6/6/2019    GERD (gastroesophageal reflux disease)     High cholesterol     Hypoxemia 3/12/2020    Skin problem     Sleep disorder        Past Surgical HIstory:   Past Surgical History:   Procedure Laterality Date    APPENDECTOMY      BREAST LUMPECTOMY Right 2002    CATARACT EXTRACTION, BILATERAL  2014    COLONOSCOPY  2008    HYSTERECTOMY      PORTACATH PLACEMENT  05/18/2016    SURGICAL REMOVAL OF NODULE OF VOCAL CORD         Allergies:  Review of patient's allergies indicates:   Allergen Reactions    Benadryl [diphenhydramine hcl]      Restless leg     Medrol [methylprednisolone] Hallucinations    Penicillins        Medications:  Current Outpatient Medications   Medication Sig Dispense Refill    ANORO ELLIPTA 62.5-25 mcg/actuation DsDv       dexAMETHasone 0.5 mg/5 mL Soln SWISH WITH 10 ML PO FOR 2 MIN THEN SPIT QID      exemestane (AROMASIN) 25 mg tablet Take 1 tablet (25 mg total) by mouth once daily. 30 tablet 3    furosemide (LASIX) 40 MG tablet Take 1 tablet (40 mg total) by mouth once daily. 30  tablet 11    gabapentin (NEURONTIN) 400 MG capsule Take 1 capsule (400 mg total) by mouth 3 (three) times daily. 120 capsule 4    iron-vitamin C 100-250 mg, ICAR-C, (ICAR-C) 100-250 mg Tab Take 1 tablet by mouth once daily. 30 tablet 5    LORazepam (ATIVAN) 1 MG tablet TAKE 1 TABLET BY MOUTH EVERY EVENING 30 tablet 0    megestrol (MEGACE) 400 mg/10 mL (40 mg/mL) Susp Take 10 mLs (400 mg total) by mouth once daily. 240 mL 2    oxyCODONE (ROXICODONE) 10 mg Tab immediate release tablet Take 1 tablet (10 mg total) by mouth every 8 (eight) hours as needed for Pain. 45 tablet 0    pantoprazole (PROTONIX) 40 MG tablet TAKE 1 TABLET BY MOUTH EVERY DAY 90 tablet 0    potassium chloride SA (K-DUR,KLOR-CON) 20 MEQ tablet Take 1 tablet (20 mEq total) by mouth once daily. 30 tablet 11    PROAIR HFA 90 mcg/actuation inhaler       promethazine (PHENERGAN) 25 MG tablet Take 1 tablet (25 mg total) by mouth every 6 (six) hours as needed for Nausea. 30 tablet 3    temazepam (RESTORIL) 15 mg Cap TAKE 1 CAPSULE(15 MG) BY MOUTH EVERY NIGHT 30 capsule 0     No current facility-administered medications for this visit.        Family History:   Family History   Problem Relation Age of Onset    Lung cancer Father     Melanoma Father        Social History:  reports that she has been smoking cigarettes. She has a 2.00 pack-year smoking history. She has never used smokeless tobacco. She reports that she drinks about 4.0 standard drinks of alcohol per week. She reports that she does not use drugs.    Review of Systemas  Constitutional: No change in weight, appetie, fatigue, fever, or night sweats  Eyes: No changes in vision  Ears, Nose, Mouth, Throat, and Face: No hearing problems, ear pain, rummy nose, or sore throat  Respiratory: No shortness of breath or cough  Cardiovascular: No chest pain, palpitations or dizziness  Gastrointestinal: No abdominal pain, hematochezia, melena  Genitourinary: No dysuria, hematuria, nocturia,  "menstrual problems, post menopausal  Integumentary/Breast: No rashes or itching  Hematologic/Lymphatic: No anemia or bleeding abnormalities  Musculoskeletal: No joint or muscle abnormalities  Neurological: No sensory or motor problems, no headaches  Behavioral/Psych: No depression, anxiety, cognitive problems, or stress  Endocrine: No diabetes or thyroid problems  Allergic/Immunologic: See allergy above    Physical Exam      Vitals:   Vitals:    06/11/20 1012   BP: 106/64   BP Location: Left arm   Patient Position: Sitting   BP Method: Small (Automatic)   Pulse: 89   Resp: 16   Temp: 98.2 °F (36.8 °C)   TempSrc: Temporal   SpO2: (!) 76%   Weight: 53.6 kg (118 lb 3.2 oz)   Height: 5' 6" (1.676 m)     BMI: Body mass index is 19.08 kg/m².  BSA Body surface area is 1.58 meters squared.  ECOG Performance Status:   ECOG SCORE         GENERAL APPEARANCE: Well developed, well nourished, in no acute distress.  SKIN: Inspection of the skin reveals no rashes, ulcerations or petechiae.  HEENT: The sclerae were anicteric and conjunctivae were pink and moist. Extraocular movements were intact and pupils were equal, round, and reactive to light with normal accommodation. External inspection of the ears and nose showed no scars, lesions, or masses. Lips, teeth, and gums showed normal mucosa. The oral mucosa, hard and soft palate, tongue and posterior pharynx were normal.  NECK: Supple and symmetric. There was no thyroid enlargement, and no tenderness, or masses were felt.  CRESPIRATORY: Normal AP diameter and normal contour without any kyphoscoliosis. LUNGS: Auscultation of the lungs revealed normal breath sounds without any other adventitious sounds or rubs.  CARDIOVASCULAR: There was a regular rate and rhythm without any murmurs, gallops, rubs. The carotid pulses were normal and 2+ bilaterally without bruits. Peripheral pulses were 2+ and symmetric.  GASTROINTESTINAL: Soft and nontender with normal bowel sounds. The liver span " was approximately 5-6 cm in the right midclavicular line by percussion. The liver edge was nontender. The spleen was not palpable. There were no inguinal or umbilical hernias noted. No ascites was noted.  LYMPH NODES: No lymphadenopathy was appreciated in the neck, axillae or groin.  MUSCULOSKELETAL: Gait was normal. There was no tenderness or effusions noted. Muscle strength and tone were normal. EXTREMITIES: No cyanosis, clubbing or edema.  NEUROLOGIC: Alert and oriented x 3. Normal affect. Gait was normal. Normal deep tendon reflexes with no pathological reflexes. Sensation to touch was normal.  PSYCHIATRIC: good mood, orientated to place, time and person     Labs and Imagings     Lab Results   Component Value Date    WBC 7.1 05/21/2020    HGB 11.4 (L) 05/21/2020    HCT 37.4 05/21/2020    LABPLAT 295 05/21/2020       CMP  Sodium   Date Value Ref Range Status   06/04/2020 138 135 - 145 mmol/L Final     Potassium   Date Value Ref Range Status   06/04/2020 4.0 3.6 - 5.2 mmol/L Final     Chloride   Date Value Ref Range Status   06/04/2020 100 100 - 108 mmol/L Final     CO2   Date Value Ref Range Status   06/04/2020 27 21 - 32 mmol/L Final     Glucose   Date Value Ref Range Status   06/04/2020 89 70 - 110 mg/dL Final     BUN, Bld   Date Value Ref Range Status   06/04/2020 11 7 - 18 mg/dL Final     Creatinine   Date Value Ref Range Status   06/04/2020 0.92 0.55 - 1.02 mg/dL Final     Calcium   Date Value Ref Range Status   06/04/2020 8.1 (L) 8.8 - 10.5 mg/dL Final     Total Protein   Date Value Ref Range Status   06/04/2020 6.3 (L) 6.4 - 8.2 g/dL Final     Albumin   Date Value Ref Range Status   06/04/2020 3.3 (L) 3.4 - 5.0 g/dL Final     Total Bilirubin   Date Value Ref Range Status   06/04/2020 0.5 0.0 - 1.0 mg/dL Final     Alkaline Phosphatase   Date Value Ref Range Status   06/04/2020 67 46 - 116 U/L Final     AST   Date Value Ref Range Status   06/04/2020 18 15 - 37 U/L Final     ALT   Date Value Ref Range Status    06/04/2020 19 12 - 78 U/L Final     Anion Gap   Date Value Ref Range Status   06/04/2020 11.0 3.0 - 11.0 mmol/L Final         Imaging:     Assessment:     Principle Problem: No primary diagnosis found.   Co-morbidity/Active Problems:   Patient Active Problem List   Diagnosis    Breast cancer metastasized to bone    Neuropathy    Fatigue associated with anemia    Drug-related hair loss    Nausea    Primary insomnia    Foot infection    Cancer, metastatic to bone    Malignant neoplasm of overlapping 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, estrogen receptor positive     Chronic fatigue    Diverticulosis    Dehydration    Hypoxemia    Iron deficiency anemia secondary to inadequate dietary iron intake    Hypoalbuminemia        Benita Carballo is a 74 y.o. female with PMH of There were no encounter diagnoses., with No primary diagnosis found.       Metastatic breast cancer ERPR positive HER2 Noah questionable  Status post of chemotherapy and HER2 targeted therapy with progression on HER2 targeted therapy  Recent testing fish negative for her 2 2018  Repeat the lipid biopsy 2019  shows HER2 positive and ER negative  Bone metastatic disease  Fatigue  Hypoxia  Dehydration    Plan:   Overall Plan:    She has HER2 positive disease with ERPR negative by the new liquid biopsy. Started Taxol/Herceptin 2/2020  Discussed ECHO with pt, showing right heart failure   Seen by Dr Gardner, cardiologist and recommended that Rt heart failure unlikely related to chemo, likely related to COPD and ILD.   Herceptin/taxol resumed last week with poor tolerance by pt, weight loss, fatigue reported, will hold tx this week, plan to resume next week at low dose, discussed with Dr Contreras  Discussed with Dr Contreras and he recommended reducing dose of herceptin further  Lasix x 3 days with 10 lbs wt loss, p is instructed to use prn, pt has not had to use Lasix over last 3 weeks   Compression sleeve for dependant upper ext swelling  Pt is  wanting to stop oxycontin and norco due to out of pocket cost. She is requesting to resume oxycodone 5 mg TID when she runs out of oxycontin/norco. She states works better and more affordable.  Labs reviewed today and she is cleared for tx.      Rosalind Ricks NP  Plan:   Overall Plan:    She has HER2 positive disease with ERPR negative by the new liquid biopsy. Started Taxol/Herceptin 2/2020  Discussed ECHO with pt, showing right heart failure 3/2020  Seen by Dr Gardner, cardiologist and recommended that Rt heart failure unlikely related to chemo, likely related to COPD and ILD.   Lasix x 3 days with 10 lbs wt loss, p is instructed to use prn, pt has not had to use Lasix over last 3 weeks   Compression sleeve for dependant upper ext swelling  Pt is wanting to stop oxycontin and norco due to out of pocket cost. She is requesting to resume oxycodone 5 mg TID when she runs out of oxycontin/norco. She states works better and more affordable.  Labs reviewed today and she is cleared for tx.  Only taxol given today as herceptin is not available but will resume Herceptint/taxol next week, will see pt every other week in clinic  [facility] one molecular testing requested     Rosalind Ricks NP

## 2020-06-17 ENCOUNTER — TELEPHONE (OUTPATIENT)
Dept: HEMATOLOGY/ONCOLOGY | Facility: CLINIC | Age: 75
End: 2020-06-17

## 2020-06-17 NOTE — TELEPHONE ENCOUNTER
Lo stated that Ms. Carballo canceled her PET imaging due to her being ill and that she ( Ms. Carballo) would call and reschedule her PET scan.

## 2020-06-18 ENCOUNTER — OFFICE VISIT (OUTPATIENT)
Dept: HEMATOLOGY/ONCOLOGY | Facility: CLINIC | Age: 75
End: 2020-06-18
Payer: MEDICARE

## 2020-06-18 VITALS
OXYGEN SATURATION: 98 % | RESPIRATION RATE: 16 BRPM | HEART RATE: 87 BPM | DIASTOLIC BLOOD PRESSURE: 74 MMHG | TEMPERATURE: 98 F | WEIGHT: 118 LBS | HEIGHT: 66 IN | SYSTOLIC BLOOD PRESSURE: 123 MMHG | BODY MASS INDEX: 18.96 KG/M2

## 2020-06-18 DIAGNOSIS — C79.51 CARCINOMA OF BREAST METASTATIC TO BONE, UNSPECIFIED LATERALITY: Primary | ICD-10-CM

## 2020-06-18 DIAGNOSIS — C50.919 CARCINOMA OF BREAST METASTATIC TO BONE, UNSPECIFIED LATERALITY: Primary | ICD-10-CM

## 2020-06-18 DIAGNOSIS — C79.51 CANCER, METASTATIC TO BONE: ICD-10-CM

## 2020-06-18 PROCEDURE — 99214 OFFICE O/P EST MOD 30 MIN: CPT | Mod: S$GLB,,, | Performed by: NURSE PRACTITIONER

## 2020-06-18 PROCEDURE — 99214 PR OFFICE/OUTPT VISIT, EST, LEVL IV, 30-39 MIN: ICD-10-PCS | Mod: S$GLB,,, | Performed by: NURSE PRACTITIONER

## 2020-06-18 RX ORDER — DOXYCYCLINE 100 MG/1
100 CAPSULE ORAL EVERY 12 HOURS
Qty: 20 CAPSULE | Refills: 0 | Status: SHIPPED | OUTPATIENT
Start: 2020-06-18

## 2020-06-18 RX ORDER — OXYCODONE HYDROCHLORIDE 15 MG/1
15 TABLET ORAL EVERY 6 HOURS PRN
Qty: 60 TABLET | Refills: 0 | Status: SHIPPED | OUTPATIENT
Start: 2020-06-18 | End: 2020-07-13 | Stop reason: SDUPTHER

## 2020-06-25 ENCOUNTER — OFFICE VISIT (OUTPATIENT)
Dept: HEMATOLOGY/ONCOLOGY | Facility: CLINIC | Age: 75
End: 2020-06-25
Payer: MEDICARE

## 2020-06-25 VITALS
WEIGHT: 115.81 LBS | SYSTOLIC BLOOD PRESSURE: 126 MMHG | BODY MASS INDEX: 18.61 KG/M2 | HEART RATE: 75 BPM | HEIGHT: 66 IN | OXYGEN SATURATION: 90 % | DIASTOLIC BLOOD PRESSURE: 71 MMHG | RESPIRATION RATE: 16 BRPM | TEMPERATURE: 97 F

## 2020-06-25 DIAGNOSIS — C50.919 CARCINOMA OF BREAST METASTATIC TO BONE, UNSPECIFIED LATERALITY: Primary | ICD-10-CM

## 2020-06-25 DIAGNOSIS — C79.51 CARCINOMA OF BREAST METASTATIC TO BONE, UNSPECIFIED LATERALITY: Primary | ICD-10-CM

## 2020-06-25 PROCEDURE — 99215 PR OFFICE/OUTPT VISIT, EST, LEVL V, 40-54 MIN: ICD-10-PCS | Mod: S$GLB,,, | Performed by: INTERNAL MEDICINE

## 2020-06-25 PROCEDURE — 99215 OFFICE O/P EST HI 40 MIN: CPT | Mod: S$GLB,,, | Performed by: INTERNAL MEDICINE

## 2020-06-25 RX ORDER — FAMOTIDINE 10 MG/ML
20 INJECTION INTRAVENOUS
Status: CANCELLED | OUTPATIENT
Start: 2020-07-02

## 2020-06-25 RX ORDER — SODIUM CHLORIDE 0.9 % (FLUSH) 0.9 %
10 SYRINGE (ML) INJECTION
Status: CANCELLED | OUTPATIENT
Start: 2020-06-25

## 2020-06-25 RX ORDER — DIPHENHYDRAMINE HYDROCHLORIDE 50 MG/ML
50 INJECTION INTRAMUSCULAR; INTRAVENOUS ONCE AS NEEDED
Status: CANCELLED | OUTPATIENT
Start: 2020-07-02

## 2020-06-25 RX ORDER — EPINEPHRINE 0.3 MG/.3ML
0.3 INJECTION SUBCUTANEOUS ONCE AS NEEDED
Status: CANCELLED | OUTPATIENT
Start: 2020-07-02

## 2020-06-25 RX ORDER — EPINEPHRINE 0.3 MG/.3ML
0.3 INJECTION SUBCUTANEOUS ONCE AS NEEDED
Status: CANCELLED | OUTPATIENT
Start: 2020-06-25

## 2020-06-25 RX ORDER — FAMOTIDINE 10 MG/ML
20 INJECTION INTRAVENOUS
Status: CANCELLED | OUTPATIENT
Start: 2020-06-25

## 2020-06-25 RX ORDER — HEPARIN 100 UNIT/ML
500 SYRINGE INTRAVENOUS
Status: CANCELLED | OUTPATIENT
Start: 2020-06-25

## 2020-06-25 RX ORDER — HEPARIN 100 UNIT/ML
500 SYRINGE INTRAVENOUS
Status: CANCELLED | OUTPATIENT
Start: 2020-07-02

## 2020-06-25 RX ORDER — DIPHENHYDRAMINE HYDROCHLORIDE 50 MG/ML
50 INJECTION INTRAMUSCULAR; INTRAVENOUS ONCE AS NEEDED
Status: CANCELLED | OUTPATIENT
Start: 2020-06-25

## 2020-06-25 RX ORDER — SODIUM CHLORIDE 0.9 % (FLUSH) 0.9 %
10 SYRINGE (ML) INJECTION
Status: CANCELLED | OUTPATIENT
Start: 2020-07-02

## 2020-06-25 NOTE — PROGRESS NOTES
Medical Oncology Progress Note  [facility]     PATIENT: Benita Carballo  : 1945 74 y.o. female  MRN [id number]     PCP: Solomon Contreras MD  Consult Requested By:      Date of Service: 2020    Subjective:   Interim History:  No chief complaint on file.    Benita Carballo is here for to followup breast cancer treatment.    The patient was started on  chemotherapy last week with Herceptin and Taxol weekly.  She has chronic hypoxia and her oxygen saturation has been declining today her O2 status 77% she looks purple and she also has increasing degree of short of breath; in addition she complains of swelling to her left arm.  She had no chest pain. She was admittted last week to the hospital and underwent thoracentesis, which was not tested for cytology and imaging for possible PE was ruled out. Today she states she is feeling better but POX was 82 % on RA, and still has yosi upper ext swelling which she states has improved. She has not had a echo so will order ECHO stat to r/o any CHF which could be causing her swelling both lung and upper ext. States she had upper u/s with her pulm which was neg for DVT.     3/26/20   She returns with ECHO showing EF 55-60%, but right sided heart failure noted. Pt has an additional 12 # of wt gain since last week. She states she is feeling fine, better than at last visit. Discussed ECHO and will hold herceptin and obtain MUGA, will proceed with weekly taxol and possibly will add herceptin back after MUGA completed and will refer pt to cardiology, but could be delayed due to COVID pandemic.     2020  Today she presents with significant fluid loss noted. No longer upper ext third space swelling noted, alb and T Pro dramatically improved, pt has last 10 + lbs of fluid. She is breathing easy today and denies any SOB or ORELLANA. We will proceed with weekly taxol and continue to hold herceptin until MUGA or cardiology clearance is obtained. She was seen  yesterday with Dr Gardner. Below is a portion of his note:       Oncology History:  Oncology History Overview Note   2002 Rt breast cancer s/p lump with [place] Tamoxifen x 1 yr, then Arimidex x 5 yrs. Completed tx 2008.     2015  c/o chronic cough with CT chest 1/28/16 showing extensive adenopathy  With base of lt neck mass 4.5 cm with yosi pulm nodules. RF to Dr ANDREWS Mcnally    2/6/16 bx of lt neck mass carcinoma with feature suggestive of breast primary. ER/%, Her2 borderline by IHC and deemed equivocal by FISH       Breast cancer metastasized to bone   2/4/2016 Initial Diagnosis    Breast cancer metastasized to bone, , lymphoid mass      5/25/2016 - 2/9/2017 Chemotherapy    Taxol/Herceptin x 8 cycles then perjeta added 7/16  Prescribed by Dr Mcnally      2/20/2017 - 6/17/2017 Chemotherapy    Kadcycla prescribed by Dr Mcnally      6/28/2017 Biopsy    Biopsy of rt abd met disease consistent with breast cancer. ER/AZ + Her 2 neg per Dr Moreno's note.    Dr Moreno noted path review from bx 2/16 showed HER 2 + equivocal with FISH reading as Equivocal but ratio 1.2. Additional report form 2016 states additional testing was done with different probe and the equivocal results can be noted as negative.     Treatment therapy changed from Her2 based tx to endocrine base therapy by Dr Moreno      6/28/2017 -  Hormone Therapy    Arimidex 6/17-2/18  Aromasin 2/18  Ibrance + Aromasin 5/18 -1/19  Ibrance + faslodex 2/19 4/30/2019 Imaging Significant Findings    PET/CT: Lesions involving the medial clavicle , subcarinal LN, pleural based soft tissue density adjacent to T11 and lesion in left lobe of liver diminished in size and SUV.   RECIST 34% decrease in disease     2/26/2020 -  Chemotherapy    Treatment Summary   Plan Name: OP BREAST TRASTUZUMAB PACLITAXEL QW  Treatment Goal: Control  Status: Active  Start Date: 2/26/2020  End Date: 7/2/2020 (Planned)  Provider: Solomon Contreras MD  Chemotherapy: PACLitaxel (TAXOL) 64  mg/m2 = 102 mg in sodium chloride 0.9% 250 mL chemo infusion, 64 mg/m2 = 102 mg (100 % of original dose 64 mg/m2), Intravenous, Clinic/HOD 1 time, 12 of 14 cycles  Dose modification: 64 mg/m2 (original dose 64 mg/m2, Cycle 1), 50 mg/m2 (original dose 64 mg/m2, Cycle 14)  trastuzumab 211 mg in sodium chloride 0.9% 250 mL chemo infusion, 4 mg/kg = 211 mg, Intravenous, Clinic/HOD 1 time, 7 of 9 cycles  Dose modification: 2 mg/kg (original dose 2 mg/kg, Cycle 7, Reason: MD Discretion), 80 mg (original dose 2 mg/kg, Cycle 8)       ==8/2019 Now She is currently on combined afinitor + aromasin,  repeated CT scan shows improvement  PET 1/2020 showed PD,  afinitor + aromasin stopped  == PET scan 01/20/2020 the progressive  ==Started Taxol/Herceptin weekly 2/2020  ==6/2020 PET PD  ==add Weekly paclitaxel with trastuzumab and pertuzumab  Past Medical History:   Past Medical History:   Diagnosis Date    Arthritis     Bone cancer     Breast cancer     Cancer     Cataract     Depression     Diverticulosis 1/15/2020    Fatigue associated with anemia 6/6/2019    GERD (gastroesophageal reflux disease)     High cholesterol     Hypoxemia 3/12/2020    Skin problem     Sleep disorder        Past Surgical HIstory:   Past Surgical History:   Procedure Laterality Date    APPENDECTOMY      BREAST LUMPECTOMY Right 2002    CATARACT EXTRACTION, BILATERAL  2014    COLONOSCOPY  2008    HYSTERECTOMY      PORTACATH PLACEMENT  05/18/2016    SURGICAL REMOVAL OF NODULE OF VOCAL CORD         Allergies:  Review of patient's allergies indicates:   Allergen Reactions    Benadryl [diphenhydramine hcl]      Restless leg     Medrol [methylprednisolone] Hallucinations    Penicillins        Medications:  Current Outpatient Medications   Medication Sig Dispense Refill    ANORO ELLIPTA 62.5-25 mcg/actuation DsDv       dexAMETHasone 0.5 mg/5 mL Soln SWISH WITH 10 ML PO FOR 2 MIN THEN SPIT QID      doxycycline (VIBRAMYCIN) 100 MG Cap  Take 1 capsule (100 mg total) by mouth every 12 (twelve) hours. 20 capsule 0    exemestane (AROMASIN) 25 mg tablet Take 1 tablet (25 mg total) by mouth once daily. 30 tablet 3    furosemide (LASIX) 40 MG tablet Take 1 tablet (40 mg total) by mouth once daily. 30 tablet 11    gabapentin (NEURONTIN) 400 MG capsule Take 1 capsule (400 mg total) by mouth 3 (three) times daily. 120 capsule 4    iron-vitamin C 100-250 mg, ICAR-C, (ICAR-C) 100-250 mg Tab Take 1 tablet by mouth once daily. 30 tablet 5    LORazepam (ATIVAN) 1 MG tablet TAKE 1 TABLET BY MOUTH EVERY EVENING 30 tablet 0    megestrol (MEGACE) 400 mg/10 mL (40 mg/mL) Susp Take 10 mLs (400 mg total) by mouth once daily. 240 mL 2    oxyCODONE (ROXICODONE) 15 MG Tab Take 1 tablet (15 mg total) by mouth every 6 (six) hours as needed for Pain. 60 tablet 0    pantoprazole (PROTONIX) 40 MG tablet TAKE 1 TABLET BY MOUTH EVERY DAY 90 tablet 0    potassium chloride SA (K-DUR,KLOR-CON) 20 MEQ tablet Take 1 tablet (20 mEq total) by mouth once daily. 30 tablet 11    PROAIR HFA 90 mcg/actuation inhaler       promethazine (PHENERGAN) 25 MG tablet TAKE 1 TABLET BY MOUTH EVERY 6 HOURS AS NEEDED FOR NAUSEA 30 tablet 3    temazepam (RESTORIL) 15 mg Cap TAKE 1 CAPSULE(15 MG) BY MOUTH EVERY NIGHT 30 capsule 0     No current facility-administered medications for this visit.        Family History:   Family History   Problem Relation Age of Onset    Lung cancer Father     Melanoma Father        Social History:  reports that she has been smoking cigarettes. She has a 2.00 pack-year smoking history. She has never used smokeless tobacco. She reports current alcohol use of about 4.0 standard drinks of alcohol per week. She reports that she does not use drugs.    Review of Systemas  Constitutional: No change in weight, appetie, fatigue, fever, or night sweats  Eyes: No changes in vision  Ears, Nose, Mouth, Throat, and Face: No hearing problems, ear pain, rummy nose, or sore  "throat  Respiratory: No shortness of breath or cough  Cardiovascular: No chest pain, palpitations or dizziness  Gastrointestinal: No abdominal pain, hematochezia, melena  Genitourinary: No dysuria, hematuria, nocturia, menstrual problems, post menopausal  Integumentary/Breast: No rashes or itching  Hematologic/Lymphatic: No anemia or bleeding abnormalities  Musculoskeletal: No joint or muscle abnormalities  Neurological: No sensory or motor problems, no headaches  Behavioral/Psych: No depression, anxiety, cognitive problems, or stress  Endocrine: No diabetes or thyroid problems  Allergic/Immunologic: See allergy above    Physical Exam      Vitals:   Vitals:    06/25/20 1000   BP: 126/71   BP Location: Left arm   Patient Position: Sitting   BP Method: Small (Automatic)   Pulse: 75   Resp: 16   Temp: 97.1 °F (36.2 °C)   TempSrc: Temporal   SpO2: (!) 90%   Weight: 52.5 kg (115 lb 12.8 oz)   Height: 5' 6" (1.676 m)     BMI: Body mass index is 18.69 kg/m².  BSA Body surface area is 1.56 meters squared.  ECOG Performance Status:   ECOG SCORE    1 - Restricted in strenuous activity-ambulatory and able to carry out work of a light nature       GENERAL APPEARANCE: Well developed, well nourished, in no acute distress.  SKIN: Inspection of the skin reveals no rashes, ulcerations or petechiae.  HEENT: The sclerae were anicteric and conjunctivae were pink and moist. Extraocular movements were intact and pupils were equal, round, and reactive to light with normal accommodation. External inspection of the ears and nose showed no scars, lesions, or masses. Lips, teeth, and gums showed normal mucosa. The oral mucosa, hard and soft palate, tongue and posterior pharynx were normal.  NECK: Supple and symmetric. There was no thyroid enlargement, and no tenderness, or masses were felt.  CRESPIRATORY: Normal AP diameter and normal contour without any kyphoscoliosis. LUNGS: Auscultation of the lungs revealed normal breath sounds without " any other adventitious sounds or rubs.  CARDIOVASCULAR: There was a regular rate and rhythm without any murmurs, gallops, rubs. The carotid pulses were normal and 2+ bilaterally without bruits. Peripheral pulses were 2+ and symmetric.  GASTROINTESTINAL: Soft and nontender with normal bowel sounds. The liver span was approximately 5-6 cm in the right midclavicular line by percussion. The liver edge was nontender. The spleen was not palpable. There were no inguinal or umbilical hernias noted. No ascites was noted.  LYMPH NODES: No lymphadenopathy was appreciated in the neck, axillae or groin.  MUSCULOSKELETAL: Gait was normal. There was no tenderness or effusions noted. Muscle strength and tone were normal. EXTREMITIES: No cyanosis, clubbing or edema.  NEUROLOGIC: Alert and oriented x 3. Normal affect. Gait was normal. Normal deep tendon reflexes with no pathological reflexes. Sensation to touch was normal.  PSYCHIATRIC: good mood, orientated to place, time and person     Labs and Imagings         Imaging:     Assessment:     Principle Problem: 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, unspecified laterality [C50.919, C79.51]   Co-morbidity/Active Problems:   Patient Active Problem List   Diagnosis    Breast cancer metastasized to bone    Neuropathy    Fatigue associated with anemia    Drug-related hair loss    Nausea    Primary insomnia    Foot infection    Cancer, metastatic to bone    Malignant neoplasm of overlapping 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, estrogen receptor positive     Chronic fatigue    Diverticulosis    Dehydration    Hypoxemia    Iron deficiency anemia secondary to inadequate dietary iron intake    Hypoalbuminemia        Benita Carballo is a 74 y.o. female with PMH of The encounter diagnosis was 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, unspecified laterality., with 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, unspecified laterality [C50.919, C79.51]       Metastatic breast cancer ERPR  positive HER2 Noah questionable  Status post of chemotherapy and HER2 targeted therapy with progression on HER2 targeted therapy  Recent testing fish negative for her 2 2018  Repeat the lipid biopsy 2019  shows HER2 positive and ER negative  Bone metastatic disease  Fatigue  Hypoxia  Dehydration    Plan:   Overall Plan:    She has HER2 positive disease with ERPR negative by the new liquid biopsy. Started Taxol/Herceptin 2/2020  Discussed ECHO with pt, showing right heart failure   Seen by Dr Gardner, cardiologist and recommended that Rt heart failure unlikely related to chemo, likely related to COPD and ILD.   Herceptin/taxol resumed last week with poor tolerance by pt, weight loss, fatigue reported, will hold tx this week, plan to resume next week at low dose, discussed with Dr Contreras  Discussed with Dr Contreras and he recommended reducing dose of herceptin further  Lasix x 3 days with 10 lbs wt loss, p is instructed to use prn, pt has not had to use Lasix over last 3 weeks   Compression sleeve for dependant upper ext swelling  Pt is wanting to stop oxycontin and norco due to out of pocket cost. She is requesting to resume oxycodone 5 mg TID when she runs out of oxycontin/norco. She states works better and more affordable.  Labs reviewed today and she is cleared for tx.      Rosalind Ricks, NPEstablished Patient - Audio Only Telehealth Visit     The patient location is: home  The chief complaint leading to consultation is: chemo clearnace  Visit type: Virtual visit with audio only (telephone)     The reason for the audio only service rather than synchronous audio and video virtual visit was related to technical difficulties or patient preference/necessity.     Each patient to whom I provide medical services by telemedicine is:  (1) informed of the relationship between the physician and patient and the respective role of any other health care provider with respect to management of the patient; and (2) notified that they  may decline to receive medical services by telemedicine and may withdraw from such care at any time. Patient verbally consented to receive this service via voice-only telephone call.       HPI: see above     Assessment and plan:  See above        Medical Oncology Progress Note  [facility]     PATIENT: Benita Carballo  : 1945 74 y.o. female  MRN [id number]     PCP: Solomon Contreras MD  Consult Requested By:      Date of Service: 2020    Subjective:   Interim History:  No chief complaint on file.    Benita Carballo is here for to followup breast cancer treatment.    The patient was started on  chemotherapy last week with Herceptin and Taxol weekly.  She has chronic hypoxia and her oxygen saturation has been declining today her O2 status 77% she looks purple and she also has increasing degree of short of breath; in addition she complains of swelling to her left arm.  She had no chest pain. She was admittted last week to the hospital and underwent thoracentesis, which was not tested for cytology and imaging for possible PE was ruled out. Today she states she is feeling better but POX was 82 % on RA, and still has yosi upper ext swelling which she states has improved. She has not had a echo so will order ECHO stat to r/o any CHF which could be causing her swelling both lung and upper ext. States she had upper u/s with her pulm which was neg for DVT.     3/26/20   She returns with ECHO showing EF 55-60%, but right sided heart failure noted. Pt has an additional 12 # of wt gain since last week. She states she is feeling fine, better than at last visit. Discussed ECHO and will hold herceptin and obtain MUGA, will proceed with weekly taxol and possibly will add herceptin back after MUGA completed and will refer pt to cardiology, but could be delayed due to COVID pandemic.     2020  Today she presents with significant fluid loss noted. No longer upper ext third space swelling noted,  alb and T Pro dramatically improved, pt has last 10 + lbs of fluid. She is breathing easy today and denies any SOB or ORELLANA. We will proceed with weekly taxol and continue to hold herceptin until MUGA or cardiology clearance is obtained. She was seen yesterday with Dr Gardner. Below is a portion of his note:       Oncology History:  Oncology History Overview Note   2002 Rt breast cancer s/p lump with [place] Tamoxifen x 1 yr, then Arimidex x 5 yrs. Completed tx 2008.     2015  c/o chronic cough with CT chest 1/28/16 showing extensive adenopathy  With base of lt neck mass 4.5 cm with yosi pulm nodules. RF to Dr ANDREWS Mcnally    2/6/16 bx of lt neck mass carcinoma with feature suggestive of breast primary. ER/%, Her2 borderline by IHC and deemed equivocal by FISH       Breast cancer metastasized to bone   2/4/2016 Initial Diagnosis    Breast cancer metastasized to bone, , lymphoid mass      5/25/2016 - 2/9/2017 Chemotherapy    Taxol/Herceptin x 8 cycles then perjeta added 7/16  Prescribed by Dr Mcnally      2/20/2017 - 6/17/2017 Chemotherapy    Kadcycla prescribed by Dr Mcnally      6/28/2017 Biopsy    Biopsy of rt abd met disease consistent with breast cancer. ER/FL + Her 2 neg per Dr Moreno's note.    Dr Moreno noted path review from bx 2/16 showed HER 2 + equivocal with FISH reading as Equivocal but ratio 1.2. Additional report form 2016 states additional testing was done with different probe and the equivocal results can be noted as negative.     Treatment therapy changed from Her2 based tx to endocrine base therapy by Dr Moreno      6/28/2017 -  Hormone Therapy    Arimidex 6/17-2/18  Aromasin 2/18  Ibrance + Aromasin 5/18 -1/19  Ibrance + faslodex 2/19 4/30/2019 Imaging Significant Findings    PET/CT: Lesions involving the medial clavicle , subcarinal LN, pleural based soft tissue density adjacent to T11 and lesion in left lobe of liver diminished in size and SUV.   RECIST 34% decrease in disease     2/26/2020  -  Chemotherapy    Treatment Summary   Plan Name: OP BREAST TRASTUZUMAB PACLITAXEL QW  Treatment Goal: Control  Status: Active  Start Date: 2/26/2020  End Date: 7/2/2020 (Planned)  Provider: Solomon Contreras MD  Chemotherapy: PACLitaxel (TAXOL) 64 mg/m2 = 102 mg in sodium chloride 0.9% 250 mL chemo infusion, 64 mg/m2 = 102 mg (100 % of original dose 64 mg/m2), Intravenous, Clinic/HOD 1 time, 12 of 14 cycles  Dose modification: 64 mg/m2 (original dose 64 mg/m2, Cycle 1), 50 mg/m2 (original dose 64 mg/m2, Cycle 14)  trastuzumab 211 mg in sodium chloride 0.9% 250 mL chemo infusion, 4 mg/kg = 211 mg, Intravenous, Clinic/HOD 1 time, 7 of 9 cycles  Dose modification: 2 mg/kg (original dose 2 mg/kg, Cycle 7, Reason: MD Discretion), 80 mg (original dose 2 mg/kg, Cycle 8)       ==8/2019 Now She is currently on combined afinitor + aromasin,  repeated CT scan shows improvement  PET 1/2020 showed PD,  afinitor + aromasin stopped  == PET scan 01/20/2020 the progressive  ==Started Taxol/Herceptin weekly 2/2020    Past Medical History:   Past Medical History:   Diagnosis Date    Arthritis     Bone cancer     Breast cancer     Cancer     Cataract     Depression     Diverticulosis 1/15/2020    Fatigue associated with anemia 6/6/2019    GERD (gastroesophageal reflux disease)     High cholesterol     Hypoxemia 3/12/2020    Skin problem     Sleep disorder        Past Surgical HIstory:   Past Surgical History:   Procedure Laterality Date    APPENDECTOMY      BREAST LUMPECTOMY Right 2002    CATARACT EXTRACTION, BILATERAL  2014    COLONOSCOPY  2008    HYSTERECTOMY      PORTACATH PLACEMENT  05/18/2016    SURGICAL REMOVAL OF NODULE OF VOCAL CORD         Allergies:  Review of patient's allergies indicates:   Allergen Reactions    Benadryl [diphenhydramine hcl]      Restless leg     Medrol [methylprednisolone] Hallucinations    Penicillins        Medications:  Current Outpatient Medications   Medication Sig Dispense  Refill    ANORO ELLIPTA 62.5-25 mcg/actuation DsDv       dexAMETHasone 0.5 mg/5 mL Soln SWISH WITH 10 ML PO FOR 2 MIN THEN SPIT QID      doxycycline (VIBRAMYCIN) 100 MG Cap Take 1 capsule (100 mg total) by mouth every 12 (twelve) hours. 20 capsule 0    exemestane (AROMASIN) 25 mg tablet Take 1 tablet (25 mg total) by mouth once daily. 30 tablet 3    furosemide (LASIX) 40 MG tablet Take 1 tablet (40 mg total) by mouth once daily. 30 tablet 11    gabapentin (NEURONTIN) 400 MG capsule Take 1 capsule (400 mg total) by mouth 3 (three) times daily. 120 capsule 4    iron-vitamin C 100-250 mg, ICAR-C, (ICAR-C) 100-250 mg Tab Take 1 tablet by mouth once daily. 30 tablet 5    LORazepam (ATIVAN) 1 MG tablet TAKE 1 TABLET BY MOUTH EVERY EVENING 30 tablet 0    megestrol (MEGACE) 400 mg/10 mL (40 mg/mL) Susp Take 10 mLs (400 mg total) by mouth once daily. 240 mL 2    oxyCODONE (ROXICODONE) 15 MG Tab Take 1 tablet (15 mg total) by mouth every 6 (six) hours as needed for Pain. 60 tablet 0    pantoprazole (PROTONIX) 40 MG tablet TAKE 1 TABLET BY MOUTH EVERY DAY 90 tablet 0    potassium chloride SA (K-DUR,KLOR-CON) 20 MEQ tablet Take 1 tablet (20 mEq total) by mouth once daily. 30 tablet 11    PROAIR HFA 90 mcg/actuation inhaler       promethazine (PHENERGAN) 25 MG tablet TAKE 1 TABLET BY MOUTH EVERY 6 HOURS AS NEEDED FOR NAUSEA 30 tablet 3    temazepam (RESTORIL) 15 mg Cap TAKE 1 CAPSULE(15 MG) BY MOUTH EVERY NIGHT 30 capsule 0     No current facility-administered medications for this visit.        Family History:   Family History   Problem Relation Age of Onset    Lung cancer Father     Melanoma Father        Social History:  reports that she has been smoking cigarettes. She has a 2.00 pack-year smoking history. She has never used smokeless tobacco. She reports current alcohol use of about 4.0 standard drinks of alcohol per week. She reports that she does not use drugs.    Review of Systemas  Constitutional: No  "change in weight, appetie, fatigue, fever, or night sweats  Eyes: No changes in vision  Ears, Nose, Mouth, Throat, and Face: No hearing problems, ear pain, rummy nose, or sore throat  Respiratory: No shortness of breath or cough  Cardiovascular: No chest pain, palpitations or dizziness  Gastrointestinal: No abdominal pain, hematochezia, melena  Genitourinary: No dysuria, hematuria, nocturia, menstrual problems, post menopausal  Integumentary/Breast: No rashes or itching  Hematologic/Lymphatic: No anemia or bleeding abnormalities  Musculoskeletal: No joint or muscle abnormalities  Neurological: No sensory or motor problems, no headaches  Behavioral/Psych: No depression, anxiety, cognitive problems, or stress  Endocrine: No diabetes or thyroid problems  Allergic/Immunologic: See allergy above    Physical Exam      Vitals:   Vitals:    06/25/20 1000   BP: 126/71   BP Location: Left arm   Patient Position: Sitting   BP Method: Small (Automatic)   Pulse: 75   Resp: 16   Temp: 97.1 °F (36.2 °C)   TempSrc: Temporal   SpO2: (!) 90%   Weight: 52.5 kg (115 lb 12.8 oz)   Height: 5' 6" (1.676 m)     BMI: Body mass index is 18.69 kg/m².  BSA Body surface area is 1.56 meters squared.  ECOG Performance Status:   ECOG SCORE    1 - Restricted in strenuous activity-ambulatory and able to carry out work of a light nature       GENERAL APPEARANCE: Well developed, well nourished, in no acute distress.  SKIN: Inspection of the skin reveals no rashes, ulcerations or petechiae.  HEENT: The sclerae were anicteric and conjunctivae were pink and moist. Extraocular movements were intact and pupils were equal, round, and reactive to light with normal accommodation. External inspection of the ears and nose showed no scars, lesions, or masses. Lips, teeth, and gums showed normal mucosa. The oral mucosa, hard and soft palate, tongue and posterior pharynx were normal.  NECK: Supple and symmetric. There was no thyroid enlargement, and no tenderness, " or masses were felt.  CRESPIRATORY: Normal AP diameter and normal contour without any kyphoscoliosis. LUNGS: Auscultation of the lungs revealed normal breath sounds without any other adventitious sounds or rubs.  CARDIOVASCULAR: There was a regular rate and rhythm without any murmurs, gallops, rubs. The carotid pulses were normal and 2+ bilaterally without bruits. Peripheral pulses were 2+ and symmetric.  GASTROINTESTINAL: Soft and nontender with normal bowel sounds. The liver span was approximately 5-6 cm in the right midclavicular line by percussion. The liver edge was nontender. The spleen was not palpable. There were no inguinal or umbilical hernias noted. No ascites was noted.  LYMPH NODES: No lymphadenopathy was appreciated in the neck, axillae or groin.  MUSCULOSKELETAL: Gait was normal. There was no tenderness or effusions noted. Muscle strength and tone were normal. EXTREMITIES: No cyanosis, clubbing or edema.  NEUROLOGIC: Alert and oriented x 3. Normal affect. Gait was normal. Normal deep tendon reflexes with no pathological reflexes. Sensation to touch was normal.  PSYCHIATRIC: good mood, orientated to place, time and person     Labs and Imagings     Lab Results   Component Value Date    WBC 7.1 05/21/2020    HGB 11.4 (L) 05/21/2020    HCT 37.4 05/21/2020    LABPLAT 295 05/21/2020       CMP  Sodium   Date Value Ref Range Status   06/11/2020 140 135 - 145 mmol/L Final     Potassium   Date Value Ref Range Status   06/11/2020 4.2 3.6 - 5.2 mmol/L Final     Chloride   Date Value Ref Range Status   06/11/2020 101 100 - 108 mmol/L Final     CO2   Date Value Ref Range Status   06/11/2020 34 (H) 21 - 32 mmol/L Final     Glucose   Date Value Ref Range Status   06/11/2020 103 70 - 110 mg/dL Final     BUN, Bld   Date Value Ref Range Status   06/11/2020 9 7 - 18 mg/dL Final     Creatinine   Date Value Ref Range Status   06/11/2020 0.96 0.55 - 1.02 mg/dL Final     Calcium   Date Value Ref Range Status   06/11/2020 8.9  8.8 - 10.5 mg/dL Final     Total Protein   Date Value Ref Range Status   06/11/2020 6.1 (L) 6.4 - 8.2 g/dL Final     Albumin   Date Value Ref Range Status   06/11/2020 2.9 (L) 3.4 - 5.0 g/dL Final     Total Bilirubin   Date Value Ref Range Status   06/11/2020 0.4 0.0 - 1.0 mg/dL Final     Alkaline Phosphatase   Date Value Ref Range Status   06/11/2020 62 46 - 116 U/L Final     AST   Date Value Ref Range Status   06/11/2020 15 15 - 37 U/L Final     ALT   Date Value Ref Range Status   06/11/2020 18 12 - 78 U/L Final     Anion Gap   Date Value Ref Range Status   06/11/2020 5.0 3.0 - 11.0 mmol/L Final         Imaging:     Assessment:     Principle Problem: 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, unspecified laterality [C50.919, C79.51]   Co-morbidity/Active Problems:   Patient Active Problem List   Diagnosis    Breast cancer metastasized to bone    Neuropathy    Fatigue associated with anemia    Drug-related hair loss    Nausea    Primary insomnia    Foot infection    Cancer, metastatic to bone    Malignant neoplasm of overlapping 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, estrogen receptor positive     Chronic fatigue    Diverticulosis    Dehydration    Hypoxemia    Iron deficiency anemia secondary to inadequate dietary iron intake    Hypoalbuminemia        Benita Carballo is a 74 y.o. female with PMH of The encounter diagnosis was 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, unspecified laterality., with 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, unspecified laterality [C50.919, C79.51]       Metastatic breast cancer ERPR positive HER2 Noah questionable  Status post of chemotherapy and HER2 targeted therapy with progression on HER2 targeted therapy  Recent testing fish negative for her 2 2018  Repeat the lipid biopsy 2019  shows HER2 positive and ER negative  Bone metastatic disease  Fatigue  Hypoxia  Dehydration  Progressing on Herceptin/Taxol    Plan:   Overall Plan:  Add pertuzumab    ==Labs CBC CMP weekly, TM 4  weeks  ==Cont current Chemo, add Pertu   ==Next scan due end of 8/2020  ==Will call radiology to see how large the effusion and may repeat thoracentesis  ==RTC 1 week

## 2020-07-02 ENCOUNTER — OFFICE VISIT (OUTPATIENT)
Dept: HEMATOLOGY/ONCOLOGY | Facility: CLINIC | Age: 75
End: 2020-07-02
Payer: MEDICARE

## 2020-07-02 VITALS
OXYGEN SATURATION: 91 % | BODY MASS INDEX: 18.38 KG/M2 | TEMPERATURE: 98 F | DIASTOLIC BLOOD PRESSURE: 63 MMHG | HEIGHT: 66 IN | HEART RATE: 76 BPM | SYSTOLIC BLOOD PRESSURE: 110 MMHG | WEIGHT: 114.38 LBS | RESPIRATION RATE: 16 BRPM

## 2020-07-02 DIAGNOSIS — C50.812 MALIGNANT NEOPLASM OF OVERLAPPING SITES OF LEFT BREAST IN FEMALE, ESTROGEN RECEPTOR POSITIVE: ICD-10-CM

## 2020-07-02 DIAGNOSIS — Z17.0 MALIGNANT NEOPLASM OF OVERLAPPING SITES OF LEFT BREAST IN FEMALE, ESTROGEN RECEPTOR POSITIVE: ICD-10-CM

## 2020-07-02 DIAGNOSIS — C50.919 MALIGNANT NEOPLASM OF BREAST, STAGE 4, UNSPECIFIED LATERALITY: ICD-10-CM

## 2020-07-02 DIAGNOSIS — C50.919 CARCINOMA OF BREAST METASTATIC TO BONE, UNSPECIFIED LATERALITY: Primary | ICD-10-CM

## 2020-07-02 DIAGNOSIS — R09.02 HYPOXEMIA: ICD-10-CM

## 2020-07-02 DIAGNOSIS — C79.51 CARCINOMA OF BREAST METASTATIC TO BONE, UNSPECIFIED LATERALITY: Primary | ICD-10-CM

## 2020-07-02 LAB
ALBUMIN SERPL BCP-MCNC: 2.7 G/DL (ref 3.4–5)
ALBUMIN/GLOBULIN RATIO: 0.77 RATIO (ref 1.1–1.8)
ALP SERPL-CCNC: 76 U/L (ref 46–116)
ALT SERPL W P-5'-P-CCNC: 25 U/L (ref 12–78)
ANION GAP SERPL CALC-SCNC: 8 MMOL/L (ref 3–11)
ANISOCYTOSIS: ABNORMAL
AST SERPL-CCNC: 19 U/L (ref 15–37)
BANDS: 1 % (ref 0–5)
BILIRUB SERPL-MCNC: 0.6 MG/DL (ref 0–1)
BUN SERPL-MCNC: 13 MG/DL (ref 7–18)
BUN/CREAT SERPL: 11.71 RATIO (ref 7–18)
CALCIUM SERPL-MCNC: 8.5 MG/DL (ref 8.8–10.5)
CHLORIDE SERPL-SCNC: 96 MMOL/L (ref 100–108)
CO2 SERPL-SCNC: 29 MMOL/L (ref 21–32)
CREAT SERPL-MCNC: 1.11 MG/DL (ref 0.55–1.02)
EOSINOPHIL NFR BLD: 1 % (ref 1–3)
GFR ESTIMATION: 48
GLOBULIN: 3.5 G/DL (ref 2.3–3.5)
GLUCOSE SERPL-MCNC: 113 MG/DL (ref 70–110)
LYMPHOCYTES NFR BLD: 12 % (ref 25–40)
MONOCYTES NFR BLD MANUAL: 7 % (ref 1–15)
NEUTROPHILS ABSOLUTE COUNT: 10.3 10*3/UL (ref 1.8–7.7)
NEUTROPHILS NFR BLD: 79 % (ref 37–80)
POTASSIUM SERPL-SCNC: 3.8 MMOL/L (ref 3.6–5.2)
PROT SERPL-MCNC: 6.2 G/DL (ref 6.4–8.2)
SMALL PLATELETS BLD QL SMEAR: ADEQUATE
SODIUM BLD-SCNC: 133 MMOL/L (ref 135–145)

## 2020-07-02 PROCEDURE — 99214 PR OFFICE/OUTPT VISIT, EST, LEVL IV, 30-39 MIN: ICD-10-PCS | Mod: S$GLB,,, | Performed by: NURSE PRACTITIONER

## 2020-07-02 PROCEDURE — 99214 OFFICE O/P EST MOD 30 MIN: CPT | Mod: S$GLB,,, | Performed by: NURSE PRACTITIONER

## 2020-07-02 NOTE — PROGRESS NOTES
Medical Oncology Progress Note  [facility]     PATIENT: Benita Carballo  : 1945 74 y.o. female  MRN [id number]     PCP: Solomon Contreras MD  Consult Requested By:      Date of Service: 2020    Subjective:   Interim History:  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, unspecified laterali    Benita Carballo is here for to followup breast cancer treatment.    The patient was started on  chemotherapy last week with Herceptin and Taxol weekly.  She has chronic hypoxia and her oxygen saturation has been declining today her O2 status 77% she looks purple and she also has increasing degree of short of breath; in addition she complains of swelling to her left arm.  She had no chest pain. She was admittted last week to the hospital and underwent thoracentesis, which was not tested for cytology and imaging for possible PE was ruled out. Today she states she is feeling better but POX was 82 % on RA, and still has yosi upper ext swelling which she states has improved. She has not had a echo so will order ECHO stat to r/o any CHF which could be causing her swelling both lung and upper ext. States she had upper u/s with her pulm which was neg for DVT.     3/26/20   She returns with ECHO showing EF 55-60%, but right sided heart failure noted. Pt has an additional 12 # of wt gain since last week. She states she is feeling fine, better than at last visit. Discussed ECHO and will hold herceptin and obtain MUGA, will proceed with weekly taxol and possibly will add herceptin back after MUGA completed and will refer pt to cardiology, but could be delayed due to COVID pandemic.     2020  Today she presents with significant fluid loss noted. No longer upper ext third space swelling noted, alb and T Pro dramatically improved, pt has last 10 + lbs of fluid. She is breathing easy today and denies any SOB or ORELLANA. We will proceed with weekly taxol and continue to hold herceptin until MUGA or cardiology  clearance is obtained. She was seen yesterday with Dr Gardner. Below is a portion of his note:       Oncology History:  Oncology History Overview Note   2002 Rt breast cancer s/p lump with [place] Tamoxifen x 1 yr, then Arimidex x 5 yrs. Completed tx 2008.     2015  c/o chronic cough with CT chest 1/28/16 showing extensive adenopathy  With base of lt neck mass 4.5 cm with yosi pulm nodules. RF to Dr ANDREWS Mcnally    2/6/16 bx of lt neck mass carcinoma with feature suggestive of breast primary. ER/%, Her2 borderline by IHC and deemed equivocal by FISH       Breast cancer metastasized to bone   2/4/2016 Initial Diagnosis    Breast cancer metastasized to bone, , lymphoid mass      5/25/2016 - 2/9/2017 Chemotherapy    Taxol/Herceptin x 8 cycles then perjeta added 7/16  Prescribed by Dr Mcnally      2/20/2017 - 6/17/2017 Chemotherapy    Kadcycla prescribed by Dr Mcnally      6/28/2017 Biopsy    Biopsy of rt abd met disease consistent with breast cancer. ER/VT + Her 2 neg per Dr Moreno's note.    Dr Moreno noted path review from bx 2/16 showed HER 2 + equivocal with FISH reading as Equivocal but ratio 1.2. Additional report form 2016 states additional testing was done with different probe and the equivocal results can be noted as negative.     Treatment therapy changed from Her2 based tx to endocrine base therapy by Dr Moreno      6/28/2017 -  Hormone Therapy    Arimidex 6/17-2/18  Aromasin 2/18  Ibrance + Aromasin 5/18 -1/19  Ibrance + faslodex 2/19 4/30/2019 Imaging Significant Findings    PET/CT: Lesions involving the medial clavicle , subcarinal LN, pleural based soft tissue density adjacent to T11 and lesion in left lobe of liver diminished in size and SUV.   RECIST 34% decrease in disease     2/26/2020 -  Chemotherapy    Treatment Summary   Plan Name: OP BREAST TRASTUZUMAB PACLITAXEL QW  Treatment Goal: Control  Status: Active  Start Date: 2/26/2020  End Date: 7/2/2020 (Planned)  Provider: Solomon Contreras,  MD  Chemotherapy: PACLitaxel (TAXOL) 64 mg/m2 = 102 mg in sodium chloride 0.9% 250 mL chemo infusion, 64 mg/m2 = 102 mg (100 % of original dose 64 mg/m2), Intravenous, Clinic/HOD 1 time, 13 of 14 cycles  Dose modification: 64 mg/m2 (original dose 64 mg/m2, Cycle 1), 50 mg/m2 (original dose 64 mg/m2, Cycle 14)  trastuzumab 211 mg in sodium chloride 0.9% 250 mL chemo infusion, 4 mg/kg = 211 mg, Intravenous, Clinic/HOD 1 time, 8 of 9 cycles  Dose modification: 2 mg/kg (original dose 2 mg/kg, Cycle 7, Reason: MD Discretion), 80 mg (original dose 2 mg/kg, Cycle 8)       ==8/2019 Now She is currently on combined afinitor + aromasin,  repeated CT scan shows improvement  PET 1/2020 showed PD,  afinitor + aromasin stopped  == PET scan 01/20/2020 the progressive  ==Started Taxol/Herceptin weekly 2/2020  ==6/2020 PET PD  ==add Weekly paclitaxel with trastuzumab and pertuzumab  Past Medical History:   Past Medical History:   Diagnosis Date    Arthritis     Bone cancer     Breast cancer     Cancer     Cataract     Depression     Diverticulosis 1/15/2020    Fatigue associated with anemia 6/6/2019    GERD (gastroesophageal reflux disease)     High cholesterol     Hypoxemia 3/12/2020    Skin problem     Sleep disorder        Past Surgical HIstory:   Past Surgical History:   Procedure Laterality Date    APPENDECTOMY      BREAST LUMPECTOMY Right 2002    CATARACT EXTRACTION, BILATERAL  2014    COLONOSCOPY  2008    HYSTERECTOMY      PORTACATH PLACEMENT  05/18/2016    SURGICAL REMOVAL OF NODULE OF VOCAL CORD         Allergies:  Review of patient's allergies indicates:   Allergen Reactions    Benadryl [diphenhydramine hcl]      Restless leg     Medrol [methylprednisolone] Hallucinations    Penicillins        Medications:  Current Outpatient Medications   Medication Sig Dispense Refill    ANORO ELLIPTA 62.5-25 mcg/actuation DsDv       dexAMETHasone 0.5 mg/5 mL Soln SWISH WITH 10 ML PO FOR 2 MIN THEN SPIT QID       exemestane (AROMASIN) 25 mg tablet Take 1 tablet (25 mg total) by mouth once daily. 30 tablet 3    furosemide (LASIX) 40 MG tablet Take 1 tablet (40 mg total) by mouth once daily. 30 tablet 11    gabapentin (NEURONTIN) 400 MG capsule Take 1 capsule (400 mg total) by mouth 3 (three) times daily. 120 capsule 4    iron-vitamin C 100-250 mg, ICAR-C, (ICAR-C) 100-250 mg Tab Take 1 tablet by mouth once daily. 30 tablet 5    LORazepam (ATIVAN) 1 MG tablet TAKE 1 TABLET BY MOUTH EVERY EVENING 30 tablet 0    megestrol (MEGACE) 400 mg/10 mL (40 mg/mL) Susp Take 10 mLs (400 mg total) by mouth once daily. 240 mL 2    oxyCODONE (ROXICODONE) 15 MG Tab Take 1 tablet (15 mg total) by mouth every 6 (six) hours as needed for Pain. 60 tablet 0    pantoprazole (PROTONIX) 40 MG tablet TAKE 1 TABLET BY MOUTH EVERY DAY 90 tablet 0    potassium chloride SA (K-DUR,KLOR-CON) 20 MEQ tablet Take 1 tablet (20 mEq total) by mouth once daily. 30 tablet 11    PROAIR HFA 90 mcg/actuation inhaler       promethazine (PHENERGAN) 25 MG tablet TAKE 1 TABLET BY MOUTH EVERY 6 HOURS AS NEEDED FOR NAUSEA 30 tablet 3    temazepam (RESTORIL) 15 mg Cap TAKE 1 CAPSULE(15 MG) BY MOUTH EVERY NIGHT 30 capsule 0    doxycycline (VIBRAMYCIN) 100 MG Cap Take 1 capsule (100 mg total) by mouth every 12 (twelve) hours. 20 capsule 0     No current facility-administered medications for this visit.        Family History:   Family History   Problem Relation Age of Onset    Lung cancer Father     Melanoma Father        Social History:  reports that she has been smoking cigarettes. She has a 2.00 pack-year smoking history. She has never used smokeless tobacco. She reports current alcohol use of about 4.0 standard drinks of alcohol per week. She reports that she does not use drugs.    Review of Systemas  Constitutional: No change in weight, appetie, fatigue, fever, or night sweats  Eyes: No changes in vision  Ears, Nose, Mouth, Throat, and Face: No hearing  "problems, ear pain, rummy nose, or sore throat  Respiratory: No shortness of breath or cough  Cardiovascular: No chest pain, palpitations or dizziness  Gastrointestinal: No abdominal pain, hematochezia, melena  Genitourinary: No dysuria, hematuria, nocturia, menstrual problems, post menopausal  Integumentary/Breast: No rashes or itching  Hematologic/Lymphatic: No anemia or bleeding abnormalities  Musculoskeletal: No joint or muscle abnormalities  Neurological: No sensory or motor problems, no headaches  Behavioral/Psych: No depression, anxiety, cognitive problems, or stress  Endocrine: No diabetes or thyroid problems  Allergic/Immunologic: See allergy above    Physical Exam      Vitals:   Vitals:    07/02/20 0956   BP: 110/63   BP Location: Left arm   Patient Position: Sitting   BP Method: Small (Automatic)   Pulse: 76   Resp: 16   Temp: 97.6 °F (36.4 °C)   TempSrc: Temporal   SpO2: (!) 91%   Weight: 51.9 kg (114 lb 6.4 oz)   Height: 5' 6" (1.676 m)     BMI: Body mass index is 18.46 kg/m².  BSA Body surface area is 1.55 meters squared.  ECOG Performance Status:   ECOG SCORE         GENERAL APPEARANCE: Well developed, well nourished, in no acute distress.  SKIN: Inspection of the skin reveals no rashes, ulcerations or petechiae.  HEENT: The sclerae were anicteric and conjunctivae were pink and moist. Extraocular movements were intact and pupils were equal, round, and reactive to light with normal accommodation. External inspection of the ears and nose showed no scars, lesions, or masses. Lips, teeth, and gums showed normal mucosa. The oral mucosa, hard and soft palate, tongue and posterior pharynx were normal.  NECK: Supple and symmetric. There was no thyroid enlargement, and no tenderness, or masses were felt.  CRESPIRATORY: Normal AP diameter and normal contour without any kyphoscoliosis. LUNGS: Auscultation of the lungs revealed normal breath sounds without any other adventitious sounds or rubs.  CARDIOVASCULAR: " There was a regular rate and rhythm without any murmurs, gallops, rubs. The carotid pulses were normal and 2+ bilaterally without bruits. Peripheral pulses were 2+ and symmetric.  GASTROINTESTINAL: Soft and nontender with normal bowel sounds. The liver span was approximately 5-6 cm in the right midclavicular line by percussion. The liver edge was nontender. The spleen was not palpable. There were no inguinal or umbilical hernias noted. No ascites was noted.  LYMPH NODES: No lymphadenopathy was appreciated in the neck, axillae or groin.  MUSCULOSKELETAL: Gait was normal. There was no tenderness or effusions noted. Muscle strength and tone were normal. EXTREMITIES: No cyanosis, clubbing or edema.  NEUROLOGIC: Alert and oriented x 3. Normal affect. Gait was normal. Normal deep tendon reflexes with no pathological reflexes. Sensation to touch was normal.  PSYCHIATRIC: good mood, orientated to place, time and person     Labs and Imagings         Imaging:     Assessment:     Principle Problem: No primary diagnosis found.   Co-morbidity/Active Problems:   Patient Active Problem List   Diagnosis    Breast cancer metastasized to bone    Neuropathy    Fatigue associated with anemia    Drug-related hair loss    Nausea    Primary insomnia    Foot infection    Cancer, metastatic to bone    Malignant neoplasm of overlapping 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, estrogen receptor positive     Chronic fatigue    Diverticulosis    Dehydration    Hypoxemia    Iron deficiency anemia secondary to inadequate dietary iron intake    Hypoalbuminemia        Benita Carballo is a 74 y.o. female with PMH of There were no encounter diagnoses., with No primary diagnosis found.       Metastatic breast cancer ERPR positive HER2 Noah questionable  Status post of chemotherapy and HER2 targeted therapy with progression on HER2 targeted therapy  Recent testing fish negative for her 2 2018  Repeat the lipid biopsy 2019  shows HER2 positive  and ER negative  Bone metastatic disease  Fatigue  Hypoxia  Dehydration    Plan:   Overall Plan:    She has HER2 positive disease with ERPR negative by the new liquid biopsy. Started Taxol/Herceptin 2/2020  Discussed ECHO with pt, showing right heart failure   Seen by Dr Gardner, cardiologist and recommended that Rt heart failure unlikely related to chemo, likely related to COPD and ILD.   Herceptin/taxol resumed last week with poor tolerance by pt, weight loss, fatigue reported, will hold tx this week, plan to resume next week at low dose, discussed with Dr Contreras  Discussed with Dr Contreras and he recommended reducing dose of herceptin further  Lasix x 3 days with 10 lbs wt loss, p is instructed to use prn, pt has not had to use Lasix over last 3 weeks   Compression sleeve for dependant upper ext swelling  Pt is wanting to stop oxycontin and norco due to out of pocket cost. She is requesting to resume oxycodone 5 mg TID when she runs out of oxycontin/norco. She states works better and more affordable.  Labs reviewed today and she is cleared for tx.      Rosalind Ricks, NPEstablished Patient - Audio Only Telehealth Visit     The patient location is: home  The chief complaint leading to consultation is: chemo clearnace  Visit type: Virtual visit with audio only (telephone)     The reason for the audio only service rather than synchronous audio and video virtual visit was related to technical difficulties or patient preference/necessity.     Each patient to whom I provide medical services by telemedicine is:  (1) informed of the relationship between the physician and patient and the respective role of any other health care provider with respect to management of the patient; and (2) notified that they may decline to receive medical services by telemedicine and may withdraw from such care at any time. Patient verbally consented to receive this service via voice-only telephone call.       HPI: see above     Assessment and  plan:  See above        Medical Oncology Progress Note  [facility]     PATIENT: Benita Carballo  : 1945 74 y.o. female  MRN [id number]     PCP: Solomon Contreras MD  Consult Requested By:      Date of Service: 2020    Subjective:   Interim History:  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, unspecified laterali    Benita Carballo is here for to followup breast cancer treatment.    The patient was started on  chemotherapy last week with Herceptin and Taxol weekly.  She has chronic hypoxia and her oxygen saturation has been declining today her O2 status 77% she looks purple and she also has increasing degree of short of breath; in addition she complains of swelling to her left arm.  She had no chest pain. She was admittted last week to the hospital and underwent thoracentesis, which was not tested for cytology and imaging for possible PE was ruled out. Today she states she is feeling better but POX was 82 % on RA, and still has yosi upper ext swelling which she states has improved. She has not had a echo so will order ECHO stat to r/o any CHF which could be causing her swelling both lung and upper ext. States she had upper u/s with her pulm which was neg for DVT.     3/26/20   She returns with ECHO showing EF 55-60%, but right sided heart failure noted. Pt has an additional 12 # of wt gain since last week. She states she is feeling fine, better than at last visit. Discussed ECHO and will hold herceptin and obtain MUGA, will proceed with weekly taxol and possibly will add herceptin back after MUGA completed and will refer pt to cardiology, but could be delayed due to COVID pandemic.     2020  Today she presents with significant fluid loss noted. No longer upper ext third space swelling noted, alb and T Pro dramatically improved, pt has last 10 + lbs of fluid. She is breathing easy today and denies any SOB or ORELLANA. We will proceed with weekly taxol and continue to hold herceptin until  MUGA or cardiology clearance is obtained. She was seen yesterday with Dr Gardner. Below is a portion of his note:       Oncology History:  Oncology History Overview Note   2002 Rt breast cancer s/p lump with [place] Tamoxifen x 1 yr, then Arimidex x 5 yrs. Completed tx 2008.     2015  c/o chronic cough with CT chest 1/28/16 showing extensive adenopathy  With base of lt neck mass 4.5 cm with yosi pulm nodules. RF to Dr ANDREWS Mcnally    2/6/16 bx of lt neck mass carcinoma with feature suggestive of breast primary. ER/%, Her2 borderline by IHC and deemed equivocal by FISH       Breast cancer metastasized to bone   2/4/2016 Initial Diagnosis    Breast cancer metastasized to bone, , lymphoid mass      5/25/2016 - 2/9/2017 Chemotherapy    Taxol/Herceptin x 8 cycles then perjeta added 7/16  Prescribed by Dr Mcnally      2/20/2017 - 6/17/2017 Chemotherapy    Kadcycla prescribed by Dr Mcnally      6/28/2017 Biopsy    Biopsy of rt abd met disease consistent with breast cancer. ER/NV + Her 2 neg per Dr Moreno's note.    Dr Moreno noted path review from bx 2/16 showed HER 2 + equivocal with FISH reading as Equivocal but ratio 1.2. Additional report form 2016 states additional testing was done with different probe and the equivocal results can be noted as negative.     Treatment therapy changed from Her2 based tx to endocrine base therapy by Dr Moreno      6/28/2017 -  Hormone Therapy    Arimidex 6/17-2/18  Aromasin 2/18  Ibrance + Aromasin 5/18 -1/19  Ibrance + faslodex 2/19 4/30/2019 Imaging Significant Findings    PET/CT: Lesions involving the medial clavicle , subcarinal LN, pleural based soft tissue density adjacent to T11 and lesion in left lobe of liver diminished in size and SUV.   RECIST 34% decrease in disease     2/26/2020 -  Chemotherapy    Treatment Summary   Plan Name: OP BREAST TRASTUZUMAB PACLITAXEL QW  Treatment Goal: Control  Status: Active  Start Date: 2/26/2020  End Date: 7/2/2020 (Planned)  Provider:  Solomon Contreras MD  Chemotherapy: PACLitaxel (TAXOL) 64 mg/m2 = 102 mg in sodium chloride 0.9% 250 mL chemo infusion, 64 mg/m2 = 102 mg (100 % of original dose 64 mg/m2), Intravenous, Clinic/HOD 1 time, 13 of 14 cycles  Dose modification: 64 mg/m2 (original dose 64 mg/m2, Cycle 1), 50 mg/m2 (original dose 64 mg/m2, Cycle 14)  trastuzumab 211 mg in sodium chloride 0.9% 250 mL chemo infusion, 4 mg/kg = 211 mg, Intravenous, Clinic/HOD 1 time, 8 of 9 cycles  Dose modification: 2 mg/kg (original dose 2 mg/kg, Cycle 7, Reason: MD Discretion), 80 mg (original dose 2 mg/kg, Cycle 8)       ==8/2019 Now She is currently on combined afinitor + aromasin,  repeated CT scan shows improvement  PET 1/2020 showed PD,  afinitor + aromasin stopped  == PET scan 01/20/2020 the progressive  ==Started Taxol/Herceptin weekly 2/2020    Past Medical History:   Past Medical History:   Diagnosis Date    Arthritis     Bone cancer     Breast cancer     Cancer     Cataract     Depression     Diverticulosis 1/15/2020    Fatigue associated with anemia 6/6/2019    GERD (gastroesophageal reflux disease)     High cholesterol     Hypoxemia 3/12/2020    Skin problem     Sleep disorder        Past Surgical HIstory:   Past Surgical History:   Procedure Laterality Date    APPENDECTOMY      BREAST LUMPECTOMY Right 2002    CATARACT EXTRACTION, BILATERAL  2014    COLONOSCOPY  2008    HYSTERECTOMY      PORTACATH PLACEMENT  05/18/2016    SURGICAL REMOVAL OF NODULE OF VOCAL CORD         Allergies:  Review of patient's allergies indicates:   Allergen Reactions    Benadryl [diphenhydramine hcl]      Restless leg     Medrol [methylprednisolone] Hallucinations    Penicillins        Medications:  Current Outpatient Medications   Medication Sig Dispense Refill    ANORO ELLIPTA 62.5-25 mcg/actuation DsDv       dexAMETHasone 0.5 mg/5 mL Soln SWISH WITH 10 ML PO FOR 2 MIN THEN SPIT QID      exemestane (AROMASIN) 25 mg tablet Take 1 tablet (25 mg  total) by mouth once daily. 30 tablet 3    furosemide (LASIX) 40 MG tablet Take 1 tablet (40 mg total) by mouth once daily. 30 tablet 11    gabapentin (NEURONTIN) 400 MG capsule Take 1 capsule (400 mg total) by mouth 3 (three) times daily. 120 capsule 4    iron-vitamin C 100-250 mg, ICAR-C, (ICAR-C) 100-250 mg Tab Take 1 tablet by mouth once daily. 30 tablet 5    LORazepam (ATIVAN) 1 MG tablet TAKE 1 TABLET BY MOUTH EVERY EVENING 30 tablet 0    megestrol (MEGACE) 400 mg/10 mL (40 mg/mL) Susp Take 10 mLs (400 mg total) by mouth once daily. 240 mL 2    oxyCODONE (ROXICODONE) 15 MG Tab Take 1 tablet (15 mg total) by mouth every 6 (six) hours as needed for Pain. 60 tablet 0    pantoprazole (PROTONIX) 40 MG tablet TAKE 1 TABLET BY MOUTH EVERY DAY 90 tablet 0    potassium chloride SA (K-DUR,KLOR-CON) 20 MEQ tablet Take 1 tablet (20 mEq total) by mouth once daily. 30 tablet 11    PROAIR HFA 90 mcg/actuation inhaler       promethazine (PHENERGAN) 25 MG tablet TAKE 1 TABLET BY MOUTH EVERY 6 HOURS AS NEEDED FOR NAUSEA 30 tablet 3    temazepam (RESTORIL) 15 mg Cap TAKE 1 CAPSULE(15 MG) BY MOUTH EVERY NIGHT 30 capsule 0    doxycycline (VIBRAMYCIN) 100 MG Cap Take 1 capsule (100 mg total) by mouth every 12 (twelve) hours. 20 capsule 0     No current facility-administered medications for this visit.        Family History:   Family History   Problem Relation Age of Onset    Lung cancer Father     Melanoma Father        Social History:  reports that she has been smoking cigarettes. She has a 2.00 pack-year smoking history. She has never used smokeless tobacco. She reports current alcohol use of about 4.0 standard drinks of alcohol per week. She reports that she does not use drugs.    Review of Systemas  Constitutional: No change in weight, appetie, fatigue, fever, or night sweats  Eyes: No changes in vision  Ears, Nose, Mouth, Throat, and Face: No hearing problems, ear pain, rummy nose, or sore throat  Respiratory: No  "shortness of breath or cough  Cardiovascular: No chest pain, palpitations or dizziness  Gastrointestinal: No abdominal pain, hematochezia, melena  Genitourinary: No dysuria, hematuria, nocturia, menstrual problems, post menopausal  Integumentary/Breast: No rashes or itching  Hematologic/Lymphatic: No anemia or bleeding abnormalities  Musculoskeletal: No joint or muscle abnormalities  Neurological: No sensory or motor problems, no headaches  Behavioral/Psych: No depression, anxiety, cognitive problems, or stress  Endocrine: No diabetes or thyroid problems  Allergic/Immunologic: See allergy above    Physical Exam      Vitals:   Vitals:    07/02/20 0956   BP: 110/63   BP Location: Left arm   Patient Position: Sitting   BP Method: Small (Automatic)   Pulse: 76   Resp: 16   Temp: 97.6 °F (36.4 °C)   TempSrc: Temporal   SpO2: (!) 91%   Weight: 51.9 kg (114 lb 6.4 oz)   Height: 5' 6" (1.676 m)     BMI: Body mass index is 18.46 kg/m².  BSA Body surface area is 1.55 meters squared.  ECOG Performance Status:   ECOG SCORE         GENERAL APPEARANCE: Well developed, well nourished, in no acute distress.  SKIN: Inspection of the skin reveals no rashes, ulcerations or petechiae.  HEENT: The sclerae were anicteric and conjunctivae were pink and moist. Extraocular movements were intact and pupils were equal, round, and reactive to light with normal accommodation. External inspection of the ears and nose showed no scars, lesions, or masses. Lips, teeth, and gums showed normal mucosa. The oral mucosa, hard and soft palate, tongue and posterior pharynx were normal.  NECK: Supple and symmetric. There was no thyroid enlargement, and no tenderness, or masses were felt.  CRESPIRATORY: Normal AP diameter and normal contour without any kyphoscoliosis. LUNGS: Auscultation of the lungs revealed normal breath sounds without any other adventitious sounds or rubs.  CARDIOVASCULAR: There was a regular rate and rhythm without any murmurs, gallops, " rubs. The carotid pulses were normal and 2+ bilaterally without bruits. Peripheral pulses were 2+ and symmetric.  GASTROINTESTINAL: Soft and nontender with normal bowel sounds. The liver span was approximately 5-6 cm in the right midclavicular line by percussion. The liver edge was nontender. The spleen was not palpable. There were no inguinal or umbilical hernias noted. No ascites was noted.  LYMPH NODES: No lymphadenopathy was appreciated in the neck, axillae or groin.  MUSCULOSKELETAL: Gait was normal. There was no tenderness or effusions noted. Muscle strength and tone were normal. EXTREMITIES: No cyanosis, clubbing or edema.  NEUROLOGIC: Alert and oriented x 3. Normal affect. Gait was normal. Normal deep tendon reflexes with no pathological reflexes. Sensation to touch was normal.  PSYCHIATRIC: good mood, orientated to place, time and person     Labs and Imagings     Lab Results   Component Value Date    WBC 7.1 05/21/2020    HGB 11.4 (L) 05/21/2020    HCT 37.4 05/21/2020    LABPLAT 295 05/21/2020       CMP  Sodium   Date Value Ref Range Status   06/25/2020 139 135 - 145 mmol/L Final     Potassium   Date Value Ref Range Status   06/25/2020 4.3 3.6 - 5.2 mmol/L Final     Chloride   Date Value Ref Range Status   06/25/2020 103 100 - 108 mmol/L Final     CO2   Date Value Ref Range Status   06/25/2020 29 21 - 32 mmol/L Final     Glucose   Date Value Ref Range Status   06/25/2020 89 70 - 110 mg/dL Final     BUN, Bld   Date Value Ref Range Status   06/25/2020 10 7 - 18 mg/dL Final     Creatinine   Date Value Ref Range Status   06/25/2020 0.94 0.55 - 1.02 mg/dL Final     Calcium   Date Value Ref Range Status   06/25/2020 8.9 8.8 - 10.5 mg/dL Final     Total Protein   Date Value Ref Range Status   06/25/2020 6.2 (L) 6.4 - 8.2 g/dL Final     Albumin   Date Value Ref Range Status   06/25/2020 3.0 (L) 3.4 - 5.0 g/dL Final     Total Bilirubin   Date Value Ref Range Status   06/25/2020 0.6 0.0 - 1.0 mg/dL Final     Alkaline  Phosphatase   Date Value Ref Range Status   06/25/2020 61 46 - 116 U/L Final     AST   Date Value Ref Range Status   06/25/2020 16 15 - 37 U/L Final     ALT   Date Value Ref Range Status   06/25/2020 16 12 - 78 U/L Final     Anion Gap   Date Value Ref Range Status   06/25/2020 7.0 3.0 - 11.0 mmol/L Final         Imaging:     Assessment:     Principle Problem: No primary diagnosis found.   Co-morbidity/Active Problems:   Patient Active Problem List   Diagnosis    Breast cancer metastasized to bone    Neuropathy    Fatigue associated with anemia    Drug-related hair loss    Nausea    Primary insomnia    Foot infection    Cancer, metastatic to bone    Malignant neoplasm of overlapping 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, estrogen receptor positive     Chronic fatigue    Diverticulosis    Dehydration    Hypoxemia    Iron deficiency anemia secondary to inadequate dietary iron intake    Hypoalbuminemia        Benita Carballo is a 74 y.o. female with PMH of There were no encounter diagnoses., with No primary diagnosis found.       Metastatic breast cancer ERPR positive HER2 Noah questionable  Status post of chemotherapy and HER2 targeted therapy with progression on HER2 targeted therapy  Recent testing fish negative for her 2 2018  Repeat the lipid biopsy 2019  shows HER2 positive and ER negative  Bone metastatic disease  Fatigue  Hypoxia  Dehydration  Progressing on Herceptin/Taxol    Plan:   Overall Plan:  Add pertuzumab    ==Labs CBC CMP weekly, TM 4 weeks  ==Cont current Chemo, add Pertu   ==Next scan due end of 8/2020  ==Will call radiology to see how large the effusion and may repeat thoracentesis  ==RTC 1 week

## 2020-07-13 ENCOUNTER — TELEPHONE (OUTPATIENT)
Dept: HEMATOLOGY/ONCOLOGY | Facility: CLINIC | Age: 75
End: 2020-07-13

## 2020-07-13 DIAGNOSIS — C79.51 CANCER, METASTATIC TO BONE: ICD-10-CM

## 2020-07-13 RX ORDER — OXYCODONE HYDROCHLORIDE 15 MG/1
15 TABLET ORAL EVERY 6 HOURS PRN
Qty: 60 TABLET | Refills: 0 | Status: SHIPPED | OUTPATIENT
Start: 2020-07-13 | End: 2020-07-23 | Stop reason: SDUPTHER

## 2020-07-13 NOTE — TELEPHONE ENCOUNTER
----- Message from Karo Espitia sent at 7/13/2020  1:04 PM CDT -----  Regarding: Med refill  Contact: Mehdi/son  Mehdi/son is calling to check status on refill request for oxycodone. States that pt is out of medication and has not had any medication since last night. Pt uses     [id number] - [facility] [address] [facility] [address] [facility] [address] ([address] [facility]) &   [address]  [facility] [address]  Phone: [phone] Fax: [phone]    Please call back at [phone]//thank you acc

## 2020-07-13 NOTE — TELEPHONE ENCOUNTER
----- Message from Adilene Keller sent at 7/13/2020  8:40 AM CDT -----  Contact: son-delmar  Requesting call back regarding questions on if the provider has received fax from pharmacy to fill pain medication. Please call back at [phone]. Son states he is having to give pt medication from hospice due to pt is out. She needs medication as soon as possible.

## 2020-07-14 ENCOUNTER — TELEPHONE (OUTPATIENT)
Dept: HEMATOLOGY/ONCOLOGY | Facility: CLINIC | Age: 75
End: 2020-07-14

## 2020-07-14 NOTE — TELEPHONE ENCOUNTER
----- Message from Smitha Craig sent at 7/14/2020  9:47 AM CDT -----  Regarding: medication  Contact: Patient's son- Mehdi  Patient son states her pharmacy is out of her medication and would like the prescription sent to a different pharmacy. Please send prescription to [facility]. Please call to advise at Ph .[phone] (home)   .

## 2020-07-14 NOTE — TELEPHONE ENCOUNTER
Spoke to son and informed that it was verified medication is waiting at [facility]

## 2020-07-16 ENCOUNTER — OFFICE VISIT (OUTPATIENT)
Dept: HEMATOLOGY/ONCOLOGY | Facility: CLINIC | Age: 75
End: 2020-07-16
Payer: MEDICARE

## 2020-07-16 VITALS
BODY MASS INDEX: 17.49 KG/M2 | DIASTOLIC BLOOD PRESSURE: 58 MMHG | TEMPERATURE: 98 F | OXYGEN SATURATION: 95 % | SYSTOLIC BLOOD PRESSURE: 117 MMHG | RESPIRATION RATE: 18 BRPM | HEART RATE: 72 BPM | WEIGHT: 108.81 LBS | HEIGHT: 66 IN

## 2020-07-16 DIAGNOSIS — C50.919 CARCINOMA OF BREAST METASTATIC TO BONE, UNSPECIFIED LATERALITY: Primary | ICD-10-CM

## 2020-07-16 DIAGNOSIS — C79.51 CARCINOMA OF BREAST METASTATIC TO BONE, UNSPECIFIED LATERALITY: Primary | ICD-10-CM

## 2020-07-16 PROCEDURE — 99214 PR OFFICE/OUTPT VISIT, EST, LEVL IV, 30-39 MIN: ICD-10-PCS | Mod: GV,S$GLB,, | Performed by: INTERNAL MEDICINE

## 2020-07-16 PROCEDURE — 99214 OFFICE O/P EST MOD 30 MIN: CPT | Mod: GV,S$GLB,, | Performed by: INTERNAL MEDICINE

## 2020-07-16 RX ORDER — FAMOTIDINE 10 MG/ML
20 INJECTION INTRAVENOUS
Status: CANCELLED | OUTPATIENT
Start: 2020-08-05

## 2020-07-16 RX ORDER — SODIUM CHLORIDE 0.9 % (FLUSH) 0.9 %
10 SYRINGE (ML) INJECTION
Status: CANCELLED | OUTPATIENT
Start: 2020-07-29

## 2020-07-16 RX ORDER — DIPHENHYDRAMINE HYDROCHLORIDE 50 MG/ML
50 INJECTION INTRAMUSCULAR; INTRAVENOUS ONCE AS NEEDED
Status: CANCELLED | OUTPATIENT
Start: 2020-07-22

## 2020-07-16 RX ORDER — HEPARIN 100 UNIT/ML
500 SYRINGE INTRAVENOUS
Status: CANCELLED | OUTPATIENT
Start: 2020-08-05

## 2020-07-16 RX ORDER — FAMOTIDINE 10 MG/ML
20 INJECTION INTRAVENOUS
Status: CANCELLED | OUTPATIENT
Start: 2020-07-22

## 2020-07-16 RX ORDER — DIPHENHYDRAMINE HYDROCHLORIDE 50 MG/ML
50 INJECTION INTRAMUSCULAR; INTRAVENOUS ONCE AS NEEDED
Status: CANCELLED | OUTPATIENT
Start: 2020-07-29

## 2020-07-16 RX ORDER — SODIUM CHLORIDE 0.9 % (FLUSH) 0.9 %
10 SYRINGE (ML) INJECTION
Status: CANCELLED | OUTPATIENT
Start: 2020-08-05

## 2020-07-16 RX ORDER — FAMOTIDINE 10 MG/ML
20 INJECTION INTRAVENOUS
Status: CANCELLED | OUTPATIENT
Start: 2020-07-29

## 2020-07-16 RX ORDER — DIPHENHYDRAMINE HYDROCHLORIDE 50 MG/ML
50 INJECTION INTRAMUSCULAR; INTRAVENOUS ONCE AS NEEDED
Status: CANCELLED | OUTPATIENT
Start: 2020-08-05

## 2020-07-16 RX ORDER — SODIUM CHLORIDE 0.9 % (FLUSH) 0.9 %
10 SYRINGE (ML) INJECTION
Status: CANCELLED | OUTPATIENT
Start: 2020-07-22

## 2020-07-16 RX ORDER — HEPARIN 100 UNIT/ML
500 SYRINGE INTRAVENOUS
Status: CANCELLED | OUTPATIENT
Start: 2020-07-29

## 2020-07-16 RX ORDER — EPINEPHRINE 0.3 MG/.3ML
0.3 INJECTION SUBCUTANEOUS ONCE AS NEEDED
Status: CANCELLED | OUTPATIENT
Start: 2020-07-22

## 2020-07-16 RX ORDER — EPINEPHRINE 0.3 MG/.3ML
0.3 INJECTION SUBCUTANEOUS ONCE AS NEEDED
Status: CANCELLED | OUTPATIENT
Start: 2020-08-05

## 2020-07-16 RX ORDER — HEPARIN 100 UNIT/ML
500 SYRINGE INTRAVENOUS
Status: CANCELLED | OUTPATIENT
Start: 2020-07-22

## 2020-07-16 RX ORDER — EPINEPHRINE 0.3 MG/.3ML
0.3 INJECTION SUBCUTANEOUS ONCE AS NEEDED
Status: CANCELLED | OUTPATIENT
Start: 2020-07-29

## 2020-07-16 NOTE — PROGRESS NOTES
Medical Oncology Progress Note  [facility]     PATIENT: Benita Carballo  : 1945 74 y.o. female  MRN [id number]     PCP: Solomon Contreras MD  Consult Requested By:      Date of Service: 2020    Subjective:   Interim History:  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, unspecified lateral    Benita Carballo is here for to followup breast cancer treatment.      The patient was admitted hospital last week because of COPD exacerbation.  CT scan was performed which shows no evidence of pulmonary embolism  She was treated with nebulizer with clinical improvement and was discharged home        3/26/20   She returns with ECHO showing EF 55-60%, but right sided heart failure noted. Pt has an additional 12 # of wt gain since last week. She states she is feeling fine, better than at last visit. Discussed ECHO and will hold herceptin and obtain MUGA, will proceed with weekly taxol and possibly will add herceptin back after MUGA completed and will refer pt to cardiology, but could be delayed due to COVID pandemic.     2020  Today she presents with significant fluid loss noted. No longer upper ext third space swelling noted, alb and T Pro dramatically improved, pt has last 10 + lbs of fluid. She is breathing easy today and denies any SOB or ORELLANA. We will proceed with weekly taxol and continue to hold herceptin until MUGA or cardiology clearance is obtained. She was seen yesterday with Dr Gardner. Below is a portion of his note:       Oncology History:  Oncology History Overview Note    Rt breast cancer s/p lump with [place] Tamoxifen x 1 yr, then Arimidex x 5 yrs. Completed tx .       c/o chronic cough with CT chest 16 showing extensive adenopathy  With base of lt neck mass 4.5 cm with yosi pulm nodules. RF to Dr ANDREWS Mcnally    16 bx of lt neck mass carcinoma with feature suggestive of breast primary. ER/%, Her2 borderline by IHC and deemed equivocal by FISH        Breast cancer metastasized to bone   2/4/2016 Initial Diagnosis    Breast cancer metastasized to bone, , lymphoid mass      5/25/2016 - 2/9/2017 Chemotherapy    Taxol/Herceptin x 8 cycles then perjeta added 7/16  Prescribed by Dr Mcnally      2/20/2017 - 6/17/2017 Chemotherapy    Kadcycla prescribed by Dr Mcnally      6/28/2017 Biopsy    Biopsy of rt abd met disease consistent with breast cancer. ER/CO + Her 2 neg per Dr Moreno's note.    Dr Moreno noted path review from bx 2/16 showed HER 2 + equivocal with FISH reading as Equivocal but ratio 1.2. Additional report form 2016 states additional testing was done with different probe and the equivocal results can be noted as negative.     Treatment therapy changed from Her2 based tx to endocrine base therapy by Dr oMreno      6/28/2017 -  Hormone Therapy    Arimidex 6/17-2/18  Aromasin 2/18  Ibrance + Aromasin 5/18 -1/19  Ibrance + faslodex 2/19 4/30/2019 Imaging Significant Findings    PET/CT: Lesions involving the medial clavicle , subcarinal LN, pleural based soft tissue density adjacent to T11 and lesion in left lobe of liver diminished in size and SUV.   RECIST 34% decrease in disease     2/26/2020 -  Chemotherapy    Treatment Summary   Plan Name: OP BREAST TRASTUZUMAB PACLITAXEL QW Perjeta Q3W  Treatment Goal: Control  Status: Active  Start Date: 2/26/2020  End Date: 8/12/2020 (Planned)  Provider: Solomon Contreras MD  Chemotherapy: PACLitaxel (TAXOL) 64 mg/m2 = 102 mg in sodium chloride 0.9% 250 mL chemo infusion, 64 mg/m2 = 102 mg (100 % of original dose 64 mg/m2), Intravenous, Clinic/HOD 1 time, 14 of 18 cycles  Dose modification: 64 mg/m2 (original dose 64 mg/m2, Cycle 1), 50 mg/m2 (original dose 64 mg/m2, Cycle 14)  trastuzumab 211 mg in sodium chloride 0.9% 250 mL chemo infusion, 4 mg/kg = 211 mg, Intravenous, Clinic/HOD 1 time, 9 of 13 cycles  Dose modification: 2 mg/kg (original dose 2 mg/kg, Cycle 7, Reason: MD Discretion), 80 mg (original dose 2 mg/kg, Cycle  8)  pertuzumab (PERJETA) 420 mg in sodium chloride 0.9% 250 mL infusion, 420 mg, Intravenous, Clinic/HOD 1 time, 0 of 1 cycle       ==8/2019 Now She is currently on combined afinitor + aromasin,  repeated CT scan shows improvement  PET 1/2020 showed PD,  afinitor + aromasin stopped  == PET scan 01/20/2020 the progressive  ==Started Taxol/Herceptin weekly 2/2020  ==6/2020 PET PD  ==add Weekly paclitaxel with trastuzumab and pertuzumab    Past Medical History:   Past Medical History:   Diagnosis Date    Arthritis     Bone cancer     Breast cancer     Cancer     Cataract     Depression     Diverticulosis 1/15/2020    Fatigue associated with anemia 6/6/2019    GERD (gastroesophageal reflux disease)     High cholesterol     Hypoxemia 3/12/2020    Skin problem     Sleep disorder        Past Surgical HIstory:   Past Surgical History:   Procedure Laterality Date    APPENDECTOMY      BREAST LUMPECTOMY Right 2002    CATARACT EXTRACTION, BILATERAL  2014    COLONOSCOPY  2008    HYSTERECTOMY      PORTACATH PLACEMENT  05/18/2016    SURGICAL REMOVAL OF NODULE OF VOCAL CORD         Allergies:  Review of patient's allergies indicates:   Allergen Reactions    Benadryl [diphenhydramine hcl]      Restless leg     Medrol [methylprednisolone] Hallucinations    Penicillins        Medications:  Current Outpatient Medications   Medication Sig Dispense Refill    ANORO ELLIPTA 62.5-25 mcg/actuation DsDv       dexAMETHasone 0.5 mg/5 mL Soln SWISH WITH 10 ML PO FOR 2 MIN THEN SPIT QID      doxycycline (VIBRAMYCIN) 100 MG Cap Take 1 capsule (100 mg total) by mouth every 12 (twelve) hours. 20 capsule 0    exemestane (AROMASIN) 25 mg tablet Take 1 tablet (25 mg total) by mouth once daily. 30 tablet 3    furosemide (LASIX) 40 MG tablet Take 1 tablet (40 mg total) by mouth once daily. 30 tablet 11    gabapentin (NEURONTIN) 400 MG capsule Take 1 capsule (400 mg total) by mouth 3 (three) times daily. 120 capsule 4     iron-vitamin C 100-250 mg, ICAR-C, (ICAR-C) 100-250 mg Tab Take 1 tablet by mouth once daily. 30 tablet 5    LORazepam (ATIVAN) 1 MG tablet TAKE 1 TABLET BY MOUTH EVERY EVENING 30 tablet 0    megestrol (MEGACE) 400 mg/10 mL (40 mg/mL) Susp Take 10 mLs (400 mg total) by mouth once daily. 240 mL 2    oxyCODONE (ROXICODONE) 15 MG Tab Take 1 tablet (15 mg total) by mouth every 6 (six) hours as needed for Pain. 60 tablet 0    pantoprazole (PROTONIX) 40 MG tablet TAKE 1 TABLET BY MOUTH EVERY DAY 90 tablet 0    potassium chloride SA (K-DUR,KLOR-CON) 20 MEQ tablet Take 1 tablet (20 mEq total) by mouth once daily. 30 tablet 11    PROAIR HFA 90 mcg/actuation inhaler       promethazine (PHENERGAN) 25 MG tablet TAKE 1 TABLET BY MOUTH EVERY 6 HOURS AS NEEDED FOR NAUSEA 30 tablet 3    temazepam (RESTORIL) 15 mg Cap TAKE 1 CAPSULE(15 MG) BY MOUTH EVERY NIGHT 30 capsule 0     No current facility-administered medications for this visit.        Family History:   Family History   Problem Relation Age of Onset    Lung cancer Father     Melanoma Father        Social History:  reports that she has been smoking cigarettes. She has a 2.00 pack-year smoking history. She has never used smokeless tobacco. She reports current alcohol use of about 4.0 standard drinks of alcohol per week. She reports that she does not use drugs.    Review of Systemas  Constitutional: No change in weight, appetie, fatigue, fever, or night sweats  Eyes: No changes in vision  Ears, Nose, Mouth, Throat, and Face: No hearing problems, ear pain, rummy nose, or sore throat  Respiratory: No shortness of breath or cough  Cardiovascular: No chest pain, palpitations or dizziness  Gastrointestinal: No abdominal pain, hematochezia, melena  Genitourinary: No dysuria, hematuria, nocturia, menstrual problems, post menopausal  Integumentary/Breast: No rashes or itching  Hematologic/Lymphatic: No anemia or bleeding abnormalities  Musculoskeletal: No joint or muscle  "abnormalities  Neurological: No sensory or motor problems, no headaches  Behavioral/Psych: No depression, anxiety, cognitive problems, or stress  Endocrine: No diabetes or thyroid problems  Allergic/Immunologic: See allergy above    Physical Exam      Vitals:   Vitals:    07/16/20 1004   BP: (!) 117/58   BP Location: Left arm   Patient Position: Sitting   BP Method: Large (Automatic)   Pulse: 72   Resp: 18   Temp: 97.7 °F (36.5 °C)   SpO2: 95%   Weight: 49.4 kg (108 lb 12.8 oz)   Height: 5' 6" (1.676 m)     BMI: Body mass index is 17.56 kg/m².  BSA Body surface area is 1.52 meters squared.  ECOG Performance Status:   ECOG SCORE    1 - Restricted in strenuous activity-ambulatory and able to carry out work of a light nature       GENERAL APPEARANCE: Well developed, well nourished, in no acute distress.  SKIN: Inspection of the skin reveals no rashes, ulcerations or petechiae.  HEENT: The sclerae were anicteric and conjunctivae were pink and moist. Extraocular movements were intact and pupils were equal, round, and reactive to light with normal accommodation. External inspection of the ears and nose showed no scars, lesions, or masses. Lips, teeth, and gums showed normal mucosa. The oral mucosa, hard and soft palate, tongue and posterior pharynx were normal.  NECK: Supple and symmetric. There was no thyroid enlargement, and no tenderness, or masses were felt.  CRESPIRATORY: Normal AP diameter and normal contour without any kyphoscoliosis. LUNGS: Auscultation of the lungs revealed normal breath sounds without any other adventitious sounds or rubs.  CARDIOVASCULAR: There was a regular rate and rhythm without any murmurs, gallops, rubs. The carotid pulses were normal and 2+ bilaterally without bruits. Peripheral pulses were 2+ and symmetric.  GASTROINTESTINAL: Soft and nontender with normal bowel sounds. The liver span was approximately 5-6 cm in the right midclavicular line by percussion. The liver edge was nontender. " The spleen was not palpable. There were no inguinal or umbilical hernias noted. No ascites was noted.  LYMPH NODES: No lymphadenopathy was appreciated in the neck, axillae or groin.  MUSCULOSKELETAL: Gait was normal. There was no tenderness or effusions noted. Muscle strength and tone were normal. EXTREMITIES: No cyanosis, clubbing or edema.  NEUROLOGIC: Alert and oriented x 3. Normal affect. Gait was normal. Normal deep tendon reflexes with no pathological reflexes. Sensation to touch was normal.  PSYCHIATRIC: good mood, orientated to place, time and person     Labs and Imagings         Imaging:     Assessment:     Principle Problem: 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, unspecified laterality [C50.919, C79.51]   Co-morbidity/Active Problems:   Patient Active Problem List   Diagnosis    Breast cancer metastasized to bone    Neuropathy    Fatigue associated with anemia    Drug-related hair loss    Nausea    Primary insomnia    Foot infection    Cancer, metastatic to bone    Malignant neoplasm of overlapping 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, estrogen receptor positive     Chronic fatigue    Diverticulosis    Dehydration    Hypoxemia    Iron deficiency anemia secondary to inadequate dietary iron intake    Hypoalbuminemia    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    Malignant neoplasm of breast, stage 4        Benita Carballo is a 74 y.o. female with PMH of The encounter diagnosis was 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, unspecified laterality., with 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, unspecified laterality [C50.919, C79.51]       Metastatic breast cancer ERPR positive HER2 Noah questionable  Status post of chemotherapy and HER2 targeted therapy with progression on HER2 targeted therapy  Recent testing fish negative for her 2 2018  Repeat the lipid biopsy 2019  shows HER2 positive and ER negative  Bone metastatic disease  Fatigue  Hypoxia  Dehydration    Plan:   Overall Plan:    She has HER2  positive disease with ERPR negative by the new liquid biopsy. Started Taxol/Herceptin 2/2020  Discussed ECHO with pt, showing right heart failure   Seen by Dr Gardner, cardiologist and recommended that Rt heart failure unlikely related to chemo, likely related to COPD and ILD.   Herceptin/taxol resumed last week with poor tolerance by pt, weight loss, fatigue reported, will hold tx this week, plan to resume next week at low dose, discussed with Dr Contreras  Discussed with Dr Contreras and he recommended reducing dose of herceptin further  Lasix x 3 days with 10 lbs wt loss, p is instructed to use prn, pt has not had to use Lasix over last 3 weeks   Compression sleeve for dependant upper ext swelling  Pt is wanting to stop oxycontin and norco due to out of pocket cost. She is requesting to resume oxycodone 5 mg TID when she runs out of oxycontin/norco. She states works better and more affordable.  Labs reviewed today and she is cleared for tx.      Rosalind Ricks, NPEstablished Patient - Audio Only Telehealth Visit     The patient location is: home  The chief complaint leading to consultation is: chemo clearnace  Visit type: Virtual visit with audio only (telephone)     The reason for the audio only service rather than synchronous audio and video virtual visit was related to technical difficulties or patient preference/necessity.     Each patient to whom I provide medical services by telemedicine is:  (1) informed of the relationship between the physician and patient and the respective role of any other health care provider with respect to management of the patient; and (2) notified that they may decline to receive medical services by telemedicine and may withdraw from such care at any time. Patient verbally consented to receive this service via voice-only telephone call.       HPI: see above     Assessment and plan:  See above        Medical Oncology Progress Note  [facility]     PATIENT: Benita ROMERO  Kait  : 1945 74 y.o. female  MRN [id number]     PCP: Solomon Contreras MD  Consult Requested By:      Date of Service: 2020    Subjective:   Interim History:  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, unspecified lateral    Benita Carballo is here for to followup breast cancer treatment.    The patient was started on  chemotherapy last week with Herceptin and Taxol weekly.  She has chronic hypoxia and her oxygen saturation has been declining today her O2 status 77% she looks purple and she also has increasing degree of short of breath; in addition she complains of swelling to her left arm.  She had no chest pain. She was admittted last week to the hospital and underwent thoracentesis, which was not tested for cytology and imaging for possible PE was ruled out. Today she states she is feeling better but POX was 82 % on RA, and still has yosi upper ext swelling which she states has improved. She has not had a echo so will order ECHO stat to r/o any CHF which could be causing her swelling both lung and upper ext. States she had upper u/s with her pulm which was neg for DVT.     3/26/20   She returns with ECHO showing EF 55-60%, but right sided heart failure noted. Pt has an additional 12 # of wt gain since last week. She states she is feeling fine, better than at last visit. Discussed ECHO and will hold herceptin and obtain MUGA, will proceed with weekly taxol and possibly will add herceptin back after MUGA completed and will refer pt to cardiology, but could be delayed due to COVID pandemic.     2020  Today she presents with significant fluid loss noted. No longer upper ext third space swelling noted, alb and T Pro dramatically improved, pt has last 10 + lbs of fluid. She is breathing easy today and denies any SOB or ORELLANA. We will proceed with weekly taxol and continue to hold herceptin until MUGA or cardiology clearance is obtained. She was seen yesterday with Dr Gardner. Below is a portion of his  note:       Oncology History:  Oncology History Overview Note   2002 Rt breast cancer s/p lump with [place] Tamoxifen x 1 yr, then Arimidex x 5 yrs. Completed tx 2008.     2015  c/o chronic cough with CT chest 1/28/16 showing extensive adenopathy  With base of lt neck mass 4.5 cm with yosi pulm nodules. RF to Dr ANDREWS Mcnally    2/6/16 bx of lt neck mass carcinoma with feature suggestive of breast primary. ER/%, Her2 borderline by IHC and deemed equivocal by FISH       Breast cancer metastasized to bone   2/4/2016 Initial Diagnosis    Breast cancer metastasized to bone, , lymphoid mass      5/25/2016 - 2/9/2017 Chemotherapy    Taxol/Herceptin x 8 cycles then perjeta added 7/16  Prescribed by Dr Mcnally      2/20/2017 - 6/17/2017 Chemotherapy    Kadcycla prescribed by Dr Mcnally      6/28/2017 Biopsy    Biopsy of rt abd met disease consistent with breast cancer. ER/[place] + Her 2 neg per Dr Moreno's note.    Dr Moreno noted path review from bx 2/16 showed HER 2 + equivocal with FISH reading as Equivocal but ratio 1.2. Additional report form 2016 states additional testing was done with different probe and the equivocal results can be noted as negative.     Treatment therapy changed from Her2 based tx to endocrine base therapy by Dr Moreno      6/28/2017 -  Hormone Therapy    Arimidex 6/17-2/18  Aromasin 2/18  Ibrance + Aromasin 5/18 -1/19  Ibrance + faslodex 2/19 4/30/2019 Imaging Significant Findings    PET/CT: Lesions involving the medial clavicle , subcarinal LN, pleural based soft tissue density adjacent to T11 and lesion in left lobe of liver diminished in size and SUV.   RECIST 34% decrease in disease     2/26/2020 -  Chemotherapy    Treatment Summary   Plan Name: OP BREAST TRASTUZUMAB PACLITAXEL QW Perjeta Q3W  Treatment Goal: Control  Status: Active  Start Date: 2/26/2020  End Date: 8/12/2020 (Planned)  Provider: Solomon Contreras MD  Chemotherapy: PACLitaxel (TAXOL) 64 mg/m2 = 102 mg in sodium chloride 0.9% 250  mL chemo infusion, 64 mg/m2 = 102 mg (100 % of original dose 64 mg/m2), Intravenous, Clinic/HOD 1 time, 14 of 18 cycles  Dose modification: 64 mg/m2 (original dose 64 mg/m2, Cycle 1), 50 mg/m2 (original dose 64 mg/m2, Cycle 14)  trastuzumab 211 mg in sodium chloride 0.9% 250 mL chemo infusion, 4 mg/kg = 211 mg, Intravenous, Clinic/HOD 1 time, 9 of 13 cycles  Dose modification: 2 mg/kg (original dose 2 mg/kg, Cycle 7, Reason: MD Discretion), 80 mg (original dose 2 mg/kg, Cycle 8)  pertuzumab (PERJETA) 420 mg in sodium chloride 0.9% 250 mL infusion, 420 mg, Intravenous, Clinic/HOD 1 time, 0 of 1 cycle       ==8/2019 Now She is currently on combined afinitor + aromasin,  repeated CT scan shows improvement  PET 1/2020 showed PD,  afinitor + aromasin stopped  == PET scan 01/20/2020 the progressive  ==Started Taxol/Herceptin weekly 2/2020    Past Medical History:   Past Medical History:   Diagnosis Date    Arthritis     Bone cancer     Breast cancer     Cancer     Cataract     Depression     Diverticulosis 1/15/2020    Fatigue associated with anemia 6/6/2019    GERD (gastroesophageal reflux disease)     High cholesterol     Hypoxemia 3/12/2020    Skin problem     Sleep disorder        Past Surgical HIstory:   Past Surgical History:   Procedure Laterality Date    APPENDECTOMY      BREAST LUMPECTOMY Right 2002    CATARACT EXTRACTION, BILATERAL  2014    COLONOSCOPY  2008    HYSTERECTOMY      PORTACATH PLACEMENT  05/18/2016    SURGICAL REMOVAL OF NODULE OF VOCAL CORD         Allergies:  Review of patient's allergies indicates:   Allergen Reactions    Benadryl [diphenhydramine hcl]      Restless leg     Medrol [methylprednisolone] Hallucinations    Penicillins        Medications:  Current Outpatient Medications   Medication Sig Dispense Refill    ANORO ELLIPTA 62.5-25 mcg/actuation DsDv       dexAMETHasone 0.5 mg/5 mL Soln SWISH WITH 10 ML PO FOR 2 MIN THEN SPIT QID      doxycycline (VIBRAMYCIN) 100  MG Cap Take 1 capsule (100 mg total) by mouth every 12 (twelve) hours. 20 capsule 0    exemestane (AROMASIN) 25 mg tablet Take 1 tablet (25 mg total) by mouth once daily. 30 tablet 3    furosemide (LASIX) 40 MG tablet Take 1 tablet (40 mg total) by mouth once daily. 30 tablet 11    gabapentin (NEURONTIN) 400 MG capsule Take 1 capsule (400 mg total) by mouth 3 (three) times daily. 120 capsule 4    iron-vitamin C 100-250 mg, ICAR-C, (ICAR-C) 100-250 mg Tab Take 1 tablet by mouth once daily. 30 tablet 5    LORazepam (ATIVAN) 1 MG tablet TAKE 1 TABLET BY MOUTH EVERY EVENING 30 tablet 0    megestrol (MEGACE) 400 mg/10 mL (40 mg/mL) Susp Take 10 mLs (400 mg total) by mouth once daily. 240 mL 2    oxyCODONE (ROXICODONE) 15 MG Tab Take 1 tablet (15 mg total) by mouth every 6 (six) hours as needed for Pain. 60 tablet 0    pantoprazole (PROTONIX) 40 MG tablet TAKE 1 TABLET BY MOUTH EVERY DAY 90 tablet 0    potassium chloride SA (K-DUR,KLOR-CON) 20 MEQ tablet Take 1 tablet (20 mEq total) by mouth once daily. 30 tablet 11    PROAIR HFA 90 mcg/actuation inhaler       promethazine (PHENERGAN) 25 MG tablet TAKE 1 TABLET BY MOUTH EVERY 6 HOURS AS NEEDED FOR NAUSEA 30 tablet 3    temazepam (RESTORIL) 15 mg Cap TAKE 1 CAPSULE(15 MG) BY MOUTH EVERY NIGHT 30 capsule 0     No current facility-administered medications for this visit.        Family History:   Family History   Problem Relation Age of Onset    Lung cancer Father     Melanoma Father        Social History:  reports that she has been smoking cigarettes. She has a 2.00 pack-year smoking history. She has never used smokeless tobacco. She reports current alcohol use of about 4.0 standard drinks of alcohol per week. She reports that she does not use drugs.    Review of Systemas  Constitutional: No change in weight, appetie, fatigue, fever, or night sweats  Eyes: No changes in vision  Ears, Nose, Mouth, Throat, and Face: No hearing problems, ear pain, rummy nose, or  "sore throat  Respiratory: No shortness of breath or cough  Cardiovascular: No chest pain, palpitations or dizziness  Gastrointestinal: No abdominal pain, hematochezia, melena  Genitourinary: No dysuria, hematuria, nocturia, menstrual problems, post menopausal  Integumentary/Breast: No rashes or itching  Hematologic/Lymphatic: No anemia or bleeding abnormalities  Musculoskeletal: No joint or muscle abnormalities  Neurological: No sensory or motor problems, no headaches  Behavioral/Psych: No depression, anxiety, cognitive problems, or stress  Endocrine: No diabetes or thyroid problems  Allergic/Immunologic: See allergy above    Physical Exam      Vitals:   Vitals:    07/16/20 1004   BP: (!) 117/58   BP Location: Left arm   Patient Position: Sitting   BP Method: Large (Automatic)   Pulse: 72   Resp: 18   Temp: 97.7 °F (36.5 °C)   SpO2: 95%   Weight: 49.4 kg (108 lb 12.8 oz)   Height: 5' 6" (1.676 m)     BMI: Body mass index is 17.56 kg/m².  BSA Body surface area is 1.52 meters squared.  ECOG Performance Status:   ECOG SCORE    1 - Restricted in strenuous activity-ambulatory and able to carry out work of a light nature       GENERAL APPEARANCE: Well developed, well nourished, in no acute distress.  SKIN: Inspection of the skin reveals no rashes, ulcerations or petechiae.  HEENT: The sclerae were anicteric and conjunctivae were pink and moist. Extraocular movements were intact and pupils were equal, round, and reactive to light with normal accommodation. External inspection of the ears and nose showed no scars, lesions, or masses. Lips, teeth, and gums showed normal mucosa. The oral mucosa, hard and soft palate, tongue and posterior pharynx were normal.  NECK: Supple and symmetric. There was no thyroid enlargement, and no tenderness, or masses were felt.  CRESPIRATORY: Normal AP diameter and normal contour without any kyphoscoliosis. LUNGS: Auscultation of the lungs revealed normal breath sounds without any other " adventitious sounds or rubs.  CARDIOVASCULAR: There was a regular rate and rhythm without any murmurs, gallops, rubs. The carotid pulses were normal and 2+ bilaterally without bruits. Peripheral pulses were 2+ and symmetric.  GASTROINTESTINAL: Soft and nontender with normal bowel sounds. The liver span was approximately 5-6 cm in the right midclavicular line by percussion. The liver edge was nontender. The spleen was not palpable. There were no inguinal or umbilical hernias noted. No ascites was noted.  LYMPH NODES: No lymphadenopathy was appreciated in the neck, axillae or groin.  MUSCULOSKELETAL: Gait was normal. There was no tenderness or effusions noted. Muscle strength and tone were normal. EXTREMITIES: No cyanosis, clubbing or edema.  NEUROLOGIC: Alert and oriented x 3. Normal affect. Gait was normal. Normal deep tendon reflexes with no pathological reflexes. Sensation to touch was normal.  PSYCHIATRIC: good mood, orientated to place, time and person     Labs and Imagings     Lab Results   Component Value Date    WBC 7.1 05/21/2020    HGB 11.4 (L) 05/21/2020    HCT 37.4 05/21/2020    LABPLAT 295 05/21/2020       CMP  Sodium   Date Value Ref Range Status   07/16/2020 138 135 - 145 mmol/L Final     Potassium   Date Value Ref Range Status   07/16/2020 4.5 3.6 - 5.2 mmol/L Final     Chloride   Date Value Ref Range Status   07/16/2020 101 100 - 108 mmol/L Final     CO2   Date Value Ref Range Status   07/16/2020 31 21 - 32 mmol/L Final     Glucose   Date Value Ref Range Status   07/16/2020 99 70 - 110 mg/dL Final     BUN, Bld   Date Value Ref Range Status   07/16/2020 8 7 - 18 mg/dL Final     Creatinine   Date Value Ref Range Status   07/16/2020 0.79 0.55 - 1.02 mg/dL Final     Calcium   Date Value Ref Range Status   07/16/2020 8.6 (L) 8.8 - 10.5 mg/dL Final     Total Protein   Date Value Ref Range Status   07/16/2020 6.0 (L) 6.4 - 8.2 g/dL Final     Albumin   Date Value Ref Range Status   07/16/2020 2.7 (L) 3.4 -  5.0 g/dL Final     Total Bilirubin   Date Value Ref Range Status   07/16/2020 0.6 0.0 - 1.0 mg/dL Final     Alkaline Phosphatase   Date Value Ref Range Status   07/16/2020 72 46 - 116 U/L Final     AST   Date Value Ref Range Status   07/16/2020 16 15 - 37 U/L Final     ALT   Date Value Ref Range Status   07/16/2020 26 12 - 78 U/L Final     Anion Gap   Date Value Ref Range Status   07/16/2020 6.0 3.0 - 11.0 mmol/L Final         Imaging:     Assessment:     Principle Problem: 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, unspecified laterality [C50.919, C79.51]   Co-morbidity/Active Problems:   Patient Active Problem List   Diagnosis    Breast cancer metastasized to bone    Neuropathy    Fatigue associated with anemia    Drug-related hair loss    Nausea    Primary insomnia    Foot infection    Cancer, metastatic to bone    Malignant neoplasm of overlapping 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, estrogen receptor positive     Chronic fatigue    Diverticulosis    Dehydration    Hypoxemia    Iron deficiency anemia secondary to inadequate dietary iron intake    Hypoalbuminemia    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    Malignant neoplasm of breast, stage 4        Benita Carballo is a 74 y.o. female with PMH of The encounter diagnosis was 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, unspecified laterality., with 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, unspecified laterality [C50.919, C79.51]       Metastatic breast cancer ERPR positive HER2 Noah questionable  Status post of chemotherapy and HER2 targeted therapy with progression on HER2 targeted therapy  Recent testing fish negative for her 2 2018  Repeat the lipid biopsy 2019  shows HER2 positive and ER negative  Bone metastatic disease  Fatigue  Hypoxia  Dehydration  Progressing on Herceptin/Taxol, now on Taxol Herceptin weekly and Perjeta times every 3 weeks    Plan:   Overall Plan:     ==Labs CBC CMP weekly, TM 4 weeks  == resume chemotherapy Taxol Herce next week  ptin weekly, and Perjeta every 3 weeks   ==Next scan due end of 8/2020  ==RTC 1 week

## 2020-07-23 ENCOUNTER — OFFICE VISIT (OUTPATIENT)
Dept: HEMATOLOGY/ONCOLOGY | Facility: CLINIC | Age: 75
End: 2020-07-23
Payer: MEDICARE

## 2020-07-23 VITALS
WEIGHT: 105.19 LBS | HEART RATE: 100 BPM | TEMPERATURE: 98 F | SYSTOLIC BLOOD PRESSURE: 119 MMHG | BODY MASS INDEX: 16.9 KG/M2 | RESPIRATION RATE: 16 BRPM | DIASTOLIC BLOOD PRESSURE: 61 MMHG | HEIGHT: 66 IN | OXYGEN SATURATION: 95 %

## 2020-07-23 DIAGNOSIS — C79.51 CANCER, METASTATIC TO BONE: ICD-10-CM

## 2020-07-23 DIAGNOSIS — C79.51 CARCINOMA OF BREAST METASTATIC TO BONE, UNSPECIFIED LATERALITY: ICD-10-CM

## 2020-07-23 DIAGNOSIS — C50.919 CARCINOMA OF BREAST METASTATIC TO BONE, UNSPECIFIED LATERALITY: ICD-10-CM

## 2020-07-23 DIAGNOSIS — C50.812 MALIGNANT NEOPLASM OF OVERLAPPING SITES OF LEFT BREAST IN FEMALE, ESTROGEN RECEPTOR POSITIVE: Primary | ICD-10-CM

## 2020-07-23 DIAGNOSIS — F51.01 PRIMARY INSOMNIA: ICD-10-CM

## 2020-07-23 DIAGNOSIS — Z17.0 MALIGNANT NEOPLASM OF OVERLAPPING SITES OF LEFT BREAST IN FEMALE, ESTROGEN RECEPTOR POSITIVE: Primary | ICD-10-CM

## 2020-07-23 LAB
ANISOCYTOSIS: NORMAL
HYPOCHROMIA BLD QL SMEAR: NORMAL

## 2020-07-23 PROCEDURE — 99215 OFFICE O/P EST HI 40 MIN: CPT | Mod: S$GLB,,, | Performed by: INTERNAL MEDICINE

## 2020-07-23 PROCEDURE — 99215 PR OFFICE/OUTPT VISIT, EST, LEVL V, 40-54 MIN: ICD-10-PCS | Mod: S$GLB,,, | Performed by: INTERNAL MEDICINE

## 2020-07-23 RX ORDER — TEMAZEPAM 15 MG/1
CAPSULE ORAL
Qty: 30 CAPSULE | Refills: 0 | Status: SHIPPED | OUTPATIENT
Start: 2020-07-23 | End: 2020-08-12 | Stop reason: SDUPTHER

## 2020-07-23 RX ORDER — CAPECITABINE 500 MG/1
1000 TABLET, FILM COATED ORAL 2 TIMES DAILY
Qty: 60 TABLET | Refills: 5 | Status: SHIPPED | OUTPATIENT
Start: 2020-07-23 | End: 2020-08-11 | Stop reason: SDUPTHER

## 2020-07-23 RX ORDER — OXYCODONE HYDROCHLORIDE 15 MG/1
15 TABLET ORAL EVERY 6 HOURS PRN
Qty: 60 TABLET | Refills: 0 | Status: SHIPPED | OUTPATIENT
Start: 2020-07-23 | End: 2020-08-07 | Stop reason: SDUPTHER

## 2020-07-23 NOTE — PROGRESS NOTES
Medical Oncology Progress Note  [facility]     PATIENT: Benita Carballo  : 1945 74 y.o. female  MRN [id number]     PCP: Solomon Contreras MD  Consult Requested By:      Date of Service: 2020    Subjective:   Interim History:  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, unspecified lateral    Benita Carballo is here for to followup breast cancer treatment.      The patient was admitted hospital last week because of COPD exacerbation.  CT scan was performed which shows no evidence of pulmonary embolism She was treated with nebulizer with clinical improvement and was discharged home    3/26/20   She returns with ECHO showing EF 55-60%, but right sided heart failure noted. Pt has an additional 12 # of wt gain since last week. She states she is feeling fine, better than at last visit. Discussed ECHO and will hold herceptin and obtain MUGA, will proceed with weekly taxol and possibly will add herceptin back after MUGA completed and will refer pt to cardiology, but could be delayed due to COVID pandemic.     2020  Today she presents with significant fluid loss noted. No longer upper ext third space swelling noted, alb and T Pro dramatically improved, pt has last 10 + lbs of fluid. She is breathing easy today and denies any SOB or ORELLANA. We will proceed with weekly taxol and continue to hold herceptin until MUGA or cardiology clearance is obtained. She was seen yesterday with Dr Gardner. Below is a portion of his note:       Oncology History:  Oncology History Overview Note    Rt breast cancer s/p lump with [place] Tamoxifen x 1 yr, then Arimidex x 5 yrs. Completed tx .       c/o chronic cough with CT chest 16 showing extensive adenopathy  With base of lt neck mass 4.5 cm with yosi pulm nodules. RF to Dr ANDREWS Mcnally    16 bx of lt neck mass carcinoma with feature suggestive of breast primary. ER/%, Her2 borderline by IHC and deemed equivocal by FISH        Breast cancer metastasized to bone   2/4/2016 Initial Diagnosis    Breast cancer metastasized to bone, , lymphoid mass      5/25/2016 - 2/9/2017 Chemotherapy    Taxol/Herceptin x 8 cycles then perjeta added 7/16  Prescribed by Dr Mcnally      2/20/2017 - 6/17/2017 Chemotherapy    Kadcycla prescribed by Dr Mcnally      6/28/2017 Biopsy    Biopsy of rt abd met disease consistent with breast cancer. ER/[place] + Her 2 neg per Dr Moreno's note.    Dr Moreno noted path review from bx 2/16 showed HER 2 + equivocal with FISH reading as Equivocal but ratio 1.2. Additional report form 2016 states additional testing was done with different probe and the equivocal results can be noted as negative.     Treatment therapy changed from Her2 based tx to endocrine base therapy by Dr Moreno      6/28/2017 -  Hormone Therapy    Arimidex 6/17-2/18  Aromasin 2/18  Ibrance + Aromasin 5/18 -1/19  Ibrance + faslodex 2/19 4/30/2019 Imaging Significant Findings    PET/CT: Lesions involving the medial clavicle , subcarinal LN, pleural based soft tissue density adjacent to T11 and lesion in left lobe of liver diminished in size and SUV.   RECIST 34% decrease in disease     2/26/2020 -  Chemotherapy    Treatment Summary   Plan Name: OP BREAST TRASTUZUMAB PACLITAXEL QW Perjeta Q3W  Treatment Goal: Control  Status: Active  Start Date: 2/26/2020  End Date: 8/12/2020 (Planned)  Provider: Solomon Contreras MD  Chemotherapy: PACLitaxel (TAXOL) 64 mg/m2 = 102 mg in sodium chloride 0.9% 250 mL chemo infusion, 64 mg/m2 = 102 mg (100 % of original dose 64 mg/m2), Intravenous, Clinic/HOD 1 time, 14 of 18 cycles  Dose modification: 64 mg/m2 (original dose 64 mg/m2, Cycle 1), 50 mg/m2 (original dose 64 mg/m2, Cycle 14)  trastuzumab 211 mg in sodium chloride 0.9% 250 mL chemo infusion, 4 mg/kg = 211 mg, Intravenous, Clinic/HOD 1 time, 9 of 13 cycles  Dose modification: 2 mg/kg (original dose 2 mg/kg, Cycle 7, Reason: MD Discretion), 80 mg (original dose 2 mg/kg, Cycle  8)  pertuzumab (PERJETA) 420 mg in sodium chloride 0.9% 250 mL infusion, 420 mg, Intravenous, Clinic/HOD 1 time, 0 of 1 cycle       ==8/2019 Now She is currently on combined afinitor + aromasin,  repeated CT scan shows improvement  PET 1/2020 showed PD,  afinitor + aromasin stopped  == PET scan 01/20/2020 the progressive    ==Started Taxol/Herceptin weekly 2/2020    ==6/2020 PET PD  ==add Weekly paclitaxel with trastuzumab and pertuzumab.  However the patient states that she was treated with pertuzumab in the past several years ago which almost kill her as her report, therefore going to change her treatment plan as below  ==7/2020 start Nerlynx plus Xeloda for HER2 positive metastatic breast cancer    Past Medical History:   Past Medical History:   Diagnosis Date    Arthritis     Bone cancer     Breast cancer     Cancer     Cataract     Depression     Diverticulosis 1/15/2020    Fatigue associated with anemia 6/6/2019    GERD (gastroesophageal reflux disease)     High cholesterol     Hypoxemia 3/12/2020    Skin problem     Sleep disorder        Past Surgical HIstory:   Past Surgical History:   Procedure Laterality Date    APPENDECTOMY      BREAST LUMPECTOMY Right 2002    CATARACT EXTRACTION, BILATERAL  2014    COLONOSCOPY  2008    HYSTERECTOMY      PORTACATH PLACEMENT  05/18/2016    SURGICAL REMOVAL OF NODULE OF VOCAL CORD         Allergies:  Review of patient's allergies indicates:   Allergen Reactions    Benadryl [diphenhydramine hcl]      Restless leg     Medrol [methylprednisolone] Hallucinations    Penicillins        Medications:  Current Outpatient Medications   Medication Sig Dispense Refill    ANORO ELLIPTA 62.5-25 mcg/actuation DsDv       dexAMETHasone 0.5 mg/5 mL Soln SWISH WITH 10 ML PO FOR 2 MIN THEN SPIT QID      doxycycline (VIBRAMYCIN) 100 MG Cap Take 1 capsule (100 mg total) by mouth every 12 (twelve) hours. 20 capsule 0    exemestane (AROMASIN) 25 mg tablet Take 1 tablet  (25 mg total) by mouth once daily. 30 tablet 3    furosemide (LASIX) 40 MG tablet Take 1 tablet (40 mg total) by mouth once daily. 30 tablet 11    gabapentin (NEURONTIN) 400 MG capsule Take 1 capsule (400 mg total) by mouth 3 (three) times daily. 120 capsule 4    iron-vitamin C 100-250 mg, ICAR-C, (ICAR-C) 100-250 mg Tab Take 1 tablet by mouth once daily. 30 tablet 5    LORazepam (ATIVAN) 1 MG tablet TAKE 1 TABLET BY MOUTH EVERY EVENING 30 tablet 0    megestrol (MEGACE) 400 mg/10 mL (40 mg/mL) Susp Take 10 mLs (400 mg total) by mouth once daily. 240 mL 2    oxyCODONE (ROXICODONE) 15 MG Tab Take 1 tablet (15 mg total) by mouth every 6 (six) hours as needed for Pain. 60 tablet 0    pantoprazole (PROTONIX) 40 MG tablet TAKE 1 TABLET BY MOUTH EVERY DAY 90 tablet 0    potassium chloride SA (K-DUR,KLOR-CON) 20 MEQ tablet Take 1 tablet (20 mEq total) by mouth once daily. 30 tablet 11    PROAIR HFA 90 mcg/actuation inhaler       promethazine (PHENERGAN) 25 MG tablet TAKE 1 TABLET BY MOUTH EVERY 6 HOURS AS NEEDED FOR NAUSEA 30 tablet 3    temazepam (RESTORIL) 15 mg Cap TAKE 1 CAPSULE(15 MG) BY MOUTH EVERY NIGHT 30 capsule 0    capecitabine (XELODA) 500 MG Tab Take 2 tablets (1,000 mg total) by mouth 2 (two) times daily This is continuous dosing, no breaks. 60 tablet 5    neratinib (NERLYNX) 40 mg Tab Take 240 mg by mouth once daily. 180 tablet 5     No current facility-administered medications for this visit.        Family History:   Family History   Problem Relation Age of Onset    Lung cancer Father     Melanoma Father        Social History:  reports that she has been smoking cigarettes. She has a 2.00 pack-year smoking history. She has never used smokeless tobacco. She reports current alcohol use of about 4.0 standard drinks of alcohol per week. She reports that she does not use drugs.    Review of Systemas  Constitutional: No change in weight, appetie, fatigue, fever, or night sweats  Eyes: No changes in  "vision  Ears, Nose, Mouth, Throat, and Face: No hearing problems, ear pain, rummy nose, or sore throat  Respiratory: No shortness of breath or cough  Cardiovascular: No chest pain, palpitations or dizziness  Gastrointestinal: No abdominal pain, hematochezia, melena  Genitourinary: No dysuria, hematuria, nocturia, menstrual problems, post menopausal  Integumentary/Breast: No rashes or itching  Hematologic/Lymphatic: No anemia or bleeding abnormalities  Musculoskeletal: No joint or muscle abnormalities  Neurological: No sensory or motor problems, no headaches  Behavioral/Psych: No depression, anxiety, cognitive problems, or stress  Endocrine: No diabetes or thyroid problems  Allergic/Immunologic: See allergy above    Physical Exam      Vitals:   Vitals:    07/23/20 0942 07/23/20 0951   BP: 119/61    BP Location: Left arm    Patient Position: Sitting    BP Method: Small (Automatic)    Pulse: 100    Resp: 16    Temp: 97.6 °F (36.4 °C)    TempSrc: Temporal    SpO2: (!) 75% 95%   Weight: 47.7 kg (105 lb 3.2 oz)    Height: 5' 6" (1.676 m)      BMI: Body mass index is 16.98 kg/m².  BSA Body surface area is 1.49 meters squared.  ECOG Performance Status:   ECOG SCORE    1 - Restricted in strenuous activity-ambulatory and able to carry out work of a light nature       GENERAL APPEARANCE: Well developed, well nourished, in no acute distress.  SKIN: Inspection of the skin reveals no rashes, ulcerations or petechiae.  HEENT: The sclerae were anicteric and conjunctivae were pink and moist. Extraocular movements were intact and pupils were equal, round, and reactive to light with normal accommodation. External inspection of the ears and nose showed no scars, lesions, or masses. Lips, teeth, and gums showed normal mucosa. The oral mucosa, hard and soft palate, tongue and posterior pharynx were normal.  NECK: Supple and symmetric. There was no thyroid enlargement, and no tenderness, or masses were felt.  CRESPIRATORY: Normal AP " diameter and normal contour without any kyphoscoliosis. LUNGS: Auscultation of the lungs revealed normal breath sounds without any other adventitious sounds or rubs.  CARDIOVASCULAR: There was a regular rate and rhythm without any murmurs, gallops, rubs. The carotid pulses were normal and 2+ bilaterally without bruits. Peripheral pulses were 2+ and symmetric.  GASTROINTESTINAL: Soft and nontender with normal bowel sounds. The liver span was approximately 5-6 cm in the right midclavicular line by percussion. The liver edge was nontender. The spleen was not palpable. There were no inguinal or umbilical hernias noted. No ascites was noted.  LYMPH NODES: No lymphadenopathy was appreciated in the neck, axillae or groin.  MUSCULOSKELETAL: Gait was normal. There was no tenderness or effusions noted. Muscle strength and tone were normal. EXTREMITIES: No cyanosis, clubbing or edema.  NEUROLOGIC: Alert and oriented x 3. Normal affect. Gait was normal. Normal deep tendon reflexes with no pathological reflexes. Sensation to touch was normal.  PSYCHIATRIC: good mood, orientated to place, time and person     Labs and Imagings         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, estrogen receptor positive [C50.812, Z17.0]   Co-morbidity/Active Problems:   Patient Active Problem List   Diagnosis    Breast cancer metastasized to bone    Neuropathy    Fatigue associated with anemia    Drug-related hair loss    Nausea    Primary insomnia    Foot infection    Cancer, metastatic to bone    Malignant neoplasm of overlapping 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, estrogen receptor positive     Chronic fatigue    Diverticulosis    Dehydration    Hypoxemia    Iron deficiency anemia secondary to inadequate dietary iron intake    Hypoalbuminemia    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    Malignant neoplasm of breast, stage 4        Benita Carballo is a 74  y.o. female with PMH of The primary encounter diagnosis was Malignant neoplasm of overlapping 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, estrogen receptor positive . Diagnoses of 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, unspecified laterality, Primary insomnia, and Cancer, metastatic to bone were also pertinent to this visit., with Malignant neoplasm of overlapping 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, estrogen receptor positive [C50.812, Z17.0]       Metastatic breast cancer ERPR positive HER2 Noah questionable  Status post of chemotherapy and HER2 targeted therapy with progression on HER2 targeted therapy  Recent testing fish negative for her 2 2018  Repeat the lipid biopsy 2019  shows HER2 positive and ER negative  Bone metastatic disease  Fatigue  Hypoxia  Dehydration    Plan:   Overall Plan:    She has HER2 positive disease with ERPR negative by the new liquid biopsy. Started Taxol/Herceptin 2/2020  Discussed ECHO with pt, showing right heart failure   Seen by Dr Gardner, cardiologist and recommended that Rt heart failure unlikely related to chemo, likely related to COPD and ILD.   Herceptin/taxol resumed last week with poor tolerance by pt, weight loss, fatigue reported, will hold tx this week, plan to resume next week at low dose, discussed with Dr Contreras  Discussed with Dr Contreras and he recommended reducing dose of herceptin further  Lasix x 3 days with 10 lbs wt loss, p is instructed to use prn, pt has not had to use Lasix over last 3 weeks   Compression sleeve for dependant upper ext swelling  Pt is wanting to stop oxycontin and norco due to out of pocket cost. She is requesting to resume oxycodone 5 mg TID when she runs out of oxycontin/norco. She states works better and more affordable.  Labs reviewed today and she is cleared for tx.      Rosalind Ricks NPEstablished Patient - Audio Only Telehealth Visit     The patient location is: home  The chief complaint leading to consultation is: chemo clearnace  Visit type: Virtual  visit with audio only (telephone)     The reason for the audio only service rather than synchronous audio and video virtual visit was related to technical difficulties or patient preference/necessity.     Each patient to whom I provide medical services by telemedicine is:  (1) informed of the relationship between the physician and patient and the respective role of any other health care provider with respect to management of the patient; and (2) notified that they may decline to receive medical services by telemedicine and may withdraw from such care at any time. Patient verbally consented to receive this service via voice-only telephone call.       HPI: see above     Assessment and plan:  See above        Medical Oncology Progress Note  [facility]     PATIENT: Benita Carballo  : 1945 74 y.o. female  MRN [id number]     PCP: Solomon Contreras MD  Consult Requested By:      Date of Service: 2020    Subjective:   Interim History:  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, unspecified lateral    Benita Carballo is here for to followup breast cancer treatment.    The patient was started on  chemotherapy last week with Herceptin and Taxol weekly.  She has chronic hypoxia and her oxygen saturation has been declining today her O2 status 77% she looks purple and she also has increasing degree of short of breath; in addition she complains of swelling to her left arm.  She had no chest pain. She was admittted last week to the hospital and underwent thoracentesis, which was not tested for cytology and imaging for possible PE was ruled out. Today she states she is feeling better but POX was 82 % on RA, and still has yosi upper ext swelling which she states has improved. She has not had a echo so will order ECHO stat to r/o any CHF which could be causing her swelling both lung and upper ext. States she had upper u/s with her pulm which was neg for DVT.     3/26/20   She returns with ECHO  showing EF 55-60%, but right sided heart failure noted. Pt has an additional 12 # of wt gain since last week. She states she is feeling fine, better than at last visit. Discussed ECHO and will hold herceptin and obtain MUGA, will proceed with weekly taxol and possibly will add herceptin back after MUGA completed and will refer pt to cardiology, but could be delayed due to COVID pandemic.     4/2020  Today she presents with significant fluid loss noted. No longer upper ext third space swelling noted, alb and T Pro dramatically improved, pt has last 10 + lbs of fluid. She is breathing easy today and denies any SOB or ORELLANA. We will proceed with weekly taxol and continue to hold herceptin until MUGA or cardiology clearance is obtained. She was seen yesterday with Dr Gardner. Below is a portion of his note:       Oncology History:  Oncology History Overview Note   2002 Rt breast cancer s/p lump with [place] Tamoxifen x 1 yr, then Arimidex x 5 yrs. Completed tx 2008.     2015  c/o chronic cough with CT chest 1/28/16 showing extensive adenopathy  With base of lt neck mass 4.5 cm with yosi pulm nodules. RF to Dr ANDREWS Mcnally    2/6/16 bx of lt neck mass carcinoma with feature suggestive of breast primary. ER/%, Her2 borderline by IHC and deemed equivocal by FISH       Breast cancer metastasized to bone   2/4/2016 Initial Diagnosis    Breast cancer metastasized to bone, , lymphoid mass      5/25/2016 - 2/9/2017 Chemotherapy    Taxol/Herceptin x 8 cycles then perjeta added 7/16  Prescribed by Dr Mcnally      2/20/2017 - 6/17/2017 Chemotherapy    Kadcycla prescribed by Dr Mcnally      6/28/2017 Biopsy    Biopsy of rt abd met disease consistent with breast cancer. ER/NM + Her 2 neg per Dr Moreno's note.    Dr Moreno noted path review from bx 2/16 showed HER 2 + equivocal with FISH reading as Equivocal but ratio 1.2. Additional report form 2016 states additional testing was done with different probe and the equivocal results can  be noted as negative.     Treatment therapy changed from Her2 based tx to endocrine base therapy by Dr Moreno      6/28/2017 -  Hormone Therapy    Arimidex 6/17-2/18  Aromasin 2/18  Ibrance + Aromasin 5/18 -1/19  Ibrance + faslodex 2/19 4/30/2019 Imaging Significant Findings    PET/CT: Lesions involving the medial clavicle , subcarinal LN, pleural based soft tissue density adjacent to T11 and lesion in left lobe of liver diminished in size and SUV.   RECIST 34% decrease in disease     2/26/2020 -  Chemotherapy    Treatment Summary   Plan Name: OP BREAST TRASTUZUMAB PACLITAXEL QW Perjeta Q3W  Treatment Goal: Control  Status: Active  Start Date: 2/26/2020  End Date: 8/12/2020 (Planned)  Provider: Solomon Contreras MD  Chemotherapy: PACLitaxel (TAXOL) 64 mg/m2 = 102 mg in sodium chloride 0.9% 250 mL chemo infusion, 64 mg/m2 = 102 mg (100 % of original dose 64 mg/m2), Intravenous, Clinic/HOD 1 time, 14 of 18 cycles  Dose modification: 64 mg/m2 (original dose 64 mg/m2, Cycle 1), 50 mg/m2 (original dose 64 mg/m2, Cycle 14)  trastuzumab 211 mg in sodium chloride 0.9% 250 mL chemo infusion, 4 mg/kg = 211 mg, Intravenous, Clinic/HOD 1 time, 9 of 13 cycles  Dose modification: 2 mg/kg (original dose 2 mg/kg, Cycle 7, Reason: MD Discretion), 80 mg (original dose 2 mg/kg, Cycle 8)  pertuzumab (PERJETA) 420 mg in sodium chloride 0.9% 250 mL infusion, 420 mg, Intravenous, Clinic/HOD 1 time, 0 of 1 cycle       ==8/2019 Now She is currently on combined afinitor + aromasin,  repeated CT scan shows improvement  PET 1/2020 showed PD,  afinitor + aromasin stopped  == PET scan 01/20/2020 the progressive  ==Started Taxol/Herceptin weekly 2/2020    Past Medical History:   Past Medical History:   Diagnosis Date    Arthritis     Bone cancer     Breast cancer     Cancer     Cataract     Depression     Diverticulosis 1/15/2020    Fatigue associated with anemia 6/6/2019    GERD (gastroesophageal reflux disease)     High cholesterol      Hypoxemia 3/12/2020    Skin problem     Sleep disorder        Past Surgical HIstory:   Past Surgical History:   Procedure Laterality Date    APPENDECTOMY      BREAST LUMPECTOMY Right 2002    CATARACT EXTRACTION, BILATERAL  2014    COLONOSCOPY  2008    HYSTERECTOMY      PORTACATH PLACEMENT  05/18/2016    SURGICAL REMOVAL OF NODULE OF VOCAL CORD         Allergies:  Review of patient's allergies indicates:   Allergen Reactions    Benadryl [diphenhydramine hcl]      Restless leg     Medrol [methylprednisolone] Hallucinations    Penicillins        Medications:  Current Outpatient Medications   Medication Sig Dispense Refill    ANORO ELLIPTA 62.5-25 mcg/actuation DsDv       dexAMETHasone 0.5 mg/5 mL Soln SWISH WITH 10 ML PO FOR 2 MIN THEN SPIT QID      doxycycline (VIBRAMYCIN) 100 MG Cap Take 1 capsule (100 mg total) by mouth every 12 (twelve) hours. 20 capsule 0    exemestane (AROMASIN) 25 mg tablet Take 1 tablet (25 mg total) by mouth once daily. 30 tablet 3    furosemide (LASIX) 40 MG tablet Take 1 tablet (40 mg total) by mouth once daily. 30 tablet 11    gabapentin (NEURONTIN) 400 MG capsule Take 1 capsule (400 mg total) by mouth 3 (three) times daily. 120 capsule 4    iron-vitamin C 100-250 mg, ICAR-C, (ICAR-C) 100-250 mg Tab Take 1 tablet by mouth once daily. 30 tablet 5    LORazepam (ATIVAN) 1 MG tablet TAKE 1 TABLET BY MOUTH EVERY EVENING 30 tablet 0    megestrol (MEGACE) 400 mg/10 mL (40 mg/mL) Susp Take 10 mLs (400 mg total) by mouth once daily. 240 mL 2    oxyCODONE (ROXICODONE) 15 MG Tab Take 1 tablet (15 mg total) by mouth every 6 (six) hours as needed for Pain. 60 tablet 0    pantoprazole (PROTONIX) 40 MG tablet TAKE 1 TABLET BY MOUTH EVERY DAY 90 tablet 0    potassium chloride SA (K-DUR,KLOR-CON) 20 MEQ tablet Take 1 tablet (20 mEq total) by mouth once daily. 30 tablet 11    PROAIR HFA 90 mcg/actuation inhaler       promethazine (PHENERGAN) 25 MG tablet TAKE 1 TABLET BY MOUTH  EVERY 6 HOURS AS NEEDED FOR NAUSEA 30 tablet 3    temazepam (RESTORIL) 15 mg Cap TAKE 1 CAPSULE(15 MG) BY MOUTH EVERY NIGHT 30 capsule 0    capecitabine (XELODA) 500 MG Tab Take 2 tablets (1,000 mg total) by mouth 2 (two) times daily This is continuous dosing, no breaks. 60 tablet 5    neratinib (NERLYNX) 40 mg Tab Take 240 mg by mouth once daily. 180 tablet 5     No current facility-administered medications for this visit.        Family History:   Family History   Problem Relation Age of Onset    Lung cancer Father     Melanoma Father        Social History:  reports that she has been smoking cigarettes. She has a 2.00 pack-year smoking history. She has never used smokeless tobacco. She reports current alcohol use of about 4.0 standard drinks of alcohol per week. She reports that she does not use drugs.    Review of Systemas  Constitutional: No change in weight, appetie, fatigue, fever, or night sweats  Eyes: No changes in vision  Ears, Nose, Mouth, Throat, and Face: No hearing problems, ear pain, rummy nose, or sore throat  Respiratory: No shortness of breath or cough  Cardiovascular: No chest pain, palpitations or dizziness  Gastrointestinal: No abdominal pain, hematochezia, melena  Genitourinary: No dysuria, hematuria, nocturia, menstrual problems, post menopausal  Integumentary/Breast: No rashes or itching  Hematologic/Lymphatic: No anemia or bleeding abnormalities  Musculoskeletal: No joint or muscle abnormalities  Neurological: No sensory or motor problems, no headaches  Behavioral/Psych: No depression, anxiety, cognitive problems, or stress  Endocrine: No diabetes or thyroid problems  Allergic/Immunologic: See allergy above    Physical Exam      Vitals:   Vitals:    07/23/20 0942 07/23/20 0951   BP: 119/61    BP Location: Left arm    Patient Position: Sitting    BP Method: Small (Automatic)    Pulse: 100    Resp: 16    Temp: 97.6 °F (36.4 °C)    TempSrc: Temporal    SpO2: (!) 75% 95%   Weight: 47.7 kg  "(105 lb 3.2 oz)    Height: 5' 6" (1.676 m)      BMI: Body mass index is 16.98 kg/m².  BSA Body surface area is 1.49 meters squared.  ECOG Performance Status:   ECOG SCORE    1 - Restricted in strenuous activity-ambulatory and able to carry out work of a light nature       GENERAL APPEARANCE: Well developed, well nourished, in no acute distress.  SKIN: Inspection of the skin reveals no rashes, ulcerations or petechiae.  HEENT: The sclerae were anicteric and conjunctivae were pink and moist. Extraocular movements were intact and pupils were equal, round, and reactive to light with normal accommodation. External inspection of the ears and nose showed no scars, lesions, or masses. Lips, teeth, and gums showed normal mucosa. The oral mucosa, hard and soft palate, tongue and posterior pharynx were normal.  NECK: Supple and symmetric. There was no thyroid enlargement, and no tenderness, or masses were felt.  CRESPIRATORY: Normal AP diameter and normal contour without any kyphoscoliosis. LUNGS: Auscultation of the lungs revealed normal breath sounds without any other adventitious sounds or rubs.  CARDIOVASCULAR: There was a regular rate and rhythm without any murmurs, gallops, rubs. The carotid pulses were normal and 2+ bilaterally without bruits. Peripheral pulses were 2+ and symmetric.  GASTROINTESTINAL: Soft and nontender with normal bowel sounds. The liver span was approximately 5-6 cm in the right midclavicular line by percussion. The liver edge was nontender. The spleen was not palpable. There were no inguinal or umbilical hernias noted. No ascites was noted.  LYMPH NODES: No lymphadenopathy was appreciated in the neck, axillae or groin.  MUSCULOSKELETAL: Gait was normal. There was no tenderness or effusions noted. Muscle strength and tone were normal. EXTREMITIES: No cyanosis, clubbing or edema.  NEUROLOGIC: Alert and oriented x 3. Normal affect. Gait was normal. Normal deep tendon reflexes with no pathological " reflexes. Sensation to touch was normal.  PSYCHIATRIC: good mood, orientated to place, time and person     Labs and Imagings     Lab Results   Component Value Date    WBC 7.1 05/21/2020    HGB 11.4 (L) 05/21/2020    HCT 37.4 05/21/2020    LABPLAT 295 05/21/2020       CMP  Sodium   Date Value Ref Range Status   07/23/2020 140 135 - 145 mmol/L Final     Potassium   Date Value Ref Range Status   07/23/2020 4.3 3.6 - 5.2 mmol/L Final     Chloride   Date Value Ref Range Status   07/23/2020 101 100 - 108 mmol/L Final     CO2   Date Value Ref Range Status   07/23/2020 33 (H) 21 - 32 mmol/L Final     Glucose   Date Value Ref Range Status   07/23/2020 106 70 - 110 mg/dL Final     BUN, Bld   Date Value Ref Range Status   07/23/2020 7 7 - 18 mg/dL Final     Creatinine   Date Value Ref Range Status   07/23/2020 0.78 0.55 - 1.02 mg/dL Final     Calcium   Date Value Ref Range Status   07/23/2020 8.7 (L) 8.8 - 10.5 mg/dL Final     Total Protein   Date Value Ref Range Status   07/23/2020 6.7 6.4 - 8.2 g/dL Final     Albumin   Date Value Ref Range Status   07/23/2020 2.9 (L) 3.4 - 5.0 g/dL Final     Total Bilirubin   Date Value Ref Range Status   07/23/2020 0.4 0.0 - 1.0 mg/dL Final     Alkaline Phosphatase   Date Value Ref Range Status   07/23/2020 72 46 - 116 U/L Final     AST   Date Value Ref Range Status   07/23/2020 13 (L) 15 - 37 U/L Final     ALT   Date Value Ref Range Status   07/23/2020 19 12 - 78 U/L Final     Anion Gap   Date Value Ref Range Status   07/23/2020 6.0 3.0 - 11.0 mmol/L Final         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, estrogen receptor positive [C50.812, Z17.0]   Co-morbidity/Active Problems:   Patient Active Problem List   Diagnosis    Breast cancer metastasized to bone    Neuropathy    Fatigue associated with anemia    Drug-related hair loss    Nausea    Primary insomnia    Foot infection    Cancer, metastatic to bone     Malignant neoplasm of overlapping 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, estrogen receptor positive     Chronic fatigue    Diverticulosis    Dehydration    Hypoxemia    Iron deficiency anemia secondary to inadequate dietary iron intake    Hypoalbuminemia    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    Malignant neoplasm of breast, stage 4        Benita Carballo is a 74 y.o. female with PMH of The primary encounter diagnosis was Malignant neoplasm of overlapping 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, estrogen receptor positive . Diagnoses of 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, unspecified laterality, Primary insomnia, and Cancer, metastatic to bone were also pertinent to this visit., with Malignant neoplasm of overlapping 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, estrogen receptor positive [C50.812, Z17.0]       Metastatic breast cancer ERPR positive HER2 Noah questionable  Status post of chemotherapy and HER2 targeted therapy with progression on HER2 targeted therapy  Recent testing fish negative for her 2 2018  Repeat the lipid biopsy 2019  shows HER2 positive and ER negative  Bone metastatic disease  Fatigue  Hypoxia  Progressing on Herceptin/Taxol, now on Taxol Herceptin  Poor tolerance to Perjeta by history which was given couple years ago by Dr Mcnally      Plan:   Overall Plan:  Dural iron chemotherapy Nerlynx and Xeloda    ==Labs CBC CMP weekly, TM 4 weeks  == discontinue Taxol Herceptin  == start Nerlynx 240 mg p.o. daily continuously without break, plus fixed dose of Xeloda 1000 mg p.o. b.i.d.  Continuously without break  ==Next scan due end of 9/2020  ==RTC 2 weeks  == patient is discharged from hospice

## 2020-07-24 ENCOUNTER — TELEPHONE (OUTPATIENT)
Dept: HEMATOLOGY/ONCOLOGY | Facility: CLINIC | Age: 75
End: 2020-07-24

## 2020-07-24 NOTE — TELEPHONE ENCOUNTER
Pt. Needs her Xeloda sent to [facility]. Please put a new script so we are able to get this rolling.

## 2020-08-05 ENCOUNTER — TELEPHONE (OUTPATIENT)
Dept: HEMATOLOGY/ONCOLOGY | Facility: CLINIC | Age: 75
End: 2020-08-05

## 2020-08-05 NOTE — TELEPHONE ENCOUNTER
----- Message from Karo Espitia sent at 8/5/2020  8:20 AM CDT -----  Regarding: Pharm Auth  Contact: Sallie/[facility]  Type:  Pharmacy Calling to Clarify an RX    Name of Caller:Sallie  Pharmacy Name:[facility]   Prescription Name:Nerlynx 40mg   What do they need to clarify?:   Best Call Back Number:[phone]  Additional Information: States that she is calling for a rx for medication and a diagnosis code.

## 2020-08-06 ENCOUNTER — TELEPHONE (OUTPATIENT)
Dept: HEMATOLOGY/ONCOLOGY | Facility: CLINIC | Age: 75
End: 2020-08-06

## 2020-08-06 ENCOUNTER — OFFICE VISIT (OUTPATIENT)
Dept: HEMATOLOGY/ONCOLOGY | Facility: CLINIC | Age: 75
End: 2020-08-06
Payer: MEDICARE

## 2020-08-06 VITALS
HEART RATE: 91 BPM | DIASTOLIC BLOOD PRESSURE: 57 MMHG | HEIGHT: 66 IN | BODY MASS INDEX: 16.52 KG/M2 | WEIGHT: 102.81 LBS | TEMPERATURE: 97 F | SYSTOLIC BLOOD PRESSURE: 97 MMHG | OXYGEN SATURATION: 96 % | RESPIRATION RATE: 16 BRPM

## 2020-08-06 DIAGNOSIS — Z17.0 MALIGNANT NEOPLASM OF OVERLAPPING SITES OF LEFT BREAST IN FEMALE, ESTROGEN RECEPTOR POSITIVE: Primary | ICD-10-CM

## 2020-08-06 DIAGNOSIS — C50.812 MALIGNANT NEOPLASM OF OVERLAPPING SITES OF LEFT BREAST IN FEMALE, ESTROGEN RECEPTOR POSITIVE: Primary | ICD-10-CM

## 2020-08-06 LAB
ANISOCYTOSIS: NORMAL
HYPOCHROMIA BLD QL SMEAR: NORMAL

## 2020-08-06 PROCEDURE — 99215 PR OFFICE/OUTPT VISIT, EST, LEVL V, 40-54 MIN: ICD-10-PCS | Mod: S$GLB,,, | Performed by: INTERNAL MEDICINE

## 2020-08-06 PROCEDURE — 99215 OFFICE O/P EST HI 40 MIN: CPT | Mod: S$GLB,,, | Performed by: INTERNAL MEDICINE

## 2020-08-06 RX ORDER — LOPERAMIDE HYDROCHLORIDE 2 MG/1
2 CAPSULE ORAL 4 TIMES DAILY PRN
Qty: 60 CAPSULE | Refills: 5 | Status: SHIPPED | OUTPATIENT
Start: 2020-08-06 | End: 2020-08-16

## 2020-08-06 RX ORDER — LOPERAMIDE HYDROCHLORIDE 2 MG/1
CAPSULE ORAL
Qty: 95 CAPSULE | Refills: 0 | Status: SHIPPED | OUTPATIENT
Start: 2020-08-06 | End: 2020-09-03

## 2020-08-06 NOTE — PROGRESS NOTES
Medical Oncology Progress Note  [facility]     PATIENT: Benita Carballo  : 1945 74 y.o. female  MRN [id number]     PCP: Solomon Contreras MD  Consult Requested By:      Date of Service: 2020    Subjective:   Interim History:  No chief complaint on file.    Benita Carballo is here for to followup breast cancer treatment.       she is waiting to start oral chemotherapy with metronomic does of capecitabine and daily Nerlynx.  The patient have not received the 2 medications yet   she doing relatively well except she still dependent on oxygen at home    Oncology History:  Oncology History Overview Note    Rt breast cancer s/p lump with chemo, XRT in [place]. Tamoxifen x 1 yr, then Arimidex x 5 yrs. Completed tx .       c/o chronic cough with CT chest 16 showing extensive adenopathy  With base of lt neck mass 4.5 cm with yosi pulm nodules. RF to Dr ANDREWS Mcnally    16 bx of lt neck mass carcinoma with feature suggestive of breast primary. ER/%, Her2 borderline by IHC and deemed equivocal by FISH       Breast cancer metastasized to bone   2016 Initial Diagnosis    Breast cancer metastasized to bone, , lymphoid mass      2016 - 2017 Chemotherapy    Taxol/Herceptin x 8 cycles then perjeta added   Prescribed by Dr Mcnally      2017 - 2017 Chemotherapy    Kadcycla prescribed by Dr Mcnally      2017 Biopsy    Biopsy of rt abd met disease consistent with breast cancer. ER/RI + Her 2 neg per Dr Moreno's note.    Dr Moreno noted path review from bx  showed HER 2 + equivocal with FISH reading as Equivocal but ratio 1.2. Additional report form 2016 states additional testing was done with different probe and the equivocal results can be noted as negative.     Treatment therapy changed from Her2 based tx to endocrine base therapy by Dr Moreno      2017 -  Hormone Therapy    Arimidex -  Aromasin   Ibrance + Aromasin  -  Ibrance +  faslodex 2/19 4/30/2019 Imaging Significant Findings    PET/CT: Lesions involving the medial clavicle , subcarinal LN, pleural based soft tissue density adjacent to T11 and lesion in left lobe of liver diminished in size and SUV.   RECIST 34% decrease in disease     2/26/2020 -  Chemotherapy    Treatment Summary   Plan Name: OP BREAST TRASTUZUMAB PACLITAXEL QW Perjeta Q3W  Treatment Goal: Control  Status: Active  Start Date: 2/26/2020  End Date: 8/12/2020 (Planned)  Provider: Solomon Contreras MD  Chemotherapy: PACLitaxel (TAXOL) 64 mg/m2 = 102 mg in sodium chloride 0.9% 250 mL chemo infusion, 64 mg/m2 = 102 mg (100 % of original dose 64 mg/m2), Intravenous, Clinic/HOD 1 time, 14 of 18 cycles  Dose modification: 64 mg/m2 (original dose 64 mg/m2, Cycle 1), 50 mg/m2 (original dose 64 mg/m2, Cycle 14)  trastuzumab 211 mg in sodium chloride 0.9% 250 mL chemo infusion, 4 mg/kg = 211 mg, Intravenous, Clinic/HOD 1 time, 9 of 13 cycles  Dose modification: 2 mg/kg (original dose 2 mg/kg, Cycle 7, Reason: MD Discretion), 80 mg (original dose 2 mg/kg, Cycle 8)  pertuzumab (PERJETA) 420 mg in sodium chloride 0.9% 250 mL infusion, 420 mg, Intravenous, Clinic/HOD 1 time, 0 of 1 cycle         3/26/20  ECHO showing EF 55-60%,  with Dr Gardner. Below is a portion of his note:         ==8/2019 She is currently on combined afinitor + aromasin,  repeated CT scan shows improvement    PET 1/2020 showed PD,  afinitor + aromasin stopped  == PET scan 01/20/2020 the progressive  ==Started Taxol/Herceptin weekly 2/2020  ==6/2020 PET PD  ==add Weekly paclitaxel with trastuzumab and pertuzumab.  However the patient states that she was treated with pertuzumab in the past several years ago which almost kill her as her report, therefore going to change her treatment plan as below  ==7/2020 plan to start Nerlynx plus Xeloda for HER2 positive metastatic breast cancer    Past Medical History:   Past Medical History:   Diagnosis Date     Arthritis     Bone cancer     Breast cancer     Cancer     Cataract     Depression     Diverticulosis 1/15/2020    Fatigue associated with anemia 6/6/2019    GERD (gastroesophageal reflux disease)     High cholesterol     Hypoxemia 3/12/2020    Skin problem     Sleep disorder        Past Surgical HIstory:   Past Surgical History:   Procedure Laterality Date    APPENDECTOMY      BREAST LUMPECTOMY Right 2002    CATARACT EXTRACTION, BILATERAL  2014    COLONOSCOPY  2008    HYSTERECTOMY      PORTACATH PLACEMENT  05/18/2016    SURGICAL REMOVAL OF NODULE OF VOCAL CORD         Allergies:  Review of patient's allergies indicates:   Allergen Reactions    Benadryl [diphenhydramine hcl]      Restless leg     Medrol [methylprednisolone] Hallucinations    Penicillins        Medications:  Current Outpatient Medications   Medication Sig Dispense Refill    ANORO ELLIPTA 62.5-25 mcg/actuation DsDv       capecitabine (XELODA) 500 MG Tab Take 2 tablets (1,000 mg total) by mouth 2 (two) times daily This is continuous dosing, no breaks. 60 tablet 5    dexAMETHasone 0.5 mg/5 mL Soln SWISH WITH 10 ML PO FOR 2 MIN THEN SPIT QID      doxycycline (VIBRAMYCIN) 100 MG Cap Take 1 capsule (100 mg total) by mouth every 12 (twelve) hours. 20 capsule 0    exemestane (AROMASIN) 25 mg tablet Take 1 tablet (25 mg total) by mouth once daily. 30 tablet 3    furosemide (LASIX) 40 MG tablet Take 1 tablet (40 mg total) by mouth once daily. 30 tablet 11    gabapentin (NEURONTIN) 400 MG capsule Take 1 capsule (400 mg total) by mouth 3 (three) times daily. 120 capsule 4    iron-vitamin C 100-250 mg, ICAR-C, (ICAR-C) 100-250 mg Tab Take 1 tablet by mouth once daily. 30 tablet 5    LORazepam (ATIVAN) 1 MG tablet TAKE 1 TABLET BY MOUTH EVERY EVENING 30 tablet 0    megestrol (MEGACE) 400 mg/10 mL (40 mg/mL) Susp Take 10 mLs (400 mg total) by mouth once daily. 240 mL 2    neratinib (NERLYNX) 40 mg Tab Take 240 mg by mouth once  daily. 180 tablet 5    oxyCODONE (ROXICODONE) 15 MG Tab Take 1 tablet (15 mg total) by mouth every 6 (six) hours as needed for Pain. 60 tablet 0    pantoprazole (PROTONIX) 40 MG tablet TAKE 1 TABLET BY MOUTH EVERY DAY 90 tablet 0    potassium chloride SA (K-DUR,KLOR-CON) 20 MEQ tablet Take 1 tablet (20 mEq total) by mouth once daily. 30 tablet 11    PROAIR HFA 90 mcg/actuation inhaler       promethazine (PHENERGAN) 25 MG tablet TAKE 1 TABLET BY MOUTH EVERY 6 HOURS AS NEEDED FOR NAUSEA 30 tablet 3    temazepam (RESTORIL) 15 mg Cap TAKE 1 CAPSULE(15 MG) BY MOUTH EVERY NIGHT 30 capsule 0    loperamide (IMODIUM) 2 mg capsule Take 1 capsule (2 mg total) by mouth 4 (four) times daily as needed for Diarrhea. 60 capsule 5     No current facility-administered medications for this visit.        Family History:   Family History   Problem Relation Age of Onset    Lung cancer Father     Melanoma Father        Social History:  reports that she has been smoking cigarettes. She has a 2.00 pack-year smoking history. She has never used smokeless tobacco. She reports current alcohol use of about 4.0 standard drinks of alcohol per week. She reports that she does not use drugs.    Review of Systemas  Constitutional: No change in weight, appetie, fatigue, fever, or night sweats  Eyes: No changes in vision  Ears, Nose, Mouth, Throat, and Face: No hearing problems, ear pain, rummy nose, or sore throat  Respiratory: No shortness of breath or cough  Cardiovascular: No chest pain, palpitations or dizziness  Gastrointestinal: No abdominal pain, hematochezia, melena  Genitourinary: No dysuria, hematuria, nocturia, menstrual problems, post menopausal  Integumentary/Breast: No rashes or itching  Hematologic/Lymphatic: No anemia or bleeding abnormalities  Musculoskeletal: No joint or muscle abnormalities  Neurological: No sensory or motor problems, no headaches  Behavioral/Psych: No depression, anxiety, cognitive problems, or  "stress  Endocrine: No diabetes or thyroid problems  Allergic/Immunologic: See allergy above    Physical Exam      Vitals:   Vitals:    08/06/20 0944   BP: (!) 97/57   BP Location: Left arm   Patient Position: Sitting   BP Method: Large (Automatic)   Pulse: 91   Resp: 16   Temp: 97.4 °F (36.3 °C)   TempSrc: Temporal   SpO2: 96%   Weight: 46.6 kg (102 lb 12.8 oz)   Height: 5' 6" (1.676 m)     BMI: Body mass index is 16.59 kg/m².  BSA Body surface area is 1.47 meters squared.  ECOG Performance Status:   ECOG SCORE    1 - Restricted in strenuous activity-ambulatory and able to carry out work of a light nature       GENERAL APPEARANCE: Well developed, well nourished, in no acute distress.  SKIN: Inspection of the skin reveals no rashes, ulcerations or petechiae.  HEENT: The sclerae were anicteric and conjunctivae were pink and moist. Extraocular movements were intact and pupils were equal, round, and reactive to light with normal accommodation. External inspection of the ears and nose showed no scars, lesions, or masses. Lips, teeth, and gums showed normal mucosa. The oral mucosa, hard and soft palate, tongue and posterior pharynx were normal.  NECK: Supple and symmetric. There was no thyroid enlargement, and no tenderness, or masses were felt.  CRESPIRATORY: Normal AP diameter and normal contour without any kyphoscoliosis. LUNGS: Auscultation of the lungs revealed normal breath sounds without any other adventitious sounds or rubs.  CARDIOVASCULAR: There was a regular rate and rhythm without any murmurs, gallops, rubs. The carotid pulses were normal and 2+ bilaterally without bruits. Peripheral pulses were 2+ and symmetric.  GASTROINTESTINAL: Soft and nontender with normal bowel sounds. The liver span was approximately 5-6 cm in the right midclavicular line by percussion. The liver edge was nontender. The spleen was not palpable. There were no inguinal or umbilical hernias noted. No ascites was noted.  LYMPH NODES: No " lymphadenopathy was appreciated in the neck, axillae or groin.  MUSCULOSKELETAL: Gait was normal. There was no tenderness or effusions noted. Muscle strength and tone were normal. EXTREMITIES: No cyanosis, clubbing or edema.  NEUROLOGIC: Alert and oriented x 3. Normal affect. Gait was normal. Normal deep tendon reflexes with no pathological reflexes. Sensation to touch was normal.  PSYCHIATRIC: good mood, orientated to place, time and person     Labs and Imagings         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, estrogen receptor positive [C50.812, Z17.0]   Co-morbidity/Active Problems:   Patient Active Problem List   Diagnosis    Breast cancer metastasized to bone    Neuropathy    Fatigue associated with anemia    Drug-related hair loss    Nausea    Primary insomnia    Foot infection    Cancer, metastatic to bone    Malignant neoplasm of overlapping 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, estrogen receptor positive     Chronic fatigue    Diverticulosis    Dehydration    Hypoxemia    Iron deficiency anemia secondary to inadequate dietary iron intake    Hypoalbuminemia    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    Malignant neoplasm of breast, stage 4        Benita Carballo is a 74 y.o. female with PMH of The encounter diagnosis was Malignant neoplasm of overlapping 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, estrogen receptor positive ., with Malignant neoplasm of overlapping 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, estrogen receptor positive [C50.812, Z17.0]     =========================================================  Metastatic breast cancer ERPR positive HER2 Noah questionable  Status post of chemotherapy and HER2 targeted therapy with progression on HER2 targeted therapy  Recent testing fish negative for her 2 2018  Repeat the lipid biopsy 2019  shows HER2 positive and ER negative; Started Taxol/Herceptin 2/2020; Progressing on Herceptin/Taxol    Poor tolerance to Perjeta by history which was given couple years ago by Dr Montiel metastatic disease  Fatigue  Hypoxia/COPD  Dehydration         Plan:   Overall Plan:  3rd line chemotherapy Nerlynx and Xeloda    ==Labs CBC CMP weekly, TM 4 weeks  ==To  start Nerlynx 240 mg p.o. daily continuously without break, plus fixed dose of Xeloda 1000 mg p.o. b.i.d.  Continuously without break  All meds sent to specialty pharm    ==Next scan due end of 9/2020  ==RTC 2 weeks  == patient is discharged from hospice

## 2020-08-07 DIAGNOSIS — C79.51 CANCER, METASTATIC TO BONE: ICD-10-CM

## 2020-08-07 RX ORDER — OXYCODONE HYDROCHLORIDE 15 MG/1
15 TABLET ORAL EVERY 6 HOURS PRN
Qty: 60 TABLET | Refills: 0 | Status: SHIPPED | OUTPATIENT
Start: 2020-08-07 | End: 2020-09-08 | Stop reason: SDUPTHER

## 2020-08-08 LAB
CANCER ANTIGEN 15-3: 142 U/ML (ref 0–31)
CEA SERPL-MCNC: 14.8 NG/ML (ref 0–3)

## 2020-08-11 ENCOUNTER — TELEPHONE (OUTPATIENT)
Dept: HEMATOLOGY/ONCOLOGY | Facility: CLINIC | Age: 75
End: 2020-08-11

## 2020-08-11 DIAGNOSIS — Z17.0 MALIGNANT NEOPLASM OF OVERLAPPING SITES OF LEFT BREAST IN FEMALE, ESTROGEN RECEPTOR POSITIVE: ICD-10-CM

## 2020-08-11 DIAGNOSIS — C50.812 MALIGNANT NEOPLASM OF OVERLAPPING SITES OF LEFT BREAST IN FEMALE, ESTROGEN RECEPTOR POSITIVE: ICD-10-CM

## 2020-08-11 NOTE — TELEPHONE ENCOUNTER
Spoke to pt regarding her medication.  Informed her that it was denied and is investing. PA denied per faxed from [employer].

## 2020-08-12 DIAGNOSIS — F51.01 PRIMARY INSOMNIA: ICD-10-CM

## 2020-08-12 RX ORDER — CAPECITABINE 500 MG/1
1000 TABLET, FILM COATED ORAL 2 TIMES DAILY
Qty: 60 TABLET | Refills: 5 | Status: SHIPPED | OUTPATIENT
Start: 2020-08-12 | End: 2020-09-11

## 2020-08-12 RX ORDER — TEMAZEPAM 15 MG/1
CAPSULE ORAL
Qty: 30 CAPSULE | Refills: 0 | Status: SHIPPED | OUTPATIENT
Start: 2020-08-12 | End: 2020-08-20 | Stop reason: SDUPTHER

## 2020-08-12 NOTE — TELEPHONE ENCOUNTER
----- Message from Eladia Scruggs sent at 8/12/2020  3:16 PM CDT -----  .Type:  RX Refill Request    Who Called: self  Refill or New Rx:refill  RX Name and Strength:temazepam 15mg  How is the patient currently taking it? (ex. 1XDay):1/night  Is this a 30 day or 90 day RX:30  Preferred Pharmacy with phone number:.  [id number] - [place], [place] - [address] [facility] [address] [facility] [address] [facility]) &   [address]  [facility] [address]  Phone: [phone] Fax: [phone]    Local or Mail Order:local  Ordering Provider:scotty  Would the patient rather a call back or a response via [facility]? call  Best Call Back Number:.[phone] (home)   Additional Information:

## 2020-08-14 ENCOUNTER — TELEPHONE (OUTPATIENT)
Dept: HEMATOLOGY/ONCOLOGY | Facility: CLINIC | Age: 75
End: 2020-08-14

## 2020-08-14 NOTE — TELEPHONE ENCOUNTER
Spoke with pt we are waiting on [email or web address] to send a form for free drug assistance. Ms. Carballo understood the process.    ----- Message from Eladia Scruggs sent at 8/14/2020  1:53 PM CDT -----  patient needs call back regarding status of chemo being in pill form..[phone] (home)

## 2020-08-20 ENCOUNTER — OFFICE VISIT (OUTPATIENT)
Dept: HEMATOLOGY/ONCOLOGY | Facility: CLINIC | Age: 75
End: 2020-08-20
Payer: MEDICARE

## 2020-08-20 VITALS
BODY MASS INDEX: 17.19 KG/M2 | OXYGEN SATURATION: 97 % | DIASTOLIC BLOOD PRESSURE: 61 MMHG | SYSTOLIC BLOOD PRESSURE: 101 MMHG | HEIGHT: 66 IN | RESPIRATION RATE: 18 BRPM | TEMPERATURE: 97 F | WEIGHT: 107 LBS | HEART RATE: 83 BPM

## 2020-08-20 DIAGNOSIS — Z17.0 MALIGNANT NEOPLASM OF OVERLAPPING SITES OF LEFT BREAST IN FEMALE, ESTROGEN RECEPTOR POSITIVE: Primary | ICD-10-CM

## 2020-08-20 DIAGNOSIS — C50.812 MALIGNANT NEOPLASM OF OVERLAPPING SITES OF LEFT BREAST IN FEMALE, ESTROGEN RECEPTOR POSITIVE: Primary | ICD-10-CM

## 2020-08-20 DIAGNOSIS — F51.01 PRIMARY INSOMNIA: ICD-10-CM

## 2020-08-20 PROCEDURE — 99215 PR OFFICE/OUTPT VISIT, EST, LEVL V, 40-54 MIN: ICD-10-PCS | Mod: S$GLB,,, | Performed by: INTERNAL MEDICINE

## 2020-08-20 PROCEDURE — 99215 OFFICE O/P EST HI 40 MIN: CPT | Mod: S$GLB,,, | Performed by: INTERNAL MEDICINE

## 2020-08-20 RX ORDER — CAPECITABINE 500 MG/1
1000 TABLET, FILM COATED ORAL 2 TIMES DAILY
Qty: 112 TABLET | Refills: 0 | Status: SHIPPED | OUTPATIENT
Start: 2020-08-20 | End: 2020-10-07 | Stop reason: SDUPTHER

## 2020-08-20 RX ORDER — TEMAZEPAM 15 MG/1
CAPSULE ORAL
Qty: 30 CAPSULE | Refills: 0 | Status: SHIPPED | OUTPATIENT
Start: 2020-08-20 | End: 2020-09-08 | Stop reason: SDUPTHER

## 2020-08-20 NOTE — PROGRESS NOTES
Medical Oncology Progress Note  [facility]     PATIENT: Benita Carballo  : 1945 74 y.o. female  MRN [id number]     PCP: Solomon Contreras MD  Consult Requested By:      Date of Service: 2020    Subjective:   Interim History:  Malignant neoplasm of overlapping sites of left breast in fe and Medication Refill    Benita Carballo is here for to followup breast cancer treatment.       she is waiting to start oral chemotherapy with metronomic does of capecitabine and daily Nerlynx.  The patient have not received the 2 medications yet   she doing relatively well except she still dependent on oxygen at home  Still waiting for [facility]      Oncology History:  Oncology History Overview Note    Rt breast cancer s/p lump with chemo, [place] in [place]. Tamoxifen x 1 yr, then Arimidex x 5 yrs. Completed tx .       c/o chronic cough with CT chest 16 showing extensive adenopathy  With base of lt neck mass 4.5 cm with yois pulm nodules. RF to Dr ANDREWS Mcnally    16 bx of lt neck mass carcinoma with feature suggestive of breast primary. ER/%, Her2 borderline by IHC and deemed equivocal by FISH       Breast cancer metastasized to bone   2016 Initial Diagnosis    Breast cancer metastasized to bone, , lymphoid mass      2016 - 2017 Chemotherapy    Taxol/Herceptin x 8 cycles then perjeta added   Prescribed by Dr Mcnally      2017 - 2017 Chemotherapy    Kadcycla prescribed by Dr Mcnally      2017 Biopsy    Biopsy of rt abd met disease consistent with breast cancer. ER/HI + Her 2 neg per Dr Moreno's note.    Dr Moreno noted path review from bx  showed HER 2 + equivocal with FISH reading as Equivocal but ratio 1.2. Additional report form 2016 states additional testing was done with different probe and the equivocal results can be noted as negative.     Treatment therapy changed from Her2 based tx to endocrine base therapy by Dr Moreno      2017 -  Hormone  Therapy    Arimidex 6/17-2/18  Aromasin 2/18  Ibrance + Aromasin 5/18 -1/19  Ibrance + faslodex 2/19 4/30/2019 Imaging Significant Findings    PET/CT: Lesions involving the medial clavicle , subcarinal LN, pleural based soft tissue density adjacent to T11 and lesion in left lobe of liver diminished in size and SUV.   RECIST 34% decrease in disease     2/26/2020 -  Chemotherapy    Treatment Summary   Plan Name: OP BREAST TRASTUZUMAB PACLITAXEL QW Perjeta Q3W  Treatment Goal: Control  Status: Active  Start Date: 2/26/2020  End Date: 8/12/2020 (Planned)  Provider: Solomon Contreras MD  Chemotherapy: PACLitaxel (TAXOL) 64 mg/m2 = 102 mg in sodium chloride 0.9% 250 mL chemo infusion, 64 mg/m2 = 102 mg (100 % of original dose 64 mg/m2), Intravenous, Clinic/HOD 1 time, 14 of 18 cycles  Dose modification: 64 mg/m2 (original dose 64 mg/m2, Cycle 1), 50 mg/m2 (original dose 64 mg/m2, Cycle 14)  trastuzumab 211 mg in sodium chloride 0.9% 250 mL chemo infusion, 4 mg/kg = 211 mg, Intravenous, Clinic/HOD 1 time, 9 of 13 cycles  Dose modification: 2 mg/kg (original dose 2 mg/kg, Cycle 7, Reason: MD Discretion), 80 mg (original dose 2 mg/kg, Cycle 8)  pertuzumab (PERJETA) 420 mg in sodium chloride 0.9% 250 mL infusion, 420 mg, Intravenous, Clinic/HOD 1 time, 0 of 1 cycle         3/26/20  ECHO showing EF 55-60%,  with Dr Gardner. Below is a portion of his note:         ==8/2019 She is currently on combined afinitor + aromasin,  repeated CT scan shows improvement    PET 1/2020 showed PD,  afinitor + aromasin stopped  == PET scan 01/20/2020 the progressive  ==Started Taxol/Herceptin weekly 2/2020  ==6/2020 PET PD  ==add Weekly paclitaxel with trastuzumab and pertuzumab.  However the patient states that she was treated with pertuzumab in the past several years ago which almost kill her as her report, therefore going to change her treatment plan as below  ==7/2020 plan to start Nerlynx plus Xeloda for HER2 positive metastatic  breast cancer    Past Medical History:   Past Medical History:   Diagnosis Date    Arthritis     Bone cancer     Breast cancer     Cancer     Cataract     Depression     Diverticulosis 1/15/2020    Fatigue associated with anemia 6/6/2019    GERD (gastroesophageal reflux disease)     High cholesterol     Hypoxemia 3/12/2020    Skin problem     Sleep disorder        Past Surgical HIstory:   Past Surgical History:   Procedure Laterality Date    APPENDECTOMY      BREAST LUMPECTOMY Right 2002    CATARACT EXTRACTION, BILATERAL  2014    COLONOSCOPY  2008    HYSTERECTOMY      PORTACATH PLACEMENT  05/18/2016    SURGICAL REMOVAL OF NODULE OF VOCAL CORD         Allergies:  Review of patient's allergies indicates:   Allergen Reactions    Benadryl [diphenhydramine hcl]      Restless leg     Medrol [methylprednisolone] Hallucinations    Penicillins        Medications:  Current Outpatient Medications   Medication Sig Dispense Refill    ANORO ELLIPTA 62.5-25 mcg/actuation DsDv       capecitabine (XELODA) 500 MG Tab Take 2 tablets (1,000 mg total) by mouth 2 (two) times daily This is continuous dosing, no breaks. 60 tablet 5    capecitabine (XELODA) 500 MG Tab Take 2 tablets (1,000 mg total) by mouth 2 (two) times daily. 112 tablet 0    dexAMETHasone 0.5 mg/5 mL Soln SWISH WITH 10 ML PO FOR 2 MIN THEN SPIT QID      doxycycline (VIBRAMYCIN) 100 MG Cap Take 1 capsule (100 mg total) by mouth every 12 (twelve) hours. 20 capsule 0    exemestane (AROMASIN) 25 mg tablet Take 1 tablet (25 mg total) by mouth once daily. 30 tablet 3    furosemide (LASIX) 40 MG tablet Take 1 tablet (40 mg total) by mouth once daily. 30 tablet 11    gabapentin (NEURONTIN) 400 MG capsule Take 1 capsule (400 mg total) by mouth 3 (three) times daily. 120 capsule 4    iron-vitamin C 100-250 mg, ICAR-C, (ICAR-C) 100-250 mg Tab Take 1 tablet by mouth once daily. 30 tablet 5    loperamide (IMODIUM) 2 mg capsule Take 2 capsules (4 mg  total) by mouth 4 (four) times daily for 1 day, THEN 2 capsules (4 mg total) 3 (three) times daily for 2 days, THEN 1 capsule (2 mg total) 3 (three) times daily for 25 days. 95 capsule 0    LORazepam (ATIVAN) 1 MG tablet TAKE 1 TABLET BY MOUTH EVERY EVENING 30 tablet 0    megestrol (MEGACE) 400 mg/10 mL (40 mg/mL) Susp Take 10 mLs (400 mg total) by mouth once daily. 240 mL 2    neratinib (NERLYNX) 40 mg Tab Take 240 mg by mouth once daily. 180 tablet 5    oxyCODONE (ROXICODONE) 15 MG Tab Take 1 tablet (15 mg total) by mouth every 6 (six) hours as needed for Pain. 60 tablet 0    pantoprazole (PROTONIX) 40 MG tablet TAKE 1 TABLET BY MOUTH EVERY DAY 90 tablet 0    potassium chloride SA (K-DUR,KLOR-CON) 20 MEQ tablet Take 1 tablet (20 mEq total) by mouth once daily. 30 tablet 11    PROAIR HFA 90 mcg/actuation inhaler       promethazine (PHENERGAN) 25 MG tablet TAKE 1 TABLET BY MOUTH EVERY 6 HOURS AS NEEDED FOR NAUSEA 30 tablet 3    temazepam (RESTORIL) 15 mg Cap TAKE 1 CAPSULE(15 MG) BY MOUTH EVERY NIGHT 30 capsule 0     No current facility-administered medications for this visit.        Family History:   Family History   Problem Relation Age of Onset    Lung cancer Father     Melanoma Father        Social History:  reports that she has been smoking cigarettes. She has a 2.00 pack-year smoking history. She has never used smokeless tobacco. She reports current alcohol use of about 4.0 standard drinks of alcohol per week. She reports that she does not use drugs.    Review of Systemas  Constitutional: No change in weight, appetie, fatigue, fever, or night sweats  Eyes: No changes in vision  Ears, Nose, Mouth, Throat, and Face: No hearing problems, ear pain, rummy nose, or sore throat  Respiratory: No shortness of breath or cough  Cardiovascular: No chest pain, palpitations or dizziness  Gastrointestinal: No abdominal pain, hematochezia, melena  Genitourinary: No dysuria, hematuria, nocturia, menstrual problems,  "post menopausal  Integumentary/Breast: No rashes or itching  Hematologic/Lymphatic: No anemia or bleeding abnormalities  Musculoskeletal: No joint or muscle abnormalities  Neurological: No sensory or motor problems, no headaches  Behavioral/Psych: No depression, anxiety, cognitive problems, or stress  Endocrine: No diabetes or thyroid problems  Allergic/Immunologic: See allergy above    Physical Exam      Vitals:   Vitals:    08/20/20 0930   BP: 101/61   BP Location: Left arm   Patient Position: Sitting   BP Method: Small (Automatic)   Pulse: 83   Resp: 18   Temp: 97.4 °F (36.3 °C)   TempSrc: Temporal   SpO2: 97%   Weight: 48.5 kg (107 lb)   Height: 5' 6" (1.676 m)     BMI: Body mass index is 17.27 kg/m².  BSA Body surface area is 1.5 meters squared.  ECOG Performance Status:   ECOG SCORE    1 - Restricted in strenuous activity-ambulatory and able to carry out work of a light nature       GENERAL APPEARANCE: Well developed, well nourished, in no acute distress.  SKIN: Inspection of the skin reveals no rashes, ulcerations or petechiae.  HEENT: The sclerae were anicteric and conjunctivae were pink and moist. Extraocular movements were intact and pupils were equal, round, and reactive to light with normal accommodation. External inspection of the ears and nose showed no scars, lesions, or masses. Lips, teeth, and gums showed normal mucosa. The oral mucosa, hard and soft palate, tongue and posterior pharynx were normal.  NECK: Supple and symmetric. There was no thyroid enlargement, and no tenderness, or masses were felt.  CRESPIRATORY: Normal AP diameter and normal contour without any kyphoscoliosis. LUNGS: Auscultation of the lungs revealed normal breath sounds without any other adventitious sounds or rubs.  CARDIOVASCULAR: There was a regular rate and rhythm without any murmurs, gallops, rubs. The carotid pulses were normal and 2+ bilaterally without bruits. Peripheral pulses were 2+ and symmetric.  GASTROINTESTINAL: " Soft and nontender with normal bowel sounds. The liver span was approximately 5-6 cm in the right midclavicular line by percussion. The liver edge was nontender. The spleen was not palpable. There were no inguinal or umbilical hernias noted. No ascites was noted.  LYMPH NODES: No lymphadenopathy was appreciated in the neck, axillae or groin.  MUSCULOSKELETAL: Gait was normal. There was no tenderness or effusions noted. Muscle strength and tone were normal. EXTREMITIES: No cyanosis, clubbing or edema.  NEUROLOGIC: Alert and oriented x 3. Normal affect. Gait was normal. Normal deep tendon reflexes with no pathological reflexes. Sensation to touch was normal.  PSYCHIATRIC: good mood, orientated to place, time and person     Labs and Imagings         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, estrogen receptor positive [C50.812, Z17.0]   Co-morbidity/Active Problems:   Patient Active Problem List   Diagnosis    Breast cancer metastasized to bone    Neuropathy    Fatigue associated with anemia    Drug-related hair loss    Nausea    Primary insomnia    Foot infection    Cancer, metastatic to bone    Malignant neoplasm of overlapping 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, estrogen receptor positive     Chronic fatigue    Diverticulosis    Dehydration    Hypoxemia    Iron deficiency anemia secondary to inadequate dietary iron intake    Hypoalbuminemia    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    Malignant neoplasm of breast, stage 4        Benita Carabllo is a 74 y.o. female with PMH of The primary encounter diagnosis was Malignant neoplasm of overlapping 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, estrogen receptor positive . A diagnosis of Primary insomnia was also pertinent to this visit., with Malignant neoplasm of overlapping 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, estrogen receptor positive [C50.812, Z17.0]      =========================================================  Metastatic breast cancer ERPR positive HER2 Noah questionable  Status post of chemotherapy and HER2 targeted therapy with progression on HER2 targeted therapy  Recent testing fish negative for her 2 2018  Repeat the lipid biopsy 2019  shows HER2 positive and ER negative; Started Taxol/Herceptin 2/2020; Progressing on Herceptin/Taxol   Poor tolerance to Perjeta by history which was given couple years ago by Dr Montiel metastatic disease  Fatigue  Hypoxia/COPD  Dehydration         Plan:   Overall Plan:  3rd line chemotherapy Nerlynx and Xeloda    ==Labs CBC CMP q 2 weeksTM 4 weeks  ==To  start Nerlynx 240 mg p.o. daily continuously without break, plus fixed dose of Xeloda 1000 mg p.o. b.i.d.  Continuously without break  All meds sent to specialty pharm  --She will buy her Xeloda with [employer] coupon $150 per month, still need asistance to get meds covered.    ==Next scan due end of 9/2020  ==RTC 2 weeks  == patient is discharged from hospice    Solomon Contreras M.D.

## 2020-08-24 ENCOUNTER — TELEPHONE (OUTPATIENT)
Dept: HEMATOLOGY/ONCOLOGY | Facility: CLINIC | Age: 75
End: 2020-08-24

## 2020-08-25 ENCOUNTER — TELEPHONE (OUTPATIENT)
Dept: HEMATOLOGY/ONCOLOGY | Facility: CLINIC | Age: 75
End: 2020-08-25

## 2020-08-25 NOTE — TELEPHONE ENCOUNTER
Spoke to pt and stated the pharmacist told her in order to get a early refill of pain medication she will need a call from provider. Message went to provider.

## 2020-08-25 NOTE — TELEPHONE ENCOUNTER
----- Message from Roseanne Ivan sent at 8/25/2020  9:10 AM CDT -----  Type:  RX Refill Request    Who Called: pt   Refill or New Rx:refill  RX Name and Strength:oxyCODONE (ROXICODONE) 15 MG Tab  How is the patient currently taking it? (ex. 1XDay): 1Tablet every 6hrs   Is this a 30 day or 90 day RX:30  Preferred Pharmacy with phone number:  Local or Mail Order:local  Ordering Provider:scotty   Would the patient rather a call back or a response via [facility]? Callback   Best Call Back Number:[phone]   Additional Information:     [id number] - [place] - [address] AT [address] OF  ([address] [facility]) &   [address]  [place] [address]  Phone: [phone] Fax: [phone]

## 2020-09-08 DIAGNOSIS — C79.51 CANCER, METASTATIC TO BONE: ICD-10-CM

## 2020-09-08 DIAGNOSIS — F51.01 PRIMARY INSOMNIA: ICD-10-CM

## 2020-09-09 RX ORDER — OXYCODONE HYDROCHLORIDE 15 MG/1
15 TABLET ORAL EVERY 6 HOURS PRN
Qty: 60 TABLET | Refills: 0 | Status: SHIPPED | OUTPATIENT
Start: 2020-09-09 | End: 2020-09-21 | Stop reason: SDUPTHER

## 2020-09-09 RX ORDER — TEMAZEPAM 15 MG/1
CAPSULE ORAL
Qty: 30 CAPSULE | Refills: 0 | Status: SHIPPED | OUTPATIENT
Start: 2020-09-09 | End: 2020-10-13 | Stop reason: SDUPTHER

## 2020-09-09 NOTE — TELEPHONE ENCOUNTER
Phone went directly to voicemail.  Left message to confirm appointment and verify her medication requests were completely addressed.

## 2020-09-14 DIAGNOSIS — C50.812 MALIGNANT NEOPLASM OF OVERLAPPING SITES OF LEFT BREAST IN FEMALE, ESTROGEN RECEPTOR POSITIVE: ICD-10-CM

## 2020-09-14 DIAGNOSIS — Z17.0 MALIGNANT NEOPLASM OF OVERLAPPING SITES OF LEFT BREAST IN FEMALE, ESTROGEN RECEPTOR POSITIVE: ICD-10-CM

## 2020-09-15 ENCOUNTER — TELEPHONE (OUTPATIENT)
Dept: HEMATOLOGY/ONCOLOGY | Facility: CLINIC | Age: 75
End: 2020-09-15

## 2020-09-15 NOTE — TELEPHONE ENCOUNTER
Called patient to R/S appt per Dr. Contreras. No answer left voicemail with my direct contact information.

## 2020-09-21 ENCOUNTER — TELEPHONE (OUTPATIENT)
Dept: HEMATOLOGY/ONCOLOGY | Facility: CLINIC | Age: 75
End: 2020-09-21

## 2020-09-21 DIAGNOSIS — C79.51 CANCER, METASTATIC TO BONE: ICD-10-CM

## 2020-09-21 RX ORDER — OXYCODONE HYDROCHLORIDE 15 MG/1
15 TABLET ORAL EVERY 6 HOURS PRN
Qty: 60 TABLET | Refills: 0 | Status: SHIPPED | OUTPATIENT
Start: 2020-09-21 | End: 2020-10-02 | Stop reason: SDUPTHER

## 2020-09-21 RX ORDER — OXYCODONE HYDROCHLORIDE 15 MG/1
15 TABLET ORAL EVERY 6 HOURS PRN
Qty: 60 TABLET | Refills: 0 | Status: SHIPPED | OUTPATIENT
Start: 2020-09-21 | End: 2020-09-21 | Stop reason: SDUPTHER

## 2020-09-21 NOTE — TELEPHONE ENCOUNTER
----- Message from Renate Carey sent at 9/21/2020 10:31 AM CDT -----  Regarding: patient advice  Contact: patient  Patient is requesting a call back as soon as possible in regards to a prescription she needs. Patient stated she left a message. Please call back at [phone] ([phone])

## 2020-09-21 NOTE — TELEPHONE ENCOUNTER
----- Message from Charo Khanna sent at 9/21/2020 10:11 AM CDT -----  Patient stating she  need refill on medication, oxyCODONE (ROXICODONE) 15 MG Tab.      Call back number [phone]. Tks

## 2020-10-02 DIAGNOSIS — C79.51 CANCER, METASTATIC TO BONE: ICD-10-CM

## 2020-10-02 RX ORDER — OXYCODONE HYDROCHLORIDE 15 MG/1
15 TABLET ORAL EVERY 6 HOURS PRN
Qty: 60 TABLET | Refills: 0 | Status: SHIPPED | OUTPATIENT
Start: 2020-10-02 | End: 2020-10-19 | Stop reason: SDUPTHER

## 2020-10-02 NOTE — TELEPHONE ENCOUNTER
----- Message from Renate Carey sent at 10/2/2020  1:44 PM CDT -----  Regarding: patient refill  Contact: patient  Type:  RX Refill Request    Who Called: patient  Refill or New Rx:refill  RX Name and Strength: Oxycodone 15mg  How is the patient currently taking it? (ex. 1XDay): once every 4-6 hours as needed  Is this a 30 day or 90 day RX: pt not sure  Preferred Pharmacy with phone number:  [id number] - [place], [place] - [address] AT [facility] OF  [address]  [facility] [address]  Phone: [phone] Fax: [phone]  Local or Mail Order:local  Ordering Provider:Solomon Contreras  Would the patient rather a call back or a response via [facility]? Call back  Best Call Back Number:[phone] or [phone]  Additional Information: pt stated she needs an early release on this medication

## 2020-10-05 ENCOUNTER — OFFICE VISIT (OUTPATIENT)
Dept: HEMATOLOGY/ONCOLOGY | Facility: CLINIC | Age: 75
End: 2020-10-05
Payer: MEDICARE

## 2020-10-05 VITALS
WEIGHT: 102.13 LBS | HEART RATE: 76 BPM | BODY MASS INDEX: 16.42 KG/M2 | DIASTOLIC BLOOD PRESSURE: 69 MMHG | OXYGEN SATURATION: 98 % | TEMPERATURE: 98 F | HEIGHT: 66 IN | SYSTOLIC BLOOD PRESSURE: 126 MMHG | RESPIRATION RATE: 18 BRPM

## 2020-10-05 DIAGNOSIS — C50.812 MALIGNANT NEOPLASM OF OVERLAPPING SITES OF LEFT BREAST IN FEMALE, ESTROGEN RECEPTOR POSITIVE: Primary | ICD-10-CM

## 2020-10-05 DIAGNOSIS — Z17.0 MALIGNANT NEOPLASM OF OVERLAPPING SITES OF LEFT BREAST IN FEMALE, ESTROGEN RECEPTOR POSITIVE: Primary | ICD-10-CM

## 2020-10-05 PROCEDURE — 99215 PR OFFICE/OUTPT VISIT, EST, LEVL V, 40-54 MIN: ICD-10-PCS | Mod: S$GLB,,, | Performed by: INTERNAL MEDICINE

## 2020-10-05 PROCEDURE — 99215 OFFICE O/P EST HI 40 MIN: CPT | Mod: S$GLB,,, | Performed by: INTERNAL MEDICINE
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
## 2020-10-06 ENCOUNTER — TELEPHONE (OUTPATIENT)
Dept: HEMATOLOGY/ONCOLOGY | Facility: CLINIC | Age: 75
End: 2020-10-06

## 2020-10-06 DIAGNOSIS — Z17.0 MALIGNANT NEOPLASM OF OVERLAPPING SITES OF LEFT BREAST IN FEMALE, ESTROGEN RECEPTOR POSITIVE: ICD-10-CM

## 2020-10-06 DIAGNOSIS — C50.812 MALIGNANT NEOPLASM OF OVERLAPPING SITES OF LEFT BREAST IN FEMALE, ESTROGEN RECEPTOR POSITIVE: ICD-10-CM

## 2020-10-06 RX ORDER — CAPECITABINE 500 MG/1
1000 TABLET, FILM COATED ORAL 2 TIMES DAILY
Qty: 112 TABLET | Refills: 0 | Status: CANCELLED | OUTPATIENT
Start: 2020-10-06 | End: 2021-10-06

## 2020-10-06 NOTE — TELEPHONE ENCOUNTER
Called patient regarding Xeloda and Nerlynx prescriptions. No answer. Cannot leave VM will continue to call.

## 2020-10-07 ENCOUNTER — TELEPHONE (OUTPATIENT)
Dept: PHARMACY | Facility: CLINIC | Age: 75
End: 2020-10-07

## 2020-10-07 DIAGNOSIS — C50.812 MALIGNANT NEOPLASM OF OVERLAPPING SITES OF LEFT BREAST IN FEMALE, ESTROGEN RECEPTOR POSITIVE: ICD-10-CM

## 2020-10-07 DIAGNOSIS — Z17.0 MALIGNANT NEOPLASM OF OVERLAPPING SITES OF LEFT BREAST IN FEMALE, ESTROGEN RECEPTOR POSITIVE: ICD-10-CM

## 2020-10-07 RX ORDER — CAPECITABINE 500 MG/1
1000 TABLET, FILM COATED ORAL 2 TIMES DAILY
Qty: 112 TABLET | Refills: 0 | Status: SHIPPED | OUTPATIENT
Start: 2020-10-07 | End: 2020-10-13

## 2020-10-07 NOTE — TELEPHONE ENCOUNTER
No answer/VM to inform patient that [facility] received prescription for Nerlynx & Capecitabine and benefits investigation is required.  [facility] will be back in touch once insurance determination is received.

## 2020-10-12 ENCOUNTER — NURSE TRIAGE (OUTPATIENT)
Dept: ADMINISTRATIVE | Facility: CLINIC | Age: 75
End: 2020-10-12

## 2020-10-12 ENCOUNTER — TELEPHONE (OUTPATIENT)
Dept: HEMATOLOGY/ONCOLOGY | Facility: CLINIC | Age: 75
End: 2020-10-12

## 2020-10-12 NOTE — TELEPHONE ENCOUNTER
----- Message from Chelsea Ontiveros sent at 10/12/2020 11:28 AM CDT -----  Regarding: pt  Pt would like to schedule an appt. Please call back [phone]
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
## 2020-10-12 NOTE — TELEPHONE ENCOUNTER
Spoke with Salina (caregiver) she states patient is currently inpatient at [facility].  Salina states she wants to know what hospice group Dr. Contreras would recommend.  She states patient is getting discharged to home today and has a follow up appointment with Dr. Contreras tomorrow.  Will send message to office.  Advised Salina to ask question during appointment tomorrow if office does not call her this afternoon.  Salina verbalized understanding.     Reason for Disposition   [1] Caller requesting NON-URGENT health information AND [2] PCP's office is the best resource    Protocols used: INFORMATION ONLY CALL-A-AH

## 2020-10-12 NOTE — TELEPHONE ENCOUNTER
Return pt call and pt stated she has difficulty swallowing.  Pt would like to schedule appt. Asked pt is she having problem with breathing pt stated no.  Instruct pt to go to the ER. Pt asked do I think she should go to ER and I stated yes, could be some swelling or other severe problem.  Pt stated she still wanted to scheduled with Rosalind on tomorrow.  Scheduled appt with Rosalind Ricks on 10/13/20 at 9 am.  Pt verbalize understanding of ER visit recommended.

## 2020-10-12 NOTE — TELEPHONE ENCOUNTER
Unable to reach x 2 attempts.    Reason for Disposition   Second attempt to contact family AND no contact made. Phone number verified.    Additional Information   Negative: Caller has already spoken with the PCP (or office), and has no further questions   Negative: Caller has already spoken with another triager and has no further questions   Negative: Caller has already spoken with another triager or PCP (or office), and has further questions and triager able to answer questions.   Negative: Busy signal.  First attempt to contact caller.  Follow-up call scheduled within 15 minutes.   Negative: No answer.  First attempt to contact caller.  Follow-up call scheduled within 15 minutes.   Negative: Message left on unidentified voice mail. Phone number verified.   Negative: Message left on identified voicemail   Negative: Message left with person in household   Negative: Wrong number reached. Phone number verified.    Protocols used: NO CONTACT OR DUPLICATE CONTACT CALL-A-OH

## 2020-10-13 ENCOUNTER — OFFICE VISIT (OUTPATIENT)
Dept: HEMATOLOGY/ONCOLOGY | Facility: CLINIC | Age: 75
End: 2020-10-13
Payer: MEDICARE

## 2020-10-13 ENCOUNTER — TELEPHONE (OUTPATIENT)
Dept: HEMATOLOGY/ONCOLOGY | Facility: CLINIC | Age: 75
End: 2020-10-13

## 2020-10-13 VITALS
DIASTOLIC BLOOD PRESSURE: 72 MMHG | HEART RATE: 91 BPM | OXYGEN SATURATION: 83 % | WEIGHT: 99.19 LBS | RESPIRATION RATE: 10 BRPM | HEIGHT: 66 IN | TEMPERATURE: 98 F | SYSTOLIC BLOOD PRESSURE: 115 MMHG | BODY MASS INDEX: 15.94 KG/M2

## 2020-10-13 DIAGNOSIS — B37.81 THRUSH OF MOUTH AND ESOPHAGUS: Primary | ICD-10-CM

## 2020-10-13 DIAGNOSIS — C50.812 MALIGNANT NEOPLASM OF OVERLAPPING SITES OF LEFT BREAST IN FEMALE, ESTROGEN RECEPTOR POSITIVE: ICD-10-CM

## 2020-10-13 DIAGNOSIS — Z17.0 MALIGNANT NEOPLASM OF OVERLAPPING SITES OF LEFT BREAST IN FEMALE, ESTROGEN RECEPTOR POSITIVE: ICD-10-CM

## 2020-10-13 DIAGNOSIS — F51.01 PRIMARY INSOMNIA: ICD-10-CM

## 2020-10-13 DIAGNOSIS — B37.0 THRUSH OF MOUTH AND ESOPHAGUS: Primary | ICD-10-CM

## 2020-10-13 PROCEDURE — 99214 OFFICE O/P EST MOD 30 MIN: CPT | Mod: S$GLB,,, | Performed by: NURSE PRACTITIONER

## 2020-10-13 PROCEDURE — 99214 PR OFFICE/OUTPT VISIT, EST, LEVL IV, 30-39 MIN: ICD-10-PCS | Mod: S$GLB,,, | Performed by: NURSE PRACTITIONER

## 2020-10-13 RX ORDER — NYSTATIN 100000 [USP'U]/ML
4 SUSPENSION ORAL
Qty: 160 ML | Refills: 0 | Status: SHIPPED | OUTPATIENT
Start: 2020-10-13 | End: 2020-10-23

## 2020-10-13 RX ORDER — PREDNISONE 20 MG/1
TABLET ORAL
COMMUNITY
Start: 2020-07-08

## 2020-10-13 RX ORDER — FAMOTIDINE 40 MG/1
40 TABLET, FILM COATED ORAL NIGHTLY
Qty: 30 TABLET | Refills: 1 | Status: SHIPPED | OUTPATIENT
Start: 2020-10-13 | End: 2021-10-13

## 2020-10-13 RX ORDER — LEVOFLOXACIN 500 MG/1
TABLET, FILM COATED ORAL
COMMUNITY
Start: 2020-10-08

## 2020-10-13 RX ORDER — CAPECITABINE 150 MG/1
300 TABLET, FILM COATED ORAL 2 TIMES DAILY
Qty: 56 TABLET | Refills: 0 | Status: SHIPPED | OUTPATIENT
Start: 2020-10-13 | End: 2020-10-20

## 2020-10-13 RX ORDER — TEMAZEPAM 15 MG/1
CAPSULE ORAL
Qty: 30 CAPSULE | Refills: 0 | Status: SHIPPED | OUTPATIENT
Start: 2020-10-13

## 2020-10-13 RX ORDER — FLUCONAZOLE 150 MG/1
150 TABLET ORAL ONCE
Qty: 2 TABLET | Refills: 0 | Status: SHIPPED | OUTPATIENT
Start: 2020-10-13 | End: 2020-10-13

## 2020-10-13 RX ORDER — CAPECITABINE 500 MG/1
500 TABLET, FILM COATED ORAL 2 TIMES DAILY
Qty: 28 TABLET | Refills: 0 | Status: SHIPPED | OUTPATIENT
Start: 2020-10-13 | End: 2020-10-27
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
## 2020-10-13 NOTE — TELEPHONE ENCOUNTER
Contacted Pet imaging on [facility], they are doing Pet scans. PET scan order faxed to [phone].

## 2020-10-13 NOTE — TELEPHONE ENCOUNTER
DOCUMENTATION ONLY:  Prior authorization for Nerlynx on file until 8/12/23.  PA not required for capecitabine.      Co-pay: $2264.03 (Nerlynx), $12.28 (capecitabine 150mg), $16.68 (capecitabine 500mg).  Patient assistance is being researched.

## 2020-10-16 ENCOUNTER — TELEPHONE (OUTPATIENT)
Dept: HEMATOLOGY/ONCOLOGY | Facility: CLINIC | Age: 75
End: 2020-10-16

## 2020-10-16 NOTE — TELEPHONE ENCOUNTER
Called pt back regarding concerns of med approval and her PET scan. Got pt scheduled for a PET at [facility] on 10/21 and talked to [facility] concerning her meds. Pharmacist said they have tried to reach her twice and no answer. I gave them an updated number to reach pt and informed pt to stay by her phone. Kb lpn 10/16/2020 [id number].

## 2020-10-19 ENCOUNTER — OFFICE VISIT (OUTPATIENT)
Dept: HEMATOLOGY/ONCOLOGY | Facility: CLINIC | Age: 75
End: 2020-10-19
Payer: MEDICARE

## 2020-10-19 VITALS
OXYGEN SATURATION: 99 % | TEMPERATURE: 98 F | DIASTOLIC BLOOD PRESSURE: 80 MMHG | HEART RATE: 82 BPM | SYSTOLIC BLOOD PRESSURE: 150 MMHG

## 2020-10-19 DIAGNOSIS — C79.51 CARCINOMA OF BREAST METASTATIC TO BONE, UNSPECIFIED LATERALITY: Primary | ICD-10-CM

## 2020-10-19 DIAGNOSIS — C50.919 CARCINOMA OF BREAST METASTATIC TO BONE, UNSPECIFIED LATERALITY: Primary | ICD-10-CM

## 2020-10-19 DIAGNOSIS — C79.51 CANCER, METASTATIC TO BONE: ICD-10-CM

## 2020-10-19 LAB — HYPOCHROMIA BLD QL SMEAR: NORMAL

## 2020-10-19 PROCEDURE — 99214 PR OFFICE/OUTPT VISIT, EST, LEVL IV, 30-39 MIN: ICD-10-PCS | Mod: S$GLB,,, | Performed by: INTERNAL MEDICINE

## 2020-10-19 PROCEDURE — 99214 OFFICE O/P EST MOD 30 MIN: CPT | Mod: S$GLB,,, | Performed by: INTERNAL MEDICINE

## 2020-10-19 RX ORDER — OXYCODONE HYDROCHLORIDE 15 MG/1
30 TABLET ORAL EVERY 4 HOURS PRN
Qty: 60 TABLET | Refills: 0 | Status: SHIPPED | OUTPATIENT
Start: 2020-10-19

## 2020-10-19 RX ORDER — LACTOSE-REDUCED FOOD
236 POWDER (GRAM) ORAL 3 TIMES DAILY
Qty: 90 CAN | Refills: 6 | COMMUNITY
Start: 2020-10-19

## 2020-10-19 NOTE — PROGRESS NOTES
Medical Oncology Progress Note  [facility]     PATIENT: Benita Carballo  : 1945 74 y.o. female  MRN [id number]     PCP: Solomon Contreras MD  Consult Requested By:      Date of Service: 10/19/2020    Subjective:   Interim History:  Breast Cancer (left breast ) and Pain    Benita Carballo is here for to followup breast cancer treatment.      The patient here for laboratory study today however the patient found to have increase short of breath and desaturation     she is waiting to start oral chemotherapy with metronomic does of capecitabine and daily Nerlynx.  The patient have not received the 2 medications yet       Oncology History:  Oncology History Overview Note    Rt breast cancer s/p lump with chemo, [place] in [place] Tamoxifen x 1 yr, then Arimidex x 5 yrs. Completed tx .       c/o chronic cough with CT chest 16 showing extensive adenopathy  With base of lt neck mass 4.5 cm with yosi pulm nodules. RF to Dr ANDREWS Mcnally    16 bx of lt neck mass carcinoma with feature suggestive of breast primary. ER/%, Her2 borderline by IHC and deemed equivocal by FISH       Breast cancer metastasized to bone   2016 Initial Diagnosis    Breast cancer metastasized to bone, , lymphoid mass      2016 - 2017 Chemotherapy    Taxol/Herceptin x 8 cycles then perjeta added   Prescribed by Dr Mcnally      2017 - 2017 Chemotherapy    Kadcycla prescribed by Dr Mcnally      2017 Biopsy    Biopsy of rt abd met disease consistent with breast cancer. ER/TN + Her 2 neg per Dr Moreno's note.    Dr Moreno noted path review from bx  showed HER 2 + equivocal with FISH reading as Equivocal but ratio 1.2. Additional report form 2016 states additional testing was done with different probe and the equivocal results can be noted as negative.     Treatment therapy changed from Her2 based tx to endocrine base therapy by Dr Moreno      2017 -  Hormone Therapy    Arimidex  6/17-2/18  Aromasin 2/18  Ibrance + Aromasin 5/18 -1/19  Ibrance + faslodex 2/19 4/30/2019 Imaging Significant Findings    PET/CT: Lesions involving the medial clavicle , subcarinal LN, pleural based soft tissue density adjacent to T11 and lesion in left lobe of liver diminished in size and SUV.   RECIST 34% decrease in disease     2/26/2020 -  Chemotherapy    Treatment Summary   Plan Name: OP BREAST TRASTUZUMAB PACLITAXEL QW Perjeta Q3W  Treatment Goal: Control  Status: Active  Start Date: 2/26/2020  End Date: 8/12/2020 (Planned)  Provider: Solomon Contreras MD  Chemotherapy: PACLitaxel (TAXOL) 64 mg/m2 = 102 mg in sodium chloride 0.9% 250 mL chemo infusion, 64 mg/m2 = 102 mg (100 % of original dose 64 mg/m2), Intravenous, Clinic/HOD 1 time, 14 of 18 cycles  Dose modification: 64 mg/m2 (original dose 64 mg/m2, Cycle 1), 50 mg/m2 (original dose 64 mg/m2, Cycle 14)  trastuzumab 211 mg in sodium chloride 0.9% 250 mL chemo infusion, 4 mg/kg = 211 mg, Intravenous, Clinic/HOD 1 time, 9 of 13 cycles  Dose modification: 2 mg/kg (original dose 2 mg/kg, Cycle 7, Reason: MD Discretion), 80 mg (original dose 2 mg/kg, Cycle 8)  pertuzumab (PERJETA) 420 mg in sodium chloride 0.9% 250 mL infusion, 420 mg, Intravenous, Clinic/HOD 1 time, 0 of 1 cycle         3/26/20  ECHO showing EF 55-60%,  with Dr Gardner. Below is a portion of his note:         ==8/2019 She is currently on combined afinitor + aromasin,  repeated CT scan shows improvement    PET 1/2020 showed PD,  afinitor + aromasin stopped  == PET scan 01/20/2020 the progressive  ==Started Taxol/Herceptin weekly 2/2020  ==6/2020 PET PD  ==add Weekly paclitaxel with trastuzumab and pertuzumab.  However the patient states that she was treated with pertuzumab in the past several years ago which almost kill her as her report, therefore going to change her treatment plan as below  ==7/2020 plan to start Nerlynx plus Xeloda for HER2 positive metastatic breast cancer    Past  Medical History:   Past Medical History:   Diagnosis Date    Arthritis     Bone cancer     Breast cancer     Cancer     Cataract     Depression     Diverticulosis 1/15/2020    Fatigue associated with anemia 6/6/2019    GERD (gastroesophageal reflux disease)     High cholesterol     Hypoxemia 3/12/2020    Skin problem     Sleep disorder        Past Surgical HIstory:   Past Surgical History:   Procedure Laterality Date    APPENDECTOMY      BREAST LUMPECTOMY Right 2002    CATARACT EXTRACTION, BILATERAL  2014    COLONOSCOPY  2008    HYSTERECTOMY      PORTACATH PLACEMENT  05/18/2016    SURGICAL REMOVAL OF NODULE OF VOCAL CORD         Allergies:  Review of patient's allergies indicates:   Allergen Reactions    Benadryl [diphenhydramine hcl]      Restless leg     Medrol [methylprednisolone] Hallucinations    Penicillins        Medications:  Current Outpatient Medications   Medication Sig Dispense Refill    ANORO ELLIPTA 62.5-25 mcg/actuation DsDv       capecitabine (XELODA) 150 MG tablet Take 2 tablets (300 mg total) by mouth 2 (two) times daily. 56 tablet 0    capecitabine (XELODA) 500 MG Tab Take 1 tablet (500 mg total) by mouth 2 (two) times daily. 28 tablet 0    dexAMETHasone 0.5 mg/5 mL Soln SWISH WITH 10 ML PO FOR 2 MIN THEN SPIT QID      doxycycline (VIBRAMYCIN) 100 MG Cap Take 1 capsule (100 mg total) by mouth every 12 (twelve) hours. 20 capsule 0    famotidine (PEPCID) 40 MG tablet Take 1 tablet (40 mg total) by mouth every evening. 30 tablet 1    furosemide (LASIX) 40 MG tablet Take 1 tablet (40 mg total) by mouth once daily. 30 tablet 11    gabapentin (NEURONTIN) 400 MG capsule Take 1 capsule (400 mg total) by mouth 3 (three) times daily. 120 capsule 4    iron-vitamin C 100-250 mg, ICAR-C, (ICAR-C) 100-250 mg Tab Take 1 tablet by mouth once daily. 30 tablet 5    levoFLOXacin (LEVAQUIN) 500 MG tablet       LORazepam (ATIVAN) 1 MG tablet TAKE 1 TABLET BY MOUTH EVERY EVENING 30  tablet 0    megestrol (MEGACE) 400 mg/10 mL (40 mg/mL) Susp Take 10 mLs (400 mg total) by mouth once daily. 240 mL 2    neratinib (NERLYNX) 40 mg Tab Take 6 tablets (240 MG) by mouth once daily. 180 tablet 5    nystatin (MYCOSTATIN) 100,000 unit/mL suspension Take 4 mLs (400,000 Units total) by mouth 4 (four) times daily with meals and nightly. for 10 days 160 mL 0    oxyCODONE (ROXICODONE) 15 MG Tab Take 2 tablets (30 mg total) by mouth every 4 (four) hours as needed for Pain. 60 tablet 0    potassium chloride SA (K-DUR,KLOR-CON) 20 MEQ tablet Take 1 tablet (20 mEq total) by mouth once daily. 30 tablet 11    predniSONE (DELTASONE) 20 MG tablet TK 2 TS PO D      PROAIR HFA 90 mcg/actuation inhaler       promethazine (PHENERGAN) 25 MG tablet TAKE 1 TABLET BY MOUTH EVERY 6 HOURS AS NEEDED FOR NAUSEA 30 tablet 3    temazepam (RESTORIL) 15 mg Cap TAKE 1 CAPSULE(15 MG) BY MOUTH EVERY NIGHT 30 capsule 0    food supplemt, lactose-reduced (ENSURE MAX PROTEIN) Liqd Take 236 mLs by mouth 3 (three) times daily. 90 Can 6     No current facility-administered medications for this visit.        Family History:   Family History   Problem Relation Age of Onset    Lung cancer Father     Melanoma Father        Social History:  reports that she has been smoking cigarettes. She has a 2.50 pack-year smoking history. She has never used smokeless tobacco. She reports previous alcohol use of about 4.0 standard drinks of alcohol per week. She reports that she does not use drugs.    Review of Systemas  Constitutional: No change in weight, appetie, fatigue, fever, or night sweats  Eyes: No changes in vision  Ears, Nose, Mouth, Throat, and Face: No hearing problems, ear pain, rummy nose, or sore throat  Respiratory: No shortness of breath or cough  Cardiovascular: No chest pain, palpitations or dizziness  Gastrointestinal: No abdominal pain, hematochezia, melena  Genitourinary: No dysuria, hematuria, nocturia, menstrual problems,  post menopausal  Integumentary/Breast: No rashes or itching  Hematologic/Lymphatic: No anemia or bleeding abnormalities  Musculoskeletal: No joint or muscle abnormalities  Neurological: No sensory or motor problems, no headaches  Behavioral/Psych: No depression, anxiety, cognitive problems, or stress  Endocrine: No diabetes or thyroid problems  Allergic/Immunologic: See allergy above    Physical Exam      Vitals:   Vitals:    10/19/20 0950 10/19/20 0951   BP: (!) 150/80    BP Location: Right arm    Patient Position: Sitting    BP Method: Small (Automatic)    Pulse: 82    Temp: 97.8 °F (36.6 °C)    TempSrc: Temporal    SpO2: (!) 72% 99%     BMI: There is no height or weight on file to calculate BMI.  BSA There is no height or weight on file to calculate BSA.  ECOG Performance Status:   ECOG SCORE    1 - Restricted in strenuous activity-ambulatory and able to carry out work of a light nature       GENERAL APPEARANCE: Well developed, well nourished, in no acute distress.  SKIN: Inspection of the skin reveals no rashes, ulcerations or petechiae.  HEENT:  There is a bulky lymphadenopathy to the left side of neck  NECK:  This lymph node enlargement to the neck side of the neck   CRESPIRATORY: Normal AP diameter and normal contour without any kyphoscoliosis. LUNGS: Auscultation of the lungs revealed normal breath sounds without any other adventitious sounds or rubs.  CARDIOVASCULAR: There was a regular rate and rhythm without any murmurs, gallops, rubs. The carotid pulses were normal and 2+ bilaterally without bruits. Peripheral pulses were 2+ and symmetric.  GASTROINTESTINAL: Soft and nontender with normal bowel sounds. The liver span was approximately 5-6 cm in the right midclavicular line by percussion. The liver edge was nontender. The spleen was not palpable. There were no inguinal or umbilical hernias noted. No ascites was noted.  LYMPH NODES: No lymphadenopathy was appreciated in the neck, axillae or  groin.  MUSCULOSKELETAL: Gait was normal. There was no tenderness or effusions noted. Muscle strength and tone were normal. EXTREMITIES: No cyanosis, clubbing or edema.  NEUROLOGIC: Alert and oriented x 3. Normal affect. Gait was normal. Normal deep tendon reflexes with no pathological reflexes. Sensation to touch was normal.  PSYCHIATRIC: good mood, orientated to place, time and person     Labs and Imagings         Imaging:     Assessment:     Principle Problem: 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, unspecified laterality [C50.919, C79.51]   Co-morbidity/Active Problems:   Patient Active Problem List   Diagnosis    Breast cancer metastasized to bone    Neuropathy    Fatigue associated with anemia    Drug-related hair loss    Nausea    Primary insomnia    Foot infection    Cancer, metastatic to bone    Malignant neoplasm of overlapping 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, estrogen receptor positive     Chronic fatigue    Diverticulosis    Dehydration    Hypoxemia    Iron deficiency anemia secondary to inadequate dietary iron intake    Hypoalbuminemia    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    Malignant neoplasm of breast, stage 4        Bentia Carballo is a 74 y.o. 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 A diagnosis of Cancer, metastatic to bone was also pertinent to this visit., with 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, unspecified laterality [C50.919, C79.51]     =========================================================  Metastatic breast cancer ERPR positive HER2 Naoh questionable  Status post of chemotherapy and HER2 targeted therapy with progression on HER2 targeted therapy  Recent testing fish negative for her 2 2018  Repeat the lipid biopsy 2019  shows HER2 positive and ER negative; Started Taxol/Herceptin 2/2020; Progressing on Herceptin/Taxol   Poor tolerance to Perjeta by history which was given couple  years ago by Dr Mcnally  Bone metastatic disease  Bulky lymphadenopathy to the left side of the neck  Fatigue  Hypoxia/COPD           Plan:   Overall Plan:  3rd line chemotherapy Nerlynx and Xeloda pending  Restaging with a PET scan and may try rebiopsy; may need radiation therapy to the neck  Recent [employer] liquid biopsy to looking for additional mutations  Still working on getting Xeloda and Nerlynx     == I sent patient to Radiation Oncology given the fact that 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

## 2020-10-20 ENCOUNTER — TELEPHONE (OUTPATIENT)
Dept: HEMATOLOGY/ONCOLOGY | Facility: CLINIC | Age: 75
End: 2020-10-20

## 2020-10-20 NOTE — TELEPHONE ENCOUNTER
Spoke with patients family. Explained how to take new prescription. Offered Boost samples. States they will come into office tomorrow for samples.

## 2020-10-23 ENCOUNTER — TELEPHONE (OUTPATIENT)
Dept: HEMATOLOGY/ONCOLOGY | Facility: CLINIC | Age: 75
End: 2020-10-23

## 2020-10-23 NOTE — TELEPHONE ENCOUNTER
Spoke with pt caregiver, Tomorro and stated that pt hasn't received wheelchair and this time from [facility].  Informed pt's caregiver we will follow up and a script has been sent. Pt's caregiver stated Ms Carballo and herself would like to speak to hospice. Sent info the Nurse navigator.

## 2020-10-23 NOTE — TELEPHONE ENCOUNTER
----- Message from Charo Khanna sent at 10/23/2020 12:20 PM CDT -----  Patient's care taker need to speak to nurse regarding patient medication and treatment plan. Call back number [phone] or [phone].. Tks

## 2020-11-02 ENCOUNTER — TELEPHONE (OUTPATIENT)
Dept: HEMATOLOGY/ONCOLOGY | Facility: CLINIC | Age: 75
End: 2020-11-02

## 2020-11-02 ENCOUNTER — OFFICE VISIT (OUTPATIENT)
Dept: HEMATOLOGY/ONCOLOGY | Facility: CLINIC | Age: 75
End: 2020-11-02
Payer: MEDICARE

## 2020-11-02 VITALS
HEART RATE: 79 BPM | OXYGEN SATURATION: 97 % | TEMPERATURE: 98 F | HEIGHT: 66 IN | BODY MASS INDEX: 15.62 KG/M2 | WEIGHT: 97.19 LBS | SYSTOLIC BLOOD PRESSURE: 121 MMHG | RESPIRATION RATE: 20 BRPM | DIASTOLIC BLOOD PRESSURE: 80 MMHG

## 2020-11-02 DIAGNOSIS — C79.51 CANCER, METASTATIC TO BONE: Primary | ICD-10-CM

## 2020-11-02 LAB
ANISOCYTOSIS: ABNORMAL
BANDS: 5 % (ref 0–5)
CELLS COUNTED: 100
HYPOCHROMIA BLD QL SMEAR: ABNORMAL
LYMPHOCYTES NFR BLD: 1 % (ref 25–40)
MONOCYTES NFR BLD: 13 % (ref 1–15)
NEUTROPHILS # BLD AUTO: 7.3 10*3/UL (ref 1.8–7.7)
NEUTROPHILS NFR BLD: 81 % (ref 37–80)
SMALL PLATELETS BLD QL SMEAR: ADEQUATE

## 2020-11-02 PROCEDURE — 99215 PR OFFICE/OUTPT VISIT, EST, LEVL V, 40-54 MIN: ICD-10-PCS | Mod: GV,S$GLB,, | Performed by: INTERNAL MEDICINE

## 2020-11-02 PROCEDURE — 99215 OFFICE O/P EST HI 40 MIN: CPT | Mod: GV,S$GLB,, | Performed by: INTERNAL MEDICINE

## 2020-11-02 RX ORDER — DEXAMETHASONE 2 MG/1
2 TABLET ORAL 2 TIMES DAILY WITH MEALS
Qty: 28 TABLET | Refills: 0 | Status: SHIPPED | OUTPATIENT
Start: 2020-11-02 | End: 2020-11-16

## 2020-11-02 NOTE — PROGRESS NOTES
Medical Oncology Progress Note  [facility]     PATIENT: Benita Carballo  : 1945 74 y.o. 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      The patient was started on radiation to the left side of the neck about 5 days ago the patient states that she feels much better after several days radiation and her breathing is easier and her pain is less although she still on pain medications at this time       Oncology History:  Oncology History Overview Note    Rt breast cancer s/p lump with chemo, [place] in [place]. Tamoxifen x 1 yr, then Arimidex x 5 yrs. Completed tx .       c/o chronic cough with CT chest 16 showing extensive adenopathy  With base of lt neck mass 4.5 cm with yosi pulm nodules. RF to Dr ANDREWS Mcnally    16 bx of lt neck mass carcinoma with feature suggestive of breast primary. ER/%, Her2 borderline by IHC and deemed equivocal by FISH       Breast cancer metastasized to bone   2016 Initial Diagnosis    Breast cancer metastasized to bone, , lymphoid mass      2016 - 2017 Chemotherapy    Taxol/Herceptin x 8 cycles then perjeta added   Prescribed by Dr Mcnally      2017 - 2017 Chemotherapy    Kadcycla prescribed by Dr Mcnally      2017 Biopsy    Biopsy of rt abd met disease consistent with breast cancer. ER/MN + Her 2 neg per Dr Moreno's note.    Dr Moreno noted path review from bx  showed HER 2 + equivocal with FISH reading as Equivocal but ratio 1.2. Additional report form 2016 states additional testing was done with different probe and the equivocal results can be noted as negative.     Treatment therapy changed from Her2 based tx to endocrine base therapy by Dr Moreno      2017 -  Hormone Therapy    Arimidex  6/17-2/18  Aromasin 2/18  Ibrance + Aromasin 5/18 -1/19  Ibrance + faslodex 2/19 4/30/2019 Imaging Significant Findings    PET/CT: Lesions involving the medial clavicle , subcarinal LN, pleural based soft tissue density adjacent to T11 and lesion in left lobe of liver diminished in size and SUV.   RECIST 34% decrease in disease     2/26/2020 -  Chemotherapy    Treatment Summary   Plan Name: OP BREAST TRASTUZUMAB PACLITAXEL QW Perjeta Q3W  Treatment Goal: Control  Status: Active  Start Date: 2/26/2020  End Date: 8/12/2020 (Planned)  Provider: Solomon Contreras MD  Chemotherapy: PACLitaxel (TAXOL) 64 mg/m2 = 102 mg in sodium chloride 0.9% 250 mL chemo infusion, 64 mg/m2 = 102 mg (100 % of original dose 64 mg/m2), Intravenous, Clinic/HOD 1 time, 14 of 18 cycles  Dose modification: 64 mg/m2 (original dose 64 mg/m2, Cycle 1), 50 mg/m2 (original dose 64 mg/m2, Cycle 14)  trastuzumab 211 mg in sodium chloride 0.9% 250 mL chemo infusion, 4 mg/kg = 211 mg, Intravenous, Clinic/HOD 1 time, 9 of 13 cycles  Dose modification: 2 mg/kg (original dose 2 mg/kg, Cycle 7, Reason: MD Discretion), 80 mg (original dose 2 mg/kg, Cycle 8)  pertuzumab (PERJETA) 420 mg in sodium chloride 0.9% 250 mL infusion, 420 mg, Intravenous, Clinic/HOD 1 time, 0 of 1 cycle         3/26/20  ECHO showing EF 55-60%,  with Dr Gardner. Below is a portion of his note:         ==8/2019 She is currently on combined afinitor + aromasin,  repeated CT scan shows improvement    PET 1/2020 showed PD,  afinitor + aromasin stopped  == PET scan 01/20/2020 the progressive  ==Started Taxol/Herceptin weekly 2/2020  ==6/2020 PET PD  ==add Weekly paclitaxel with trastuzumab and pertuzumab.  However the patient states that she was treated with pertuzumab in the past several years ago which almost kill her as her report, therefore going to change her treatment plan as below  ==7/2020 plan to start Nerlynx plus Xeloda for HER2 positive metastatic breast cancer,   11/2020  still waiting for medicine to deliver to her home  ==10/2020 start radiation therapy for 10 days to the left side of neck    Past Medical History:   Past Medical History:   Diagnosis Date    Arthritis     Bone cancer     Breast cancer     Cancer     Cataract     Depression     Diverticulosis 1/15/2020    Fatigue associated with anemia 6/6/2019    GERD (gastroesophageal reflux disease)     High cholesterol     Hypoxemia 3/12/2020    Skin problem     Sleep disorder        Past Surgical HIstory:   Past Surgical History:   Procedure Laterality Date    APPENDECTOMY      BREAST LUMPECTOMY Right 2002    CATARACT EXTRACTION, BILATERAL  2014    COLONOSCOPY  2008    HYSTERECTOMY      PORTACATH PLACEMENT  05/18/2016    SURGICAL REMOVAL OF NODULE OF VOCAL CORD         Allergies:  Review of patient's allergies indicates:   Allergen Reactions    Benadryl [diphenhydramine hcl]      Restless leg     Medrol [methylprednisolone] Hallucinations    Penicillins        Medications:  Current Outpatient Medications   Medication Sig Dispense Refill    ANORO ELLIPTA 62.5-25 mcg/actuation DsDv       capecitabine (XELODA) 150 MG tablet Take 2 tablets (300 mg total) by mouth 2 (two) times daily. 56 tablet 0    capecitabine (XELODA) 500 MG Tab Take 1 tablet (500 mg total) by mouth 2 (two) times daily. 28 tablet 0    dexAMETHasone (DECADRON) 2 MG tablet Take 1 tablet (2 mg total) by mouth 2 (two) times daily with meals. for 14 days 28 tablet 0    dexAMETHasone 0.5 mg/5 mL Soln SWISH WITH 10 ML PO FOR 2 MIN THEN SPIT QID      doxycycline (VIBRAMYCIN) 100 MG Cap Take 1 capsule (100 mg total) by mouth every 12 (twelve) hours. 20 capsule 0    famotidine (PEPCID) 40 MG tablet Take 1 tablet (40 mg total) by mouth every evening. 30 tablet 1    food supplemt, lactose-reduced (ENSURE MAX PROTEIN) Liqd Take 236 mLs by mouth 3 (three) times daily. 90 Can 6    furosemide (LASIX) 40 MG tablet Take 1 tablet (40 mg total) by  mouth once daily. 30 tablet 11    gabapentin (NEURONTIN) 400 MG capsule Take 1 capsule (400 mg total) by mouth 3 (three) times daily. 120 capsule 4    iron-vitamin C 100-250 mg, ICAR-C, (ICAR-C) 100-250 mg Tab Take 1 tablet by mouth once daily. 30 tablet 5    levoFLOXacin (LEVAQUIN) 500 MG tablet       LORazepam (ATIVAN) 1 MG tablet TAKE 1 TABLET BY MOUTH EVERY EVENING 30 tablet 0    megestrol (MEGACE) 400 mg/10 mL (40 mg/mL) Susp Take 10 mLs (400 mg total) by mouth once daily. 240 mL 2    neratinib (NERLYNX) 40 mg Tab Take 6 tablets (240 MG) by mouth once daily. 180 tablet 5    oxyCODONE (ROXICODONE) 15 MG Tab Take 2 tablets (30 mg total) by mouth every 4 (four) hours as needed for Pain. 60 tablet 0    potassium chloride SA (K-DUR,KLOR-CON) 20 MEQ tablet Take 1 tablet (20 mEq total) by mouth once daily. 30 tablet 11    predniSONE (DELTASONE) 20 MG tablet TK 2 TS PO D      PROAIR HFA 90 mcg/actuation inhaler       promethazine (PHENERGAN) 25 MG tablet TAKE 1 TABLET BY MOUTH EVERY 6 HOURS AS NEEDED FOR NAUSEA 30 tablet 3    temazepam (RESTORIL) 15 mg Cap TAKE 1 CAPSULE(15 MG) BY MOUTH EVERY NIGHT 30 capsule 0     No current facility-administered medications for this visit.        Family History:   Family History   Problem Relation Age of Onset    Lung cancer Father     Melanoma Father        Social History:  reports that she has been smoking cigarettes. She has a 2.50 pack-year smoking history. She has never used smokeless tobacco. She reports previous alcohol use of about 4.0 standard drinks of alcohol per week. She reports that she does not use drugs.    Review of Systemas  Constitutional: No change in weight, appetie, fatigue, fever, or night sweats  Eyes: No changes in vision  Ears, Nose, Mouth, Throat, and Face: No hearing problems, ear pain, rummy nose, or sore throat  Respiratory: see HPI   Cardiovascular: No chest pain, palpitations or dizziness  Gastrointestinal: No abdominal pain,  "hematochezia, melena  Genitourinary: No dysuria, hematuria, nocturia, menstrual problems, post menopausal  Integumentary/Breast: No rashes or itching  Hematologic/Lymphatic: No anemia or bleeding abnormalities  Musculoskeletal: No joint or muscle abnormalities  Neurological: No sensory or motor problems, no headaches  Behavioral/Psych: No depression, anxiety, cognitive problems, or stress  Endocrine: No diabetes or thyroid problems  Allergic/Immunologic: See allergy above    Physical Exam      Vitals:   Vitals:    11/02/20 1042   BP: 121/80   BP Location: Left arm   Patient Position: Sitting   BP Method: Medium (Automatic)   Pulse: 79   Resp: 20   Temp: 97.9 °F (36.6 °C)   TempSrc: Temporal   SpO2: 97%   Weight: 44.1 kg (97 lb 3.2 oz)   Height: 5' 6" (1.676 m)     BMI: Body mass index is 15.69 kg/m².  BSA Body surface area is 1.43 meters squared.  ECOG Performance Status:   ECOG SCORE    1 - Restricted in strenuous activity-ambulatory and able to carry out work of a light nature       GENERAL APPEARANCE: Well developed, well nourished, in no acute distress.  SKIN: Inspection of the skin reveals no rashes, ulcerations or petechiae.  HEENT:  There is a bulky lymphadenopathy to the left side of neck; less prominent today than a week ago  NECK:  This lymph node enlargement to the neck side of the neck   CRESPIRATORY: Normal AP diameter and normal contour without any kyphoscoliosis. LUNGS: Auscultation of the lungs revealed normal breath sounds without any other adventitious sounds or rubs.  CARDIOVASCULAR: There was a regular rate and rhythm without any murmurs, gallops, rubs. The carotid pulses were normal and 2+ bilaterally without bruits. Peripheral pulses were 2+ and symmetric.  GASTROINTESTINAL: Soft and nontender with normal bowel sounds. The liver span was approximately 5-6 cm in the right midclavicular line by percussion. The liver edge was nontender. The spleen was not palpable. There were no inguinal or " umbilical hernias noted. No ascites was noted.  LYMPH NODES: No lymphadenopathy was appreciated in the neck, axillae or groin.  MUSCULOSKELETAL: Gait was normal. There was no tenderness or effusions noted. Muscle strength and tone were normal. EXTREMITIES: No cyanosis, clubbing or edema.  NEUROLOGIC: Alert and oriented x 3. Normal affect. Gait was normal. Normal deep tendon reflexes with no pathological reflexes. Sensation to touch was normal.  PSYCHIATRIC: good mood, orientated to place, time and person     Labs and Imagings         Imaging:     Assessment:     Principle Problem: Cancer, metastatic to bone [C79.51]   Co-morbidity/Active Problems:   Patient Active Problem List   Diagnosis    Breast cancer metastasized to bone    Neuropathy    Fatigue associated with anemia    Drug-related hair loss    Nausea    Primary insomnia    Foot infection    Cancer, metastatic to bone    Malignant neoplasm of overlapping 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, estrogen receptor positive     Chronic fatigue    Diverticulosis    Dehydration    Hypoxemia    Iron deficiency anemia secondary to inadequate dietary iron intake    Hypoalbuminemia    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    Malignant neoplasm of breast, stage 4        Benita Carballo is a 74 y.o. female with PMH of The encounter diagnosis was Cancer, metastatic to bone., with Cancer, metastatic to bone [C79.51]     =========================================================  Metastatic breast cancer ERPR positive HER2 Noah questionable  Status post of chemotherapy and HER2 targeted therapy with progression on HER2 targeted therapy  Recent testing fish negative for her 2 2018  Repeat the lipid biopsy 2019  shows HER2 positive and ER negative; Started Taxol/Herceptin 2/2020; Progressing on Herceptin/Taxol   Poor tolerance to Perjeta by history which was given couple years ago by Dr Mcnally  Bone metastatic disease  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

## 2020-11-02 NOTE — TELEPHONE ENCOUNTER
Pt had a lot of concerns about her Nerlynx. Pt states that she still has not received it. I could not remember if this was the medication that you were working on and the insurance did not approve of?

## 2020-11-02 NOTE — TELEPHONE ENCOUNTER
Spoke with [facility]. States they are in touch with patient regarding mediations Nerlynx and Xeloda. They spoke with her 10/26/20 and confirmed address. They will be sending her financial paperwork to fill out and return so that she may receive her medication.

## 2020-11-06 ENCOUNTER — TELEPHONE (OUTPATIENT)
Dept: HEMATOLOGY/ONCOLOGY | Facility: CLINIC | Age: 75
End: 2020-11-06

## 2020-11-06 NOTE — TELEPHONE ENCOUNTER
Spoke with Wanda Pepper with [facility]. They have been unable to reach the patient regarding her Xeloda and nerlynx prescription. I also tried reaching out to the patient on both available numbers. I left a message.The patient needs to call [phone].

## 2020-11-16 ENCOUNTER — TELEPHONE (OUTPATIENT)
Dept: HEMATOLOGY/ONCOLOGY | Facility: CLINIC | Age: 75
End: 2020-11-16

## 2020-11-23 NOTE — TELEPHONE ENCOUNTER
FOR DOCUMENTATION ONLY:  Financial Assistance for Xeloda is approved from 11- until further notice  Source: [employer] [facility] Dispensing pharmacy  [phone]

## 2020-11-23 NOTE — TELEPHONE ENCOUNTER
FOR DOCUMENTATION ONLY:  Financial Assistance for Nerlynx is approved from 8- to 8-  Source [employer]  BIN: [id number]  KELLEY: CASSI  Id: [id number]  GRP: [id number]  5400.00 Ashok

## 2020-11-24 ENCOUNTER — PATIENT MESSAGE (OUTPATIENT)
Dept: PHARMACY | Facility: CLINIC | Age: 75
End: 2020-11-24

## 2025-06-13 NOTE — PROGRESS NOTES
Medical Oncology Progress Note  [facility]     PATIENT: Benita Carballo  : 1945 74 y.o. female  MRN [id number]     PCP: Solomon Contreras MD  Consult Requested By:      Date of Service: 2020    Subjective:   Interim History:  Malignant neoplasm of overlapping sites of left breast    Benita Carballo is here for to followup breast cancer treatment.    The patient was started on  chemotherapy last week with Herceptin and Taxol weekly.  She has chronic hypoxia and her oxygen saturation has been declining today her O2 status 77% she looks purple and she also has increasing degree of short of breath; in addition she complains of swelling to her left arm.  She had no chest pain. She was admittted last week to the hospital and underwent thoracentesis, which was not tested for cytology and imaging for possible PE was ruled out. Today she states she is feeling better but POX was 82 % on RA, and still has yosi upper ext swelling which she states has improved. She has not had a echo so will order ECHO stat to r/o any CHF which could be causing her swelling both lung and upper ext. States she had upper u/s with her pulm which was neg for DVT.     3/26/20   She returns with ECHO showing EF 55-60%, but right sided heart failure noted. Pt has an additional 12 # of wt gain since last week. She states she is feeling fine, better than at last visit. Discussed ECHO and will hold herceptin and obtain MUGA, will proceed with weekly taxol and possibly will add herceptin back after MUGA completed and will refer pt to cardiology, but could be delayed due to COVID pandemic.     2020  Today she presents with significant fluid loss noted. No longer upper ext third space swelling noted, alb and T Pro dramatically improved, pt has last 10 + lbs of fluid. She is breathing easy today and denies any SOB or ORELLANA. We will proceed with weekly taxol and continue to hold herceptin until MUGA or cardiology clearance  is obtained. She was seen yesterday with Dr Gardner. Below is a portion of his note:       Oncology History:  Oncology History Overview Note   2002 Rt breast cancer s/p lump with [place] Tamoxifen x 1 yr, then Arimidex x 5 yrs. Completed tx 2008.     2015  c/o chronic cough with CT chest 1/28/16 showing extensive adenopathy  With base of lt neck mass 4.5 cm with yosi pulm nodules. RF to Dr ANDREWS Mcnally    2/6/16 bx of lt neck mass carcinoma with feature suggestive of breast primary. ER/%, Her2 borderline by IHC and deemed equivocal by FISH       Breast cancer metastasized to bone   2/4/2016 Initial Diagnosis    Breast cancer metastasized to bone, , lymphoid mass      5/25/2016 - 2/9/2017 Chemotherapy    Taxol/Herceptin x 8 cycles then perjeta added 7/16  Prescribed by Dr Mcnally      2/20/2017 - 6/17/2017 Chemotherapy    Kadcycla prescribed by Dr Mcnally      6/28/2017 Biopsy    Biopsy of rt abd met disease consistent with breast cancer. ER/UT + Her 2 neg per Dr Moreno's note.    Dr Moreno noted path review from bx 2/16 showed HER 2 + equivocal with FISH reading as Equivocal but ratio 1.2. Additional report form 2016 states additional testing was done with different probe and the equivocal results can be noted as negative.     Treatment therapy changed from Her2 based tx to endocrine base therapy by Dr Moreno      6/28/2017 -  Hormone Therapy    Arimidex 6/17-2/18  Aromasin 2/18  Ibrance + Aromasin 5/18 -1/19  Ibrance + faslodex 2/19 4/30/2019 Imaging Significant Findings    PET/CT: Lesions involving the medial clavicle , subcarinal LN, pleural based soft tissue density adjacent to T11 and lesion in left lobe of liver diminished in size and SUV.   RECIST 34% decrease in disease     2/26/2020 -  Chemotherapy    Treatment Summary   Plan Name: OP BREAST TRASTUZUMAB PACLITAXEL QW  Treatment Goal: Control  Status: Active  Start Date: 2/26/2020  End Date: 7/2/2020 (Planned)  Provider: Solomon Contreras MD  Chemotherapy:  PACLitaxel (TAXOL) 64 mg/m2 = 102 mg in sodium chloride 0.9% 250 mL chemo infusion, 64 mg/m2 = 102 mg (100 % of original dose 64 mg/m2), Intravenous, Clinic/HOD 1 time, 12 of 14 cycles  Dose modification: 64 mg/m2 (original dose 64 mg/m2, Cycle 1), 50 mg/m2 (original dose 64 mg/m2, Cycle 14)  trastuzumab 211 mg in sodium chloride 0.9% 250 mL chemo infusion, 4 mg/kg = 211 mg, Intravenous, Clinic/HOD 1 time, 7 of 9 cycles  Dose modification: 2 mg/kg (original dose 2 mg/kg, Cycle 7, Reason: MD Discretion), 80 mg (original dose 2 mg/kg, Cycle 8)       ==8/2019 Now She is currently on combined afinitor + aromasin,  repeated CT scan shows improvement  PET 1/2020 showed PD,  afinitor + aromasin stopped  == PET scan 01/20/2020 the progressive  ==Started Taxol/Herceptin weekly 2/2020    Past Medical History:   Past Medical History:   Diagnosis Date    Arthritis     Bone cancer     Breast cancer     Cancer     Cataract     Depression     Diverticulosis 1/15/2020    Fatigue associated with anemia 6/6/2019    GERD (gastroesophageal reflux disease)     High cholesterol     Hypoxemia 3/12/2020    Skin problem     Sleep disorder        Past Surgical HIstory:   Past Surgical History:   Procedure Laterality Date    APPENDECTOMY      BREAST LUMPECTOMY Right 2002    CATARACT EXTRACTION, BILATERAL  2014    COLONOSCOPY  2008    HYSTERECTOMY      PORTACATH PLACEMENT  05/18/2016    SURGICAL REMOVAL OF NODULE OF VOCAL CORD         Allergies:  Review of patient's allergies indicates:   Allergen Reactions    Benadryl [diphenhydramine hcl]      Restless leg     Medrol [methylprednisolone] Hallucinations    Penicillins        Medications:  Current Outpatient Medications   Medication Sig Dispense Refill    ANORO ELLIPTA 62.5-25 mcg/actuation DsDv       dexAMETHasone 0.5 mg/5 mL Soln SWISH WITH 10 ML PO FOR 2 MIN THEN SPIT QID      exemestane (AROMASIN) 25 mg tablet Take 1 tablet (25 mg total) by mouth once daily. 30  tablet 3    furosemide (LASIX) 40 MG tablet Take 1 tablet (40 mg total) by mouth once daily. 30 tablet 11    gabapentin (NEURONTIN) 400 MG capsule Take 1 capsule (400 mg total) by mouth 3 (three) times daily. 120 capsule 4    iron-vitamin C 100-250 mg, ICAR-C, (ICAR-C) 100-250 mg Tab Take 1 tablet by mouth once daily. 30 tablet 5    LORazepam (ATIVAN) 1 MG tablet TAKE 1 TABLET BY MOUTH EVERY EVENING 30 tablet 0    megestrol (MEGACE) 400 mg/10 mL (40 mg/mL) Susp Take 10 mLs (400 mg total) by mouth once daily. 240 mL 2    pantoprazole (PROTONIX) 40 MG tablet TAKE 1 TABLET BY MOUTH EVERY DAY 90 tablet 0    potassium chloride SA (K-DUR,KLOR-CON) 20 MEQ tablet Take 1 tablet (20 mEq total) by mouth once daily. 30 tablet 11    PROAIR HFA 90 mcg/actuation inhaler       promethazine (PHENERGAN) 25 MG tablet Take 1 tablet (25 mg total) by mouth every 6 (six) hours as needed for Nausea. 30 tablet 3    temazepam (RESTORIL) 15 mg Cap TAKE 1 CAPSULE(15 MG) BY MOUTH EVERY NIGHT 30 capsule 0     No current facility-administered medications for this visit.        Family History:   Family History   Problem Relation Age of Onset    Lung cancer Father     Melanoma Father        Social History:  reports that she has been smoking cigarettes. She has a 2.00 pack-year smoking history. She has never used smokeless tobacco. She reports current alcohol use of about 4.0 standard drinks of alcohol per week. She reports that she does not use drugs.    Review of Systemas  Constitutional: No change in weight, appetie, fatigue, fever, or night sweats  Eyes: No changes in vision  Ears, Nose, Mouth, Throat, and Face: No hearing problems, ear pain, rummy nose, or sore throat  Respiratory: No shortness of breath or cough  Cardiovascular: No chest pain, palpitations or dizziness  Gastrointestinal: No abdominal pain, hematochezia, melena  Genitourinary: No dysuria, hematuria, nocturia, menstrual problems, post  "menopausal  Integumentary/Breast: No rashes or itching  Hematologic/Lymphatic: No anemia or bleeding abnormalities  Musculoskeletal: No joint or muscle abnormalities  Neurological: No sensory or motor problems, no headaches  Behavioral/Psych: No depression, anxiety, cognitive problems, or stress  Endocrine: No diabetes or thyroid problems  Allergic/Immunologic: See allergy above    Physical Exam      Vitals:   Vitals:    06/18/20 0936 06/18/20 0941   BP: 123/74    BP Location: Left arm    Patient Position: Sitting    BP Method: Small (Automatic)    Pulse: 87    Resp: 16    Temp: 97.9 °F (36.6 °C)    TempSrc: Temporal    SpO2: (!) 92% 98%   Weight: 53.5 kg (118 lb)    Height: 5' 6" (1.676 m)      BMI: Body mass index is 19.05 kg/m².  BSA Body surface area is 1.58 meters squared.  ECOG Performance Status:   ECOG SCORE         GENERAL APPEARANCE: Well developed, well nourished, in no acute distress.  SKIN: Inspection of the skin reveals no rashes, ulcerations or petechiae.  HEENT: The sclerae were anicteric and conjunctivae were pink and moist. Extraocular movements were intact and pupils were equal, round, and reactive to light with normal accommodation. External inspection of the ears and nose showed no scars, lesions, or masses. Lips, teeth, and gums showed normal mucosa. The oral mucosa, hard and soft palate, tongue and posterior pharynx were normal.  NECK: Supple and symmetric. There was no thyroid enlargement, and no tenderness, or masses were felt.  CRESPIRATORY: Normal AP diameter and normal contour without any kyphoscoliosis. LUNGS: Auscultation of the lungs revealed normal breath sounds without any other adventitious sounds or rubs.  CARDIOVASCULAR: There was a regular rate and rhythm without any murmurs, gallops, rubs. The carotid pulses were normal and 2+ bilaterally without bruits. Peripheral pulses were 2+ and symmetric.  GASTROINTESTINAL: Soft and nontender with normal bowel sounds. The liver span was " approximately 5-6 cm in the right midclavicular line by percussion. The liver edge was nontender. The spleen was not palpable. There were no inguinal or umbilical hernias noted. No ascites was noted.  LYMPH NODES: No lymphadenopathy was appreciated in the neck, axillae or groin.  MUSCULOSKELETAL: Gait was normal. There was no tenderness or effusions noted. Muscle strength and tone were normal. EXTREMITIES: No cyanosis, clubbing or edema.  NEUROLOGIC: Alert and oriented x 3. Normal affect. Gait was normal. Normal deep tendon reflexes with no pathological reflexes. Sensation to touch was normal.  PSYCHIATRIC: good mood, orientated to place, time and person     Labs and Imagings         Imaging:     Assessment:     Principle Problem: 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, unspecified laterality [C50.919, C79.51]   Co-morbidity/Active Problems:   Patient Active Problem List   Diagnosis    Breast cancer metastasized to bone    Neuropathy    Fatigue associated with anemia    Drug-related hair loss    Nausea    Primary insomnia    Foot infection    Cancer, metastatic to bone    Malignant neoplasm of overlapping 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, estrogen receptor positive     Chronic fatigue    Diverticulosis    Dehydration    Hypoxemia    Iron deficiency anemia secondary to inadequate dietary iron intake    Hypoalbuminemia        Benita Carballo is a 74 y.o. 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 A diagnosis of Cancer, metastatic to bone was also pertinent to this visit., with 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, unspecified laterality [C50.919, C79.51]       Metastatic breast cancer ERPR positive HER2 Noah questionable  Status post of chemotherapy and HER2 targeted therapy with progression on HER2 targeted therapy  Recent testing fish negative for her 2 2018  Repeat the lipid biopsy 2019  shows HER2 positive and ER  negative  Bone metastatic disease  Fatigue  Hypoxia  Dehydration    Plan:   Overall Plan:    She has HER2 positive disease with ERPR negative by the new liquid biopsy. Started Taxol/Herceptin 2/2020  Discussed ECHO with pt, showing right heart failure   Seen by Dr Gardner, cardiologist and recommended that Rt heart failure unlikely related to chemo, likely related to COPD and ILD.   Herceptin/taxol resumed last week with poor tolerance by pt, weight loss, fatigue reported, will hold tx this week, plan to resume next week at low dose, discussed with Dr Contreras  Discussed with Dr Contreras and he recommended reducing dose of herceptin further  Lasix x 3 days with 10 lbs wt loss, p is instructed to use prn, pt has not had to use Lasix over last 3 weeks   Compression sleeve for dependant upper ext swelling  Pt is wanting to stop oxycontin and norco due to out of pocket cost. She is requesting to resume oxycodone 5 mg TID when she runs out of oxycontin/norco. She states works better and more affordable.  Labs reviewed today and she is cleared for tx.      Rosalind Ricks NPEstabldanii Patient - Audio Only Telehealth Visit     The patient location is: home  The chief complaint leading to consultation is: chemo clearnace  Visit type: Virtual visit with audio only (telephone)     The reason for the audio only service rather than synchronous audio and video virtual visit was related to technical difficulties or patient preference/necessity.     Each patient to whom I provide medical services by telemedicine is:  (1) informed of the relationship between the physician and patient and the respective role of any other health care provider with respect to management of the patient; and (2) notified that they may decline to receive medical services by telemedicine and may withdraw from such care at any time. Patient verbally consented to receive this service via voice-only telephone call.       HPI: see above     Assessment and plan:  See  above        Medical Oncology Progress Note  [facility]     PATIENT: Benita Carballo  : 1945 74 y.o. female  MRN [id number]     PCP: Solomon Contreras MD  Consult Requested By:      Date of Service: 2020    Subjective:   Interim History:  Malignant neoplasm of overlapping sites of left breast    Benita Carballo is here for to followup breast cancer treatment.    The patient was started on  chemotherapy last week with Herceptin and Taxol weekly.  She has chronic hypoxia and her oxygen saturation has been declining today her O2 status 77% she looks purple and she also has increasing degree of short of breath; in addition she complains of swelling to her left arm.  She had no chest pain. She was admittted last week to the hospital and underwent thoracentesis, which was not tested for cytology and imaging for possible PE was ruled out. Today she states she is feeling better but POX was 82 % on RA, and still has yosi upper ext swelling which she states has improved. She has not had a echo so will order ECHO stat to r/o any CHF which could be causing her swelling both lung and upper ext. States she had upper u/s with her pulm which was neg for DVT.     3/26/20   She returns with ECHO showing EF 55-60%, but right sided heart failure noted. Pt has an additional 12 # of wt gain since last week. She states she is feeling fine, better than at last visit. Discussed ECHO and will hold herceptin and obtain MUGA, will proceed with weekly taxol and possibly will add herceptin back after MUGA completed and will refer pt to cardiology, but could be delayed due to COVID pandemic.     2020  Today she presents with significant fluid loss noted. No longer upper ext third space swelling noted, alb and T Pro dramatically improved, pt has last 10 + lbs of fluid. She is breathing easy today and denies any SOB or ORELLANA. We will proceed with weekly taxol and continue to hold herceptin until MUGA or  cardiology clearance is obtained. She was seen yesterday with Dr Gardner. Below is a portion of his note:       Oncology History:  Oncology History Overview Note   2002 Rt breast [place] s/[place] lump with [place] Tam[place] x 1 yr, then Arimidex x 5 yrs. Completed tx 2008.     2015  c/o chronic cough with CT chest 1/28/16 showing extensive adenopathy  With base of lt neck mass 4.5 cm with yosi pulm nodules. RF to Dr ANDREWS Mcnally    2/6/16 bx of lt neck mass carcinoma with feature suggestive of breast primary. ER/%, Her2 borderline by IHC and deemed equivocal by FISH       Breast cancer metastasized to bone   2/4/2016 Initial Diagnosis    Breast cancer metastasized to bone, , lymphoid mass      5/25/2016 - 2/9/2017 Chemotherapy    Taxol/Herceptin x 8 cycles then perjeta added 7/16  Prescribed by Dr Mcnally      2/20/2017 - 6/17/2017 Chemotherapy    Kadcycla prescribed by Dr Mcnally      6/28/2017 Biopsy    Biopsy of rt abd met disease consistent with breast cancer. ER/NC + Her 2 neg per Dr Moreno's note.    Dr Moreno noted path review from bx 2/16 showed HER 2 + equivocal with FISH reading as Equivocal but ratio 1.2. Additional report form 2016 states additional testing was done with different probe and the equivocal results can be noted as negative.     Treatment therapy changed from Her2 based tx to endocrine base therapy by Dr Moreno      6/28/2017 -  Hormone Therapy    Arimidex 6/17-2/18  Aromasin 2/18  Ibrance + Aromasin 5/18 -1/19  Ibrance + faslodex 2/19 4/30/2019 Imaging Significant Findings    PET/CT: Lesions involving the medial clavicle , subcarinal LN, pleural based soft tissue density adjacent to T11 and lesion in left lobe of liver diminished in size and SUV.   RECIST 34% decrease in disease     2/26/2020 -  Chemotherapy    Treatment Summary   Plan Name: OP BREAST TRASTUZUMAB PACLITAXEL QW  Treatment Goal: Control  Status: Active  Start Date: 2/26/2020  End Date: 7/2/2020 (Planned)  Provider: Solomon GOMES  MD Ben  Chemotherapy: PACLitaxel (TAXOL) 64 mg/m2 = 102 mg in sodium chloride 0.9% 250 mL chemo infusion, 64 mg/m2 = 102 mg (100 % of original dose 64 mg/m2), Intravenous, Clinic/HOD 1 time, 12 of 14 cycles  Dose modification: 64 mg/m2 (original dose 64 mg/m2, Cycle 1), 50 mg/m2 (original dose 64 mg/m2, Cycle 14)  trastuzumab 211 mg in sodium chloride 0.9% 250 mL chemo infusion, 4 mg/kg = 211 mg, Intravenous, Clinic/HOD 1 time, 7 of 9 cycles  Dose modification: 2 mg/kg (original dose 2 mg/kg, Cycle 7, Reason: MD Discretion), 80 mg (original dose 2 mg/kg, Cycle 8)       ==8/2019 Now She is currently on combined afinitor + aromasin,  repeated CT scan shows improvement  PET 1/2020 showed PD,  afinitor + aromasin stopped  == PET scan 01/20/2020 the progressive  ==Started Taxol/Herceptin weekly 2/2020    Past Medical History:   Past Medical History:   Diagnosis Date    Arthritis     Bone cancer     Breast cancer     Cancer     Cataract     Depression     Diverticulosis 1/15/2020    Fatigue associated with anemia 6/6/2019    GERD (gastroesophageal reflux disease)     High cholesterol     Hypoxemia 3/12/2020    Skin problem     Sleep disorder        Past Surgical HIstory:   Past Surgical History:   Procedure Laterality Date    APPENDECTOMY      BREAST LUMPECTOMY Right 2002    CATARACT EXTRACTION, BILATERAL  2014    COLONOSCOPY  2008    HYSTERECTOMY      PORTACATH PLACEMENT  05/18/2016    SURGICAL REMOVAL OF NODULE OF VOCAL CORD         Allergies:  Review of patient's allergies indicates:   Allergen Reactions    Benadryl [diphenhydramine hcl]      Restless leg     Medrol [methylprednisolone] Hallucinations    Penicillins        Medications:  Current Outpatient Medications   Medication Sig Dispense Refill    ANORO ELLIPTA 62.5-25 mcg/actuation DsDv       dexAMETHasone 0.5 mg/5 mL Soln SWISH WITH 10 ML PO FOR 2 MIN THEN SPIT QID      exemestane (AROMASIN) 25 mg tablet Take 1 tablet (25 mg total)  by mouth once daily. 30 tablet 3    furosemide (LASIX) 40 MG tablet Take 1 tablet (40 mg total) by mouth once daily. 30 tablet 11    gabapentin (NEURONTIN) 400 MG capsule Take 1 capsule (400 mg total) by mouth 3 (three) times daily. 120 capsule 4    iron-vitamin C 100-250 mg, ICAR-C, (ICAR-C) 100-250 mg Tab Take 1 tablet by mouth once daily. 30 tablet 5    LORazepam (ATIVAN) 1 MG tablet TAKE 1 TABLET BY MOUTH EVERY EVENING 30 tablet 0    megestrol (MEGACE) 400 mg/10 mL (40 mg/mL) Susp Take 10 mLs (400 mg total) by mouth once daily. 240 mL 2    pantoprazole (PROTONIX) 40 MG tablet TAKE 1 TABLET BY MOUTH EVERY DAY 90 tablet 0    potassium chloride SA (K-DUR,KLOR-CON) 20 MEQ tablet Take 1 tablet (20 mEq total) by mouth once daily. 30 tablet 11    PROAIR HFA 90 mcg/actuation inhaler       promethazine (PHENERGAN) 25 MG tablet Take 1 tablet (25 mg total) by mouth every 6 (six) hours as needed for Nausea. 30 tablet 3    temazepam (RESTORIL) 15 mg Cap TAKE 1 CAPSULE(15 MG) BY MOUTH EVERY NIGHT 30 capsule 0     No current facility-administered medications for this visit.        Family History:   Family History   Problem Relation Age of Onset    Lung cancer Father     Melanoma Father        Social History:  reports that she has been smoking cigarettes. She has a 2.00 pack-year smoking history. She has never used smokeless tobacco. She reports current alcohol use of about 4.0 standard drinks of alcohol per week. She reports that she does not use drugs.    Review of Systemas  Constitutional: No change in weight, appetie, fatigue, fever, or night sweats  Eyes: No changes in vision  Ears, Nose, Mouth, Throat, and Face: No hearing problems, ear pain, rummy nose, or sore throat  Respiratory: No shortness of breath or cough  Cardiovascular: No chest pain, palpitations or dizziness  Gastrointestinal: No abdominal pain, hematochezia, melena  Genitourinary: No dysuria, hematuria, nocturia, menstrual problems, post  approximately 5-6 cm in the right midclavicular line by percussion. The liver edge was nontender. The spleen was not palpable. There were no inguinal or umbilical hernias noted. No ascites was noted.  LYMPH NODES: No lymphadenopathy was appreciated in the neck, axillae or groin.  MUSCULOSKELETAL: Gait was normal. There was no tenderness or effusions noted. Muscle strength and tone were normal. EXTREMITIES: No cyanosis, clubbing or edema.  NEUROLOGIC: Alert and oriented x 3. Normal affect. Gait was normal. Normal deep tendon reflexes with no pathological reflexes. Sensation to touch was normal.  PSYCHIATRIC: good mood, orientated to place, time and person     Labs and Imagings     Lab Results   Component Value Date    WBC 7.1 05/21/2020    HGB 11.4 (L) 05/21/2020    HCT 37.4 05/21/2020    LABPLAT 295 05/21/2020       CMP  Sodium   Date Value Ref Range Status   06/11/2020 140 135 - 145 mmol/L Final     Potassium   Date Value Ref Range Status   06/11/2020 4.2 3.6 - 5.2 mmol/L Final     Chloride   Date Value Ref Range Status   06/11/2020 101 100 - 108 mmol/L Final     CO2   Date Value Ref Range Status   06/11/2020 34 (H) 21 - 32 mmol/L Final     Glucose   Date Value Ref Range Status   06/11/2020 103 70 - 110 mg/dL Final     BUN, Bld   Date Value Ref Range Status   06/11/2020 9 7 - 18 mg/dL Final     Creatinine   Date Value Ref Range Status   06/11/2020 0.96 0.55 - 1.02 mg/dL Final     Calcium   Date Value Ref Range Status   06/11/2020 8.9 8.8 - 10.5 mg/dL Final     Total Protein   Date Value Ref Range Status   06/11/2020 6.1 (L) 6.4 - 8.2 g/dL Final     Albumin   Date Value Ref Range Status   06/11/2020 2.9 (L) 3.4 - 5.0 g/dL Final     Total Bilirubin   Date Value Ref Range Status   06/11/2020 0.4 0.0 - 1.0 mg/dL Final     Alkaline Phosphatase   Date Value Ref Range Status   06/11/2020 62 46 - 116 U/L Final     AST   Date Value Ref Range Status   06/11/2020 15 15 - 37 U/L Final     ALT   Date Value Ref Range Status    06/11/2020 18 12 - 78 U/L Final     Anion Gap   Date Value Ref Range Status   06/11/2020 5.0 3.0 - 11.0 mmol/L Final         Imaging:     Assessment:     Principle Problem: 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, unspecified laterality [C50.919, C79.51]   Co-morbidity/Active Problems:   Patient Active Problem List   Diagnosis    Breast cancer metastasized to bone    Neuropathy    Fatigue associated with anemia    Drug-related hair loss    Nausea    Primary insomnia    Foot infection    Cancer, metastatic to bone    Malignant neoplasm of overlapping 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, estrogen receptor positive     Chronic fatigue    Diverticulosis    Dehydration    Hypoxemia    Iron deficiency anemia secondary to inadequate dietary iron intake    Hypoalbuminemia        Benita Carballo is a 74 y.o. 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 Started Taxol/Herceptin 2/2020  2.Discussed ECHO with pt, showing right heart failure 3/2020  3.Seen by Dr Gardner, cardiologist and recommended that Rt heart failure unlikely related to chemo, likely related to COPD and ILD.   4. Lasix x 3 days with 10 lbs wt loss, p is instructed to use prn, pt has not had to use Lasix over last 3 weeks   Compression sleeve for dependant upper ext swelling  5.  [facility] one molecular testing requested   6. Pt received taxol/herceptin last week and states felt good until Monday, Starting Monday she has become weaker, increased SOB, wet cough, poor appetite and increase in pain. Missed PET scan due feeling 'too bad to go'   7.Chemo held this week, will start abx for wet cough   8. PET r/s for next week  9. Discussed palliative care and pt is agreeable to referral  10. Call placed to [facility] for portable oxygen needs.   11. RTC in 1 week with PET scan     Rosalind Ricks NP              - - -